# Patient Record
Sex: MALE | Race: WHITE | NOT HISPANIC OR LATINO | Employment: OTHER | URBAN - METROPOLITAN AREA
[De-identification: names, ages, dates, MRNs, and addresses within clinical notes are randomized per-mention and may not be internally consistent; named-entity substitution may affect disease eponyms.]

---

## 2024-07-08 ENCOUNTER — HOSPITAL ENCOUNTER (OUTPATIENT)
Dept: RADIOLOGY | Facility: MEDICAL CENTER | Age: 69
End: 2024-07-08

## 2024-07-08 ENCOUNTER — HOSPITAL ENCOUNTER (INPATIENT)
Facility: MEDICAL CENTER | Age: 69
LOS: 9 days | DRG: 377 | End: 2024-07-17
Attending: INTERNAL MEDICINE | Admitting: INTERNAL MEDICINE
Payer: MEDICARE

## 2024-07-08 DIAGNOSIS — I21.4 NSTEMI (NON-ST ELEVATED MYOCARDIAL INFARCTION) (HCC): ICD-10-CM

## 2024-07-08 DIAGNOSIS — I63.9 ACUTE CVA (CEREBROVASCULAR ACCIDENT) (HCC): ICD-10-CM

## 2024-07-08 DIAGNOSIS — Z89.432 STATUS POST AMPUTATION OF LEFT FOOT THROUGH METATARSAL BONE (HCC): ICD-10-CM

## 2024-07-08 DIAGNOSIS — E83.51 HYPOCALCEMIA: ICD-10-CM

## 2024-07-08 DIAGNOSIS — D62 ACUTE BLOOD LOSS ANEMIA: ICD-10-CM

## 2024-07-08 DIAGNOSIS — M86.172 ACUTE OSTEOMYELITIS OF LEFT FOOT (HCC): ICD-10-CM

## 2024-07-08 DIAGNOSIS — K92.1 GASTROINTESTINAL HEMORRHAGE WITH MELENA: ICD-10-CM

## 2024-07-08 DIAGNOSIS — I48.91 ATRIAL FIBRILLATION, UNSPECIFIED TYPE (HCC): ICD-10-CM

## 2024-07-08 DIAGNOSIS — L97.509 TYPE 2 DIABETES MELLITUS WITH FOOT ULCER, WITH LONG-TERM CURRENT USE OF INSULIN (HCC): ICD-10-CM

## 2024-07-08 DIAGNOSIS — E11.621 TYPE 2 DIABETES MELLITUS WITH FOOT ULCER, WITH LONG-TERM CURRENT USE OF INSULIN (HCC): ICD-10-CM

## 2024-07-08 DIAGNOSIS — R60.0 LOCALIZED EDEMA: ICD-10-CM

## 2024-07-08 DIAGNOSIS — K26.4 GASTROINTESTINAL HEMORRHAGE ASSOCIATED WITH DUODENAL ULCER: ICD-10-CM

## 2024-07-08 DIAGNOSIS — Z79.4 TYPE 2 DIABETES MELLITUS WITH FOOT ULCER, WITH LONG-TERM CURRENT USE OF INSULIN (HCC): ICD-10-CM

## 2024-07-08 DIAGNOSIS — I24.9 ACUTE CORONARY SYNDROME (HCC): ICD-10-CM

## 2024-07-08 PROBLEM — E11.9 TYPE 2 DIABETES MELLITUS (HCC): Status: ACTIVE | Noted: 2024-07-08

## 2024-07-08 PROBLEM — K92.2 GI BLEED: Status: ACTIVE | Noted: 2024-07-08

## 2024-07-08 PROBLEM — I05.0 MITRAL STENOSIS: Status: ACTIVE | Noted: 2024-07-08

## 2024-07-08 LAB
ABO GROUP BLD: NORMAL
ALBUMIN SERPL BCP-MCNC: 2.4 G/DL (ref 3.2–4.9)
ALBUMIN/GLOB SERPL: 1.4 G/DL
ALP SERPL-CCNC: 62 U/L (ref 30–99)
ALT SERPL-CCNC: 17 U/L (ref 2–50)
ANION GAP SERPL CALC-SCNC: 10 MMOL/L (ref 7–16)
APTT PPP: 21.5 SEC (ref 24.7–36)
AST SERPL-CCNC: 14 U/L (ref 12–45)
BARCODED ABORH UBTYP: 5100
BARCODED PRD CODE UBPRD: NORMAL
BARCODED UNIT NUM UBUNT: NORMAL
BASOPHILS # BLD AUTO: 0.2 % (ref 0–1.8)
BASOPHILS # BLD: 0.04 K/UL (ref 0–0.12)
BILIRUB SERPL-MCNC: 0.4 MG/DL (ref 0.1–1.5)
BLD GP AB SCN SERPL QL: NORMAL
BUN SERPL-MCNC: 30 MG/DL (ref 8–22)
CALCIUM ALBUM COR SERPL-MCNC: 8.6 MG/DL (ref 8.5–10.5)
CALCIUM SERPL-MCNC: 7.3 MG/DL (ref 8.5–10.5)
CHLORIDE SERPL-SCNC: 111 MMOL/L (ref 96–112)
CK SERPL-CCNC: 101 U/L (ref 0–154)
CO2 SERPL-SCNC: 17 MMOL/L (ref 20–33)
COMPONENT R 8504R: NORMAL
CREAT SERPL-MCNC: 0.69 MG/DL (ref 0.5–1.4)
CRP SERPL HS-MCNC: 1.3 MG/DL (ref 0–0.75)
EKG IMPRESSION: NORMAL
EOSINOPHIL # BLD AUTO: 0.06 K/UL (ref 0–0.51)
EOSINOPHIL NFR BLD: 0.3 % (ref 0–6.9)
ERYTHROCYTE [DISTWIDTH] IN BLOOD BY AUTOMATED COUNT: 46.5 FL (ref 35.9–50)
ERYTHROCYTE [SEDIMENTATION RATE] IN BLOOD BY WESTERGREN METHOD: 18 MM/HOUR (ref 0–20)
GFR SERPLBLD CREATININE-BSD FMLA CKD-EPI: 99 ML/MIN/1.73 M 2
GLOBULIN SER CALC-MCNC: 1.7 G/DL (ref 1.9–3.5)
GLUCOSE SERPL-MCNC: 155 MG/DL (ref 65–99)
HCT VFR BLD AUTO: 21.4 % (ref 42–52)
HGB BLD-MCNC: 7.3 G/DL (ref 14–18)
IMM GRANULOCYTES # BLD AUTO: 0.36 K/UL (ref 0–0.11)
IMM GRANULOCYTES NFR BLD AUTO: 2 % (ref 0–0.9)
INR PPP: 1.24 (ref 0.87–1.13)
LYMPHOCYTES # BLD AUTO: 2.07 K/UL (ref 1–4.8)
LYMPHOCYTES NFR BLD: 11.6 % (ref 22–41)
MCH RBC QN AUTO: 29.9 PG (ref 27–33)
MCHC RBC AUTO-ENTMCNC: 34.1 G/DL (ref 32.3–36.5)
MCV RBC AUTO: 87.7 FL (ref 81.4–97.8)
MONOCYTES # BLD AUTO: 1.02 K/UL (ref 0–0.85)
MONOCYTES NFR BLD AUTO: 5.7 % (ref 0–13.4)
NEUTROPHILS # BLD AUTO: 14.29 K/UL (ref 1.82–7.42)
NEUTROPHILS NFR BLD: 80.2 % (ref 44–72)
NRBC # BLD AUTO: 0.22 K/UL
NRBC BLD-RTO: 1.2 /100 WBC (ref 0–0.2)
PLATELET # BLD AUTO: 258 K/UL (ref 164–446)
PMV BLD AUTO: 9.9 FL (ref 9–12.9)
POTASSIUM SERPL-SCNC: 3.6 MMOL/L (ref 3.6–5.5)
PRODUCT TYPE UPROD: NORMAL
PROT SERPL-MCNC: 4.1 G/DL (ref 6–8.2)
PROTHROMBIN TIME: 15.7 SEC (ref 12–14.6)
RBC # BLD AUTO: 2.44 M/UL (ref 4.7–6.1)
RH BLD: NORMAL
SODIUM SERPL-SCNC: 138 MMOL/L (ref 135–145)
TROPONIN T SERPL-MCNC: 933 NG/L (ref 6–19)
UNIT STATUS USTAT: NORMAL
WBC # BLD AUTO: 17.8 K/UL (ref 4.8–10.8)

## 2024-07-08 PROCEDURE — C9113 INJ PANTOPRAZOLE SODIUM, VIA: HCPCS | Performed by: INTERNAL MEDICINE

## 2024-07-08 PROCEDURE — 93010 ELECTROCARDIOGRAM REPORT: CPT | Performed by: INTERNAL MEDICINE

## 2024-07-08 PROCEDURE — 85652 RBC SED RATE AUTOMATED: CPT

## 2024-07-08 PROCEDURE — 80053 COMPREHEN METABOLIC PANEL: CPT

## 2024-07-08 PROCEDURE — 99223 1ST HOSP IP/OBS HIGH 75: CPT | Mod: AI | Performed by: INTERNAL MEDICINE

## 2024-07-08 PROCEDURE — 84484 ASSAY OF TROPONIN QUANT: CPT

## 2024-07-08 PROCEDURE — 770000 HCHG ROOM/CARE - INTERMEDIATE ICU *

## 2024-07-08 PROCEDURE — 85730 THROMBOPLASTIN TIME PARTIAL: CPT

## 2024-07-08 PROCEDURE — 700111 HCHG RX REV CODE 636 W/ 250 OVERRIDE (IP): Performed by: INTERNAL MEDICINE

## 2024-07-08 PROCEDURE — 700105 HCHG RX REV CODE 258: Performed by: INTERNAL MEDICINE

## 2024-07-08 PROCEDURE — 93005 ELECTROCARDIOGRAM TRACING: CPT | Performed by: INTERNAL MEDICINE

## 2024-07-08 PROCEDURE — 85610 PROTHROMBIN TIME: CPT

## 2024-07-08 PROCEDURE — 86850 RBC ANTIBODY SCREEN: CPT

## 2024-07-08 PROCEDURE — 85025 COMPLETE CBC W/AUTO DIFF WBC: CPT

## 2024-07-08 PROCEDURE — 86900 BLOOD TYPING SEROLOGIC ABO: CPT

## 2024-07-08 PROCEDURE — 86140 C-REACTIVE PROTEIN: CPT

## 2024-07-08 PROCEDURE — 86901 BLOOD TYPING SEROLOGIC RH(D): CPT

## 2024-07-08 PROCEDURE — 82550 ASSAY OF CK (CPK): CPT

## 2024-07-08 RX ORDER — OXYCODONE HYDROCHLORIDE 5 MG/1
5 TABLET ORAL
Status: DISCONTINUED | OUTPATIENT
Start: 2024-07-08 | End: 2024-07-15

## 2024-07-08 RX ORDER — POLYETHYLENE GLYCOL 3350 17 G/17G
1 POWDER, FOR SOLUTION ORAL
Status: DISCONTINUED | OUTPATIENT
Start: 2024-07-08 | End: 2024-07-17 | Stop reason: HOSPADM

## 2024-07-08 RX ORDER — ONDANSETRON 4 MG/1
4 TABLET, ORALLY DISINTEGRATING ORAL EVERY 4 HOURS PRN
Status: DISCONTINUED | OUTPATIENT
Start: 2024-07-08 | End: 2024-07-17 | Stop reason: HOSPADM

## 2024-07-08 RX ORDER — SODIUM CHLORIDE 9 MG/ML
INJECTION, SOLUTION INTRAVENOUS CONTINUOUS
Status: DISCONTINUED | OUTPATIENT
Start: 2024-07-08 | End: 2024-07-09

## 2024-07-08 RX ORDER — ACETAMINOPHEN 325 MG/1
650 TABLET ORAL EVERY 6 HOURS PRN
Status: DISCONTINUED | OUTPATIENT
Start: 2024-07-08 | End: 2024-07-17 | Stop reason: HOSPADM

## 2024-07-08 RX ORDER — HYDROMORPHONE HYDROCHLORIDE 1 MG/ML
0.25 INJECTION, SOLUTION INTRAMUSCULAR; INTRAVENOUS; SUBCUTANEOUS
Status: DISCONTINUED | OUTPATIENT
Start: 2024-07-08 | End: 2024-07-14

## 2024-07-08 RX ORDER — ONDANSETRON 2 MG/ML
4 INJECTION INTRAMUSCULAR; INTRAVENOUS EVERY 4 HOURS PRN
Status: DISCONTINUED | OUTPATIENT
Start: 2024-07-08 | End: 2024-07-17 | Stop reason: HOSPADM

## 2024-07-08 RX ORDER — AMOXICILLIN 250 MG
2 CAPSULE ORAL EVERY EVENING
Status: DISCONTINUED | OUTPATIENT
Start: 2024-07-08 | End: 2024-07-17 | Stop reason: HOSPADM

## 2024-07-08 RX ORDER — PANTOPRAZOLE SODIUM 40 MG/10ML
40 INJECTION, POWDER, LYOPHILIZED, FOR SOLUTION INTRAVENOUS 2 TIMES DAILY
Status: DISCONTINUED | OUTPATIENT
Start: 2024-07-08 | End: 2024-07-13

## 2024-07-08 RX ORDER — DEXTROSE MONOHYDRATE 25 G/50ML
25 INJECTION, SOLUTION INTRAVENOUS
Status: DISCONTINUED | OUTPATIENT
Start: 2024-07-08 | End: 2024-07-09

## 2024-07-08 RX ORDER — LABETALOL HYDROCHLORIDE 5 MG/ML
10 INJECTION, SOLUTION INTRAVENOUS EVERY 4 HOURS PRN
Status: DISCONTINUED | OUTPATIENT
Start: 2024-07-08 | End: 2024-07-17 | Stop reason: HOSPADM

## 2024-07-08 RX ORDER — OXYCODONE HYDROCHLORIDE 5 MG/1
2.5 TABLET ORAL
Status: DISCONTINUED | OUTPATIENT
Start: 2024-07-08 | End: 2024-07-15

## 2024-07-08 RX ADMIN — PANTOPRAZOLE SODIUM 40 MG: 40 INJECTION, POWDER, FOR SOLUTION INTRAVENOUS at 21:45

## 2024-07-08 RX ADMIN — SODIUM CHLORIDE: 9 INJECTION, SOLUTION INTRAVENOUS at 22:16

## 2024-07-08 RX ADMIN — PIPERACILLIN AND TAZOBACTAM 3.38 G: 3; .375 INJECTION, POWDER, FOR SOLUTION INTRAVENOUS at 23:19

## 2024-07-08 SDOH — ECONOMIC STABILITY: TRANSPORTATION INSECURITY
IN THE PAST 12 MONTHS, HAS THE LACK OF TRANSPORTATION KEPT YOU FROM MEDICAL APPOINTMENTS OR FROM GETTING MEDICATIONS?: NO

## 2024-07-08 SDOH — ECONOMIC STABILITY: TRANSPORTATION INSECURITY
IN THE PAST 12 MONTHS, HAS LACK OF RELIABLE TRANSPORTATION KEPT YOU FROM MEDICAL APPOINTMENTS, MEETINGS, WORK OR FROM GETTING THINGS NEEDED FOR DAILY LIVING?: NO

## 2024-07-08 ASSESSMENT — COGNITIVE AND FUNCTIONAL STATUS - GENERAL
MOBILITY SCORE: 16
STANDING UP FROM CHAIR USING ARMS: A LITTLE
DRESSING REGULAR LOWER BODY CLOTHING: A LITTLE
SUGGESTED CMS G CODE MODIFIER MOBILITY: CK
MOVING TO AND FROM BED TO CHAIR: A LITTLE
DAILY ACTIVITIY SCORE: 20
SUGGESTED CMS G CODE MODIFIER DAILY ACTIVITY: CJ
MOVING FROM LYING ON BACK TO SITTING ON SIDE OF FLAT BED: A LITTLE
CLIMB 3 TO 5 STEPS WITH RAILING: A LOT
DRESSING REGULAR UPPER BODY CLOTHING: A LITTLE
HELP NEEDED FOR BATHING: A LITTLE
TURNING FROM BACK TO SIDE WHILE IN FLAT BAD: A LITTLE
WALKING IN HOSPITAL ROOM: A LOT
TOILETING: A LITTLE

## 2024-07-08 ASSESSMENT — ENCOUNTER SYMPTOMS
PALPITATIONS: 0
CHILLS: 0
TREMORS: 0
NERVOUS/ANXIOUS: 0
SPUTUM PRODUCTION: 0
LOSS OF CONSCIOUSNESS: 1
BACK PAIN: 0
HEARTBURN: 0
SPEECH CHANGE: 0
NECK PAIN: 0
VOMITING: 0
POLYDIPSIA: 0
DOUBLE VISION: 0
WEAKNESS: 1
ORTHOPNEA: 0
NAUSEA: 0
DIZZINESS: 1
FOCAL WEAKNESS: 0
HEMOPTYSIS: 0
FLANK PAIN: 0
PHOTOPHOBIA: 0
BRUISES/BLEEDS EASILY: 0
FALLS: 1
WEIGHT LOSS: 0
HALLUCINATIONS: 0
COUGH: 0
HEADACHES: 0
FEVER: 0
BLURRED VISION: 0

## 2024-07-08 ASSESSMENT — PATIENT HEALTH QUESTIONNAIRE - PHQ9
SUM OF ALL RESPONSES TO PHQ9 QUESTIONS 1 AND 2: 0
1. LITTLE INTEREST OR PLEASURE IN DOING THINGS: NOT AT ALL
2. FEELING DOWN, DEPRESSED, IRRITABLE, OR HOPELESS: NOT AT ALL

## 2024-07-08 ASSESSMENT — PAIN DESCRIPTION - PAIN TYPE: TYPE: ACUTE PAIN

## 2024-07-08 ASSESSMENT — SOCIAL DETERMINANTS OF HEALTH (SDOH)
IN THE PAST 12 MONTHS, HAS THE ELECTRIC, GAS, OIL, OR WATER COMPANY THREATENED TO SHUT OFF SERVICE IN YOUR HOME?: NO
WITHIN THE PAST 12 MONTHS, THE FOOD YOU BOUGHT JUST DIDN'T LAST AND YOU DIDN'T HAVE MONEY TO GET MORE: NEVER TRUE
WITHIN THE PAST 12 MONTHS, YOU WORRIED THAT YOUR FOOD WOULD RUN OUT BEFORE YOU GOT THE MONEY TO BUY MORE: NEVER TRUE
WITHIN THE LAST YEAR, HAVE YOU BEEN KICKED, HIT, SLAPPED, OR OTHERWISE PHYSICALLY HURT BY YOUR PARTNER OR EX-PARTNER?: NO
WITHIN THE LAST YEAR, HAVE YOU BEEN AFRAID OF YOUR PARTNER OR EX-PARTNER?: NO
WITHIN THE LAST YEAR, HAVE TO BEEN RAPED OR FORCED TO HAVE ANY KIND OF SEXUAL ACTIVITY BY YOUR PARTNER OR EX-PARTNER?: NO
WITHIN THE LAST YEAR, HAVE YOU BEEN HUMILIATED OR EMOTIONALLY ABUSED IN OTHER WAYS BY YOUR PARTNER OR EX-PARTNER?: NO

## 2024-07-08 ASSESSMENT — LIFESTYLE VARIABLES: SUBSTANCE_ABUSE: 0

## 2024-07-09 ENCOUNTER — ANESTHESIA (OUTPATIENT)
Dept: SURGERY | Facility: MEDICAL CENTER | Age: 69
DRG: 377 | End: 2024-07-09
Payer: MEDICARE

## 2024-07-09 ENCOUNTER — ANESTHESIA EVENT (OUTPATIENT)
Dept: SURGERY | Facility: MEDICAL CENTER | Age: 69
DRG: 377 | End: 2024-07-09
Payer: MEDICARE

## 2024-07-09 LAB
ABO + RH BLD: NORMAL
ALBUMIN SERPL BCP-MCNC: 2.3 G/DL (ref 3.2–4.9)
ALBUMIN/GLOB SERPL: 1.4 G/DL
ALP SERPL-CCNC: 62 U/L (ref 30–99)
ALT SERPL-CCNC: 16 U/L (ref 2–50)
ANION GAP SERPL CALC-SCNC: 8 MMOL/L (ref 7–16)
AST SERPL-CCNC: 13 U/L (ref 12–45)
BASOPHILS # BLD AUTO: 0.2 % (ref 0–1.8)
BASOPHILS # BLD: 0.04 K/UL (ref 0–0.12)
BILIRUB SERPL-MCNC: 0.5 MG/DL (ref 0.1–1.5)
BUN SERPL-MCNC: 28 MG/DL (ref 8–22)
CALCIUM ALBUM COR SERPL-MCNC: 8.7 MG/DL (ref 8.5–10.5)
CALCIUM SERPL-MCNC: 7.3 MG/DL (ref 8.5–10.5)
CHLORIDE SERPL-SCNC: 113 MMOL/L (ref 96–112)
CO2 SERPL-SCNC: 17 MMOL/L (ref 20–33)
CREAT SERPL-MCNC: 0.63 MG/DL (ref 0.5–1.4)
EOSINOPHIL # BLD AUTO: 0.07 K/UL (ref 0–0.51)
EOSINOPHIL NFR BLD: 0.4 % (ref 0–6.9)
ERYTHROCYTE [DISTWIDTH] IN BLOOD BY AUTOMATED COUNT: 45.9 FL (ref 35.9–50)
GFR SERPLBLD CREATININE-BSD FMLA CKD-EPI: 101 ML/MIN/1.73 M 2
GLOBULIN SER CALC-MCNC: 1.7 G/DL (ref 1.9–3.5)
GLUCOSE BLD STRIP.AUTO-MCNC: 144 MG/DL (ref 65–99)
GLUCOSE BLD STRIP.AUTO-MCNC: 150 MG/DL (ref 65–99)
GLUCOSE BLD STRIP.AUTO-MCNC: 155 MG/DL (ref 65–99)
GLUCOSE BLD STRIP.AUTO-MCNC: 229 MG/DL (ref 65–99)
GLUCOSE BLD STRIP.AUTO-MCNC: 245 MG/DL (ref 65–99)
GLUCOSE SERPL-MCNC: 157 MG/DL (ref 65–99)
HCT VFR BLD AUTO: 23.3 % (ref 42–52)
HGB BLD-MCNC: 7.6 G/DL (ref 14–18)
HGB BLD-MCNC: 8.2 G/DL (ref 14–18)
IMM GRANULOCYTES # BLD AUTO: 0.37 K/UL (ref 0–0.11)
IMM GRANULOCYTES NFR BLD AUTO: 1.9 % (ref 0–0.9)
LYMPHOCYTES # BLD AUTO: 2.03 K/UL (ref 1–4.8)
LYMPHOCYTES NFR BLD: 10.4 % (ref 22–41)
MCH RBC QN AUTO: 30.9 PG (ref 27–33)
MCHC RBC AUTO-ENTMCNC: 35.2 G/DL (ref 32.3–36.5)
MCV RBC AUTO: 87.9 FL (ref 81.4–97.8)
MONOCYTES # BLD AUTO: 1.07 K/UL (ref 0–0.85)
MONOCYTES NFR BLD AUTO: 5.5 % (ref 0–13.4)
NEUTROPHILS # BLD AUTO: 15.91 K/UL (ref 1.82–7.42)
NEUTROPHILS NFR BLD: 81.6 % (ref 44–72)
NRBC # BLD AUTO: 0.16 K/UL
NRBC BLD-RTO: 0.8 /100 WBC (ref 0–0.2)
PATHOLOGY CONSULT NOTE: NORMAL
PLATELET # BLD AUTO: 272 K/UL (ref 164–446)
PMV BLD AUTO: 9.5 FL (ref 9–12.9)
POTASSIUM SERPL-SCNC: 3.6 MMOL/L (ref 3.6–5.5)
PROT SERPL-MCNC: 4 G/DL (ref 6–8.2)
RBC # BLD AUTO: 2.65 M/UL (ref 4.7–6.1)
SCCMEC + MECA PNL NOSE NAA+PROBE: NEGATIVE
SODIUM SERPL-SCNC: 138 MMOL/L (ref 135–145)
TROPONIN T SERPL-MCNC: 861 NG/L (ref 6–19)
WBC # BLD AUTO: 19.5 K/UL (ref 4.8–10.8)

## 2024-07-09 PROCEDURE — 84484 ASSAY OF TROPONIN QUANT: CPT

## 2024-07-09 PROCEDURE — 36430 TRANSFUSION BLD/BLD COMPNT: CPT

## 2024-07-09 PROCEDURE — 99223 1ST HOSP IP/OBS HIGH 75: CPT | Mod: 25 | Performed by: INTERNAL MEDICINE

## 2024-07-09 PROCEDURE — 700111 HCHG RX REV CODE 636 W/ 250 OVERRIDE (IP): Performed by: STUDENT IN AN ORGANIZED HEALTH CARE EDUCATION/TRAINING PROGRAM

## 2024-07-09 PROCEDURE — 43255 EGD CONTROL BLEEDING ANY: CPT | Performed by: INTERNAL MEDICINE

## 2024-07-09 PROCEDURE — 700102 HCHG RX REV CODE 250 W/ 637 OVERRIDE(OP): Performed by: INTERNAL MEDICINE

## 2024-07-09 PROCEDURE — 99233 SBSQ HOSP IP/OBS HIGH 50: CPT | Performed by: HOSPITALIST

## 2024-07-09 PROCEDURE — 86923 COMPATIBILITY TEST ELECTRIC: CPT

## 2024-07-09 PROCEDURE — 700105 HCHG RX REV CODE 258: Performed by: STUDENT IN AN ORGANIZED HEALTH CARE EDUCATION/TRAINING PROGRAM

## 2024-07-09 PROCEDURE — 85025 COMPLETE CBC W/AUTO DIFF WBC: CPT

## 2024-07-09 PROCEDURE — 770000 HCHG ROOM/CARE - INTERMEDIATE ICU *

## 2024-07-09 PROCEDURE — 0DB78ZX EXCISION OF STOMACH, PYLORUS, VIA NATURAL OR ARTIFICIAL OPENING ENDOSCOPIC, DIAGNOSTIC: ICD-10-PCS | Performed by: INTERNAL MEDICINE

## 2024-07-09 PROCEDURE — 700101 HCHG RX REV CODE 250: Performed by: STUDENT IN AN ORGANIZED HEALTH CARE EDUCATION/TRAINING PROGRAM

## 2024-07-09 PROCEDURE — C9113 INJ PANTOPRAZOLE SODIUM, VIA: HCPCS | Performed by: INTERNAL MEDICINE

## 2024-07-09 PROCEDURE — 700105 HCHG RX REV CODE 258: Performed by: INTERNAL MEDICINE

## 2024-07-09 PROCEDURE — 700111 HCHG RX REV CODE 636 W/ 250 OVERRIDE (IP): Mod: JZ | Performed by: INTERNAL MEDICINE

## 2024-07-09 PROCEDURE — 160009 HCHG ANES TIME/MIN: Performed by: INTERNAL MEDICINE

## 2024-07-09 PROCEDURE — P9016 RBC LEUKOCYTES REDUCED: HCPCS

## 2024-07-09 PROCEDURE — 160048 HCHG OR STATISTICAL LEVEL 1-5: Performed by: INTERNAL MEDICINE

## 2024-07-09 PROCEDURE — 87641 MR-STAPH DNA AMP PROBE: CPT

## 2024-07-09 PROCEDURE — 88312 SPECIAL STAINS GROUP 1: CPT

## 2024-07-09 PROCEDURE — 85018 HEMOGLOBIN: CPT

## 2024-07-09 PROCEDURE — 88305 TISSUE EXAM BY PATHOLOGIST: CPT

## 2024-07-09 PROCEDURE — A9270 NON-COVERED ITEM OR SERVICE: HCPCS | Performed by: INTERNAL MEDICINE

## 2024-07-09 PROCEDURE — 80053 COMPREHEN METABOLIC PANEL: CPT

## 2024-07-09 PROCEDURE — 99223 1ST HOSP IP/OBS HIGH 75: CPT | Mod: GC | Performed by: INTERNAL MEDICINE

## 2024-07-09 PROCEDURE — 0W3P8ZZ CONTROL BLEEDING IN GASTROINTESTINAL TRACT, VIA NATURAL OR ARTIFICIAL OPENING ENDOSCOPIC: ICD-10-PCS | Performed by: INTERNAL MEDICINE

## 2024-07-09 PROCEDURE — 700102 HCHG RX REV CODE 250 W/ 637 OVERRIDE(OP)

## 2024-07-09 PROCEDURE — 82962 GLUCOSE BLOOD TEST: CPT | Mod: 91

## 2024-07-09 PROCEDURE — 160035 HCHG PACU - 1ST 60 MINS PHASE I: Performed by: INTERNAL MEDICINE

## 2024-07-09 PROCEDURE — 160002 HCHG RECOVERY MINUTES (STAT): Performed by: INTERNAL MEDICINE

## 2024-07-09 PROCEDURE — 160203 HCHG ENDO MINUTES - 1ST 30 MINS LEVEL 4: Performed by: INTERNAL MEDICINE

## 2024-07-09 PROCEDURE — 30233N1 TRANSFUSION OF NONAUTOLOGOUS RED BLOOD CELLS INTO PERIPHERAL VEIN, PERCUTANEOUS APPROACH: ICD-10-PCS | Performed by: STUDENT IN AN ORGANIZED HEALTH CARE EDUCATION/TRAINING PROGRAM

## 2024-07-09 PROCEDURE — A9270 NON-COVERED ITEM OR SERVICE: HCPCS

## 2024-07-09 RX ORDER — EMPAGLIFLOZIN 25 MG/1
25 TABLET, FILM COATED ORAL DAILY
Status: ON HOLD | COMMUNITY
End: 2024-07-31

## 2024-07-09 RX ORDER — ONDANSETRON 2 MG/ML
4 INJECTION INTRAMUSCULAR; INTRAVENOUS
Status: DISCONTINUED | OUTPATIENT
Start: 2024-07-09 | End: 2024-07-09 | Stop reason: HOSPADM

## 2024-07-09 RX ORDER — AMIODARONE HYDROCHLORIDE 200 MG/1
400 TABLET ORAL TWICE DAILY
Status: DISCONTINUED | OUTPATIENT
Start: 2024-07-09 | End: 2024-07-17 | Stop reason: HOSPADM

## 2024-07-09 RX ORDER — ROSUVASTATIN CALCIUM 5 MG/1
5 TABLET, COATED ORAL EVERY EVENING
Status: ON HOLD | COMMUNITY
End: 2024-07-17

## 2024-07-09 RX ORDER — DEXTROSE MONOHYDRATE 25 G/50ML
25 INJECTION, SOLUTION INTRAVENOUS
Status: DISCONTINUED | OUTPATIENT
Start: 2024-07-09 | End: 2024-07-11

## 2024-07-09 RX ORDER — SODIUM CHLORIDE, SODIUM LACTATE, POTASSIUM CHLORIDE, CALCIUM CHLORIDE 600; 310; 30; 20 MG/100ML; MG/100ML; MG/100ML; MG/100ML
INJECTION, SOLUTION INTRAVENOUS
Status: DISCONTINUED | OUTPATIENT
Start: 2024-07-09 | End: 2024-07-09 | Stop reason: SURG

## 2024-07-09 RX ORDER — ATORVASTATIN CALCIUM 40 MG/1
40 TABLET, FILM COATED ORAL EVERY EVENING
Status: DISCONTINUED | OUTPATIENT
Start: 2024-07-09 | End: 2024-07-17 | Stop reason: HOSPADM

## 2024-07-09 RX ORDER — LIDOCAINE HYDROCHLORIDE 20 MG/ML
INJECTION, SOLUTION EPIDURAL; INFILTRATION; INTRACAUDAL; PERINEURAL PRN
Status: DISCONTINUED | OUTPATIENT
Start: 2024-07-09 | End: 2024-07-09 | Stop reason: SURG

## 2024-07-09 RX ADMIN — PROPOFOL 30 MG: 10 INJECTION, EMULSION INTRAVENOUS at 10:00

## 2024-07-09 RX ADMIN — VANCOMYCIN HYDROCHLORIDE 1750 MG: 5 INJECTION, POWDER, LYOPHILIZED, FOR SOLUTION INTRAVENOUS at 00:01

## 2024-07-09 RX ADMIN — OXYCODONE 2.5 MG: 5 TABLET ORAL at 03:16

## 2024-07-09 RX ADMIN — OXYCODONE 2.5 MG: 5 TABLET ORAL at 20:37

## 2024-07-09 RX ADMIN — SODIUM CHLORIDE, POTASSIUM CHLORIDE, SODIUM LACTATE AND CALCIUM CHLORIDE: 600; 310; 30; 20 INJECTION, SOLUTION INTRAVENOUS at 09:57

## 2024-07-09 RX ADMIN — PIPERACILLIN AND TAZOBACTAM 3.38 G: 3; .375 INJECTION, POWDER, FOR SOLUTION INTRAVENOUS at 20:34

## 2024-07-09 RX ADMIN — LIDOCAINE HYDROCHLORIDE 100 MG: 20 INJECTION, SOLUTION EPIDURAL; INFILTRATION; INTRACAUDAL at 09:59

## 2024-07-09 RX ADMIN — AMIODARONE HYDROCHLORIDE 400 MG: 200 TABLET ORAL at 17:27

## 2024-07-09 RX ADMIN — PANTOPRAZOLE SODIUM 40 MG: 40 INJECTION, POWDER, FOR SOLUTION INTRAVENOUS at 17:27

## 2024-07-09 RX ADMIN — PROPOFOL 125 MCG/KG/MIN: 10 INJECTION, EMULSION INTRAVENOUS at 09:59

## 2024-07-09 RX ADMIN — VANCOMYCIN HYDROCHLORIDE 1250 MG: 5 INJECTION, POWDER, LYOPHILIZED, FOR SOLUTION INTRAVENOUS at 12:28

## 2024-07-09 RX ADMIN — PANTOPRAZOLE SODIUM 40 MG: 40 INJECTION, POWDER, FOR SOLUTION INTRAVENOUS at 06:04

## 2024-07-09 RX ADMIN — INSULIN HUMAN 2 UNITS: 100 INJECTION, SOLUTION PARENTERAL at 17:24

## 2024-07-09 RX ADMIN — ATORVASTATIN CALCIUM 40 MG: 40 TABLET, FILM COATED ORAL at 17:27

## 2024-07-09 RX ADMIN — PIPERACILLIN AND TAZOBACTAM 3.38 G: 3; .375 INJECTION, POWDER, FOR SOLUTION INTRAVENOUS at 03:42

## 2024-07-09 RX ADMIN — PIPERACILLIN AND TAZOBACTAM 3.38 G: 3; .375 INJECTION, POWDER, FOR SOLUTION INTRAVENOUS at 12:23

## 2024-07-09 RX ADMIN — INSULIN HUMAN 2 UNITS: 100 INJECTION, SOLUTION PARENTERAL at 12:16

## 2024-07-09 ASSESSMENT — ENCOUNTER SYMPTOMS
SORE THROAT: 0
BACK PAIN: 0
ABDOMINAL PAIN: 0
NAUSEA: 0
FEVER: 0
NECK PAIN: 0
BLURRED VISION: 0
SHORTNESS OF BREATH: 0
NERVOUS/ANXIOUS: 0
PALPITATIONS: 0
HEADACHES: 0

## 2024-07-09 ASSESSMENT — PAIN DESCRIPTION - PAIN TYPE
TYPE: SURGICAL PAIN
TYPE: SURGICAL PAIN
TYPE: ACUTE PAIN
TYPE: ACUTE PAIN
TYPE: ACUTE PAIN;CHRONIC PAIN
TYPE: ACUTE PAIN
TYPE: ACUTE PAIN;CHRONIC PAIN
TYPE: ACUTE PAIN

## 2024-07-09 NOTE — ASSESSMENT & PLAN NOTE
Uncontrolled with hyperglycemia Ha1c 9.4 with diabetic foot wound   BG uncontrolled   I have started lantus 10 Units today, continue ISS   Resume metformin   Hypoglycemic protocol  Diabetic diet

## 2024-07-09 NOTE — PROCEDURES
OPERATIVE REPORT    PATIENT:   Pj Freeman   1955       PREOPERATIVE DIAGNOSES/INDICATIONS: Upper GI bleeding, melena.     POSTOPERATIVE DIAGNOSIS:   Six duodenal ulcers, all clean based, but one was active oozing, s/p clips x 2.     PROCEDURE:  ESOPHAGOGASTRODUODENOSCOPY    PHYSICIAN:  Olu Diop MD. MPH.    ANESTHESIA:  Per anesthesiologist or ICU/ED team.     LOCATION: [facility]    CONSENT:  OBTAINED.  The risks, benefits and alternatives of the procedure were discussed in details. The risks include and are not limited to bleeding, infection, perforation, missed lesions, and sedations risks (cardiopulmonary compromise and allergic reaction to medications).    DESCRIPTION: Scope team at bedside  Patient was placed on his left lateral decubitus position. A bite block was placed in patient's mouth. Patient was sedated by anesthesia.  Vital signs were monitored throughout the procedure.  Oxygenation support was provided throughout the procedure. Upper endoscope was inserted into patient's mouth and advanced to the second portion of the duodenum under direct visualization.      Once the site was reached and examined, the upper endoscope was withdrawn.  Retroflexion was made within the stomach.  The stomach was decompressed, scope was withdrawn and the procedure was terminated.    The patient tolerated the procedure well.  There were no immediate complications.    OPERATIVE FINDINGS:    1. Esophagus: Hiatal hernia 1cm, GERD grade B with erosions.   2. Stomach: Gastritis and some erosions in body. S/p biopsy at antrum for HP.   3. Duodenum: Six duodenal ulcers, all clean based, but one was active oozing, s/p clips x 2.     RECOMMENDATIONS:  Complete iv ppi bid for total 3 days; then PPI 40mg bid oral dose for 8 weeks then taper.    Consider outpatient GI referral to have follow up and repeat EGD.   Okay to resume diet slowly. Water and clear liquid today.   GI team will follow up on the pathology with the  patient.   If Hgb stable and no further evidence of brisk/acute bleeding, we can consider resuming full dose of his medications for his heart/brain on 7/10  GI sign off.       This note has been transcribed with digital voice recognition software and although it has been reviewed may contain grammatical or word errors

## 2024-07-09 NOTE — H&P
Hospital Medicine History & Physical Note    Date of Service  7/8/2024    Primary Care Physician  No primary care provider on file.        Code Status  Full Code    Chief Complaint  Syncope, melena    History of Presenting Illness  69-year-old male with history of type 2 diabetes, hypertension, peripheral arterial disease, recent left toe amputation on 7/3 due to osteomyelitis, who   presented as a direct admit from [facility] with GI bleed, for GI consult and endoscopy.  After left foot transmetatarsal amputation  for osteomyelitis on 7/3  he was discharged home and then was readmitted on 7/6 after  syncopal episode at home,, black stool, orthostatic dizziness.   hemoglobin on admission 8.6, down from 15 prior to admission.  He continued having dark tarry stool on 7/7 and his hemoglobin trended down to 6.2, status post 4 units of RBC transfusion total.  Repeat hemoglobin today 8.1.  On admission he was evaluated with CT head and neck and MRI of the brain that showed acute CVA without large vessel occlusion.    Patient had mild chest discomfort in the last 24 hours and cardiac enzymes noted to rise to 6000.  He felt similar chest discomfort 1 to 2 weeks ago at home.  He initially started on IV heparin for both ischemic stroke presumed embolic etiology per neurology at the [facility] recommendation and for non-STEMI, that was discontinued due to continuous concern for GI bleed.    He was evaluated with transthoracic echo showed 50% EF, grade 2 diastolic dysfunction, elevated LV filling pressure, bicuspid aortic valve,  severe mitral stenosis, pulmonary hypertension.  MRI of the brain: Tiny focus of acute ischemia in the left parietal lobe with chronic microvascular disease and small infarcts.  No acute hemorrhage or mass.      patient found to have  new paroxysmal A-fib in the setting of acute CVA at outside hospital, was started on IV amiodarone and IV heparin, additionally to Plavix.   Heparin and Plavix was stopped due to continuous diarrhea black BMs and syncope on 7/7    MRI of the left foot: Status post transmetatarsal amputation without evidence of recurrent osteomyelitis of up or abscess.  Subcutaneous edema along the stump which could be postsurgical or posttraumatic without loculated fluid collection hematoma or abscess.  There is marrow edema along the first metatarsal which could represent a bone contusion    Patient was treated with IV vancomycin and Zosyn due to leukocytosis in the setting of recent surgery of transmetatarsal amputation of the left foot for osteomyelitis.    Chemistry panel today did show hyperglycemia 196, bicarb 19 with anion gap 6.9, potassium 3.5, sodium 134, albumin 1.6, phosphorus 3.3, magnesium 2.1, ALT is not elevated.  CBC showed WBC elevation 17.2, hemoglobin 7.6, platelets 243.  .    Therefore, active Hospital problem at outside facility included GI bleed, anemia of blood loss, syncope, non-ST elevation MI, valvular heart disease with severe mitral stenosis, CVA, diabetes type 2 with hyperglycemia, PAD with recent status post left metatarsal amputation  for osteomyelitis, leukocytosis, suspected secondary to recent osteomyelitis.      Active medications: Amiodarone 200 mg, vancomycin IV, Zosyn IV, Protonix IV twice daily 40 mg, vitamin B12 p.o., Januvia, farxiga, insulin sliding scale, Percocet, Zofran as needed, rosuvastatin          I discussed the plan of care with patient and bedside RN .    Review of Systems  Review of Systems   Constitutional:  Positive for malaise/fatigue. Negative for chills, fever and weight loss.   HENT:  Negative for ear pain, hearing loss and tinnitus.    Eyes:  Negative for blurred vision, double vision and photophobia.   Respiratory:  Negative for cough, hemoptysis and sputum production.    Cardiovascular:  Negative for chest pain, palpitations and orthopnea.   Gastrointestinal:  Positive for melena. Negative for heartburn,  nausea and vomiting.   Genitourinary:  Negative for dysuria, flank pain, frequency and hematuria.   Musculoskeletal:  Positive for falls and joint pain. Negative for back pain and neck pain.   Skin:  Negative for itching and rash.   Neurological:  Positive for dizziness, loss of consciousness and weakness. Negative for tremors, speech change, focal weakness and headaches.   Endo/Heme/Allergies:  Negative for environmental allergies and polydipsia. Does not bruise/bleed easily.   Psychiatric/Behavioral:  Negative for hallucinations and substance abuse. The patient is not nervous/anxious.        Past Medical History   has no past medical history on file.    Surgical History   has no past surgical history on file.     Family History  family history is not on file.   Family history reviewed with patient. There is no family history that is pertinent to the chief complaint.     Social History       Allergies  Not on File    Medications  None       Physical Exam                             Physical Exam  Vitals and nursing note reviewed.   Constitutional:       General: He is not in acute distress.     Appearance: Normal appearance.   HENT:      Head: Normocephalic and atraumatic.      Nose: Nose normal.      Mouth/Throat:      Mouth: Mucous membranes are moist.   Eyes:      Extraocular Movements: Extraocular movements intact.      Pupils: Pupils are equal, round, and reactive to light.   Cardiovascular:      Rate and Rhythm: Normal rate and regular rhythm.   Pulmonary:      Effort: Pulmonary effort is normal.      Breath sounds: Normal breath sounds.   Abdominal:      General: Abdomen is flat. There is no distension.      Tenderness: There is no abdominal tenderness.   Musculoskeletal:         General: No swelling or deformity. Normal range of motion.      Cervical back: Normal range of motion and neck supple.      Comments: Left foot: Status post TMA wound, surgical incision CDI   Skin:     General: Skin is warm and dry.  "     Coloration: Skin is pale.   Neurological:      General: No focal deficit present.      Mental Status: He is alert and oriented to person, place, and time.   Psychiatric:         Mood and Affect: Mood normal.         Behavior: Behavior normal.         Laboratory:          No results for input(s): \"ALTSGPT\", \"ASTSGOT\", \"ALKPHOSPHAT\", \"TBILIRUBIN\", \"DBILIRUBIN\", \"GAMMAGT\", \"AMYLASE\", \"LIPASE\", \"ALB\", \"PREALBUMIN\", \"GLUCOSE\" in the last 72 hours.      No results for input(s): \"NTPROBNP\" in the last 72 hours.      No results for input(s): \"TROPONINT\" in the last 72 hours.    Imaging:  No orders to display         Assessment/Plan:  Justification for Admission Status  I anticipate this patient will require at least two midnights for appropriate medical management, necessitating inpatient admission because GI bleed    Patient will need a Intermediate Care (Adult and Pediatrics) bed on MEDICAL service .  The need is secondary to GI bleed.    * Upper GI bleed- (present on admission)  Assessment & Plan  Patient is direct admit with melena and acute blood loss anemia, status post 4 unit of RBC at [facility]  Etiology is unclear, he denies taking NSAID.  However he was given IV heparin for A-fib/CVA/non-STEMI, as well as Plavix, that was discontinued  Plan: Repeat H&H, transfuse as needed for hemoglobin 8.0 and below given orthostatic dizziness, NSTEMI  Continue IV Protonix, IV fluids  Clear liquid diet and n.p.o. at midnight for expected endoscopy tomorrow      NSTEMI (non-ST elevated myocardial infarction) (HCC)  Assessment & Plan  Elevated troponin and chest discomfort documented at outside facility  Echo showed no focal wall motion abnormalities, but there is borderline low EF 50%, grade 2 diastolic dysfunction, severe mitral stenosis and pulmonary hypertension  Probably type II NSTEMI secondary to severe anemia  Plan: Repeat troponin and EKG  RBC transfusion for goal hemoglobin 8.0 and " above  Ischemic evaluation when appropriate, probably as outpatient    Acute CVA (cerebrovascular accident) ([facility])  Assessment & Plan  Presented on 7/6 after syncopal episode without reported gross neurodeficits  MRI of the brain: Tiny focus of acute ischemia in the left parietal lobe with chronic microvascular disease and small infarcts.  No acute hemorrhage or mass.  On exam today there is no definite motor deficit.  New onset A-fib, as well as severe mitral stenosis on echo documented at outside hospital, could not continue anticoagulation due to acute GI bleed  Plan to restart anticoagulation when cleared by GI  Monitor with neurochecks every 4 hours  Consider neuroconsult    Acute blood loss anemia  Assessment & Plan  S/p 4 units of RBC transfusion at outside hospital  Repeat H&H pending.  Goal hemoglobin 8.0 and above  Consider iron studies and IV iron supplementation    Type 2 diabetes mellitus ([facility])  Assessment & Plan  Uncontrolled with hyperglycemia  Insulin sliding scale ordered    Acute osteomyelitis of left foot ([facility])  Assessment & Plan  Status post left TMA on 7/3  Repeat MRI of the left foot:status post transmetatarsal amputation without evidence of recurrent osteomyelitis of up or abscess.  Subcutaneous edema along the stump which could be postsurgical or posttraumatic without loculated fluid collection hematoma or abscess.  There is marrow edema along the first metatarsal which could represent a bone contusion  And consider obtaining surgical pathology and available culture report to determine needs to continue antibiotics, consider ID consult R  repeat CBC, inflammatory markers  Left foot stump looks noninfected.  Continue vancomycin and Zosyn for now      Mitral stenosis  Assessment & Plan  transthoracic echo showed 50% EF, grade 2 diastolic dysfunction, elevated LV filling pressure, bicuspid aortic valve,  severe mitral stenosis, pulmonary hypertension.  Patient probably would benefit from GERALDINE and  possibly cardiac surgery consult when stable from bleeding standpoint        VTE prophylaxis: SCDs/TEDs

## 2024-07-09 NOTE — CONSULTS
Date of Consultation:  7/9/2024    Patient: : Pj Freeman  MRN: [id number]    Referring Physician:  Dr. Farhat Casillas DO    GI:Olu Diop M.D.     Reason for Consultation: GI bleeding    History of Present Illness:   The patient is 69 years old gentleman with medical history of recent myocardial infarction, CVA, osteomyelitis, amputation due to peripheral arterial disease, tarry stool 4 days, with hemoglobin drop in the 4 unit transfusion, transferred from outside hospital for urgent GI consultation and possible endoscopy.     GI team saw the patient at bedside,Denies active respiratory or GI distress at this time.  Last bowel movement last night.  Still tarry.  No urge to have vomiting or bowel movement for now.  N.p.o. for more than 1 day.    Had a EGD and colonoscopy about 10 to 15 years ago in [place] due to active GI bleeding.  However the patient could not remember the details of the report.  No GI follow-up was made, no follow-up endoscopy as well.    The patient living in [place], no GI provider around.      No past medical history on file.      No past surgical history on file.    No family history on file.    Social History     Socioeconomic History    Marital status:      Social Determinants of Health     Food Insecurity: No Food Insecurity (7/8/2024)    Hunger Vital Sign     Worried About Running Out of Food in the Last Year: Never true     Ran Out of Food in the Last Year: Never true   Transportation Needs: No Transportation Needs (7/8/2024)    PRAPARE - Transportation     Lack of Transportation (Medical): No     Lack of Transportation (Non-Medical): No   Intimate Partner Violence: Not At Risk (7/8/2024)    Humiliation, Afraid, Rape, and Kick questionnaire     Fear of Current or Ex-Partner: No     Emotionally Abused: No     Physically Abused: No     Sexually Abused: No   Housing Stability: Low Risk  (7/8/2024)    Housing Stability Vital Sign     Unable to Pay for Housing in the Last Year: No      Number of Places Lived in the Last Year: 1     Unstable Housing in the Last Year: No           Physical Exam:  Vitals:    07/09/24 0400 07/09/24 0500 07/09/24 0600 07/09/24 0800   BP: (!) 150/68 104/58 114/58 118/78   Pulse: 65 62 68 (!) 55   Resp: 16 13 20 12   Temp: 36.6 °C (97.9 °F)   36.9 °C (98.4 °F)   TempSrc: Temporal   Temporal   SpO2: 95% 95% 96% 96%   Weight:       Height:         Constitutional: No fevers.  Eyes: No symptoms reported.   Ears/Nose/Throat/Mouth: No choking reported.  Cardiovascular: No chest pain reported.   Respiratory: Denies shortness of breath.  Gastrointestinal/Hepatic: Per H/P.   Skin/Breast: No new rash reported.   Psychiatric: No complaints    HEENT: grossly normal.  Cardiovascular: Please refer to primary team's daily assessment.   Lungs: Please refer to primary team's daily assessment.   Abdomen: No evidence of acute abdomen.  Skin: No erythema, No rash.  Neurologic: Alert & oriented x 3, Normal motor function, No focal deficits noted.  PSY: stable mood.           Labs:  Recent Labs     07/08/24 2205 07/09/24  0605   WBC 17.8* 19.5*   RBC 2.44* 2.65*   HEMOGLOBIN 7.3* 8.2*   HEMATOCRIT 21.4* 23.3*   MCV 87.7 87.9   MCH 29.9 30.9   MCHC 34.1 35.2   RDW 46.5 45.9   PLATELETCT 258 272   MPV 9.9 9.5     Recent Labs     07/08/24 2205 07/09/24  0605   SODIUM 138 138   POTASSIUM 3.6 3.6   CHLORIDE 111 113*   CO2 17* 17*   GLUCOSE 155* 157*   BUN 30* 28*   CPKTOTAL 101  --      Recent Labs     07/08/24 2205   APTT 21.5*   INR 1.24*       Recent Labs     07/08/24 2205 07/09/24  0605   ASTSGOT 14 13   ALTSGPT 17 16   TBILIRUBIN 0.4 0.5   ALKPHOSPHAT 62 62   GLOBULIN 1.7* 1.7*   INR 1.24*  --          Imaging:  No image results found.            Impressions:  The patient is 69 years old gentleman with medical history of recent myocardial infarction, CVA, osteomyelitis, amputation due to peripheral arterial disease, tarry stool 4 days, with hemoglobin drop in the 4 unit transfusion,  transferred from outside hospital for urgent GI consultation and possible endoscopy.     GI team saw the patient at bedside,Denies active respiratory or GI distress at this time.  Last bowel movement last night.  Still tarry.  No urge to have vomiting or bowel movement for now.  N.p.o. for more than 1 day.      Could be erosion, ulcer, GERD, hiatal hernia bleeding while receiving full dose antiplatelet or anticoagulation.  No strong evidence of alcohol use or NSAID overuse.    GI team will offer upper GI scope today for bleeding control.  If totally negative, the patient will have colonoscopy tomorrow.    Continue n.p.o., PPI current supportive care by her primary team.    Diagnosis: upper gastrointestinal bleeding.   Procedure: Esophagogastroduodenoscopy with bleeding control including banding.             This note was generated using voice recognition software which has a small chance of producing errors of grammar and possibly content. I have made every reasonable attempt to find and correct any obvious errors, but expect that some may not be found prior to finalization of this note.

## 2024-07-09 NOTE — ASSESSMENT & PLAN NOTE
POSTERIOR VITREOUS DETACHMENT, OU:  NO HOLES. NO TEARS. RETINAL  DETACHMENT WARNINGS DISCUSSED. FLOATERS AND FLASHES BROCHURE GIVEN. RETURN FOR FOLLOW-UP AS SCHEDULED. transthoracic echo showed 50% EF, grade 2 diastolic dysfunction, elevated LV filling pressure, bicuspid aortic valve,  severe mitral stenosis, pulmonary hypertension.  Patient probably would benefit from GERALDINE and possibly cardiac surgery consult when stable from bleeding standpoint.  Cardiology was consulting Future outpatient valvular repair once stable from GI and cardiac standpoint, unable to tolerate AC at this time, valve workup deferred o OP for the time being

## 2024-07-09 NOTE — PROGRESS NOTES
Medications sent to pharmacy #[id number]. 2 RN sign off.    Ibuprofen  Acetaminophen  Magnesium  Vitamin c  Metoprolol ER  Bumetanide  Amiodarone  Glipizide  Gabapentin  Atorvastatin

## 2024-07-09 NOTE — ASSESSMENT & PLAN NOTE
Patient is direct admit with melena and acute blood loss anemia, status post 4 unit of RBC at [facility]   However he was given IV heparin for A-fib/CVA/non-STEMI, as well as Plavix, that was discontinued  7/9 EGD showed 6 duodenal ulcers with one that was bleeding, gastritis and some erosive esophagitis.  7/10 heparin drip initiated and stopped due to recurrent melena and need of transfusion of pRBCs.  .  Path negative for Hpylori  H/H stable over the last several days. Consider retrial of AC in the next 24-48 hours if remains stable   Cont. Oral PPI therapy   Will need OP GI follow up with repeat endoscopy

## 2024-07-09 NOTE — ASSESSMENT & PLAN NOTE
S/p 4 units of RBC transfusion at outside hospital  Repeat H&H pending.  Goal hemoglobin 8.0 and above  7/13 Hgb:9.1 <9<8.4<6.7 <7.7<9.2<6.5 <8.5  EGD with duodenal ulcers. Did not tolerate being on heparin.  7/11 discussed with GI and they do not feel an additional EGD is warranted other than holding anticoagulation.  7/12 Check TEG, if continued bleeding will readdress with GI.  Monitor CBC  H/H has stablized off AC, consider retrial of AC in the next 24/48 hours

## 2024-07-09 NOTE — PROGRESS NOTES
4 Eyes Skin Assessment Completed by ANIRUDH zhu and ANIRUDH chang.    Head WDL  Ears Redness and Blanching (R ear)  Nose WDL  Mouth WDL  Neck WDL  Breast/Chest WDL  Shoulder Blades WDL  Spine WDL  (R) Arm/Elbow/Hand Redness and Blanching  (L) Arm/Elbow/Hand Redness, Blanching, Bruising, and Scab  Abdomen Bruising  Groin Redness and Excoriation moisture fissure / nonblanching redness associated with brief worn prior to transfer (see photo_  Scrotum/Coccyx/Buttocks Redness, Blanching, and Non-Blanching (possible DTI to sacrum - see photo)  (R) Leg WDL  (L) Leg WDL  (R) Heel/Foot/Toe Redness and Swelling  (L) Heel/Foot/Toe Redness and Blanching (recent toe amputation x5 - stitches)          Devices In Places ECG, Blood Pressure Cuff, and Pulse Ox      Interventions In Place Heel Mepilex, TAP System, Pillows, Elbow Mepilex, Low Air Loss Mattress, Barrier Cream, Heels Loaded W/Pillows, and Pressure Redistribution Mattress    Possible Skin Injury Yes    Pictures Uploaded Into Epic Yes  Wound Consult Placed Yes  RN Wound Prevention Protocol Ordered Yes

## 2024-07-09 NOTE — ASSESSMENT & PLAN NOTE
Status post left TMA on 7/3  Repeat MRI of the left foot:status post transmetatarsal amputation without evidence of recurrent osteomyelitis of up or abscess.  Subcutaneous edema along the stump which could be postsurgical or posttraumatic without loculated fluid collection hematoma or abscess.  There is marrow edema along the first metatarsal which could represent a bone contusion  And consider obtaining surgical pathology and available culture report to determine needs to continue antibiotics, consider ID consult R  repeat CBC, inflammatory markers  Left foot stump looks noninfected.  Surgery was in [place] per patient.  7/8/24 ESR:18  WBC remains elevated and there is some drainage of the  medial aspect of the wound   7/10 stopped Zosyn and vancomycin initiated Unasyn.  I have consulted orthopedics to evaluate area of dehiscence of transmetatarsal wound site and a new suture was placed.  7/13 wbc:13.1  monitor cbc and vitals.  Off abx, monitor wound

## 2024-07-09 NOTE — ASSESSMENT & PLAN NOTE
Elevated troponin and chest discomfort documented at outside facility  Echo showed no focal wall motion abnormalities, but there is borderline low EF 50%, grade 2 diastolic dysfunction, severe mitral stenosis and pulmonary hypertension  Probably type II NSTEMI secondary to severe anemia  RBC transfusion for goal hemoglobin 8.0 and above  Per cardiology, For his cardiac disease, for a radial cath we do administer a heparin bolus. We recommend outpatient cardiac catheterization and then CT surgery consultation once his hemoglobin is stable and he can tolerate anticoagulation.   continue amiodarone for rhythm control to decrease risk of recurrent a fib. with possible cardiac cath during admission if bleeding resolves and able to tolerate anticoagulation

## 2024-07-09 NOTE — ASSESSMENT & PLAN NOTE
Presented on 7/6 after syncopal episode without reported gross neurodeficits  MRI of the brain: Tiny focus of acute ischemia in the left parietal lobe with chronic microvascular disease and small infarcts.  No acute hemorrhage or mass.  On exam today there is no definite motor deficit.  New onset A-fib, as well as severe mitral stenosis on echo documented at outside hospital, could not continue anticoagulation due to acute GI bleed  Frequent neurochecks no longer indicated   Neurology consulted at outside facility.  Continue current management.    Continue on high-dose statin

## 2024-07-09 NOTE — CARE PLAN
The patient is Stable - Low risk of patient condition declining or worsening    Shift Goals  Clinical Goals: hemodynamic stability  Patient Goals: get better  Family Goals: JUDITH    Progress made toward(s) clinical / shift goals:    Problem: Knowledge Deficit - Standard  Goal: Patient and family/care givers will demonstrate understanding of plan of care, disease process/condition, diagnostic tests and medications  Outcome: Progressing     Problem: Fall Risk  Goal: Patient will remain free from falls  Outcome: Progressing     Problem: Skin Integrity  Goal: Skin integrity is maintained or improved  Outcome: Progressing     Problem: Pain - Standard  Goal: Alleviation of pain or a reduction in pain to the patient’s comfort goal  Outcome: Progressing     Problem: Psychosocial  Goal: Patient's level of anxiety will decrease  Outcome: Progressing     Problem: Hemodynamics  Goal: Patient's hemodynamics, fluid balance and neurologic status will be stable or improve  Outcome: Progressing     Problem: Bowel Elimination  Goal: Establish and maintain regular bowel function  Outcome: Progressing     Problem: Infection - Standard  Goal: Patient will remain free from infection  Outcome: Progressing     Problem: Wound/ / Incision Healing  Goal: Patient's wound/surgical incision will decrease in size and heals properly  Outcome: Progressing       Patient is not progressing towards the following goals:

## 2024-07-09 NOTE — PROGRESS NOTES
Hospital Medicine Daily Progress Note    Date of Service  7/9/2024    Chief Complaint  Syncope and melena    Hospital Course  Pj Freeman is a 69 y.o. male w/ hx of DMII, HTN, PAD, recent left toe amputation due to osteomyeliits (7/3/24). He was seen at [facility] and  and a MRI brain showing acute CVA left parietal lobe.  During admit found to be having paroxysmal atrial fibrillation and was placed on amiodarone and heparin and he remained on plavix.  An echo showed EF:50% and bicuspid aortic valve, severe mitral stenosis and pulmonary hypertension.  Due to melena and syncope on 7/7 he was stopped of heparin and plavix and transferred to [facility].  He was admitted 7/8/2024 to [facility] GI bleed with Hgb:6.2    Interval Problem Update  7/9: Had EGD showing duodenal ulcers.  Starting on liquids today.  He is alert and oriented.  Hgb:8.2    I have discussed this patient's plan of care and discharge plan at IDT rounds today with Case Management, Nursing, Nursing leadership, and other members of the IDT team.    Consultants/Specialty  GI    Code Status  Full Code    Disposition  The patient is not medically cleared for discharge to home or a post-acute facility.      I have placed the appropriate orders for post-discharge needs.    Review of Systems  Review of Systems   Constitutional:  Positive for malaise/fatigue. Negative for fever.   HENT:  Negative for sore throat.    Eyes:  Negative for blurred vision.   Respiratory:  Negative for shortness of breath.    Cardiovascular:  Positive for leg swelling. Negative for chest pain and palpitations.   Gastrointestinal:  Negative for abdominal pain and nausea.   Genitourinary:  Negative for dysuria.   Musculoskeletal:  Negative for back pain and neck pain.   Neurological:  Negative for headaches.   Psychiatric/Behavioral:  The patient is not nervous/anxious.         Physical Exam  Temp:  [36.4 °C (97.6 °F)-37.1 °C (98.7 °F)] 37.1 °C (98.7 °F)  Pulse:  [55-82]  68  Resp:  [12-34] 31  BP: (104-154)/() 122/69  SpO2:  [86 %-100 %] 98 %    Physical Exam  Vitals reviewed.   Constitutional:       Appearance: Normal appearance.   HENT:      Head: Normocephalic and atraumatic.      Nose: Nose normal.      Mouth/Throat:      Mouth: Mucous membranes are moist.   Eyes:      General:         Right eye: No discharge.         Left eye: No discharge.      Extraocular Movements: Extraocular movements intact.      Conjunctiva/sclera: Conjunctivae normal.   Cardiovascular:      Rate and Rhythm: Normal rate and regular rhythm.      Pulses: Normal pulses.      Heart sounds: No murmur heard.  Pulmonary:      Effort: Pulmonary effort is normal. No respiratory distress.      Breath sounds: Normal breath sounds. No wheezing.   Abdominal:      General: Bowel sounds are normal. There is no distension.      Palpations: Abdomen is soft.   Musculoskeletal:      Right lower leg: No edema.      Left lower leg: No edema.      Comments: Left foot with transmetatarsal amputation with sutures in place.   Lymphadenopathy:      Cervical: No cervical adenopathy.   Skin:     General: Skin is warm.      Comments: Medial side of right transmetatarsal amputation with some serous (slight pink tinged) drainage and swelling    Neurological:      General: No focal deficit present.      Mental Status: He is alert.      Cranial Nerves: No cranial nerve deficit.   Psychiatric:         Mood and Affect: Mood normal.         Thought Content: Thought content normal.         Fluids    Intake/Output Summary (Last 24 hours) at 7/9/2024 1417  Last data filed at 7/9/2024 1400  Gross per 24 hour   Intake 638.93 ml   Output --   Net 638.93 ml       Laboratory  Recent Labs     07/08/24 2205 07/09/24  0605   WBC 17.8* 19.5*   RBC 2.44* 2.65*   HEMOGLOBIN 7.3* 8.2*   HEMATOCRIT 21.4* 23.3*   MCV 87.7 87.9   MCH 29.9 30.9   MCHC 34.1 35.2   RDW 46.5 45.9   PLATELETCT 258 272   MPV 9.9 9.5     Recent Labs     07/08/24 2205  07/09/24  0605   SODIUM 138 138   POTASSIUM 3.6 3.6   CHLORIDE 111 113*   CO2 17* 17*   GLUCOSE 155* 157*   BUN 30* 28*   CREATININE 0.69 0.63   CALCIUM 7.3* 7.3*     Recent Labs     07/08/24  2205   APTT 21.5*   INR 1.24*               Imaging  No orders to display        Assessment/Plan  * Upper GI bleed- (present on admission)  Assessment & Plan  Patient is direct admit with melena and acute blood loss anemia, status post 4 unit of RBC at [facility]  Etiology is unclear, he denies taking NSAID.  However he was given IV heparin for A-fib/CVA/non-STEMI, as well as Plavix, that was discontinued  Plan: Repeat H&H, transfuse as needed for hemoglobin 8.0 and below given orthostatic dizziness, NSTEMI  Continue IV Protonix, IV fluids  7/9 EGD showed duodenal ulcers with one that was bleeding, gastritis and some erosive esophagitis.  Restart anticoagulation 7/10 per GI if no further sign of bleeding.    Type 2 diabetes mellitus (HCC)  Assessment & Plan  Uncontrolled with hyperglycemia  Monitor accuchecks and cover with SSI  Hypoglycemic protocol  Diabetic diet    Acute osteomyelitis of left foot (HCC)  Assessment & Plan  Status post left TMA on 7/3  Repeat MRI of the left foot:status post transmetatarsal amputation without evidence of recurrent osteomyelitis of up or abscess.  Subcutaneous edema along the stump which could be postsurgical or posttraumatic without loculated fluid collection hematoma or abscess.  There is marrow edema along the first metatarsal which could represent a bone contusion  And consider obtaining surgical pathology and available culture report to determine needs to continue antibiotics, consider ID consult R  repeat CBC, inflammatory markers  Left foot stump looks noninfected.  Surgery was in [place] per patient.  WBC remains elevated and there is some drainage of the  medial aspect of the wound   Continue vancomycin and Zosyn for now      Mitral stenosis  Assessment &  Plan  transthoracic echo showed 50% EF, grade 2 diastolic dysfunction, elevated LV filling pressure, bicuspid aortic valve,  severe mitral stenosis, pulmonary hypertension.  Patient probably would benefit from GERALDINE and possibly cardiac surgery consult when stable from bleeding standpoint.  Needs cardiology evaluation after stability from GI and neuro    NSTEMI (non-ST elevated myocardial infarction) ([facility])  Assessment & Plan  Elevated troponin and chest discomfort documented at outside facility  Echo showed no focal wall motion abnormalities, but there is borderline low EF 50%, grade 2 diastolic dysfunction, severe mitral stenosis and pulmonary hypertension  Probably type II NSTEMI secondary to severe anemia  Plan: Repeat troponin and EKG  RBC transfusion for goal hemoglobin 8.0 and above  Ischemic evaluation when appropriate, probably as outpatient    Acute CVA (cerebrovascular accident) ([facility])  Assessment & Plan  Presented on 7/6 after syncopal episode without reported gross neurodeficits  MRI of the brain: Tiny focus of acute ischemia in the left parietal lobe with chronic microvascular disease and small infarcts.  No acute hemorrhage or mass.  On exam today there is no definite motor deficit.  New onset A-fib, as well as severe mitral stenosis on echo documented at outside hospital, could not continue anticoagulation due to acute GI bleed  Plan to restart anticoagulation when cleared by GI  Monitor with neurochecks every 4 hours  Consider neuroconsult    Acute blood loss anemia  Assessment & Plan  S/p 4 units of RBC transfusion at outside hospital  Repeat H&H pending.  Goal hemoglobin 8.0 and above  Consider iron studies and IV iron supplementation         VTE prophylaxis:   SCDs/TEDs      I have performed a physical exam and reviewed and updated ROS and Plan today (7/9/2024). In review of yesterday's note (7/8/2024), there are no changes except as documented above.

## 2024-07-09 NOTE — PROGRESS NOTES
Brief Cardiology Note:    I was called to discuss this patients care with Dr. Mcnamara. We discussed Mr. Freeman who is at [facility] ICU with severe GI bleed, POONAM, new a fib, and elevated troponin.     Agree with transfer for high risk endoscopy/colonoscopy to evaluate source of bleeding. Would not put on heparin for CVA prevention in the setting of life-threatening bleeding Given he has already required 4 units, I would also proceed with endoscopy/colonoscopy without further cardiac procedures.    If in person consultation would be helpful in the morning, please re-consult cardiology.     Electronically Signed by:  Riki Andrade MD  7/8/2024  6:09 PM

## 2024-07-09 NOTE — PROGRESS NOTES
"Pharmacy Vancomycin Kinetics Note for 7/9/2024     69 y.o. male on Vancomycin day # 1     Vancomycin Indication (AUC Dosing): Skin/skin structure infection    Provider specified end date: 07/15/24    Active Antibiotics (From admission, onward)      Ordered     Ordering Provider       Tue Jul 9, 2024  2:29 AM    07/09/24 0229  vancomycin (Vancocin) 1,250 mg in  mL IVPB  (vancomycin (VANCOCIN) IV (LD + Maintenance))  EVERY 12 HOURS         Girma Hill M.D.       Mon Jul 8, 2024 11:06 PM    07/08/24 2306  vancomycin (Vancocin) 1,750 mg in  mL IVPB  (vancomycin (VANCOCIN) IV (LD + Maintenance))  ONCE         Girma Hill M.D.       Mon Jul 8, 2024 10:43 PM    07/08/24 2243  MD Alert...Vancomycin per Pharmacy  PHARMACY TO DOSE        Question:  Indication(s) for vancomycin?  Answer:  Skin and soft tissue infection    Girma Hill M.D.    07/08/24 2243  piperacillin-tazobactam (Zosyn) 3.375 g in  mL IVPB  (piperacillin-tazobactam (ZOSYN) IV (Extended-infusion) PANEL )  EVERY 8 HOURS        Placed in \"And\" Linked Group    Girma Hill M.D.            Dosing Weight: 66.5 kg (146 lb 9.7 oz)      Admission History: Admitted on 7/8/2024 for GI bleed [K92.2]  Pertinent history: Empiric abx for SSTI of foot concerning for osteo.    Allergies:     Patient has no allergy information on record.     Pertinent cultures to date:     Results       Procedure Component Value Units Date/Time    MRSA By PCR (Amp) [[id number]] Collected: 07/09/24 0206    Order Status: Sent Specimen: Respirate from Nares             Labs:     Estimated Creatinine Clearance: 92.4 mL/min (by C-G formula based on SCr of 0.69 mg/dL).  Recent Labs     07/08/24  2205   WBC 17.8*   NEUTSPOLYS 80.20*     Recent Labs     07/08/24 2205   BUN 30*   CREATININE 0.69   ALBUMIN 2.4*     No intake or output data in the 24 hours ending 07/09/24 0230   BP (!) 151/71   Pulse 72   Temp 36.7 °C (98.1 °F)   Resp 20   Ht 1.778 m (5' 10\")  "  Wt 66.5 kg (146 lb 9.7 oz)   SpO2 95%  Temp (24hrs), Av.9 °C (98.4 °F), Min:36.7 °C (98.1 °F), Max:37 °C (98.6 °F)      List concerns for Vancomycin clearance:     Age;BUN/Scr ratio greater than 20:1;Malnutrition/Low albumin    Pharmacokinetics:     AUC kinetics:   Ke (hr ^-1): 0.0811 hr^-1  Half life: 8.55 hr  Clearance: 3.506  Estimated TDD: 1753  Estimated Dose: 774  Estimated interval: 10.6    A/P:     -  Vancomycin dose: 750 mg IV q12h    -  Next vancomycin level(s):    -TBD     -  Predicted vancomycin AUC from initial AUC test calculator: 428 mg·hr/L    -  Comments: empiric abx for SSTI. Anticipate patient to adequately clear vanco despite listed concerns for accumulation. Dosing conservatively due to age and patient not appearing overtly septic. Peak and trough to be determined once patient closer to steady state if abx are to continue. MRSA nares ordered for de-escalation purposes. Pharmacy to adjust dosing based on clearance concerns, levels and cultures.       Brayan Emmanuel, PharmD

## 2024-07-09 NOTE — CONSULTS
Cardiology Initial Consultation    Date of Service  7/9/2024    Referring Physician  Farhat Casillas D.O.    Reason for Consultation  Syncopal episode, CVA with concern for cardioembolic source, NSTEMI    History of Presenting Illness  Pj Freeman is a 69 y.o. male with a past medical history of T2DM, HTN, PAD c/b osteomyelitis status post left TMA (7/3) who presented 7/8/2024 as a direct admit from [facility] due to severe GI bleed, POONAM, elevated troponin, and new onset atrial fibrillation.  Shortly after his left TMA on 7/3, he experienced syncope, melanotic stool, and dizziness at home and return to and an MCV: 7 6.  He was found to have hemoglobin 8.6 which was a marked reduction from 15 on discharge.  He received 4 units of RBCs.  He was also found to have acute CVA on MRI and CT head neck without large vessel occlusion; tiny focus of acute ischemia in the left parietal lobe with evidence of chronic microvascular disease.  Monitoring demonstrated new paroxysmal A-fib for which she was started on IV amiodarone. Unfortunately, he also started to experience chest discomfort with markedly elevated troponins.  He was initially started on IV heparin for NSTEMI, however, due to concern for GI bleed this was discontinued.  TTE showed 50% EF, grade 2 diastolic dysfunction, bicuspid aortic valve, severe mitral stenosis, and pulmonary hypertension.  No evidence of wall motion abnormality.    Review of Systems  As above, otherwise negative    Past Medical History   has no past medical history on file.    Surgical History   has no past surgical history on file.    Family History  family history is not on file.    Social History       Medications  None       Allergies  Not on File    Vital signs in last 24 hours  Temp:  [36.6 °C (97.9 °F)-37 °C (98.6 °F)] 36.6 °C (97.9 °F)  Pulse:  [62-75] 68  Resp:  [13-20] 20  BP: (104-154)/(58-76) 114/58  SpO2:  [92 %-97 %] 96 %    Physical Exam  Physical  "Exam  HENT:      Head: Normocephalic and atraumatic.   Cardiovascular:      Rate and Rhythm: Normal rate and regular rhythm.      Pulses: Normal pulses.      Heart sounds: Normal heart sounds. No murmur heard.  Pulmonary:      Effort: Pulmonary effort is normal.      Breath sounds: Normal breath sounds.   Abdominal:      General: Abdomen is flat.      Palpations: Abdomen is soft.   Musculoskeletal:         General: No swelling.      Right lower leg: No edema.      Left lower leg: No edema.      Left foot: Deformity (TMA with intact sutures and dried blood) present.   Skin:     General: Skin is warm and dry.   Neurological:      General: No focal deficit present.      Mental Status: He is alert and oriented to person, place, and time.   Psychiatric:         Mood and Affect: Mood normal.         Behavior: Behavior normal.         Thought Content: Thought content normal.         Judgment: Judgment normal.         Lab Review  Lab Results   Component Value Date/Time    WBC 19.5 (H) 07/09/2024 06:05 AM    RBC 2.65 (L) 07/09/2024 06:05 AM    HEMOGLOBIN 8.2 (L) 07/09/2024 06:05 AM    HEMATOCRIT 23.3 (L) 07/09/2024 06:05 AM    MCV 87.9 07/09/2024 06:05 AM    MCH 30.9 07/09/2024 06:05 AM    MCHC 35.2 07/09/2024 06:05 AM    MPV 9.5 07/09/2024 06:05 AM      Lab Results   Component Value Date/Time    SODIUM 138 07/09/2024 06:05 AM    POTASSIUM 3.6 07/09/2024 06:05 AM    CHLORIDE 113 (H) 07/09/2024 06:05 AM    CO2 17 (L) 07/09/2024 06:05 AM    GLUCOSE 157 (H) 07/09/2024 06:05 AM    BUN 28 (H) 07/09/2024 06:05 AM    CREATININE 0.63 07/09/2024 06:05 AM      Lab Results   Component Value Date/Time    ASTSGOT 13 07/09/2024 06:05 AM    ALTSGPT 16 07/09/2024 06:05 AM     Lab Results   Component Value Date/Time    TROPONINT 861 (H) 07/09/2024 06:05 AM       No results for input(s): \"NTPROBNP\" in the last 72 hours.    Cardiac Imaging and Procedures Review  EKG:  My personal interpretation of the EKG dated 7/8/24 is sinus rhythm of 72 bpm " with low voltage    Echocardiogram: Repeat pending    Imaging  As discussed in HPI    Assessment  69-year-old male with past medical history as above who was a direct transfer from outside hospital on 7/8/2024 with concern for cardioembolic stroke in the setting of new onset atrial fibrillation complicated by severe upper GI bleed requiring 4 units of blood.  Suspect syncopal episode is vasovagal secondary to acute blood loss.  Lower concern for seizure given patient's sequence of events and CVA likely too small to be trigger.  Will also consider valvular disease as a contributor to syncope.  Suspect elevated troponins are secondary to demand ischemia in setting of no wall motion abnormalities identified on echo at outside hospital.  Pending EGD in hopes of identifying and treating source of bleed.  Syncopal episode, suspect vasovagal  Acute CVA, tiny left parietal lobe  Grade 2 diastolic dysfunction  Severe mitral valve stenosis  Bicuspid aortic valve  Pulmonary hypertension  Cardiac demand ischemia  Upper GI bleed with acute blood loss anemia  Paroxysmal atrial fibrillation  Leukocytosis in setting of recent left TMA    Plan  Continue to trend H&H with transfusion threshold hemoglobin> 8  Start p.o. amiodarone 400 mg twice daily with goal for loading dose ~10-15 g.  Can taper to 400 daily, then 200 daily  Pending GI evaluation for bleed, will discuss risks and benefits of restarting anticoagulation and antiplatelet therapy in setting of atrial fibrillation with [id number] and recent CVA  Start atorvastatin 40 mg  Repeat TTE for reevaluation of mitral valve, will consider repair of mitral valve depending on findings and symptoms.  Continue telemetry    Thank you for allowing me to participate in the care of this patient.    I will continue to follow this patient    Please contact me with any questions.    Celena Ruiz M.D.   [employer] PGY 2 internal medicine resident

## 2024-07-09 NOTE — DISCHARGE PLANNING
Care Transition Team Assessment    Admission Date: 7/8/2024  GMLOS: 4.6  ALOS: 1    6-Clicks ADL Score: 20  6-Clicks Mobility Score: 16    DME Needed: No    Action(s) Taken:     Chart review and pt discussed in IDT rounds. Pt admitted on 7/8 for upper GI bleed. Pt is currently alert and oriented, on room air, on IV abx, and has a left toe amputation. GI consulted and pt received a EGD today.    RN FREDDY spoke to pt at bedside. Confirmed information on facesheet including emergency contacts and address. Pt currently lives in a single-story house with spouse Kathy [phone] in [facility]. DC support: Spouse. Pt's PCP is RONY Coker. Pt has no AD. Pt is currently retired and is insured with Medicare. Prior to hospitalization pt was independent with ADLs. Pt does not own any walkers or canes but says he has a scooter. Pt does not use home oxygen. Pt stated he was going to a wound clinic x3 for his foot at [facility] in [place]. Pt denies SI/HI.     Escalations Completed: None    Medically Clear: No    Next Steps: RN FREDDY to assist with DC needs    Barriers to Discharge: Medical clearance    Information Source  Orientation Level: Oriented X4  Information Given By: Patient  Informant's Name: Pj Freeman  Who is responsible for making decisions for patient? : Patient    Readmission Evaluation  Is this a readmission?: No    Elopement Risk  Legal Hold: No  Ambulatory or Self Mobile in Wheelchair: No-Not an Elopement Risk  Elopement Risk: At Risk for Elopement    Interdisciplinary Discharge Planning  Lives with - Patient's Self Care Capacity: Spouse  Patient or legal guardian wants to designate a caregiver: No  Support Systems: Family Member(s)  Housing / Facility: 1 Story House    Discharge Preparedness  What is your plan after discharge?: Uncertain - pending medical team collaboration  What are your discharge supports?: Spouse  Prior Functional Level: Independent with Activities  of Daily Living  Difficulity with ADLs: Walking    Functional Assesment  Prior Functional Level: Independent with Activities of Daily Living    Finances  Financial Barriers to Discharge: No  Prescription Coverage: Yes    Vision / Hearing Impairment  Vision Impairment : Yes  Right Eye Vision: Wears Glasses  Left Eye Vision: Wears Glasses  Hearing Impairment : No    Advance Directive  Advance Directive?: None    Domestic Abuse  Possible Abuse/Neglect Reported to:: Not Applicable    Psychological Assessment  History of Substance Abuse: None  History of Psychiatric Problems: No    Discharge Risks or Barriers  Discharge risks or barriers?: Complex medical needs  Patient risk factors: Complex medical needs  Domestic Abuse  Possible Abuse/Neglect Reported to:: Not Applicable    Psychological Assessment  History of Substance Abuse: None  History of Psychiatric Problems: No    Discharge Risks or Barriers  Discharge risks or barriers?: Complex medical needs  Patient risk factors: Complex medical needs

## 2024-07-10 ENCOUNTER — APPOINTMENT (OUTPATIENT)
Dept: CARDIOLOGY | Facility: MEDICAL CENTER | Age: 69
DRG: 377 | End: 2024-07-10
Payer: MEDICARE

## 2024-07-10 LAB
ANION GAP SERPL CALC-SCNC: 7 MMOL/L (ref 7–16)
APTT PPP: 23.6 SEC (ref 24.7–36)
BUN SERPL-MCNC: 19 MG/DL (ref 8–22)
CALCIUM SERPL-MCNC: 7.2 MG/DL (ref 8.5–10.5)
CHLORIDE SERPL-SCNC: 112 MMOL/L (ref 96–112)
CO2 SERPL-SCNC: 19 MMOL/L (ref 20–33)
CREAT SERPL-MCNC: 0.55 MG/DL (ref 0.5–1.4)
ERYTHROCYTE [DISTWIDTH] IN BLOOD BY AUTOMATED COUNT: 46.5 FL (ref 35.9–50)
GFR SERPLBLD CREATININE-BSD FMLA CKD-EPI: 106 ML/MIN/1.73 M 2
GLUCOSE BLD STRIP.AUTO-MCNC: 164 MG/DL (ref 65–99)
GLUCOSE BLD STRIP.AUTO-MCNC: 190 MG/DL (ref 65–99)
GLUCOSE BLD STRIP.AUTO-MCNC: 268 MG/DL (ref 65–99)
GLUCOSE BLD STRIP.AUTO-MCNC: 286 MG/DL (ref 65–99)
GLUCOSE BLD STRIP.AUTO-MCNC: 299 MG/DL (ref 65–99)
GLUCOSE SERPL-MCNC: 201 MG/DL (ref 65–99)
HCT VFR BLD AUTO: 25.4 % (ref 42–52)
HGB BLD-MCNC: 8.5 G/DL (ref 14–18)
INR PPP: 1.13 (ref 0.87–1.13)
LV EJECT FRACT  99904: 60
LV EJECT FRACT MOD 2C 99903: 68.99
LV EJECT FRACT MOD 4C 99902: 53.94
LV EJECT FRACT MOD BP 99901: 62.45
MCH RBC QN AUTO: 29.8 PG (ref 27–33)
MCHC RBC AUTO-ENTMCNC: 33.5 G/DL (ref 32.3–36.5)
MCV RBC AUTO: 89.1 FL (ref 81.4–97.8)
PLATELET # BLD AUTO: 256 K/UL (ref 164–446)
PMV BLD AUTO: 9.8 FL (ref 9–12.9)
POTASSIUM SERPL-SCNC: 3.9 MMOL/L (ref 3.6–5.5)
PROTHROMBIN TIME: 14.6 SEC (ref 12–14.6)
RBC # BLD AUTO: 2.85 M/UL (ref 4.7–6.1)
SODIUM SERPL-SCNC: 138 MMOL/L (ref 135–145)
UFH PPP CHRO-ACNC: 0.35 IU/ML
UFH PPP CHRO-ACNC: <0.1 IU/ML
WBC # BLD AUTO: 15.3 K/UL (ref 4.8–10.8)

## 2024-07-10 PROCEDURE — 0HQNXZZ REPAIR LEFT FOOT SKIN, EXTERNAL APPROACH: ICD-10-PCS | Performed by: ORTHOPAEDIC SURGERY

## 2024-07-10 PROCEDURE — 82962 GLUCOSE BLOOD TEST: CPT

## 2024-07-10 PROCEDURE — 93306 TTE W/DOPPLER COMPLETE: CPT

## 2024-07-10 PROCEDURE — 36430 TRANSFUSION BLD/BLD COMPNT: CPT

## 2024-07-10 PROCEDURE — 700102 HCHG RX REV CODE 250 W/ 637 OVERRIDE(OP): Performed by: INTERNAL MEDICINE

## 2024-07-10 PROCEDURE — P9016 RBC LEUKOCYTES REDUCED: HCPCS

## 2024-07-10 PROCEDURE — 700105 HCHG RX REV CODE 258: Performed by: HOSPITALIST

## 2024-07-10 PROCEDURE — 700105 HCHG RX REV CODE 258: Performed by: INTERNAL MEDICINE

## 2024-07-10 PROCEDURE — 86923 COMPATIBILITY TEST ELECTRIC: CPT

## 2024-07-10 PROCEDURE — 700102 HCHG RX REV CODE 250 W/ 637 OVERRIDE(OP)

## 2024-07-10 PROCEDURE — 85610 PROTHROMBIN TIME: CPT

## 2024-07-10 PROCEDURE — 700101 HCHG RX REV CODE 250

## 2024-07-10 PROCEDURE — 93306 TTE W/DOPPLER COMPLETE: CPT | Mod: 26 | Performed by: INTERNAL MEDICINE

## 2024-07-10 PROCEDURE — C9113 INJ PANTOPRAZOLE SODIUM, VIA: HCPCS | Performed by: INTERNAL MEDICINE

## 2024-07-10 PROCEDURE — 99233 SBSQ HOSP IP/OBS HIGH 50: CPT | Performed by: HOSPITALIST

## 2024-07-10 PROCEDURE — 700102 HCHG RX REV CODE 250 W/ 637 OVERRIDE(OP): Performed by: HOSPITALIST

## 2024-07-10 PROCEDURE — A9270 NON-COVERED ITEM OR SERVICE: HCPCS | Performed by: HOSPITALIST

## 2024-07-10 PROCEDURE — 80048 BASIC METABOLIC PNL TOTAL CA: CPT

## 2024-07-10 PROCEDURE — 700111 HCHG RX REV CODE 636 W/ 250 OVERRIDE (IP): Mod: JZ | Performed by: HOSPITALIST

## 2024-07-10 PROCEDURE — 85520 HEPARIN ASSAY: CPT

## 2024-07-10 PROCEDURE — 700111 HCHG RX REV CODE 636 W/ 250 OVERRIDE (IP)

## 2024-07-10 PROCEDURE — 85730 THROMBOPLASTIN TIME PARTIAL: CPT

## 2024-07-10 PROCEDURE — A9270 NON-COVERED ITEM OR SERVICE: HCPCS | Performed by: INTERNAL MEDICINE

## 2024-07-10 PROCEDURE — A9270 NON-COVERED ITEM OR SERVICE: HCPCS

## 2024-07-10 PROCEDURE — 97602 WOUND(S) CARE NON-SELECTIVE: CPT

## 2024-07-10 PROCEDURE — 700111 HCHG RX REV CODE 636 W/ 250 OVERRIDE (IP): Mod: JZ | Performed by: INTERNAL MEDICINE

## 2024-07-10 PROCEDURE — 85027 COMPLETE CBC AUTOMATED: CPT

## 2024-07-10 PROCEDURE — 99233 SBSQ HOSP IP/OBS HIGH 50: CPT | Mod: GC | Performed by: INTERNAL MEDICINE

## 2024-07-10 PROCEDURE — 770000 HCHG ROOM/CARE - INTERMEDIATE ICU *

## 2024-07-10 RX ORDER — LIDOCAINE HYDROCHLORIDE 10 MG/ML
INJECTION, SOLUTION INFILTRATION; PERINEURAL
Status: COMPLETED
Start: 2024-07-10 | End: 2024-07-10

## 2024-07-10 RX ORDER — HEPARIN SODIUM 1000 [USP'U]/ML
4000 INJECTION, SOLUTION INTRAVENOUS; SUBCUTANEOUS ONCE
Status: COMPLETED | OUTPATIENT
Start: 2024-07-10 | End: 2024-07-10

## 2024-07-10 RX ORDER — ZINC SULFATE 50(220)MG
220 CAPSULE ORAL DAILY
Status: DISCONTINUED | OUTPATIENT
Start: 2024-07-10 | End: 2024-07-14

## 2024-07-10 RX ORDER — HEPARIN SODIUM 5000 [USP'U]/100ML
0-30 INJECTION, SOLUTION INTRAVENOUS CONTINUOUS
Status: DISCONTINUED | OUTPATIENT
Start: 2024-07-10 | End: 2024-07-11

## 2024-07-10 RX ORDER — ASCORBIC ACID 500 MG
500 TABLET ORAL DAILY
Status: DISCONTINUED | OUTPATIENT
Start: 2024-07-10 | End: 2024-07-12

## 2024-07-10 RX ORDER — HEPARIN SODIUM 1000 [USP'U]/ML
2000 INJECTION, SOLUTION INTRAVENOUS; SUBCUTANEOUS PRN
Status: DISCONTINUED | OUTPATIENT
Start: 2024-07-10 | End: 2024-07-11

## 2024-07-10 RX ADMIN — OXYCODONE HYDROCHLORIDE AND ACETAMINOPHEN 500 MG: 500 TABLET ORAL at 16:00

## 2024-07-10 RX ADMIN — ZINC SULFATE 220 MG (50 MG) CAPSULE 220 MG: CAPSULE at 16:00

## 2024-07-10 RX ADMIN — HEPARIN SODIUM 12 UNITS/KG/HR: 5000 INJECTION, SOLUTION INTRAVENOUS at 15:07

## 2024-07-10 RX ADMIN — AMIODARONE HYDROCHLORIDE 400 MG: 200 TABLET ORAL at 04:46

## 2024-07-10 RX ADMIN — INSULIN HUMAN 1 UNITS: 100 INJECTION, SOLUTION PARENTERAL at 08:52

## 2024-07-10 RX ADMIN — PANTOPRAZOLE SODIUM 40 MG: 40 INJECTION, POWDER, FOR SOLUTION INTRAVENOUS at 19:36

## 2024-07-10 RX ADMIN — AMIODARONE HYDROCHLORIDE 400 MG: 200 TABLET ORAL at 18:46

## 2024-07-10 RX ADMIN — INSULIN HUMAN 3 UNITS: 100 INJECTION, SOLUTION PARENTERAL at 21:55

## 2024-07-10 RX ADMIN — INSULIN HUMAN 3 UNITS: 100 INJECTION, SOLUTION PARENTERAL at 18:56

## 2024-07-10 RX ADMIN — OXYCODONE 2.5 MG: 5 TABLET ORAL at 21:52

## 2024-07-10 RX ADMIN — AMPICILLIN AND SULBACTAM 3 G: 1; 2 INJECTION, POWDER, FOR SOLUTION INTRAMUSCULAR; INTRAVENOUS at 12:51

## 2024-07-10 RX ADMIN — HEPARIN SODIUM 4000 UNITS: 1000 INJECTION, SOLUTION INTRAVENOUS; SUBCUTANEOUS at 15:07

## 2024-07-10 RX ADMIN — LIDOCAINE HYDROCHLORIDE: 10 INJECTION, SOLUTION INFILTRATION; PERINEURAL at 16:54

## 2024-07-10 RX ADMIN — INSULIN HUMAN 3 UNITS: 100 INJECTION, SOLUTION PARENTERAL at 12:37

## 2024-07-10 RX ADMIN — PIPERACILLIN AND TAZOBACTAM 3.38 G: 3; .375 INJECTION, POWDER, FOR SOLUTION INTRAVENOUS at 04:43

## 2024-07-10 RX ADMIN — ATORVASTATIN CALCIUM 40 MG: 40 TABLET, FILM COATED ORAL at 18:46

## 2024-07-10 RX ADMIN — PANTOPRAZOLE SODIUM 40 MG: 40 INJECTION, POWDER, FOR SOLUTION INTRAVENOUS at 04:43

## 2024-07-10 RX ADMIN — OXYCODONE 2.5 MG: 5 TABLET ORAL at 01:02

## 2024-07-10 RX ADMIN — AMPICILLIN AND SULBACTAM 3 G: 1; 2 INJECTION, POWDER, FOR SOLUTION INTRAMUSCULAR; INTRAVENOUS at 19:36

## 2024-07-10 ASSESSMENT — ENCOUNTER SYMPTOMS
SORE THROAT: 0
NERVOUS/ANXIOUS: 0
BLURRED VISION: 0
HEADACHES: 0
ABDOMINAL PAIN: 0
FEVER: 0
CHILLS: 1
PALPITATIONS: 0
SHORTNESS OF BREATH: 0
NAUSEA: 0
NECK PAIN: 0
DOUBLE VISION: 0
BACK PAIN: 0

## 2024-07-10 ASSESSMENT — PAIN DESCRIPTION - PAIN TYPE
TYPE: ACUTE PAIN;CHRONIC PAIN
TYPE: SURGICAL PAIN
TYPE: ACUTE PAIN;CHRONIC PAIN

## 2024-07-10 ASSESSMENT — FIBROSIS 4 INDEX: FIB4 SCORE: 0.85

## 2024-07-10 NOTE — PROGRESS NOTES
Hospital Medicine Daily Progress Note    Date of Service  7/10/2024    Chief Complaint  Syncope and melena    Hospital Course  Pj Freeman is a 69 y.o. male w/ hx of DMII, HTN, PAD, recent left toe amputation due to osteomyeliits (7/3/24). He was seen at [facility] and  and a MRI brain showing acute CVA left parietal lobe.  During admit found to be having paroxysmal atrial fibrillation and was placed on amiodarone and heparin and he remained on plavix.  An echo showed EF:50% and bicuspid aortic valve, severe mitral stenosis and pulmonary hypertension.  Due to melena and syncope on 7/7 he was stopped of heparin and plavix and transferred to [facility].  He was admitted 7/8/2024 to [facility] GI bleed with Hgb:6.2.  Neurology did consult at outside hospital.    Interval Problem Update  7/10: Hgb:8.5.  Left foot with some drainage and an open area near the medial surgical site with serous sanguinous fluid when squeezed.  He is alert and oriented. Restarted on heparin drip.    7/9: Had EGD showing duodenal ulcers.  Starting on liquids today.  He is alert and oriented.  Hgb:8.2    I have discussed this patient's plan of care and discharge plan at IDT rounds today with Case Management, Nursing, Nursing leadership, and other members of the IDT team.    Consultants/Specialty  cardiology, GI, neurology, and orthopedics    Code Status  Full Code    Disposition  The patient is not medically cleared for discharge to home or a post-acute facility.      I have placed the appropriate orders for post-discharge needs.    Review of Systems  Review of Systems   Constitutional:  Positive for chills and malaise/fatigue. Negative for fever.   HENT:  Negative for sore throat.    Eyes:  Negative for blurred vision and double vision.   Respiratory:  Negative for shortness of breath.    Cardiovascular:  Positive for leg swelling. Negative for chest pain and palpitations.   Gastrointestinal:  Negative for abdominal pain and nausea.    Genitourinary:  Negative for dysuria.   Musculoskeletal:  Negative for back pain and neck pain.   Neurological:  Negative for headaches.   Psychiatric/Behavioral:  The patient is not nervous/anxious.         Physical Exam  Temp:  [36.4 °C (97.6 °F)-37 °C (98.6 °F)] 36.6 °C (97.8 °F)  Pulse:  [62-83] 82  Resp:  [10-35] 23  BP: (110-160)/(58-83) 148/63  SpO2:  [91 %-99 %] 95 %    Physical Exam  Vitals reviewed.   Constitutional:       Appearance: Normal appearance.   HENT:      Head: Normocephalic and atraumatic.      Nose: Nose normal.      Mouth/Throat:      Mouth: Mucous membranes are moist.   Eyes:      General:         Right eye: No discharge.         Left eye: No discharge.      Extraocular Movements: Extraocular movements intact.      Conjunctiva/sclera: Conjunctivae normal.   Cardiovascular:      Rate and Rhythm: Normal rate and regular rhythm.      Pulses: Normal pulses.      Heart sounds: No murmur heard.  Pulmonary:      Effort: Pulmonary effort is normal. No respiratory distress.      Breath sounds: Normal breath sounds. No wheezing.   Abdominal:      General: Bowel sounds are normal. There is no distension.      Palpations: Abdomen is soft.   Musculoskeletal:         General: No tenderness.      Right lower leg: No edema.      Left lower leg: No edema.      Comments: Left foot with transmetatarsal amputation with right medial surgical site with an area of small dehiscence and possible missing suture.  Serous sanguinous fluid elicited on gentle squeezing of the surrounding tissue   Lymphadenopathy:      Cervical: No cervical adenopathy.   Skin:     General: Skin is warm.      Findings: No erythema.      Comments: Medial side of right transmetatarsal amputation with some serous (slight pink tinged) drainage and swelling    Neurological:      General: No focal deficit present.      Mental Status: He is alert and oriented to person, place, and time.      Cranial Nerves: No cranial nerve deficit.    Psychiatric:         Mood and Affect: Mood normal.         Behavior: Behavior normal.         Thought Content: Thought content normal.         Fluids    Intake/Output Summary (Last 24 hours) at 7/10/2024 1445  Last data filed at 7/10/2024 0800  Gross per 24 hour   Intake 648.04 ml   Output 600 ml   Net 48.04 ml       Laboratory  Recent Labs     07/08/24  2205 07/09/24  0605 07/09/24  2057 07/10/24  0440   WBC 17.8* 19.5*  --  15.3*   RBC 2.44* 2.65*  --  2.85*   HEMOGLOBIN 7.3* 8.2* 7.6* 8.5*   HEMATOCRIT 21.4* 23.3*  --  25.4*   MCV 87.7 87.9  --  89.1   MCH 29.9 30.9  --  29.8   MCHC 34.1 35.2  --  33.5   RDW 46.5 45.9  --  46.5   PLATELETCT 258 272  --  256   MPV 9.9 9.5  --  9.8     Recent Labs     07/08/24  2205 07/09/24  0605 07/10/24  0440   SODIUM 138 138 138   POTASSIUM 3.6 3.6 3.9   CHLORIDE 111 113* 112   CO2 17* 17* 19*   GLUCOSE 155* 157* 201*   BUN 30* 28* 19   CREATININE 0.69 0.63 0.55   CALCIUM 7.3* 7.3* 7.2*     Recent Labs     07/08/24  2205 07/10/24  1300   APTT 21.5* 23.6*   INR 1.24* 1.13               Imaging  EC-ECHOCARDIOGRAM COMPLETE W/O CONT    (Results Pending)        Assessment/Plan  * Upper GI bleed- (present on admission)  Assessment & Plan  Patient is direct admit with melena and acute blood loss anemia, status post 4 unit of RBC at [facility]  Etiology is unclear, he denies taking NSAID.  However he was given IV heparin for A-fib/CVA/non-STEMI, as well as Plavix, that was discontinued  Plan: Repeat H&H, transfuse as needed for hemoglobin 8.0 and below given orthostatic dizziness, NSTEMI  Continue IV Protonix, IV fluids  7/9 EGD showed duodenal ulcers with one that was bleeding, gastritis and some erosive esophagitis.  7/10 heparin drip initiated    Type 2 diabetes mellitus (HCC)  Assessment & Plan  Uncontrolled with hyperglycemia  Monitor accuchecks and cover with SSI  Hypoglycemic protocol  Diabetic diet    Acute osteomyelitis of left foot (HCC)  Assessment &  Plan  Status post left TMA on 7/3  Repeat MRI of the left foot:status post transmetatarsal amputation without evidence of recurrent osteomyelitis of up or abscess.  Subcutaneous edema along the stump which could be postsurgical or posttraumatic without loculated fluid collection hematoma or abscess.  There is marrow edema along the first metatarsal which could represent a bone contusion  And consider obtaining surgical pathology and available culture report to determine needs to continue antibiotics, consider ID consult R  repeat CBC, inflammatory markers  Left foot stump looks noninfected.  Surgery was in [place] per patient.  WBC remains elevated and there is some drainage of the  medial aspect of the wound   7/10 stopped Zosyn and vancomycin initiated Unasyn.  I have consulted orthopedics to evaluate area of dehiscence of transmetatarsal wound site possible suture missing      Mitral stenosis  Assessment & Plan  transthoracic echo showed 50% EF, grade 2 diastolic dysfunction, elevated LV filling pressure, bicuspid aortic valve,  severe mitral stenosis, pulmonary hypertension.  Patient probably would benefit from GERALDINE and possibly cardiac surgery consult when stable from bleeding standpoint.  Cardiology is consulting.  Consideration of repeat echocardiogram per cardiology  Future outpatient valvular repair once stable from GI and cardiac standpoint    NSTEMI (non-ST elevated myocardial infarction) ([facility])  Assessment & Plan  Elevated troponin and chest discomfort documented at outside facility  Echo showed no focal wall motion abnormalities, but there is borderline low EF 50%, grade 2 diastolic dysfunction, severe mitral stenosis and pulmonary hypertension  Probably type II NSTEMI secondary to severe anemia  Plan: Repeat troponin and EKG  RBC transfusion for goal hemoglobin 8.0 and above  Ischemic workup per cardiology with possible cardiac cath during admission    Acute CVA (cerebrovascular accident) ([facility])  Assessment  & Plan  Presented on 7/6 after syncopal episode without reported gross neurodeficits  MRI of the brain: Tiny focus of acute ischemia in the left parietal lobe with chronic microvascular disease and small infarcts.  No acute hemorrhage or mass.  On exam today there is no definite motor deficit.  New onset A-fib, as well as severe mitral stenosis on echo documented at outside hospital, could not continue anticoagulation due to acute GI bleed  Plan to restart anticoagulation when cleared by GI  Monitor with neurochecks every 4 hours  Neurology consulted at outside facility.  Continue current management.  On heparin drip monitoring of hemoglobin and see if he tolerates it.  Continue on high-dose statin    Acute blood loss anemia  Assessment & Plan  S/p 4 units of RBC transfusion at outside hospital  Repeat H&H pending.  Goal hemoglobin 8.0 and above  7/10 Hgb:8.5  Consider iron studies and IV iron supplementation         VTE prophylaxis:    therapeutic anticoagulation with weight-based heparin gtt, pharmacy to adjust PRN      I have performed a physical exam and reviewed and updated ROS and Plan today (7/10/2024). In review of yesterday's note (7/9/2024), there are no changes except as documented above.

## 2024-07-10 NOTE — CARE PLAN
The patient is Stable - Low risk of patient condition declining or worsening    Shift Goals  Clinical Goals: Hgb monitoring / NPO for possible EGD  Patient Goals: Pain Control / Rest  Family Goals: JUDITH    Progress made toward(s) clinical / shift goals:    Problem: Knowledge Deficit - Standard  Goal: Patient and family/care givers will demonstrate understanding of plan of care, disease process/condition, diagnostic tests and medications  Outcome: Progressing     Problem: Fall Risk  Goal: Patient will remain free from falls  Outcome: Progressing     Problem: Skin Integrity  Goal: Skin integrity is maintained or improved  Outcome: Progressing     Problem: Pain - Standard  Goal: Alleviation of pain or a reduction in pain to the patient’s comfort goal  Outcome: Progressing     Problem: Psychosocial  Goal: Patient's level of anxiety will decrease  Outcome: Progressing     Problem: Hemodynamics  Goal: Patient's hemodynamics, fluid balance and neurologic status will be stable or improve  Outcome: Progressing     Problem: Infection - Standard  Goal: Patient will remain free from infection  Outcome: Progressing     Problem: Wound/ / Incision Healing  Goal: Patient's wound/surgical incision will decrease in size and heals properly  Outcome: Progressing       Patient is not progressing towards the following goals:

## 2024-07-10 NOTE — PROCEDURES
Seen per request of Dr Field and Dr Casillas for closure of 1 cm surgical wound dehiscence.    Patient is now POD 6 from L transmetatarsal amputation performed in [facility].  He has developed a one cm dehiscence.  The incision was irrigated by RN earlier in the day.  R/A/B/I of suture closure given to the patient. With full understanding he wished to proceed. Area was first prepped with 3 chlorhexidine prep pads. Analgesia was achieved with 2cc of 1% lidocaine. Then the wound was closed with 2 vertical mattress sutures using 3-0 ethilon monofilament. Procedure was well tolerated with no blood loss.

## 2024-07-10 NOTE — PROGRESS NOTES
Stroke neurology brief note    Discussed with Dr. Casillas.    Pj was a transferred on 7/8.  Stroke Neurology asked to consult due to MRI findings.  Of note- patient had a Neurology consult completed for his MRI results from [facility].  Unfortunately I do not have that consult to review.    Pj has multiple vascular risk factors and presented on 7/6 with syncopal event and black stools.  He was noted to be in new onset atrial fibrillation and had elevated troponin.  As part of the syncopal evaluation, he had a brain MRI and CT angiogram of head and neck which I reviewed.  The MRI brain revealed small punctate embolic strokes in left parietal lobe.  CTA without high grade stenoses or occlusions.   He was started on heparin but this had to be continued due to his UGIB ultimately requiring multiple units of blood transfusion.    From Neurology perspective- strokes are small, incidental, and have no concerns for hemorrhagic conversion.  He is a candidate for any antithrombotic therapy when primary service deems appropriate to start.  It appears that he will start on heparin infusion.  Recommending transitioning to oral anticoagulation with apixaban when able.   Cardiology is adding statin therapy, and I recommend targeting LDL < 70.  There is no need for acute hypertensive response, and long-term BP goal is 110-130/60-80, he may start enteral anti-HTN if needed.  DM management for goal HgbA1c < 7.  There is no additional diagnostics recommended by Stroke Neurology.    Please reach out to the stroke service with any further questions or concerns.    Markus Riggs MD  Stroke Neurology

## 2024-07-10 NOTE — PROGRESS NOTES
Cardiology Progress Note    Date of Service  7/10/2024    Referring Physician  Farhat Casillas D.O.    Reason for Consultation  Syncopal episode, CVA with concern for cardioembolic source, NSTEMI    History of Presenting Illness  Pj Freeman is a 69 y.o. male with a past medical history of T2DM, HTN, PAD c/b osteomyelitis status post left TMA (7/3) who presented 7/8/2024 as a direct admit from [facility] due to severe GI bleed, POONAM, elevated troponin, and new onset atrial fibrillation.  Shortly after his left TMA on 7/3, he experienced syncope, melanotic stool, and dizziness at home and return to and an MCV: 7 6.  He was found to have hemoglobin 8.6 which was a marked reduction from 15 on discharge.  He received 4 units of RBCs.  He was also found to have acute CVA on MRI and CT head neck without large vessel occlusion; tiny focus of acute ischemia in the left parietal lobe with evidence of chronic microvascular disease.  Monitoring demonstrated new paroxysmal A-fib for which she was started on IV amiodarone. Unfortunately, he also started to experience chest discomfort with markedly elevated troponins.  He was initially started on IV heparin for NSTEMI, however, due to concern for GI bleed this was discontinued.  TTE showed 50% EF, grade 2 diastolic dysfunction, bicuspid aortic valve, severe mitral stenosis, and pulmonary hypertension.  No evidence of wall motion abnormality.    Interval:  Patient is stable this morning without acute complaints. He denies chest pain, abdominal pain, shortness of breath, or any other concerns. He is feeling very tired today. His hemoglobin remained stable (8.5 this morning, 8.2 yesterday). Will initiate heparin gtt and aspirin 81mg. Likely cath tomorrow if he remains stable and depending on TTE results.      Review of Systems  As above, otherwise negative`    Past Medical History   has no past medical history on file.    Surgical History   has a past  surgical history that includes pr upper gi endoscopy,diagnosis (N/A, 7/9/2024); pr upper gi endoscopy,ctrl bleed (7/9/2024); and pr upper gi endoscopy,biopsy (N/A, 7/9/2024).    Family History  family history is not on file.    Social History   reports that he has never smoked. He has never used smokeless tobacco. He reports that he does not currently use drugs.    Medications  None       Allergies  Not on File    Vital signs in last 24 hours  Temp:  [36.4 °C (97.6 °F)-37 °C (98.6 °F)] 36.6 °C (97.8 °F)  Pulse:  [62-83] 82  Resp:  [10-35] 23  BP: (110-160)/(58-83) 148/63  SpO2:  [91 %-99 %] 95 %    Physical Exam  Physical Exam  Constitutional:       General: He is awake.   HENT:      Head: Normocephalic and atraumatic.   Cardiovascular:      Rate and Rhythm: Normal rate and regular rhythm.      Pulses: Normal pulses.      Heart sounds: Normal heart sounds. No murmur heard.  Pulmonary:      Effort: Pulmonary effort is normal.      Breath sounds: Normal breath sounds.   Abdominal:      General: Abdomen is flat.      Palpations: Abdomen is soft.   Musculoskeletal:         General: No swelling.      Right lower leg: No edema.      Left lower leg: No edema.      Left foot: Deformity (TMA with intact sutures and dried blood) present.   Skin:     General: Skin is warm and dry.   Neurological:      General: No focal deficit present.      Mental Status: He is oriented to person, place, and time and easily aroused. He is lethargic.   Psychiatric:         Mood and Affect: Mood normal.         Behavior: Behavior normal.         Thought Content: Thought content normal.         Judgment: Judgment normal.         Lab Review  Lab Results   Component Value Date/Time    WBC 15.3 (H) 07/10/2024 04:40 AM    RBC 2.85 (L) 07/10/2024 04:40 AM    HEMOGLOBIN 8.5 (L) 07/10/2024 04:40 AM    HEMATOCRIT 25.4 (L) 07/10/2024 04:40 AM    MCV 89.1 07/10/2024 04:40 AM    MCH 29.8 07/10/2024 04:40 AM    MCHC 33.5 07/10/2024 04:40 AM    MPV 9.8  "07/10/2024 04:40 AM      Lab Results   Component Value Date/Time    SODIUM 138 07/10/2024 04:40 AM    POTASSIUM 3.9 07/10/2024 04:40 AM    CHLORIDE 112 07/10/2024 04:40 AM    CO2 19 (L) 07/10/2024 04:40 AM    GLUCOSE 201 (H) 07/10/2024 04:40 AM    BUN 19 07/10/2024 04:40 AM    CREATININE 0.55 07/10/2024 04:40 AM      Lab Results   Component Value Date/Time    ASTSGOT 13 07/09/2024 06:05 AM    ALTSGPT 16 07/09/2024 06:05 AM     Lab Results   Component Value Date/Time    TROPONINT 861 (H) 07/09/2024 06:05 AM       No results for input(s): \"NTPROBNP\" in the last 72 hours.    Cardiac Imaging and Procedures Review  EKG:  My personal interpretation of the EKG dated 7/8/24 is sinus rhythm of 72 bpm with low voltage    Echocardiogram: Repeat pending    Imaging  As discussed in HPI    Assessment  69-year-old male with past medical history as above who was a direct transfer from outside hospital on 7/8/2024 with concern for cardioembolic stroke in the setting of new onset atrial fibrillation complicated by severe upper GI bleed requiring 4 units of blood.  Suspect syncopal episode is vasovagal secondary to acute blood loss.  Lower concern for seizure given patient's sequence of events and CVA likely too small to be trigger.  Will also consider valvular disease as a contributor to syncope.  Suspect elevated troponins are secondary to demand ischemia in setting of no wall motion abnormalities identified on echo at outside hospital.  EGD complete, several gastric ulcerations one of which with oozing s/p clips.  Syncopal episode, suspect vasovagal  Acute CVA, tiny left parietal lobe  Grade 2 diastolic dysfunction  Severe mitral valve stenosis  Bicuspid aortic valve  Pulmonary hypertension  Cardiac demand ischemia  Upper GI bleed with acute blood loss anemia secondary to multiple gastric ulcerations  Paroxysmal atrial fibrillation  Leukocytosis in setting of recent left TMA    Plan  Continue to trend H&H with transfusion threshold " hemoglobin> 8  Continue p.o. amiodarone 400 mg twice daily with goal for loading dose ~10-15 g.  Can taper to 400 daily, then 200 daily  Pending GI evaluation for bleed, will discuss risks and benefits of restarting anticoagulation and antiplatelet therapy in setting of atrial fibrillation with [id number] and recent CVA  Start atorvastatin 40 mg  Repeat TTE for reevaluation of mitral valve, will consider repair of mitral valve depending on findings and symptoms.  Likely LHC tomorrow if patient remains stable. NPO at midnight.  Continue telemetry  Recommend IV iron when concern for infection reduced to assist with recovery from blood loss    Thank you for allowing me to participate in the care of this patient.    I will continue to follow this patient    Please contact me with any questions.    Celena Ruiz M.D.   [employer] PGY 2 internal medicine resident

## 2024-07-10 NOTE — WOUND TEAM
Assisted Wound RN Nolan with skin assessment and wound consult evaluation for pressure injury.  Additional staff necessary for turning/standing from chair, repositioning patient, assisting with dressing application and supplies and determining progress, assessment and staging of pressure injuries and/or complicated wound care.

## 2024-07-10 NOTE — CONSULTS
Cardiology Progress Note    Date of Service  7/10/2024    Referring Physician  Farhat Casillas D.O.    Reason for Consultation  Syncopal episode, CVA with concern for cardioembolic source, NSTEMI    History of Presenting Illness  Pj Freeman is a 69 y.o. male with a past medical history of T2DM, HTN, PAD c/b osteomyelitis status post left TMA (7/3) who presented 7/8/2024 as a direct admit from [facility] due to severe GI bleed, POONAM, elevated troponin, and new onset atrial fibrillation.  Shortly after his left TMA on 7/3, he experienced syncope, melanotic stool, and dizziness at home and return to and an MCV: 7 6.  He was found to have hemoglobin 8.6 which was a marked reduction from 15 on discharge.  He received 4 units of RBCs.  He was also found to have acute CVA on MRI and CT head neck without large vessel occlusion; tiny focus of acute ischemia in the left parietal lobe with evidence of chronic microvascular disease.  Monitoring demonstrated new paroxysmal A-fib for which she was started on IV amiodarone. Unfortunately, he also started to experience chest discomfort with markedly elevated troponins.  He was initially started on IV heparin for NSTEMI, however, due to concern for GI bleed this was discontinued.  TTE showed 50% EF, grade 2 diastolic dysfunction, bicuspid aortic valve, severe mitral stenosis, and pulmonary hypertension.  No evidence of wall motion abnormality.    Interval:  Patient is stable this morning without acute complaints. He denies chest pain, abdominal pain, shortness of breath, or any other concerns. He is feeling very tired today. His hemoglobin remained stable (8.5 this morning, 8.2 yesterday). Will initiate heparin gtt and aspirin 81mg. Likely cath tomorrow if he remains stable and depending on TTE results.      Review of Systems  As above, otherwise negative`    Past Medical History   has no past medical history on file.    Surgical History   has a past  surgical history that includes pr upper gi endoscopy,diagnosis (N/A, 7/9/2024); pr upper gi endoscopy,ctrl bleed (7/9/2024); and pr upper gi endoscopy,biopsy (N/A, 7/9/2024).    Family History  family history is not on file.    Social History   reports that he has never smoked. He has never used smokeless tobacco. He reports that he does not currently use drugs.    Medications  None       Allergies  Not on File    Vital signs in last 24 hours  Temp:  [36.4 °C (97.6 °F)-37.1 °C (98.7 °F)] 36.6 °C (97.8 °F)  Pulse:  [62-83] 82  Resp:  [10-35] 23  BP: (110-160)/() 148/63  SpO2:  [86 %-99 %] 95 %    Physical Exam  Physical Exam  Constitutional:       General: He is awake.   HENT:      Head: Normocephalic and atraumatic.   Cardiovascular:      Rate and Rhythm: Normal rate and regular rhythm.      Pulses: Normal pulses.      Heart sounds: Normal heart sounds. No murmur heard.  Pulmonary:      Effort: Pulmonary effort is normal.      Breath sounds: Normal breath sounds.   Abdominal:      General: Abdomen is flat.      Palpations: Abdomen is soft.   Musculoskeletal:         General: No swelling.      Right lower leg: No edema.      Left lower leg: No edema.      Left foot: Deformity (TMA with intact sutures and dried blood) present.   Skin:     General: Skin is warm and dry.   Neurological:      General: No focal deficit present.      Mental Status: He is oriented to person, place, and time and easily aroused. He is lethargic.   Psychiatric:         Mood and Affect: Mood normal.         Behavior: Behavior normal.         Thought Content: Thought content normal.         Judgment: Judgment normal.         Lab Review  Lab Results   Component Value Date/Time    WBC 15.3 (H) 07/10/2024 04:40 AM    RBC 2.85 (L) 07/10/2024 04:40 AM    HEMOGLOBIN 8.5 (L) 07/10/2024 04:40 AM    HEMATOCRIT 25.4 (L) 07/10/2024 04:40 AM    MCV 89.1 07/10/2024 04:40 AM    MCH 29.8 07/10/2024 04:40 AM    MCHC 33.5 07/10/2024 04:40 AM    MPV 9.8  "07/10/2024 04:40 AM      Lab Results   Component Value Date/Time    SODIUM 138 07/10/2024 04:40 AM    POTASSIUM 3.9 07/10/2024 04:40 AM    CHLORIDE 112 07/10/2024 04:40 AM    CO2 19 (L) 07/10/2024 04:40 AM    GLUCOSE 201 (H) 07/10/2024 04:40 AM    BUN 19 07/10/2024 04:40 AM    CREATININE 0.55 07/10/2024 04:40 AM      Lab Results   Component Value Date/Time    ASTSGOT 13 07/09/2024 06:05 AM    ALTSGPT 16 07/09/2024 06:05 AM     Lab Results   Component Value Date/Time    TROPONINT 861 (H) 07/09/2024 06:05 AM       No results for input(s): \"NTPROBNP\" in the last 72 hours.    Cardiac Imaging and Procedures Review  EKG:  My personal interpretation of the EKG dated 7/8/24 is sinus rhythm of 72 bpm with low voltage    Echocardiogram: Repeat pending    Imaging  As discussed in HPI    Assessment  69-year-old male with past medical history as above who was a direct transfer from outside hospital on 7/8/2024 with concern for cardioembolic stroke in the setting of new onset atrial fibrillation complicated by severe upper GI bleed requiring 4 units of blood.  Suspect syncopal episode is vasovagal secondary to acute blood loss.  Lower concern for seizure given patient's sequence of events and CVA likely too small to be trigger.  Will also consider valvular disease as a contributor to syncope.  Suspect elevated troponins are secondary to demand ischemia in setting of no wall motion abnormalities identified on echo at outside hospital.  EGD complete, several gastric ulcerations one of which with oozing s/p clips.  Syncopal episode, suspect vasovagal  Acute CVA, tiny left parietal lobe  Grade 2 diastolic dysfunction  Severe mitral valve stenosis  Bicuspid aortic valve  Pulmonary hypertension  Cardiac demand ischemia  Upper GI bleed with acute blood loss anemia secondary to multiple gastric ulcerations  Paroxysmal atrial fibrillation  Leukocytosis in setting of recent left TMA    Plan  Continue to trend H&H with transfusion threshold " hemoglobin> 8  Continue p.o. amiodarone 400 mg twice daily with goal for loading dose ~10-15 g.  Can taper to 400 daily, then 200 daily  Pending GI evaluation for bleed, will discuss risks and benefits of restarting anticoagulation and antiplatelet therapy in setting of atrial fibrillation with [id number] and recent CVA  Start atorvastatin 40 mg  Repeat TTE for reevaluation of mitral valve, will consider repair of mitral valve depending on findings and symptoms.  Likely LHC tomorrow if patient remains stable. NPO at midnight.  Continue telemetry  Recommend IV iron when concern for infection reduced to assist with recovery from blood loss    Thank you for allowing me to participate in the care of this patient.    I will continue to follow this patient    Please contact me with any questions.    Celena Ruiz M.D.   [employer] PGY 2 internal medicine resident

## 2024-07-10 NOTE — WOUND TEAM
[facility]  Inpatient Services  Initial Wound and Skin Care Evaluation    Admission Date: 7/8/2024     Last order of IP CONSULT TO WOUND CARE was found on 7/8/2024 from Hospital Encounter on 7/8/2024     HPI, PMH, SH: Reviewed    Past Surgical History:   Procedure Laterality Date    VT UPPER GI ENDOSCOPY,DIAGNOSIS N/A 7/9/2024    Procedure: GASTROSCOPY;  Surgeon: Olu Diop M.D.;  Location: SURGERY SAME DAY [facility];  Service: Gastroenterology    VT UPPER GI ENDOSCOPY,CTRL BLEED  7/9/2024    Procedure: EGD, WITH CLIP PLACEMENT;  Surgeon: Olu Diop M.D.;  Location: SURGERY SAME DAY [facility];  Service: Gastroenterology    VT UPPER GI ENDOSCOPY,BIOPSY N/A 7/9/2024    Procedure: GASTROSCOPY, WITH BIOPSY;  Surgeon: Olu Diop M.D.;  Location: SURGERY SAME DAY [facility];  Service: Gastroenterology     Social History     Tobacco Use    Smoking status: Never    Smokeless tobacco: Never   Substance Use Topics    Alcohol use: Not on file     No chief complaint on file.    Diagnosis: GI bleed [K92.2]    Unit where seen by Wound Team: [id number]     WOUND CONSULT RELATED TO:  L TMA    WOUND TEAM PLAN OF CARE - Frequency of Follow-up:   Nursing to follow dressing orders written for wound care. Contact wound team if area fails to progress, deteriorates or with any questions/concerns if something comes up before next scheduled follow up (See below as to whether wound is following and frequency of wound follow up)   Not following, consult as needed  - TMA    WOUND HISTORY:   Pt is a 69yr old male admitted with history of DM type 2, HTN, PAD, osteomyelitis s/p left TMA who presented as a direct admit from [facility] due to severe GI Bleed, POONAM and elevated troponin and new onset A. Fib. Wound team was consulted regarding Left TMA incision with drainage.        WOUND ASSESSMENT/LDA  Wound 07/10/24 Incision Foot Left TMA from [facility] (Active)   Date First Assessed/Time First Assessed: 07/10/24  1546   Present on Original Admission: Yes  Primary Wound Type: Incision  Location: Foot  Laterality: Left  Wound Description (Comments): TMA from [facility]      Assessments 7/10/2024  3:00 PM   Wound Image      Site Assessment Red;Drainage   Periwound Assessment Clean;Dry;Intact   Margins Attached edges;Defined edges   Closure Open to air   Drainage Amount Small   Drainage Description Serosanguineous   Treatments Cleansed;Site care;Nonselective debridement   Wound Cleansing Foam Cleanser/Washcloth   Periwound Protectant No-sting Skin Prep   Dressing Status Clean;Dry;Intact   Dressing Changed New   Dressing Cleansing/Solutions Not Applicable   Dressing Options Hydrofiber Silver;Offloading Dressing - Heel   Dressing Change/Treatment Frequency Every 48 hrs, and As Needed   NEXT Dressing Change/Treatment Date 07/12/24   NEXT Weekly Photo (Inpatient Only) 07/17/24   Wound Team Following Not following   Non-staged Wound Description Partial thickness                                  Vascular:    FILIBERTO:   No results found.    Lab Values:    Lab Results   Component Value Date/Time    WBC 15.3 (H) 07/10/2024 04:40 AM    RBC 2.85 (L) 07/10/2024 04:40 AM    HEMOGLOBIN 8.5 (L) 07/10/2024 04:40 AM    HEMATOCRIT 25.4 (L) 07/10/2024 04:40 AM    CREACTPROT 1.30 (H) 07/08/2024 10:05 PM    SEDRATEWES 18 07/08/2024 10:05 PM         Culture Results show:  No results found for this or any previous visit (from the past 720 hour(s)).    Pain Level/Medicated:  None, Tolerated without pain medication       INTERVENTIONS BY WOUND TEAM:  Chart and images reviewed. Discussed with bedside RN. All areas of concern (based on picture review, LDA review and discussion with bedside RN) have been thoroughly assessed. Documentation of areas based on significant findings. This RN in to assess patient. Performed standard wound care which includes appropriate positioning, dressing removal and non-selective debridement. Pictures and measurements  obtained weekly if/when required.    Wound:  Left TMA  Preparation for Dressing removal: Open to air  Cleansed/Non-selectively Debrided with:  Moist warm washcloth  Sally wound: Cleansed with Moist warm washcloth, Prepped with No Sting  Primary Dressing:  Aquacel ag  Secondary (Outer) Dressing: adhesive foam     Wound:  Sacrococcygeal area partial thickness abrasion  Preparation for Dressing removal: Open to air  Cleansed/Non-selectively Debrided with:  Moist warm washcloth  Sally wound: Cleansed with Moist warm washcloth, Prepped with N/A  Primary Dressing:  Sacral offloading dressing  Secondary (Outer) Dressing: Pillows for turns    Area Assessed:  Posterior Head  Area manually palpated, no wounds appreciated, no pain noted     Area Assessed:  Bilateral Ears  Area intact, pt on room air     Area Assessed:  Bilateral Elbows & Arms  Area intact, scattered bruising noted    Area Assessed: Abdomen/Pannus  Area intact,     Area Assessed: Back  Area intact,     Area Assessed:  Bilateral I.T.s  Area intact,     Area Assessed:  BLE & Knees  Area intact,     Area Assessed:  Bilateral ankles  Area intact,     Area Assessed:  Bilateral heels  Area intact, heel offloading dressings applied      Advanced Wound Care Discharge Planning  Number of Clinicians necessary to complete wound care: 1  Is patient requiring IV pain medications for dressing changes:  No   Length of time for dressing change 30 min. (This does not include chart review, pre-medication time, set up, clean up or time spent charting.)    Interdisciplinary consultation: Patient, Bedside RN, Ingrid JONES (Wound RN).  Pressure injury and staging reviewed with N/A.    EVALUATION / RATIONALE FOR TREATMENT:     Date:  07/10/24  Wound Status:  Initial evaluation    Pt with incision to left TMA. Aquacel Ag Hydrofiber applied to manage bioburden, absorb exudate, and maintain a moist wound environment without laterally wicking exudate therefore reducing sally-wound maceration.  Will follow up on incision early next week if pt still inpatient. May benefit from tubigrip to assist in decreasing edema. Pt encouraged to ice and elevate extremity. Pillow placed to float heels and elevate. Partial thickness abrasion to sacrococcygeal area pt has had since POA. Son was aware. Sacral offloading dressing applied.          Goals: Steady decrease in wound area and depth weekly.    NURSING PLAN OF CARE ORDERS:  Dressing changes: See Dressing Care orders  RN Prevention Protocol    NUTRITION RECOMMENDATIONS   Wound Team Recommendations:  N/A    DIET ORDERS (From admission to next 24h)       Start     Ordered    07/10/24 6328  Diet NPO Restrict to: Sips with Medications  AT MIDNIGHT      Question:  Diet NPO Restrict to:  Answer:  Sips with Medications    07/10/24 1121    07/10/24 0735  Diet Order Diet: Low Fiber(GI Soft) (No acidic foods/beverages. No citric no sodas); Miscellaneous modifications: (optional): No Decaf, No Caffeine(for test)  ALL MEALS        Question Answer Comment   Diet: Low Fiber(GI Soft) No acidic foods/beverages. No citric no sodas   Miscellaneous modifications: (optional) No Decaf, No Caffeine(for test)        07/10/24 0734                    PREVENTATIVE INTERVENTIONS:    Q shift Rocky - performed per nursing policy  Q shift pressure point assessments - performed per nursing policy    Surface/Positioning  ICU Low Airloss - Currently in Place  Reposition q 2 hours - Currently in Place  Z Dru Pillow - Currently in Place    Offloading/Redistribution  Sacral offloading dressing (Silicone dressing) - Applied this Visit  Heel offloading dressing (Silicone dressing) - Applied this Visit  Float Heels off Bed with Pillows - Applied this Visit           Respiratory  N/A    Containment/Moisture Prevention    Dri-dru pad - Currently in Place    Anticipated discharge plans:  TBD        Vac Discharge Needs:  Vac Discharge plan is purely a recommendation from wound team and not a requirement for  discharge unless otherwise stated by physician.  Not Applicable Pt not on a wound vac

## 2024-07-11 ENCOUNTER — APPOINTMENT (OUTPATIENT)
Dept: CARDIOLOGY | Facility: MEDICAL CENTER | Age: 69
DRG: 377 | End: 2024-07-11
Attending: INTERNAL MEDICINE
Payer: MEDICARE

## 2024-07-11 LAB
ANION GAP SERPL CALC-SCNC: 7 MMOL/L (ref 7–16)
BUN SERPL-MCNC: 18 MG/DL (ref 8–22)
CALCIUM SERPL-MCNC: 7 MG/DL (ref 8.5–10.5)
CHLORIDE SERPL-SCNC: 113 MMOL/L (ref 96–112)
CO2 SERPL-SCNC: 18 MMOL/L (ref 20–33)
CREAT SERPL-MCNC: 0.71 MG/DL (ref 0.5–1.4)
ERYTHROCYTE [DISTWIDTH] IN BLOOD BY AUTOMATED COUNT: 46 FL (ref 35.9–50)
GFR SERPLBLD CREATININE-BSD FMLA CKD-EPI: 98 ML/MIN/1.73 M 2
GLUCOSE BLD STRIP.AUTO-MCNC: 210 MG/DL (ref 65–99)
GLUCOSE BLD STRIP.AUTO-MCNC: 252 MG/DL (ref 65–99)
GLUCOSE BLD STRIP.AUTO-MCNC: 289 MG/DL (ref 65–99)
GLUCOSE SERPL-MCNC: 256 MG/DL (ref 65–99)
HCT VFR BLD AUTO: 19.3 % (ref 42–52)
HGB BLD-MCNC: 6.5 G/DL (ref 14–18)
HGB BLD-MCNC: 7.7 G/DL (ref 14–18)
HGB BLD-MCNC: 9.2 G/DL (ref 14–18)
MCH RBC QN AUTO: 30.1 PG (ref 27–33)
MCHC RBC AUTO-ENTMCNC: 33.7 G/DL (ref 32.3–36.5)
MCV RBC AUTO: 89.4 FL (ref 81.4–97.8)
PLATELET # BLD AUTO: 242 K/UL (ref 164–446)
PMV BLD AUTO: 10.3 FL (ref 9–12.9)
POTASSIUM SERPL-SCNC: 4 MMOL/L (ref 3.6–5.5)
RBC # BLD AUTO: 2.16 M/UL (ref 4.7–6.1)
SODIUM SERPL-SCNC: 138 MMOL/L (ref 135–145)
UFH PPP CHRO-ACNC: 0.48 IU/ML
WBC # BLD AUTO: 14.9 K/UL (ref 4.8–10.8)

## 2024-07-11 PROCEDURE — 770000 HCHG ROOM/CARE - INTERMEDIATE ICU *

## 2024-07-11 PROCEDURE — A9270 NON-COVERED ITEM OR SERVICE: HCPCS | Performed by: HOSPITALIST

## 2024-07-11 PROCEDURE — 700111 HCHG RX REV CODE 636 W/ 250 OVERRIDE (IP): Performed by: INTERNAL MEDICINE

## 2024-07-11 PROCEDURE — 700102 HCHG RX REV CODE 250 W/ 637 OVERRIDE(OP): Performed by: HOSPITALIST

## 2024-07-11 PROCEDURE — A9270 NON-COVERED ITEM OR SERVICE: HCPCS

## 2024-07-11 PROCEDURE — 99232 SBSQ HOSP IP/OBS MODERATE 35: CPT | Mod: GC | Performed by: INTERNAL MEDICINE

## 2024-07-11 PROCEDURE — 82962 GLUCOSE BLOOD TEST: CPT | Mod: 91

## 2024-07-11 PROCEDURE — 99233 SBSQ HOSP IP/OBS HIGH 50: CPT | Performed by: HOSPITALIST

## 2024-07-11 PROCEDURE — 86923 COMPATIBILITY TEST ELECTRIC: CPT

## 2024-07-11 PROCEDURE — 85018 HEMOGLOBIN: CPT | Mod: 91

## 2024-07-11 PROCEDURE — 700102 HCHG RX REV CODE 250 W/ 637 OVERRIDE(OP)

## 2024-07-11 PROCEDURE — 36430 TRANSFUSION BLD/BLD COMPNT: CPT

## 2024-07-11 PROCEDURE — 85027 COMPLETE CBC AUTOMATED: CPT

## 2024-07-11 PROCEDURE — P9016 RBC LEUKOCYTES REDUCED: HCPCS

## 2024-07-11 PROCEDURE — 80048 BASIC METABOLIC PNL TOTAL CA: CPT

## 2024-07-11 PROCEDURE — C9113 INJ PANTOPRAZOLE SODIUM, VIA: HCPCS | Performed by: INTERNAL MEDICINE

## 2024-07-11 PROCEDURE — 700105 HCHG RX REV CODE 258: Performed by: HOSPITALIST

## 2024-07-11 PROCEDURE — 700111 HCHG RX REV CODE 636 W/ 250 OVERRIDE (IP): Mod: JZ | Performed by: HOSPITALIST

## 2024-07-11 PROCEDURE — 85520 HEPARIN ASSAY: CPT

## 2024-07-11 RX ORDER — DEXTROSE MONOHYDRATE 25 G/50ML
25 INJECTION, SOLUTION INTRAVENOUS
Status: DISCONTINUED | OUTPATIENT
Start: 2024-07-11 | End: 2024-07-17 | Stop reason: HOSPADM

## 2024-07-11 RX ADMIN — PANTOPRAZOLE SODIUM 40 MG: 40 INJECTION, POWDER, FOR SOLUTION INTRAVENOUS at 17:14

## 2024-07-11 RX ADMIN — INSULIN HUMAN 7 UNITS: 100 INJECTION, SOLUTION PARENTERAL at 22:49

## 2024-07-11 RX ADMIN — INSULIN HUMAN 15 UNITS: 100 INJECTION, SUSPENSION SUBCUTANEOUS at 16:02

## 2024-07-11 RX ADMIN — AMIODARONE HYDROCHLORIDE 400 MG: 200 TABLET ORAL at 17:12

## 2024-07-11 RX ADMIN — OXYCODONE HYDROCHLORIDE AND ACETAMINOPHEN 500 MG: 500 TABLET ORAL at 06:13

## 2024-07-11 RX ADMIN — AMIODARONE HYDROCHLORIDE 400 MG: 200 TABLET ORAL at 06:13

## 2024-07-11 RX ADMIN — AMPICILLIN AND SULBACTAM 3 G: 1; 2 INJECTION, POWDER, FOR SOLUTION INTRAMUSCULAR; INTRAVENOUS at 13:35

## 2024-07-11 RX ADMIN — ZINC SULFATE 220 MG (50 MG) CAPSULE 220 MG: CAPSULE at 06:13

## 2024-07-11 RX ADMIN — AMPICILLIN AND SULBACTAM 3 G: 1; 2 INJECTION, POWDER, FOR SOLUTION INTRAMUSCULAR; INTRAVENOUS at 22:47

## 2024-07-11 RX ADMIN — AMPICILLIN AND SULBACTAM 3 G: 1; 2 INJECTION, POWDER, FOR SOLUTION INTRAMUSCULAR; INTRAVENOUS at 17:15

## 2024-07-11 RX ADMIN — ATORVASTATIN CALCIUM 40 MG: 40 TABLET, FILM COATED ORAL at 17:12

## 2024-07-11 RX ADMIN — PANTOPRAZOLE SODIUM 40 MG: 40 INJECTION, POWDER, FOR SOLUTION INTRAVENOUS at 06:13

## 2024-07-11 RX ADMIN — AMPICILLIN AND SULBACTAM 3 G: 1; 2 INJECTION, POWDER, FOR SOLUTION INTRAMUSCULAR; INTRAVENOUS at 00:16

## 2024-07-11 RX ADMIN — AMPICILLIN AND SULBACTAM 3 G: 1; 2 INJECTION, POWDER, FOR SOLUTION INTRAMUSCULAR; INTRAVENOUS at 06:15

## 2024-07-11 ASSESSMENT — ENCOUNTER SYMPTOMS
NAUSEA: 0
HEADACHES: 0
FEVER: 0
SHORTNESS OF BREATH: 0
CHILLS: 0
BACK PAIN: 0
DIARRHEA: 1
NERVOUS/ANXIOUS: 0
COUGH: 0
NECK PAIN: 0
PALPITATIONS: 0
ABDOMINAL PAIN: 0

## 2024-07-11 ASSESSMENT — PAIN DESCRIPTION - PAIN TYPE
TYPE: ACUTE PAIN;CHRONIC PAIN
TYPE: ACUTE PAIN
TYPE: ACUTE PAIN;CHRONIC PAIN
TYPE: ACUTE PAIN;CHRONIC PAIN

## 2024-07-11 ASSESSMENT — FIBROSIS 4 INDEX: FIB4 SCORE: 0.9

## 2024-07-11 NOTE — PROGRESS NOTES
Cardiology Progress Note    Date of Service  7/11/2024    Referring Physician  Farhat Casillas D.O.    Reason for Consultation  Syncopal episode, CVA with concern for cardioembolic source, NSTEMI    History of Presenting Illness  Pj Freeman is a 69 y.o. male with a past medical history of T2DM, HTN, PAD c/b osteomyelitis status post left TMA (7/3) who presented 7/8/2024 as a direct admit from [facility] due to severe GI bleed, POONAM, elevated troponin, and new onset atrial fibrillation.  Shortly after his left TMA on 7/3, he experienced syncope, melanotic stool, and dizziness at home and return to and an MCV: 7 6.  He was found to have hemoglobin 8.6 which was a marked reduction from 15 on discharge.  He received 4 units of RBCs.  He was also found to have acute CVA on MRI and CT head neck without large vessel occlusion; tiny focus of acute ischemia in the left parietal lobe with evidence of chronic microvascular disease.  Monitoring demonstrated new paroxysmal A-fib for which she was started on IV amiodarone. Unfortunately, he also started to experience chest discomfort with markedly elevated troponins.  He was initially started on IV heparin for NSTEMI, however, due to concern for GI bleed this was discontinued.  TTE showed 50% EF, grade 2 diastolic dysfunction, bicuspid aortic valve, severe mitral stenosis, and pulmonary hypertension.  No evidence of wall motion abnormality.    Interval:  Hemoglobin dropped to 6.5 from 8.5 and given 1 unit PRBC. Vital signs stable although patient notes that he is feeling very fatigued. Dark tarry stool noted by nursing staff. LHC cancelled.    Review of Systems  As above, otherwise negative`    Past Medical History   has no past medical history on file.    Surgical History   has a past surgical history that includes pr upper gi endoscopy,diagnosis (N/A, 7/9/2024); pr upper gi endoscopy,ctrl bleed (7/9/2024); and pr upper gi endoscopy,biopsy (N/A,  7/9/2024).    Family History  family history is not on file.    Social History   reports that he has never smoked. He has never used smokeless tobacco. He reports that he does not currently use drugs.    Medications  None       Allergies  Not on File    Vital signs in last 24 hours  Temp:  [36.6 °C (97.8 °F)-36.8 °C (98.3 °F)] 36.7 °C (98.1 °F)  Pulse:  [77-95] 86  Resp:  [13-32] 19  BP: (129-160)/(63-83) 152/82  SpO2:  [90 %-95 %] 94 %    Physical Exam  Physical Exam  Constitutional:       General: He is awake.   HENT:      Head: Normocephalic and atraumatic.   Cardiovascular:      Rate and Rhythm: Normal rate and regular rhythm.      Pulses: Normal pulses.      Heart sounds: Normal heart sounds. No murmur heard.  Pulmonary:      Effort: Pulmonary effort is normal.      Breath sounds: Normal breath sounds.   Abdominal:      General: Abdomen is flat.      Palpations: Abdomen is soft.   Musculoskeletal:         General: No swelling.      Right lower leg: No edema.      Left lower leg: No edema.      Left foot: Deformity (TMA with intact sutures and dried blood) present.   Skin:     General: Skin is warm and dry.   Neurological:      General: No focal deficit present.      Mental Status: He is oriented to person, place, and time and easily aroused. He is lethargic.   Psychiatric:         Mood and Affect: Mood normal.         Behavior: Behavior normal.         Thought Content: Thought content normal.         Judgment: Judgment normal.       Lab Review  Lab Results   Component Value Date/Time    WBC 14.9 (H) 07/11/2024 03:30 AM    RBC 2.16 (L) 07/11/2024 03:30 AM    HEMOGLOBIN 6.5 (L) 07/11/2024 03:30 AM    HEMATOCRIT 19.3 (L) 07/11/2024 03:30 AM    MCV 89.4 07/11/2024 03:30 AM    MCH 30.1 07/11/2024 03:30 AM    MCHC 33.7 07/11/2024 03:30 AM    MPV 10.3 07/11/2024 03:30 AM      Lab Results   Component Value Date/Time    SODIUM 138 07/11/2024 03:30 AM    POTASSIUM 4.0 07/11/2024 03:30 AM    CHLORIDE 113 (H) 07/11/2024  "03:30 AM    CO2 18 (L) 07/11/2024 03:30 AM    GLUCOSE 256 (H) 07/11/2024 03:30 AM    BUN 18 07/11/2024 03:30 AM    CREATININE 0.71 07/11/2024 03:30 AM      Lab Results   Component Value Date/Time    ASTSGOT 13 07/09/2024 06:05 AM    ALTSGPT 16 07/09/2024 06:05 AM     Lab Results   Component Value Date/Time    TROPONINT 861 (H) 07/09/2024 06:05 AM       No results for input(s): \"NTPROBNP\" in the last 72 hours.    Cardiac Imaging and Procedures Review  EKG:  My personal interpretation of the EKG dated 7/8/24 is sinus rhythm of 72 bpm with low voltage    Echocardiogram: Repeat re demonstrated severe mitral valve stenosis.    Imaging  As discussed in HPI    Assessment  69-year-old male with past medical history as above who was a direct transfer from outside hospital on 7/8/2024 with concern for cardioembolic stroke in the setting of new onset atrial fibrillation complicated by severe upper GI bleed requiring 4 units of blood.  Suspect syncopal episode is vasovagal secondary to acute blood loss.  Lower concern for seizure given patient's sequence of events and CVA likely too small to be trigger.  Will also consider valvular disease as a contributor to syncope.  Suspect elevated troponins are secondary to demand ischemia in setting of no wall motion abnormalities identified on echo at outside hospital.  EGD complete, several gastric ulcerations one of which with oozing s/p clips. Repeat TTE similar to report from outside hospital  Syncopal episode, suspect vasovagal  Acute CVA, tiny left parietal lobe  Grade 2 diastolic dysfunction  Severe mitral valve stenosis  Bicuspid aortic valve  Pulmonary hypertension (RVSP 75-80 mmHg)  Cardiac demand ischemia  Upper GI bleed with acute blood loss anemia secondary to multiple gastric ulcerations  Paroxysmal atrial fibrillation  Leukocytosis in setting of recent left TMA    Plan  Continue to trend H&H with transfusion threshold hemoglobin>7  Continue p.o. amiodarone 400 mg twice " daily with goal for loading dose ~10-15 g.  Can taper to 400 daily, then 200 daily  Discontinued heparin after 2 point drop in Hgb in 24 hours. Start Q8H H&H checks.  Start atorvastatin 40 mg  Continue telemetry  Recommend IV iron when concern for infection reduced to assist with recovery from blood loss  Once bleeding stabilized and can tolerate anticoagulation and antiplatelet therapy, patient to follow up with cardiology for LHC and referral for MV repair.    Thank you for allowing me to participate in the care of this patient.    Please contact me with any questions.    Celena Ruiz MD  [employer] Internal Medicine Resident PGY-2

## 2024-07-11 NOTE — CARE PLAN
The patient is Stable - Low risk of patient condition declining or worsening    Shift Goals  Clinical Goals: monitor HH  Patient Goals: heart catheterization tomorrow  Family Goals: JUDITH    Progress made toward(s) clinical / shift goals:    Problem: Knowledge Deficit - Standard  Goal: Patient and family/care givers will demonstrate understanding of plan of care, disease process/condition, diagnostic tests and medications  Outcome: Progressing     Problem: Fall Risk  Goal: Patient will remain free from falls  Outcome: Progressing     Problem: Skin Integrity  Goal: Skin integrity is maintained or improved  Outcome: Progressing     Problem: Pain - Standard  Goal: Alleviation of pain or a reduction in pain to the patient’s comfort goal  Outcome: Progressing     Problem: Hemodynamics  Goal: Patient's hemodynamics, fluid balance and neurologic status will be stable or improve  Outcome: Progressing     Problem: Infection - Standard  Goal: Patient will remain free from infection  Outcome: Progressing     Problem: Wound/ / Incision Healing  Goal: Patient's wound/surgical incision will decrease in size and heals properly  Outcome: Progressing       Patient is not progressing towards the following goals:

## 2024-07-11 NOTE — CARE PLAN
The patient is Stable - Low risk of patient condition declining or worsening    Shift Goals  Clinical Goals: Monitor H&H, Advance diet as tolerated, Hemodynamic stability,skin integrity   Patient Goals: Rest and feel better  Family Goals: None present    Progress made toward(s) clinical / shift goals:     Problem: Knowledge Deficit - Standard  Goal: Patient and family/care givers will demonstrate understanding of plan of care, disease process/condition, diagnostic tests and medications  Outcome: Progressing  Note: Patient educated on plan and goals of care and disease process. Education provided on medications, procedures, and equipment. Will continue to re-inforce education when required. All questions and concerns answered at this time.     Problem: Fall Risk  Goal: Patient will remain free from falls  Outcome: Progressing  Note: Bed in lowest and locked position, call light is within reach, bed alarm is on, treaded socks in place. Patient oriented to room, plan of care and educated to use call light for assistance. Patient educated to not get out of bed without staff present.     Problem: Skin Integrity  Goal: Skin integrity is maintained or improved  Outcome: Progressing  Note: 4 eyes assessment complete  Preventative measures in place:  Q 2 hour turns, pt demonstrates  they can turn themselves, pillows in place for support and cushioning, 2 RN skin assessments, ensuring medical devices/tubing are not under patient, repositioning medical equipment q 2 hours, mepilex in place, pressure redistribution mattress, mobility as tolerated, skin assessed under bony prominences and high pressure point areas, z-dru pillow, TAPS system,moisturizer.

## 2024-07-11 NOTE — PROGRESS NOTES
Heparin gtt started 7/10. Hgb now 6.5, down from 8.5 24hrs ago. Dr Powell notified. 1 unit PRBC, hold heparin gtt.     No bowel movements overnight.

## 2024-07-11 NOTE — CARE PLAN
Problem: Knowledge Deficit - Standard  Goal: Patient and family/care givers will demonstrate understanding of plan of care, disease process/condition, diagnostic tests and medications  Outcome: Progressing     Problem: Fall Risk  Goal: Patient will remain free from falls  Outcome: Progressing     Problem: Skin Integrity  Goal: Skin integrity is maintained or improved  Outcome: Progressing     Problem: Pain - Standard  Goal: Alleviation of pain or a reduction in pain to the patient’s comfort goal  Outcome: Progressing     Problem: Psychosocial  Goal: Patient's level of anxiety will decrease  Outcome: Progressing     Problem: Hemodynamics  Goal: Patient's hemodynamics, fluid balance and neurologic status will be stable or improve  Outcome: Progressing     Problem: Bowel Elimination  Goal: Establish and maintain regular bowel function  Outcome: Progressing     Problem: Infection - Standard  Goal: Patient will remain free from infection  Outcome: Progressing     Problem: Wound/ / Incision Healing  Goal: Patient's wound/surgical incision will decrease in size and heals properly  Outcome: Progressing   The patient is Stable - Low risk of patient condition declining or worsening    Shift Goals  Clinical Goals: hemodynamic stability, monitor H&H  Patient Goals: rest  Family Goals: updates    Progress made toward(s) clinical / shift goals:  yes

## 2024-07-11 NOTE — PROGRESS NOTES
Hospital Medicine Daily Progress Note    Date of Service  7/11/2024    Chief Complaint  Syncope and melena    Hospital Course  Pj Freeman is a 69 y.o. male w/ hx of DMII, HTN, PAD, recent left toe amputation due to osteomyeliits (7/3/24). He was seen at [facility] and  and a MRI brain showing acute CVA left parietal lobe.  During admit found to be having paroxysmal atrial fibrillation and was placed on amiodarone and heparin and he remained on plavix.  An echo showed EF:50% and bicuspid aortic valve, severe mitral stenosis and pulmonary hypertension.  Due to melena and syncope on 7/7 he was stopped of heparin and plavix and transferred to [facility].  He was admitted 7/8/2024 to [facility] GI bleed with Hgb:6.2.  Neurology did consult at outside hospital.    Interval Problem Update  7/11: wbc:14.9 <15.3. Hgb:6.5 <8.5 w/ melanotic bleeding.  Transfused 1 unit pRBCs. He is alert and oriented.  Aware we have to hold on further cardiac work up until ulcers are healed and he can tolerate anticoagulation.    7/10: Hgb:8.5.  Left foot with some drainage and an open area near the medial surgical site with serous sanguinous fluid when squeezed.  He is alert and oriented. Restarted on heparin drip.    7/9: Had EGD showing duodenal ulcers.  Starting on liquids today.  He is alert and oriented.  Hgb:8.2    I have discussed this patient's plan of care and discharge plan at IDT rounds today with Case Management, Nursing, Nursing leadership, and other members of the IDT team.    Consultants/Specialty  cardiology, GI, neurology, and orthopedics    Code Status  Full Code    Disposition  The patient is not medically cleared for discharge to home or a post-acute facility.      I have placed the appropriate orders for post-discharge needs.    Review of Systems  Review of Systems   Constitutional:  Positive for malaise/fatigue. Negative for chills and fever.   Respiratory:  Negative for cough and shortness of breath.     Cardiovascular:  Positive for leg swelling. Negative for chest pain and palpitations.   Gastrointestinal:  Positive for diarrhea and melena. Negative for abdominal pain and nausea.   Genitourinary:  Negative for dysuria.   Musculoskeletal:  Negative for back pain and neck pain.   Neurological:  Negative for headaches.   Psychiatric/Behavioral:  The patient is not nervous/anxious.         Physical Exam  Temp:  [36.7 °C (98 °F)-36.8 °C (98.3 °F)] 36.8 °C (98.2 °F)  Pulse:  [80-95] 81  Resp:  [13-25] 14  BP: (129-155)/(66-83) 155/75  SpO2:  [91 %-95 %] 94 %    Physical Exam  Vitals reviewed.   Constitutional:       Appearance: Normal appearance. He is not ill-appearing.   HENT:      Head: Normocephalic and atraumatic.      Nose: Nose normal.      Mouth/Throat:      Mouth: Mucous membranes are moist.   Eyes:      General:         Right eye: No discharge.         Left eye: No discharge.      Extraocular Movements: Extraocular movements intact.      Conjunctiva/sclera: Conjunctivae normal.   Cardiovascular:      Rate and Rhythm: Normal rate and regular rhythm.      Pulses: Normal pulses.      Heart sounds: No murmur heard.  Pulmonary:      Effort: Pulmonary effort is normal. No respiratory distress.      Breath sounds: Normal breath sounds. No wheezing.   Abdominal:      General: Bowel sounds are normal. There is no distension.      Palpations: Abdomen is soft.   Musculoskeletal:         General: No tenderness.      Cervical back: Normal range of motion.      Right lower leg: No edema.      Left lower leg: No edema.      Comments: Left foot with transmetatarsal amputation with right medial surgical site with an area of small dehiscence and possible missing suture.  Serous sanguinous fluid elicited on gentle squeezing of the surrounding tissue   Skin:     General: Skin is warm.      Findings: No erythema.      Comments: Medial side of right transmetatarsal amputation with some serous (slight pink tinged) drainage and  swelling    Neurological:      General: No focal deficit present.      Mental Status: He is alert and oriented to person, place, and time.      Cranial Nerves: No cranial nerve deficit.   Psychiatric:         Mood and Affect: Mood normal.         Behavior: Behavior normal.         Thought Content: Thought content normal.         Fluids    Intake/Output Summary (Last 24 hours) at 7/11/2024 1934  Last data filed at 7/11/2024 1430  Gross per 24 hour   Intake 614.67 ml   Output 750 ml   Net -135.33 ml       Laboratory  Recent Labs     07/09/24  0605 07/09/24  2057 07/10/24  0440 07/11/24  0330 07/11/24  0909 07/11/24  1740   WBC 19.5*  --  15.3* 14.9*  --   --    RBC 2.65*  --  2.85* 2.16*  --   --    HEMOGLOBIN 8.2*   < > 8.5* 6.5* 9.2* 7.7*   HEMATOCRIT 23.3*  --  25.4* 19.3*  --   --    MCV 87.9  --  89.1 89.4  --   --    MCH 30.9  --  29.8 30.1  --   --    MCHC 35.2  --  33.5 33.7  --   --    RDW 45.9  --  46.5 46.0  --   --    PLATELETCT 272  --  256 242  --   --    MPV 9.5  --  9.8 10.3  --   --     < > = values in this interval not displayed.     Recent Labs     07/09/24  0605 07/10/24  0440 07/11/24  0330   SODIUM 138 138 138   POTASSIUM 3.6 3.9 4.0   CHLORIDE 113* 112 113*   CO2 17* 19* 18*   GLUCOSE 157* 201* 256*   BUN 28* 19 18   CREATININE 0.63 0.55 0.71   CALCIUM 7.3* 7.2* 7.0*     Recent Labs     07/08/24  2205 07/10/24  1300   APTT 21.5* 23.6*   INR 1.24* 1.13               Imaging  EC-ECHOCARDIOGRAM COMPLETE W/O CONT   Final Result           Assessment/Plan  * Upper GI bleed- (present on admission)  Assessment & Plan  Patient is direct admit with melena and acute blood loss anemia, status post 4 unit of RBC at [facility]  Etiology is unclear, he denies taking NSAID.  However he was given IV heparin for A-fib/CVA/non-STEMI, as well as Plavix, that was discontinued  Plan: Repeat H&H, transfuse as needed for hemoglobin 8.0 and below given orthostatic dizziness, NSTEMI  Continue IV  Protonix, IV fluids  7/9 EGD showed duodenal ulcers with one that was bleeding, gastritis and some erosive esophagitis.  7/10 heparin drip initiated  7/11 Heparin dc'd due to recurrent melana and need of transfusion of pRBCs.  Monitor q 8 hgb.  Path negative for Hpylori    Type 2 diabetes mellitus (HCC)  Assessment & Plan  Uncontrolled with hyperglycemia  Monitor accuchecks and cover with SSI  Hypoglycemic protocol  Diabetic diet    Acute osteomyelitis of left foot (HCC)  Assessment & Plan  Status post left TMA on 7/3  Repeat MRI of the left foot:status post transmetatarsal amputation without evidence of recurrent osteomyelitis of up or abscess.  Subcutaneous edema along the stump which could be postsurgical or posttraumatic without loculated fluid collection hematoma or abscess.  There is marrow edema along the first metatarsal which could represent a bone contusion  And consider obtaining surgical pathology and available culture report to determine needs to continue antibiotics, consider ID consult R  repeat CBC, inflammatory markers  Left foot stump looks noninfected.  Surgery was in [place] per patient.  WBC remains elevated and there is some drainage of the  medial aspect of the wound   7/10 stopped Zosyn and vancomycin initiated Unasyn.  I have consulted orthopedics to evaluate area of dehiscence of transmetatarsal wound site and a new suture was placed.      Mitral stenosis  Assessment & Plan  transthoracic echo showed 50% EF, grade 2 diastolic dysfunction, elevated LV filling pressure, bicuspid aortic valve,  severe mitral stenosis, pulmonary hypertension.  Patient probably would benefit from GERALDINE and possibly cardiac surgery consult when stable from bleeding standpoint.  Cardiology is consulting.  Consideration of repeat echocardiogram per cardiology  Future outpatient valvular repair once stable from GI and cardiac standpoint    NSTEMI (non-ST elevated myocardial infarction) ([facility])  Assessment & Plan  Elevated  troponin and chest discomfort documented at outside facility  Echo showed no focal wall motion abnormalities, but there is borderline low EF 50%, grade 2 diastolic dysfunction, severe mitral stenosis and pulmonary hypertension  Probably type II NSTEMI secondary to severe anemia  Plan: Repeat troponin and EKG  RBC transfusion for goal hemoglobin 8.0 and above  Ischemic workup per cardiology with possible cardiac cath during admission    Acute CVA (cerebrovascular accident) (HCC)  Assessment & Plan  Presented on 7/6 after syncopal episode without reported gross neurodeficits  MRI of the brain: Tiny focus of acute ischemia in the left parietal lobe with chronic microvascular disease and small infarcts.  No acute hemorrhage or mass.  On exam today there is no definite motor deficit.  New onset A-fib, as well as severe mitral stenosis on echo documented at outside hospital, could not continue anticoagulation due to acute GI bleed  Plan to restart anticoagulation when cleared by GI  Monitor with neurochecks every 4 hours  Neurology consulted at outside facility.  Continue current management.  On heparin drip monitoring of hemoglobin and see if he tolerates it.  Continue on high-dose statin    Acute blood loss anemia  Assessment & Plan  S/p 4 units of RBC transfusion at outside hospital  Repeat H&H pending.  Goal hemoglobin 8.0 and above  7/11 Hgb:6.5 <8.5  EGD with duodenal ulcers. Did not tolerate being on heparin.  7/11 discussed with GI and they do not feel an additional EGD is warranted other than holding anticoagulation.  Monitor CBC         VTE prophylaxis:   SCDs/TEDs      I have performed a physical exam and reviewed and updated ROS and Plan today (7/11/2024). In review of yesterday's note (7/10/2024), there are no changes except as documented above.

## 2024-07-12 PROBLEM — E87.6 HYPOKALEMIA: Status: ACTIVE | Noted: 2024-07-12

## 2024-07-12 PROBLEM — E83.42 HYPOMAGNESEMIA: Status: ACTIVE | Noted: 2024-07-12

## 2024-07-12 LAB
ABO GROUP BLD: NORMAL
ANION GAP SERPL CALC-SCNC: 10 MMOL/L (ref 7–16)
BARCODED ABORH UBTYP: 5100
BARCODED PRD CODE UBPRD: NORMAL
BARCODED UNIT NUM UBUNT: NORMAL
BLD GP AB SCN SERPL QL: NORMAL
BUN SERPL-MCNC: 14 MG/DL (ref 8–22)
CALCIUM SERPL-MCNC: 6.8 MG/DL (ref 8.5–10.5)
CFT BLD TEG: 2.6 MIN (ref 4.6–9.1)
CFT P HPASE BLD TEG: 2.7 MIN (ref 4.3–8.3)
CHLORIDE SERPL-SCNC: 114 MMOL/L (ref 96–112)
CLOT ANGLE BLD TEG: 71.9 DEGREES (ref 63–78)
CLOT LYSIS 30M P MA LENFR BLD TEG: 0 % (ref 0–2.6)
CO2 SERPL-SCNC: 17 MMOL/L (ref 20–33)
COMPONENT R 8504R: NORMAL
CREAT SERPL-MCNC: 0.52 MG/DL (ref 0.5–1.4)
CT.EXTRINSIC BLD ROTEM: 1.5 MIN (ref 0.8–2.1)
ERYTHROCYTE [DISTWIDTH] IN BLOOD BY AUTOMATED COUNT: 47.8 FL (ref 35.9–50)
GFR SERPLBLD CREATININE-BSD FMLA CKD-EPI: 108 ML/MIN/1.73 M 2
GLUCOSE BLD STRIP.AUTO-MCNC: 112 MG/DL (ref 65–99)
GLUCOSE BLD STRIP.AUTO-MCNC: 136 MG/DL (ref 65–99)
GLUCOSE BLD STRIP.AUTO-MCNC: 192 MG/DL (ref 65–99)
GLUCOSE BLD STRIP.AUTO-MCNC: 215 MG/DL (ref 65–99)
GLUCOSE BLD STRIP.AUTO-MCNC: 236 MG/DL (ref 65–99)
GLUCOSE SERPL-MCNC: 166 MG/DL (ref 65–99)
HCT VFR BLD AUTO: 20.5 % (ref 42–52)
HGB BLD-MCNC: 6.7 G/DL (ref 14–18)
HGB BLD-MCNC: 8.4 G/DL (ref 14–18)
HGB BLD-MCNC: 9 G/DL (ref 14–18)
MAGNESIUM SERPL-MCNC: 1.7 MG/DL (ref 1.5–2.5)
MCF BLD TEG: 59.1 MM (ref 52–69)
MCF.PLATELET INHIB BLD ROTEM: 17.6 MM (ref 15–32)
MCH RBC QN AUTO: 29.4 PG (ref 27–33)
MCHC RBC AUTO-ENTMCNC: 32.7 G/DL (ref 32.3–36.5)
MCV RBC AUTO: 89.9 FL (ref 81.4–97.8)
PA AA BLD-ACNC: 24.9 % (ref 0–11)
PA ADP BLD-ACNC: 74.8 % (ref 0–17)
PLATELET # BLD AUTO: 207 K/UL (ref 164–446)
PMV BLD AUTO: 10.2 FL (ref 9–12.9)
POTASSIUM SERPL-SCNC: 3.4 MMOL/L (ref 3.6–5.5)
PRODUCT TYPE UPROD: NORMAL
RBC # BLD AUTO: 2.28 M/UL (ref 4.7–6.1)
RH BLD: NORMAL
SODIUM SERPL-SCNC: 141 MMOL/L (ref 135–145)
TEG ALGORITHM TGALG: ABNORMAL
UNIT STATUS USTAT: NORMAL
WBC # BLD AUTO: 11.2 K/UL (ref 4.8–10.8)

## 2024-07-12 PROCEDURE — 86900 BLOOD TYPING SEROLOGIC ABO: CPT

## 2024-07-12 PROCEDURE — 99233 SBSQ HOSP IP/OBS HIGH 50: CPT | Performed by: HOSPITALIST

## 2024-07-12 PROCEDURE — 700111 HCHG RX REV CODE 636 W/ 250 OVERRIDE (IP): Performed by: HOSPITALIST

## 2024-07-12 PROCEDURE — 85018 HEMOGLOBIN: CPT

## 2024-07-12 PROCEDURE — 85027 COMPLETE CBC AUTOMATED: CPT

## 2024-07-12 PROCEDURE — 85576 BLOOD PLATELET AGGREGATION: CPT

## 2024-07-12 PROCEDURE — 86923 COMPATIBILITY TEST ELECTRIC: CPT

## 2024-07-12 PROCEDURE — 82962 GLUCOSE BLOOD TEST: CPT | Mod: 91

## 2024-07-12 PROCEDURE — 86850 RBC ANTIBODY SCREEN: CPT

## 2024-07-12 PROCEDURE — 700102 HCHG RX REV CODE 250 W/ 637 OVERRIDE(OP)

## 2024-07-12 PROCEDURE — 700111 HCHG RX REV CODE 636 W/ 250 OVERRIDE (IP): Mod: JG | Performed by: STUDENT IN AN ORGANIZED HEALTH CARE EDUCATION/TRAINING PROGRAM

## 2024-07-12 PROCEDURE — 36430 TRANSFUSION BLD/BLD COMPNT: CPT

## 2024-07-12 PROCEDURE — A9270 NON-COVERED ITEM OR SERVICE: HCPCS

## 2024-07-12 PROCEDURE — 85384 FIBRINOGEN ACTIVITY: CPT

## 2024-07-12 PROCEDURE — 80048 BASIC METABOLIC PNL TOTAL CA: CPT

## 2024-07-12 PROCEDURE — 83735 ASSAY OF MAGNESIUM: CPT

## 2024-07-12 PROCEDURE — 85347 COAGULATION TIME ACTIVATED: CPT

## 2024-07-12 PROCEDURE — 770000 HCHG ROOM/CARE - INTERMEDIATE ICU *

## 2024-07-12 PROCEDURE — 700102 HCHG RX REV CODE 250 W/ 637 OVERRIDE(OP): Performed by: HOSPITALIST

## 2024-07-12 PROCEDURE — 700105 HCHG RX REV CODE 258: Performed by: HOSPITALIST

## 2024-07-12 PROCEDURE — C9113 INJ PANTOPRAZOLE SODIUM, VIA: HCPCS | Performed by: INTERNAL MEDICINE

## 2024-07-12 PROCEDURE — 700111 HCHG RX REV CODE 636 W/ 250 OVERRIDE (IP): Performed by: INTERNAL MEDICINE

## 2024-07-12 PROCEDURE — P9016 RBC LEUKOCYTES REDUCED: HCPCS

## 2024-07-12 PROCEDURE — 86901 BLOOD TYPING SEROLOGIC RH(D): CPT

## 2024-07-12 PROCEDURE — A9270 NON-COVERED ITEM OR SERVICE: HCPCS | Performed by: HOSPITALIST

## 2024-07-12 RX ORDER — POTASSIUM CHLORIDE 7.45 MG/ML
10 INJECTION INTRAVENOUS
Status: COMPLETED | OUTPATIENT
Start: 2024-07-12 | End: 2024-07-12

## 2024-07-12 RX ORDER — MAGNESIUM SULFATE HEPTAHYDRATE 40 MG/ML
2 INJECTION, SOLUTION INTRAVENOUS ONCE
Status: COMPLETED | OUTPATIENT
Start: 2024-07-12 | End: 2024-07-12

## 2024-07-12 RX ORDER — CALCIUM GLUCONATE 20 MG/ML
1 INJECTION, SOLUTION INTRAVENOUS ONCE
Status: COMPLETED | OUTPATIENT
Start: 2024-07-12 | End: 2024-07-12

## 2024-07-12 RX ADMIN — ZINC SULFATE 220 MG (50 MG) CAPSULE 220 MG: CAPSULE at 05:50

## 2024-07-12 RX ADMIN — PANTOPRAZOLE SODIUM 40 MG: 40 INJECTION, POWDER, FOR SOLUTION INTRAVENOUS at 17:22

## 2024-07-12 RX ADMIN — OXYCODONE HYDROCHLORIDE AND ACETAMINOPHEN 500 MG: 500 TABLET ORAL at 05:50

## 2024-07-12 RX ADMIN — POTASSIUM CHLORIDE 10 MEQ: 7.46 INJECTION, SOLUTION INTRAVENOUS at 07:48

## 2024-07-12 RX ADMIN — AMPICILLIN AND SULBACTAM 3 G: 1; 2 INJECTION, POWDER, FOR SOLUTION INTRAMUSCULAR; INTRAVENOUS at 05:51

## 2024-07-12 RX ADMIN — AMIODARONE HYDROCHLORIDE 400 MG: 200 TABLET ORAL at 05:49

## 2024-07-12 RX ADMIN — POTASSIUM CHLORIDE 10 MEQ: 7.46 INJECTION, SOLUTION INTRAVENOUS at 10:10

## 2024-07-12 RX ADMIN — INSULIN HUMAN 4 UNITS: 100 INJECTION, SOLUTION PARENTERAL at 13:30

## 2024-07-12 RX ADMIN — CALCIUM GLUCONATE 1 G: 20 INJECTION, SOLUTION INTRAVENOUS at 02:15

## 2024-07-12 RX ADMIN — INSULIN HUMAN 3 UNITS: 100 INJECTION, SOLUTION PARENTERAL at 21:08

## 2024-07-12 RX ADMIN — INSULIN HUMAN 4 UNITS: 100 INJECTION, SOLUTION PARENTERAL at 18:32

## 2024-07-12 RX ADMIN — AMIODARONE HYDROCHLORIDE 400 MG: 200 TABLET ORAL at 17:21

## 2024-07-12 RX ADMIN — AMPICILLIN AND SULBACTAM 3 G: 1; 2 INJECTION, POWDER, FOR SOLUTION INTRAMUSCULAR; INTRAVENOUS at 12:17

## 2024-07-12 RX ADMIN — INSULIN HUMAN 15 UNITS: 100 INJECTION, SUSPENSION SUBCUTANEOUS at 18:32

## 2024-07-12 RX ADMIN — POTASSIUM CHLORIDE 10 MEQ: 7.46 INJECTION, SOLUTION INTRAVENOUS at 08:47

## 2024-07-12 RX ADMIN — MAGNESIUM SULFATE HEPTAHYDRATE 2 G: 2 INJECTION, SOLUTION INTRAVENOUS at 14:56

## 2024-07-12 RX ADMIN — POTASSIUM CHLORIDE 10 MEQ: 7.46 INJECTION, SOLUTION INTRAVENOUS at 12:03

## 2024-07-12 RX ADMIN — AMPICILLIN AND SULBACTAM 3 G: 1; 2 INJECTION, POWDER, FOR SOLUTION INTRAMUSCULAR; INTRAVENOUS at 17:21

## 2024-07-12 RX ADMIN — ATORVASTATIN CALCIUM 40 MG: 40 TABLET, FILM COATED ORAL at 17:21

## 2024-07-12 RX ADMIN — PANTOPRAZOLE SODIUM 40 MG: 40 INJECTION, POWDER, FOR SOLUTION INTRAVENOUS at 05:49

## 2024-07-12 ASSESSMENT — ENCOUNTER SYMPTOMS
CHILLS: 0
COUGH: 0
NERVOUS/ANXIOUS: 0
FEVER: 0
SHORTNESS OF BREATH: 0
HEADACHES: 0
DIARRHEA: 1
BACK PAIN: 0
NAUSEA: 0
NECK PAIN: 0
PALPITATIONS: 0
ABDOMINAL PAIN: 0

## 2024-07-12 ASSESSMENT — PAIN DESCRIPTION - PAIN TYPE
TYPE: ACUTE PAIN

## 2024-07-12 ASSESSMENT — FIBROSIS 4 INDEX: FIB4 SCORE: 1.11

## 2024-07-12 NOTE — CARE PLAN
The patient is Stable - Low risk of patient condition declining or worsening    Shift Goals  Clinical Goals: monitor HH  Patient Goals: stop bleeding  Family Goals: vinayak    Progress made toward(s) clinical / shift goals:    Problem: Knowledge Deficit - Standard  Goal: Patient and family/care givers will demonstrate understanding of plan of care, disease process/condition, diagnostic tests and medications  Outcome: Progressing     Problem: Fall Risk  Goal: Patient will remain free from falls  Outcome: Progressing     Problem: Skin Integrity  Goal: Skin integrity is maintained or improved  Outcome: Progressing     Problem: Pain - Standard  Goal: Alleviation of pain or a reduction in pain to the patient’s comfort goal  Outcome: Progressing     Problem: Psychosocial  Goal: Patient's level of anxiety will decrease  Outcome: Progressing     Problem: Hemodynamics  Goal: Patient's hemodynamics, fluid balance and neurologic status will be stable or improve  Outcome: Progressing     Problem: Infection - Standard  Goal: Patient will remain free from infection  Outcome: Progressing     Problem: Wound/ / Incision Healing  Goal: Patient's wound/surgical incision will decrease in size and heals properly  Outcome: Progressing       Patient is not progressing towards the following goals:

## 2024-07-12 NOTE — PROGRESS NOTES
4 Eyes Skin Assessment Completed by ANIRUDH zhu and ANIRUDH ibarra.    Head WDL  Ears Redness and Blanching  Nose WDL  Mouth WDL  Neck WDL  Breast/Chest WDL  Shoulder Blades WDL  Spine WDL  (R) Arm/Elbow/Hand Redness and Blanching  (L) Arm/Elbow/Hand Redness, Blanching, and Bruising Scab  Abdomen Bruising  Groin Excoriation  Scrotum/Coccyx/Buttocks Redness and Blanching small abrasion   (R) Leg WDL  (L) Leg WDL  (R) Heel/Foot/Toe Redness and Blanching  (L) Heel/Foot/Toe Redness and Blanching L TMA site          Devices In Places Tele Box, Blood Pressure Cuff, and Pulse Ox      Interventions In Place Heel Mepilex, Sacral Mepilex, TAP System, Pillows, Low Air Loss Mattress, Barrier Cream, Heels Loaded W/Pillows, and Pressure Redistribution Mattress    Possible Skin Injury Yes    Pictures Uploaded Into Epic Yes  Wound Consult Placed Yes  RN Wound Prevention Protocol Ordered Yes

## 2024-07-12 NOTE — CARE PLAN
Problem: Knowledge Deficit - Standard  Goal: Patient and family/care givers will demonstrate understanding of plan of care, disease process/condition, diagnostic tests and medications  Outcome: Progressing     Problem: Fall Risk  Goal: Patient will remain free from falls  Outcome: Progressing     Problem: Skin Integrity  Goal: Skin integrity is maintained or improved  Outcome: Progressing     Problem: Pain - Standard  Goal: Alleviation of pain or a reduction in pain to the patient’s comfort goal  Outcome: Progressing     Problem: Psychosocial  Goal: Patient's level of anxiety will decrease  Outcome: Progressing     Problem: Hemodynamics  Goal: Patient's hemodynamics, fluid balance and neurologic status will be stable or improve  Outcome: Progressing     Problem: Bowel Elimination  Goal: Establish and maintain regular bowel function  Outcome: Progressing     Problem: Infection - Standard  Goal: Patient will remain free from infection  Outcome: Progressing     Problem: Wound/ / Incision Healing  Goal: Patient's wound/surgical incision will decrease in size and heals properly  Outcome: Progressing   The patient is Stable - Low risk of patient condition declining or worsening    Shift Goals  Clinical Goals: monitor H&H, bowel rest  Patient Goals: rest/eat solid food  Family Goals: vinayak    Progress made toward(s) clinical / shift goals:  yes

## 2024-07-12 NOTE — PROGRESS NOTES
Hospital Medicine Daily Progress Note    Date of Service  7/12/2024    Chief Complaint  Syncope and melena    Hospital Course  Pj Freeman is a 69 y.o. male w/ hx of DMII, HTN, PAD, recent left toe amputation due to osteomyeliits (7/3/24). He was seen at [facility] and  and a MRI brain showing acute CVA left parietal lobe.  During admit found to be having paroxysmal atrial fibrillation and was placed on amiodarone and heparin and he remained on plavix.  An echo showed EF:50% and bicuspid aortic valve, severe mitral stenosis and pulmonary hypertension.  Due to melena and syncope on 7/7 he was stopped of heparin and plavix and transferred to [facility].  He was admitted 7/8/2024 to [facility] GI bleed with Hgb:6.2.  Neurology did consult at outside hospital.    Interval Problem Update  7/12: Having ongoing melena.  Hgb:8.4<6.7 (transfused after 6.7). Ca:6.8, Mg 1.7.  He is alert and oriented with clear speech.  No abdominal pain, no dizziness.    7/11: wbc:14.9 <15.3. Hgb:6.5 <8.5 w/ melanotic bleeding.  Transfused 1 unit pRBCs. He is alert and oriented.  Aware we have to hold on further cardiac work up until ulcers are healed and he can tolerate anticoagulation.    7/10: Hgb:8.5.  Left foot with some drainage and an open area near the medial surgical site with serous sanguinous fluid when squeezed.  He is alert and oriented. Restarted on heparin drip.    7/9: Had EGD showing duodenal ulcers.  Starting on liquids today.  He is alert and oriented.  Hgb:8.2    I have discussed this patient's plan of care and discharge plan at IDT rounds today with Case Management, Nursing, Nursing leadership, and other members of the IDT team.    Consultants/Specialty  cardiology, GI, neurology, and orthopedics    Code Status  Full Code    Disposition  Medically Cleared  I have placed the appropriate orders for post-discharge needs.    Review of Systems  Review of Systems   Constitutional:  Positive for malaise/fatigue. Negative  for chills and fever.   Respiratory:  Negative for cough and shortness of breath.    Cardiovascular:  Positive for leg swelling. Negative for chest pain and palpitations.   Gastrointestinal:  Positive for diarrhea and melena. Negative for abdominal pain and nausea.   Genitourinary:  Negative for dysuria.   Musculoskeletal:  Negative for back pain and neck pain.   Neurological:  Negative for headaches.   Psychiatric/Behavioral:  The patient is not nervous/anxious.         Physical Exam  Temp:  [36.5 °C (97.7 °F)-36.8 °C (98.3 °F)] 36.5 °C (97.7 °F)  Pulse:  [58-94] 79  Resp:  [13-20] 20  BP: (118-166)/(63-96) 160/80  SpO2:  [91 %-99 %] 92 %    Physical Exam  Vitals reviewed.   Constitutional:       Appearance: Normal appearance. He is not ill-appearing.   HENT:      Head: Normocephalic and atraumatic.      Nose: Nose normal.      Mouth/Throat:      Mouth: Mucous membranes are moist.   Eyes:      General:         Right eye: No discharge.         Left eye: No discharge.      Extraocular Movements: Extraocular movements intact.      Conjunctiva/sclera: Conjunctivae normal.   Cardiovascular:      Rate and Rhythm: Normal rate and regular rhythm.      Pulses: Normal pulses.      Heart sounds: No murmur heard.  Pulmonary:      Effort: Pulmonary effort is normal. No respiratory distress.      Breath sounds: Normal breath sounds. No wheezing.   Abdominal:      General: Bowel sounds are normal. There is no distension.      Palpations: Abdomen is soft.   Musculoskeletal:         General: No tenderness.      Cervical back: Normal range of motion.      Right lower leg: No edema.      Left lower leg: No edema.      Comments: Left foot with transmetatarsal amputation with right medial surgical site with an area of small dehiscence and possible missing suture.  Serous sanguinous fluid elicited on gentle squeezing of the surrounding tissue   Skin:     General: Skin is warm.      Findings: No erythema.      Comments: Good  approximation of surgical edges   Neurological:      General: No focal deficit present.      Mental Status: He is alert and oriented to person, place, and time.      Cranial Nerves: No cranial nerve deficit.   Psychiatric:         Mood and Affect: Mood normal.         Behavior: Behavior normal.         Thought Content: Thought content normal.         Fluids    Intake/Output Summary (Last 24 hours) at 7/12/2024 1548  Last data filed at 7/12/2024 1303  Gross per 24 hour   Intake 1400 ml   Output --   Net 1400 ml       Laboratory  Recent Labs     07/10/24  0440 07/11/24  0330 07/11/24  0909 07/11/24  1740 07/12/24  0116 07/12/24  0858   WBC 15.3* 14.9*  --   --  11.2*  --    RBC 2.85* 2.16*  --   --  2.28*  --    HEMOGLOBIN 8.5* 6.5*   < > 7.7* 6.7* 8.4*   HEMATOCRIT 25.4* 19.3*  --   --  20.5*  --    MCV 89.1 89.4  --   --  89.9  --    MCH 29.8 30.1  --   --  29.4  --    MCHC 33.5 33.7  --   --  32.7  --    RDW 46.5 46.0  --   --  47.8  --    PLATELETCT 256 242  --   --  207  --    MPV 9.8 10.3  --   --  10.2  --     < > = values in this interval not displayed.     Recent Labs     07/10/24  0440 07/11/24  0330 07/12/24  0116   SODIUM 138 138 141   POTASSIUM 3.9 4.0 3.4*   CHLORIDE 112 113* 114*   CO2 19* 18* 17*   GLUCOSE 201* 256* 166*   BUN 19 18 14   CREATININE 0.55 0.71 0.52   CALCIUM 7.2* 7.0* 6.8*     Recent Labs     07/10/24  1300   APTT 23.6*   INR 1.13               Imaging  EC-ECHOCARDIOGRAM COMPLETE W/O CONT   Final Result           Assessment/Plan  * Upper GI bleed- (present on admission)  Assessment & Plan  Patient is direct admit with melena and acute blood loss anemia, status post 4 unit of RBC at [facility]  Etiology is unclear, he denies taking NSAID.  However he was given IV heparin for A-fib/CVA/non-STEMI, as well as Plavix, that was discontinued  Plan: Repeat H&H, transfuse as needed for hemoglobin 8.0 and below given orthostatic dizziness, NSTEMI  Continue IV Protonix, IV  fluids  7/9 EGD showed duodenal ulcers with one that was bleeding, gastritis and some erosive esophagitis.  7/10 heparin drip initiated  7/11 Heparin dc'd due to recurrent melena and need of transfusion of pRBCs.  Monitor q 8 hgb.  Path negative for Hpylori  7/12 PPI, transfuse for Hgb <7    Hypokalemia  Assessment & Plan  Goal of K ~4  7/12 Supplementing with IV    Hypomagnesemia  Assessment & Plan  Goal ~2  7/12 giving 2 g IV magnesium sulfate    Type 2 diabetes mellitus (HCC)  Assessment & Plan  Uncontrolled with hyperglycemia  Monitor accuchecks and cover with SSI  Hypoglycemic protocol  Diabetic diet    Acute osteomyelitis of left foot (HCC)  Assessment & Plan  Status post left TMA on 7/3  Repeat MRI of the left foot:status post transmetatarsal amputation without evidence of recurrent osteomyelitis of up or abscess.  Subcutaneous edema along the stump which could be postsurgical or posttraumatic without loculated fluid collection hematoma or abscess.  There is marrow edema along the first metatarsal which could represent a bone contusion  And consider obtaining surgical pathology and available culture report to determine needs to continue antibiotics, consider ID consult R  repeat CBC, inflammatory markers  Left foot stump looks noninfected.  Surgery was in [place] per patient.  WBC remains elevated and there is some drainage of the  medial aspect of the wound   7/10 stopped Zosyn and vancomycin initiated Unasyn.  I have consulted orthopedics to evaluate area of dehiscence of transmetatarsal wound site and a new suture was placed.      Mitral stenosis  Assessment & Plan  transthoracic echo showed 50% EF, grade 2 diastolic dysfunction, elevated LV filling pressure, bicuspid aortic valve,  severe mitral stenosis, pulmonary hypertension.  Patient probably would benefit from GERALDINE and possibly cardiac surgery consult when stable from bleeding standpoint.  Cardiology is consulting.  Consideration of repeat echocardiogram  per cardiology  Future outpatient valvular repair once stable from GI and cardiac standpoint    NSTEMI (non-ST elevated myocardial infarction) ([facility])  Assessment & Plan  Elevated troponin and chest discomfort documented at outside facility  Echo showed no focal wall motion abnormalities, but there is borderline low EF 50%, grade 2 diastolic dysfunction, severe mitral stenosis and pulmonary hypertension  Probably type II NSTEMI secondary to severe anemia  Plan: Repeat troponin and EKG  RBC transfusion for goal hemoglobin 8.0 and above  Ischemic workup per cardiology with possible cardiac cath during admission if bleeding resolves and able to tolerate anticoagulation    Acute CVA (cerebrovascular accident) ([facility])  Assessment & Plan  Presented on 7/6 after syncopal episode without reported gross neurodeficits  MRI of the brain: Tiny focus of acute ischemia in the left parietal lobe with chronic microvascular disease and small infarcts.  No acute hemorrhage or mass.  On exam today there is no definite motor deficit.  New onset A-fib, as well as severe mitral stenosis on echo documented at outside hospital, could not continue anticoagulation due to acute GI bleed  Plan to restart anticoagulation when cleared by GI  Monitor with neurochecks every 4 hours  Neurology consulted at outside facility.  Continue current management.  On heparin drip monitoring of hemoglobin and see if he tolerates it.  Continue on high-dose statin    Acute blood loss anemia  Assessment & Plan  S/p 4 units of RBC transfusion at outside hospital  Repeat H&H pending.  Goal hemoglobin 8.0 and above  7/12 Hgb:6.7 <7.7<9.2<6.5 <8.5  EGD with duodenal ulcers. Did not tolerate being on heparin.  7/11 discussed with GI and they do not feel an additional EGD is warranted other than holding anticoagulation.  7/12 Check TEG, if continued bleeding will readdress with GI.  Monitor CBC         VTE prophylaxis: VTE Selection    I have performed a physical exam and  reviewed and updated ROS and Plan today (7/12/2024). In review of yesterday's note (7/11/2024), there are no changes except as documented above.

## 2024-07-12 NOTE — PROGRESS NOTES
1 bloody bowel movement since 1900, now more dark red tinged as opposed to black which was noted prior. Hgb trending down 6.7. New orders for 1 unit PRBC.     Critical calcium 6.8. New orders for calcium gluconate.

## 2024-07-13 PROBLEM — I24.9 ACUTE CORONARY SYNDROME (HCC): Status: ACTIVE | Noted: 2024-07-13

## 2024-07-13 LAB
ANION GAP SERPL CALC-SCNC: 8 MMOL/L (ref 7–16)
BUN SERPL-MCNC: 11 MG/DL (ref 8–22)
CALCIUM SERPL-MCNC: 7.1 MG/DL (ref 8.5–10.5)
CHLORIDE SERPL-SCNC: 111 MMOL/L (ref 96–112)
CO2 SERPL-SCNC: 19 MMOL/L (ref 20–33)
CREAT SERPL-MCNC: 0.46 MG/DL (ref 0.5–1.4)
ERYTHROCYTE [DISTWIDTH] IN BLOOD BY AUTOMATED COUNT: 48.6 FL (ref 35.9–50)
GFR SERPLBLD CREATININE-BSD FMLA CKD-EPI: 112 ML/MIN/1.73 M 2
GLUCOSE BLD STRIP.AUTO-MCNC: 190 MG/DL (ref 65–99)
GLUCOSE BLD STRIP.AUTO-MCNC: 197 MG/DL (ref 65–99)
GLUCOSE BLD STRIP.AUTO-MCNC: 266 MG/DL (ref 65–99)
GLUCOSE SERPL-MCNC: 67 MG/DL (ref 65–99)
HCT VFR BLD AUTO: 26 % (ref 42–52)
HGB BLD-MCNC: 9.1 G/DL (ref 14–18)
MCH RBC QN AUTO: 30.2 PG (ref 27–33)
MCHC RBC AUTO-ENTMCNC: 35 G/DL (ref 32.3–36.5)
MCV RBC AUTO: 86.4 FL (ref 81.4–97.8)
PLATELET # BLD AUTO: 259 K/UL (ref 164–446)
PMV BLD AUTO: 9.8 FL (ref 9–12.9)
POTASSIUM SERPL-SCNC: 3.7 MMOL/L (ref 3.6–5.5)
RBC # BLD AUTO: 3.01 M/UL (ref 4.7–6.1)
SODIUM SERPL-SCNC: 138 MMOL/L (ref 135–145)
WBC # BLD AUTO: 13.1 K/UL (ref 4.8–10.8)

## 2024-07-13 PROCEDURE — 80048 BASIC METABOLIC PNL TOTAL CA: CPT

## 2024-07-13 PROCEDURE — 99233 SBSQ HOSP IP/OBS HIGH 50: CPT | Performed by: HOSPITALIST

## 2024-07-13 PROCEDURE — A9270 NON-COVERED ITEM OR SERVICE: HCPCS

## 2024-07-13 PROCEDURE — 700102 HCHG RX REV CODE 250 W/ 637 OVERRIDE(OP): Performed by: HOSPITALIST

## 2024-07-13 PROCEDURE — 700111 HCHG RX REV CODE 636 W/ 250 OVERRIDE (IP): Mod: JZ | Performed by: HOSPITALIST

## 2024-07-13 PROCEDURE — 700105 HCHG RX REV CODE 258: Performed by: HOSPITALIST

## 2024-07-13 PROCEDURE — 700111 HCHG RX REV CODE 636 W/ 250 OVERRIDE (IP): Performed by: INTERNAL MEDICINE

## 2024-07-13 PROCEDURE — 85027 COMPLETE CBC AUTOMATED: CPT

## 2024-07-13 PROCEDURE — 700102 HCHG RX REV CODE 250 W/ 637 OVERRIDE(OP)

## 2024-07-13 PROCEDURE — A9270 NON-COVERED ITEM OR SERVICE: HCPCS | Performed by: HOSPITALIST

## 2024-07-13 PROCEDURE — 770020 HCHG ROOM/CARE - TELE (206)

## 2024-07-13 PROCEDURE — C9113 INJ PANTOPRAZOLE SODIUM, VIA: HCPCS | Performed by: INTERNAL MEDICINE

## 2024-07-13 PROCEDURE — 82962 GLUCOSE BLOOD TEST: CPT | Mod: 91

## 2024-07-13 RX ADMIN — AMPICILLIN AND SULBACTAM 3 G: 1; 2 INJECTION, POWDER, FOR SOLUTION INTRAMUSCULAR; INTRAVENOUS at 05:46

## 2024-07-13 RX ADMIN — ZINC SULFATE 220 MG (50 MG) CAPSULE 220 MG: CAPSULE at 05:45

## 2024-07-13 RX ADMIN — INSULIN HUMAN 10 UNITS: 100 INJECTION, SUSPENSION SUBCUTANEOUS at 11:22

## 2024-07-13 RX ADMIN — INSULIN HUMAN 3 UNITS: 100 INJECTION, SOLUTION PARENTERAL at 13:14

## 2024-07-13 RX ADMIN — AMPICILLIN AND SULBACTAM 3 G: 1; 2 INJECTION, POWDER, FOR SOLUTION INTRAMUSCULAR; INTRAVENOUS at 00:37

## 2024-07-13 RX ADMIN — AMIODARONE HYDROCHLORIDE 400 MG: 200 TABLET ORAL at 05:45

## 2024-07-13 RX ADMIN — INSULIN HUMAN 3 UNITS: 100 INJECTION, SOLUTION PARENTERAL at 11:23

## 2024-07-13 RX ADMIN — AMPICILLIN AND SULBACTAM 3 G: 1; 2 INJECTION, POWDER, FOR SOLUTION INTRAMUSCULAR; INTRAVENOUS at 18:21

## 2024-07-13 RX ADMIN — OMEPRAZOLE 20 MG: 20 CAPSULE, DELAYED RELEASE ORAL at 18:08

## 2024-07-13 RX ADMIN — AMPICILLIN AND SULBACTAM 3 G: 1; 2 INJECTION, POWDER, FOR SOLUTION INTRAMUSCULAR; INTRAVENOUS at 13:10

## 2024-07-13 RX ADMIN — ATORVASTATIN CALCIUM 40 MG: 40 TABLET, FILM COATED ORAL at 18:08

## 2024-07-13 RX ADMIN — INSULIN HUMAN 10 UNITS: 100 INJECTION, SUSPENSION SUBCUTANEOUS at 20:52

## 2024-07-13 RX ADMIN — AMIODARONE HYDROCHLORIDE 400 MG: 200 TABLET ORAL at 18:08

## 2024-07-13 RX ADMIN — PANTOPRAZOLE SODIUM 40 MG: 40 INJECTION, POWDER, FOR SOLUTION INTRAVENOUS at 05:45

## 2024-07-13 RX ADMIN — INSULIN HUMAN 7 UNITS: 100 INJECTION, SOLUTION PARENTERAL at 20:48

## 2024-07-13 ASSESSMENT — PAIN DESCRIPTION - PAIN TYPE
TYPE: ACUTE PAIN

## 2024-07-13 ASSESSMENT — ENCOUNTER SYMPTOMS
SPEECH CHANGE: 0
SHORTNESS OF BREATH: 0
COUGH: 0
DIARRHEA: 0
PALPITATIONS: 0
ABDOMINAL PAIN: 0
BACK PAIN: 0
FEVER: 0
NECK PAIN: 0
NERVOUS/ANXIOUS: 0
NAUSEA: 0

## 2024-07-13 ASSESSMENT — COGNITIVE AND FUNCTIONAL STATUS - GENERAL
MOVING TO AND FROM BED TO CHAIR: A LITTLE
SUGGESTED CMS G CODE MODIFIER MOBILITY: CK
STANDING UP FROM CHAIR USING ARMS: A LITTLE
DRESSING REGULAR LOWER BODY CLOTHING: A LITTLE
DRESSING REGULAR UPPER BODY CLOTHING: A LITTLE
EATING MEALS: A LITTLE
MOBILITY SCORE: 18
DAILY ACTIVITIY SCORE: 18
TOILETING: A LITTLE
WALKING IN HOSPITAL ROOM: A LITTLE
HELP NEEDED FOR BATHING: A LITTLE
CLIMB 3 TO 5 STEPS WITH RAILING: A LITTLE
MOVING FROM LYING ON BACK TO SITTING ON SIDE OF FLAT BED: A LITTLE
SUGGESTED CMS G CODE MODIFIER DAILY ACTIVITY: CK
TURNING FROM BACK TO SIDE WHILE IN FLAT BAD: A LITTLE
PERSONAL GROOMING: A LITTLE

## 2024-07-13 ASSESSMENT — LIFESTYLE VARIABLES
TOTAL SCORE: 0
HOW MANY TIMES IN THE PAST YEAR HAVE YOU HAD 5 OR MORE DRINKS IN A DAY: 0
CONSUMPTION TOTAL: NEGATIVE
DOES PATIENT WANT TO STOP DRINKING: NO
TOTAL SCORE: 0
ALCOHOL_USE: NO
HAVE PEOPLE ANNOYED YOU BY CRITICIZING YOUR DRINKING: NO
TOTAL SCORE: 0
AVERAGE NUMBER OF DAYS PER WEEK YOU HAVE A DRINK CONTAINING ALCOHOL: 0
EVER FELT BAD OR GUILTY ABOUT YOUR DRINKING: NO
EVER HAD A DRINK FIRST THING IN THE MORNING TO STEADY YOUR NERVES TO GET RID OF A HANGOVER: NO
ON A TYPICAL DAY WHEN YOU DRINK ALCOHOL HOW MANY DRINKS DO YOU HAVE: 0
HAVE YOU EVER FELT YOU SHOULD CUT DOWN ON YOUR DRINKING: NO

## 2024-07-13 NOTE — CARE PLAN
The patient is Watcher - Medium risk of patient condition declining or worsening    Shift Goals  Clinical Goals: HGB>8, Q8 Hgb,  Patient Goals: Advance and tolerate diet  Family Goals: JUDITH    Progress made toward(s) clinical / shift goals:    Problem: Knowledge Deficit - Standard  Goal: Patient and family/care givers will demonstrate understanding of plan of care, disease process/condition, diagnostic tests and medications  Outcome: Progressing     Problem: Pain - Standard  Goal: Alleviation of pain or a reduction in pain to the patient’s comfort goal  Outcome: Progressing     Problem: Psychosocial  Goal: Patient's level of anxiety will decrease  Outcome: Progressing     Problem: Hemodynamics  Goal: Patient's hemodynamics, fluid balance and neurologic status will be stable or improve  Outcome: Progressing     Problem: Bowel Elimination  Goal: Establish and maintain regular bowel function  Outcome: Progressing       Patient is not progressing towards the following goals:

## 2024-07-13 NOTE — PROGRESS NOTES
Hospital Medicine Daily Progress Note    Date of Service  7/13/2024    Chief Complaint  Syncope and melena    Hospital Course  Pj Freeman is a 69 y.o. male w/ hx of DMII, HTN, PAD, recent left toe amputation due to osteomyeliits (7/3/24). He was seen at [facility] and  and a MRI brain showing acute CVA left parietal lobe.  During admit found to be having paroxysmal atrial fibrillation and was placed on amiodarone and heparin and he remained on plavix.  An echo showed EF:50% and bicuspid aortic valve, severe mitral stenosis and pulmonary hypertension.  Due to melena and syncope on 7/7 he was stopped of heparin and plavix and transferred to [facility].  He was admitted 7/8/2024 to [facility] GI bleed with Hgb:6.2.  Neurology did consult at outside hospital.    Interval Problem Update  7/13: Hgb:9.1    7/12: Having ongoing melena.  Hgb:8.4<6.7 (transfused after 6.7). Ca:6.8, Mg 1.7.  He is alert and oriented with clear speech.  No abdominal pain, no dizziness.    7/11: wbc:14.9 <15.3. Hgb:6.5 <8.5 w/ melanotic bleeding.  Transfused 1 unit pRBCs. He is alert and oriented.  Aware we have to hold on further cardiac work up until ulcers are healed and he can tolerate anticoagulation.    7/10: Hgb:8.5.  Left foot with some drainage and an open area near the medial surgical site with serous sanguinous fluid when squeezed.  He is alert and oriented. Restarted on heparin drip.    7/9: Had EGD showing duodenal ulcers.  Starting on liquids today.  He is alert and oriented.  Hgb:8.2    I have discussed this patient's plan of care and discharge plan at IDT rounds today with Case Management, Nursing, Nursing leadership, and other members of the IDT team.    Consultants/Specialty  cardiology, GI, neurology, and orthopedics    Code Status  Full Code    Disposition  The patient is not medically cleared for discharge to home or a post-acute facility.      I have placed the appropriate orders for post-discharge needs.    Review  of Systems  Review of Systems   Constitutional:  Negative for fever.   Respiratory:  Negative for cough and shortness of breath.    Cardiovascular:  Negative for palpitations and leg swelling.   Gastrointestinal:  Negative for abdominal pain, diarrhea, melena and nausea.   Genitourinary:  Negative for dysuria.   Musculoskeletal:  Negative for back pain and neck pain.   Neurological:  Negative for speech change.   Psychiatric/Behavioral:  The patient is not nervous/anxious.         Physical Exam  Temp:  [36.5 °C (97.7 °F)-36.8 °C (98.2 °F)] 36.8 °C (98.2 °F)  Pulse:  [62-81] 75  Resp:  [13-26] 19  BP: (138-166)/(68-86) 146/83  SpO2:  [89 %-99 %] 93 %    Physical Exam  Vitals reviewed.   Constitutional:       Appearance: Normal appearance. He is not ill-appearing.   HENT:      Head: Normocephalic and atraumatic.      Mouth/Throat:      Mouth: Mucous membranes are moist.   Eyes:      General:         Right eye: No discharge.         Left eye: No discharge.      Extraocular Movements: Extraocular movements intact.   Cardiovascular:      Rate and Rhythm: Normal rate and regular rhythm.      Pulses: Normal pulses.      Heart sounds: No murmur heard.  Pulmonary:      Effort: Pulmonary effort is normal. No respiratory distress.      Breath sounds: Normal breath sounds. No wheezing.   Abdominal:      General: Bowel sounds are normal. There is no distension.      Palpations: Abdomen is soft.   Musculoskeletal:      Right lower leg: No edema.      Left lower leg: No edema.      Comments: Clean dressing over left transmetatarsal surgical site.   Skin:     General: Skin is warm.      Findings: No erythema.      Comments: Good approximation of surgical edges   Neurological:      General: No focal deficit present.      Mental Status: He is alert and oriented to person, place, and time.      Cranial Nerves: No cranial nerve deficit.   Psychiatric:         Mood and Affect: Mood normal.         Behavior: Behavior normal.          Thought Content: Thought content normal.         Fluids    Intake/Output Summary (Last 24 hours) at 7/13/2024 0847  Last data filed at 7/13/2024 0600  Gross per 24 hour   Intake 1100 ml   Output 550 ml   Net 550 ml       Laboratory  Recent Labs     07/11/24  0330 07/11/24  0909 07/12/24  0116 07/12/24  0858 07/12/24  1736 07/13/24  0425   WBC 14.9*  --  11.2*  --   --  13.1*   RBC 2.16*  --  2.28*  --   --  3.01*   HEMOGLOBIN 6.5*   < > 6.7* 8.4* 9.0* 9.1*   HEMATOCRIT 19.3*  --  20.5*  --   --  26.0*   MCV 89.4  --  89.9  --   --  86.4   MCH 30.1  --  29.4  --   --  30.2   MCHC 33.7  --  32.7  --   --  35.0   RDW 46.0  --  47.8  --   --  48.6   PLATELETCT 242  --  207  --   --  259   MPV 10.3  --  10.2  --   --  9.8    < > = values in this interval not displayed.     Recent Labs     07/11/24 0330 07/12/24  0116 07/13/24  0425   SODIUM 138 141 138   POTASSIUM 4.0 3.4* 3.7   CHLORIDE 113* 114* 111   CO2 18* 17* 19*   GLUCOSE 256* 166* 67   BUN 18 14 11   CREATININE 0.71 0.52 0.46*   CALCIUM 7.0* 6.8* 7.1*     Recent Labs     07/10/24  1300   APTT 23.6*   INR 1.13               Imaging  EC-ECHOCARDIOGRAM COMPLETE W/O CONT   Final Result           Assessment/Plan  * Upper GI bleed- (present on admission)  Assessment & Plan  Patient is direct admit with melena and acute blood loss anemia, [facility]  Etiology is unclear, he denies taking NSAID.  However he was given IV heparin for A-fib/CVA/non-STEMI, as well as Plavix, that was discontinued  Plan: Repeat H&H, transfuse as needed for hemoglobin 8.0 and below given orthostatic dizziness, NSTEMI  Continue IV Protonix, IV fluids  7/9 EGD showed duodenal ulcers with one that was bleeding, gastritis and some erosive esophagitis.  7/10 heparin drip initiated  7/11 Heparin dc'd due to recurrent melena and need of transfusion of pRBCs.  Monitor q 8 hgb.  Path negative for Hpylori  7/12 PPI, transfuse for Hgb <7  7/13 Hgb staying  level.  Monitor for one more day then possibly consider restarting heparin.    Hypokalemia  Assessment & Plan  Goal of K ~4  7/12 Supplementing with IV    Hypomagnesemia  Assessment & Plan  Goal ~2  7/12 giving 2 g IV magnesium sulfate    Type 2 diabetes mellitus (HCC)  Assessment & Plan  Uncontrolled with hyperglycemia  Monitor accuchecks and cover with SSI  Hypoglycemic protocol  Diabetic diet    Acute osteomyelitis of left foot (HCC)  Assessment & Plan  Status post left TMA on 7/3  Repeat MRI of the left foot:status post transmetatarsal amputation without evidence of recurrent osteomyelitis of up or abscess.  Subcutaneous edema along the stump which could be postsurgical or posttraumatic without loculated fluid collection hematoma or abscess.  There is marrow edema along the first metatarsal which could represent a bone contusion  And consider obtaining surgical pathology and available culture report to determine needs to continue antibiotics, consider ID consult R  repeat CBC, inflammatory markers  Left foot stump looks noninfected.  Surgery was in [place] per patient.  7/8/24 ESR:18  WBC remains elevated and there is some drainage of the  medial aspect of the wound   7/10 stopped Zosyn and vancomycin initiated Unasyn.  I have consulted orthopedics to evaluate area of dehiscence of transmetatarsal wound site and a new suture was placed.  7/13 wbc:13.1  monitor cbc and vitals.      Mitral stenosis  Assessment & Plan  transthoracic echo showed 50% EF, grade 2 diastolic dysfunction, elevated LV filling pressure, bicuspid aortic valve,  severe mitral stenosis, pulmonary hypertension.  Patient probably would benefit from GERALDINE and possibly cardiac surgery consult when stable from bleeding standpoint.  Cardiology is consulting.  Consideration of repeat echocardiogram per cardiology  Future outpatient valvular repair once stable from GI and cardiac standpoint    NSTEMI (non-ST elevated myocardial infarction)  ([facility])  Assessment & Plan  Elevated troponin and chest discomfort documented at outside facility  Echo showed no focal wall motion abnormalities, but there is borderline low EF 50%, grade 2 diastolic dysfunction, severe mitral stenosis and pulmonary hypertension  Probably type II NSTEMI secondary to severe anemia  Plan: Repeat troponin and EKG  RBC transfusion for goal hemoglobin 8.0 and above  Ischemic workup per cardiology with possible cardiac cath during admission if bleeding resolves and able to tolerate anticoagulation    Acute CVA (cerebrovascular accident) ([facility])  Assessment & Plan  Presented on 7/6 after syncopal episode without reported gross neurodeficits  MRI of the brain: Tiny focus of acute ischemia in the left parietal lobe with chronic microvascular disease and small infarcts.  No acute hemorrhage or mass.  On exam today there is no definite motor deficit.  New onset A-fib, as well as severe mitral stenosis on echo documented at outside hospital, could not continue anticoagulation due to acute GI bleed  Monitor with neurochecks every 4 hours  Neurology consulted at outside facility.  Continue current management.    Continue on high-dose statin    Acute blood loss anemia  Assessment & Plan  S/p 4 units of RBC transfusion at outside hospital  Repeat H&H pending.  Goal hemoglobin 8.0 and above  7/13 Hgb:9.1 <9<8.4<6.7 <7.7<9.2<6.5 <8.5  EGD with duodenal ulcers. Did not tolerate being on heparin.  7/11 discussed with GI and they do not feel an additional EGD is warranted other than holding anticoagulation.  7/12 Check TEG, if continued bleeding will readdress with GI.  Monitor CBC         VTE prophylaxis:   SCDs/TEDs      I have performed a physical exam and reviewed and updated ROS and Plan today (7/13/2024). In review of yesterday's note (7/12/2024), there are no changes except as documented above.

## 2024-07-13 NOTE — CARE PLAN
The patient is Watcher - Medium risk of patient condition declining or worsening    Shift Goals  Clinical Goals: HGB >8, Q6 hemoglobin, Hemodynamic stability  Patient Goals: Advance diet  Family Goals: JUDITH    Progress made toward(s) clinical / shift goals:      Problem: Knowledge Deficit - Standard  Goal: Patient and family/care givers will demonstrate understanding of plan of care, disease process/condition, diagnostic tests and medications  Outcome: Progressing     Problem: Fall Risk  Goal: Patient will remain free from falls  Outcome: Progressing     Problem: Pain - Standard  Goal: Alleviation of pain or a reduction in pain to the patient’s comfort goal  Outcome: Progressing     Problem: Hemodynamics  Goal: Patient's hemodynamics, fluid balance and neurologic status will be stable or improve  Outcome: Progressing     Problem: Bowel Elimination  Goal: Establish and maintain regular bowel function  Outcome: Progressing     Problem: Wound/ / Incision Healing  Goal: Patient's wound/surgical incision will decrease in size and heals properly  Outcome: Progressing     Problem: Risk for Fluid Volume Deficit Related to Bleeding  Goal: Patient will show no signs and symptoms of excessive bleeding  Outcome: Progressing

## 2024-07-14 LAB
ERYTHROCYTE [DISTWIDTH] IN BLOOD BY AUTOMATED COUNT: 50.7 FL (ref 35.9–50)
GLUCOSE BLD STRIP.AUTO-MCNC: 131 MG/DL (ref 65–99)
GLUCOSE BLD STRIP.AUTO-MCNC: 171 MG/DL (ref 65–99)
GLUCOSE BLD STRIP.AUTO-MCNC: 190 MG/DL (ref 65–99)
GLUCOSE BLD STRIP.AUTO-MCNC: 217 MG/DL (ref 65–99)
GLUCOSE BLD STRIP.AUTO-MCNC: 67 MG/DL (ref 65–99)
GLUCOSE BLD STRIP.AUTO-MCNC: 79 MG/DL (ref 65–99)
GLUCOSE BLD STRIP.AUTO-MCNC: 96 MG/DL (ref 65–99)
HCT VFR BLD AUTO: 29.9 % (ref 42–52)
HGB BLD-MCNC: 8.8 G/DL (ref 14–18)
HGB BLD-MCNC: 9.9 G/DL (ref 14–18)
MCH RBC QN AUTO: 29.1 PG (ref 27–33)
MCHC RBC AUTO-ENTMCNC: 33.1 G/DL (ref 32.3–36.5)
MCV RBC AUTO: 87.9 FL (ref 81.4–97.8)
PLATELET # BLD AUTO: 319 K/UL (ref 164–446)
PMV BLD AUTO: 9.8 FL (ref 9–12.9)
RBC # BLD AUTO: 3.4 M/UL (ref 4.7–6.1)
WBC # BLD AUTO: 10.6 K/UL (ref 4.8–10.8)

## 2024-07-14 PROCEDURE — 700102 HCHG RX REV CODE 250 W/ 637 OVERRIDE(OP)

## 2024-07-14 PROCEDURE — 85027 COMPLETE CBC AUTOMATED: CPT

## 2024-07-14 PROCEDURE — A9270 NON-COVERED ITEM OR SERVICE: HCPCS | Performed by: HOSPITALIST

## 2024-07-14 PROCEDURE — 700102 HCHG RX REV CODE 250 W/ 637 OVERRIDE(OP): Performed by: STUDENT IN AN ORGANIZED HEALTH CARE EDUCATION/TRAINING PROGRAM

## 2024-07-14 PROCEDURE — 99233 SBSQ HOSP IP/OBS HIGH 50: CPT | Performed by: STUDENT IN AN ORGANIZED HEALTH CARE EDUCATION/TRAINING PROGRAM

## 2024-07-14 PROCEDURE — A9270 NON-COVERED ITEM OR SERVICE: HCPCS

## 2024-07-14 PROCEDURE — 82962 GLUCOSE BLOOD TEST: CPT | Mod: 91

## 2024-07-14 PROCEDURE — 700102 HCHG RX REV CODE 250 W/ 637 OVERRIDE(OP): Performed by: HOSPITALIST

## 2024-07-14 PROCEDURE — 700105 HCHG RX REV CODE 258: Performed by: HOSPITALIST

## 2024-07-14 PROCEDURE — 700102 HCHG RX REV CODE 250 W/ 637 OVERRIDE(OP): Performed by: INTERNAL MEDICINE

## 2024-07-14 PROCEDURE — A9270 NON-COVERED ITEM OR SERVICE: HCPCS | Performed by: INTERNAL MEDICINE

## 2024-07-14 PROCEDURE — 700111 HCHG RX REV CODE 636 W/ 250 OVERRIDE (IP): Mod: JZ | Performed by: HOSPITALIST

## 2024-07-14 PROCEDURE — A9270 NON-COVERED ITEM OR SERVICE: HCPCS | Performed by: STUDENT IN AN ORGANIZED HEALTH CARE EDUCATION/TRAINING PROGRAM

## 2024-07-14 PROCEDURE — 36415 COLL VENOUS BLD VENIPUNCTURE: CPT

## 2024-07-14 PROCEDURE — 85018 HEMOGLOBIN: CPT

## 2024-07-14 PROCEDURE — 770020 HCHG ROOM/CARE - TELE (206)

## 2024-07-14 RX ADMIN — INSULIN HUMAN 4 UNITS: 100 INJECTION, SOLUTION PARENTERAL at 16:59

## 2024-07-14 RX ADMIN — AMPICILLIN AND SULBACTAM 3 G: 1; 2 INJECTION, POWDER, FOR SOLUTION INTRAMUSCULAR; INTRAVENOUS at 01:08

## 2024-07-14 RX ADMIN — OMEPRAZOLE 20 MG: 20 CAPSULE, DELAYED RELEASE ORAL at 09:09

## 2024-07-14 RX ADMIN — OMEPRAZOLE 40 MG: 20 CAPSULE, DELAYED RELEASE ORAL at 17:09

## 2024-07-14 RX ADMIN — SENNOSIDES AND DOCUSATE SODIUM 2 TABLET: 50; 8.6 TABLET ORAL at 17:09

## 2024-07-14 RX ADMIN — ATORVASTATIN CALCIUM 40 MG: 40 TABLET, FILM COATED ORAL at 17:08

## 2024-07-14 RX ADMIN — AMPICILLIN AND SULBACTAM 3 G: 1; 2 INJECTION, POWDER, FOR SOLUTION INTRAMUSCULAR; INTRAVENOUS at 11:43

## 2024-07-14 RX ADMIN — ZINC SULFATE 220 MG (50 MG) CAPSULE 220 MG: CAPSULE at 05:20

## 2024-07-14 RX ADMIN — OMEPRAZOLE 20 MG: 20 CAPSULE, DELAYED RELEASE ORAL at 05:20

## 2024-07-14 RX ADMIN — INSULIN HUMAN 10 UNITS: 100 INJECTION, SUSPENSION SUBCUTANEOUS at 16:59

## 2024-07-14 RX ADMIN — AMPICILLIN AND SULBACTAM 3 G: 1; 2 INJECTION, POWDER, FOR SOLUTION INTRAMUSCULAR; INTRAVENOUS at 17:14

## 2024-07-14 RX ADMIN — INSULIN HUMAN 3 UNITS: 100 INJECTION, SOLUTION PARENTERAL at 21:47

## 2024-07-14 RX ADMIN — AMPICILLIN AND SULBACTAM 3 G: 1; 2 INJECTION, POWDER, FOR SOLUTION INTRAMUSCULAR; INTRAVENOUS at 05:20

## 2024-07-14 RX ADMIN — AMIODARONE HYDROCHLORIDE 400 MG: 200 TABLET ORAL at 17:08

## 2024-07-14 RX ADMIN — INSULIN HUMAN 3 UNITS: 100 INJECTION, SOLUTION PARENTERAL at 11:33

## 2024-07-14 RX ADMIN — AMIODARONE HYDROCHLORIDE 400 MG: 200 TABLET ORAL at 05:20

## 2024-07-14 ASSESSMENT — ENCOUNTER SYMPTOMS
SHORTNESS OF BREATH: 0
NECK PAIN: 0
COUGH: 0
BACK PAIN: 0
PALPITATIONS: 0
ABDOMINAL PAIN: 0
FEVER: 0
DIARRHEA: 0
SPEECH CHANGE: 0
NAUSEA: 0
NERVOUS/ANXIOUS: 0

## 2024-07-14 ASSESSMENT — FIBROSIS 4 INDEX: FIB4 SCORE: 0.89

## 2024-07-14 ASSESSMENT — PAIN DESCRIPTION - PAIN TYPE: TYPE: ACUTE PAIN

## 2024-07-14 NOTE — PROGRESS NOTES
Patient has just been transferred off of the unit in stable condition via ICU bed to T732 bed 2. This RN gave IPASS report to ANIRUDH Rodriguez. Patient transferred with all personal belongings and updated on plan of care.

## 2024-07-14 NOTE — PROGRESS NOTES
4 Eyes Skin Assessment Completed by ANIRUDH Rodriguez and TAYLOR Joseph.    Head Blanching and Redness  Ears WDL  Nose WDL  Mouth WDL  Neck Redness and Blanching  Breast/Chest WDL  Shoulder Blades WDL  Spine WDL  (R) Arm/Elbow/Hand Redness and Blanching  (L) Arm/Elbow/Hand Redness and Blanching  Abdomen Bruising  Groin WDL  Scrotum/Coccyx/Buttocks Redness, Blanching, and Scar  (R) Leg WDL  (L) Leg Redness and Blanching  (R) Heel/Foot/Toe Scab on heel  (L) Heel/Foot/Toe surgical incision from amputation, sutures with redness          Devices In Places Tele Box, Blood Pressure Cuff, and Pulse Ox      Interventions In Place Heel Mepilex, Sacral Mepilex, TAP System, and Pillows    Possible Skin Injury Yes    Pictures Uploaded Into Epic Yes  Wound Consult Placed Yes  RN Wound Prevention Protocol Ordered Yes RN

## 2024-07-14 NOTE — CARE PLAN
The patient is Stable - Low risk of patient condition declining or worsening    Shift Goals  Clinical Goals: monitor hgb, hemodynamic stability, wound care  Patient Goals: feel better, rest  Family Goals: vinayak    Progress made toward(s) clinical / shift goals:      Problem: Knowledge Deficit - Standard  Goal: Patient and family/care givers will demonstrate understanding of plan of care, disease process/condition, diagnostic tests and medications  Outcome: Progressing     Problem: Skin Integrity  Goal: Skin integrity is maintained or improved  Outcome: Progressing     Problem: Hemodynamics  Goal: Patient's hemodynamics, fluid balance and neurologic status will be stable or improve  Outcome: Progressing       Patient is not progressing towards the following goals:

## 2024-07-14 NOTE — PROGRESS NOTES
Hypoglycemia Intervention    Hypoglycemia protocol intervention:  Blood glucose 48 at 0530 and asymptomatic  Intervention: 8 oz of fruit juice   Repeat blood glucose 67 at 0600.  Intervention: 4 oz of fruit juice  Blood glucose 79 at 0621.  Intervention: 4 oz of fruit juice  Blood glucose 96 at 0647.  Intervention: 4 oz of fruit juice  Blood glucose 48 at 0719.  Intervention: high carb snack  Additional interventions needed: recheck in one hour  Ramy Lopez notified of the above at 0623.

## 2024-07-14 NOTE — PROGRESS NOTES
Hospital Medicine Daily Progress Note    Date of Service  7/14/2024    Chief Complaint  Syncope and melena    Hospital Course  69 y.o. male w/ hx of DMII, HTN, PAD, recent left toe amputation due to osteomyeliits (7/3/24). He was seen at [facility] and  and a MRI brain showing acute CVA left parietal lobe.  During admit found to be having paroxysmal atrial fibrillation and was placed on amiodarone and heparin and he remained on plavix.  An echo showed EF:50% and bicuspid aortic valve, severe mitral stenosis and pulmonary hypertension.  Due to melena and syncope on 7/7 he was stopped of heparin and plavix and transferred to [facility].      He was admitted 7/8/2024 to [facility] GI bleed with Hgb:6.2.  Neurology did consult at outside hospital.  Anticoagulation antiplatelets were held.  Patient received up to multiple PRBC units.  Gastroenterology following for which patient underwent EGD on 7/9/2024 and was noted for 6 duodenal ulcers 1 of which was oozing and received 2 clips.  His diet were escalated slowly and he was continued on proton pump inhibitor.  Cardiology following for which repeat echocardiogram confirms severe mitral stenosis which likely contribute to atrial fibrillation and embolic like stroke.  Heparin drip was restarted on 7/10/2024 but patient did not tolerate and had melanotic stools for which heparin drip was discontinued and he required PRBC transfusion.    Interval Problem Update  Evaluated at bedside  Tolerating meals, no belly pain  Stable vitals  Room air  Last hemoglobin yesterday trending up and at 9.1, pending repeat  Escalate to GI soft diet  Switch oral PPI to 40 mg twice daily  Continue holding anticoagulation for now  If hemoglobin remained stable, will likely reattempts anticoagulation on a.m.  Frequent H&H  Labs on AM    I have discussed this patient's plan of care and discharge plan at IDT rounds today with Case Management, Nursing, Nursing leadership, and other members of the  IDT team.    Consultants/Specialty  cardiology, GI, neurology, and orthopedics    Code Status  Full Code    Disposition  The patient is not medically cleared for discharge to home or a post-acute facility.     I have placed the appropriate orders for post-discharge needs.    Review of Systems  Review of Systems   Constitutional:  Negative for fever.   Respiratory:  Negative for cough and shortness of breath.    Cardiovascular:  Negative for palpitations and leg swelling.   Gastrointestinal:  Negative for abdominal pain, diarrhea, melena and nausea.   Genitourinary:  Negative for dysuria.   Musculoskeletal:  Negative for back pain and neck pain.   Neurological:  Negative for speech change.   Psychiatric/Behavioral:  The patient is not nervous/anxious.         Physical Exam  Temp:  [36.2 °C (97.1 °F)-36.7 °C (98.1 °F)] 36.5 °C (97.7 °F)  Pulse:  [57-81] 68  Resp:  [18-27] 18  BP: (105-156)/(49-81) 156/81  SpO2:  [93 %-98 %] 98 %    Physical Exam  Vitals reviewed.   Constitutional:       Appearance: Normal appearance. He is not ill-appearing.   HENT:      Head: Normocephalic and atraumatic.      Mouth/Throat:      Mouth: Mucous membranes are moist.   Eyes:      General:         Right eye: No discharge.         Left eye: No discharge.      Extraocular Movements: Extraocular movements intact.   Cardiovascular:      Rate and Rhythm: Normal rate and regular rhythm.      Pulses: Normal pulses.      Heart sounds: No murmur heard.  Pulmonary:      Effort: Pulmonary effort is normal. No respiratory distress.      Breath sounds: Normal breath sounds. No wheezing.   Abdominal:      General: Bowel sounds are normal. There is no distension.      Palpations: Abdomen is soft.   Musculoskeletal:      Right lower leg: No edema.      Left lower leg: No edema.      Comments: Clean dressing over left transmetatarsal surgical site.   Skin:     General: Skin is warm.      Findings: No erythema.      Comments: Good approximation of surgical  edges   Neurological:      General: No focal deficit present.      Mental Status: He is alert and oriented to person, place, and time.      Cranial Nerves: No cranial nerve deficit.   Psychiatric:         Mood and Affect: Mood normal.         Behavior: Behavior normal.         Thought Content: Thought content normal.         Fluids    Intake/Output Summary (Last 24 hours) at 7/14/2024 1317  Last data filed at 7/14/2024 1000  Gross per 24 hour   Intake 890 ml   Output 1210 ml   Net -320 ml       Laboratory  Recent Labs     07/12/24  0116 07/12/24  0858 07/12/24  1736 07/13/24  0425   WBC 11.2*  --   --  13.1*   RBC 2.28*  --   --  3.01*   HEMOGLOBIN 6.7* 8.4* 9.0* 9.1*   HEMATOCRIT 20.5*  --   --  26.0*   MCV 89.9  --   --  86.4   MCH 29.4  --   --  30.2   MCHC 32.7  --   --  35.0   RDW 47.8  --   --  48.6   PLATELETCT 207  --   --  259   MPV 10.2  --   --  9.8     Recent Labs     07/12/24  0116 07/13/24  0425   SODIUM 141 138   POTASSIUM 3.4* 3.7   CHLORIDE 114* 111   CO2 17* 19*   GLUCOSE 166* 67   BUN 14 11   CREATININE 0.52 0.46*   CALCIUM 6.8* 7.1*                     Imaging  EC-ECHOCARDIOGRAM COMPLETE W/O CONT   Final Result           Assessment/Plan  * Upper GI bleed- (present on admission)  Assessment & Plan  Patient is direct admit with melena and acute blood loss anemia, status post 4 unit of RBC at [facility]  Etiology is unclear, he denies taking NSAID.  However he was given IV heparin for A-fib/CVA/non-STEMI, as well as Plavix, that was discontinued  Plan: Repeat H&H, transfuse as needed for hemoglobin 8.0 and below given orthostatic dizziness, NSTEMI  Continue IV Protonix, IV fluids  7/9 EGD showed duodenal ulcers with one that was bleeding, gastritis and some erosive esophagitis.  7/10 heparin drip initiated  7/11 Heparin dc'd due to recurrent melena and need of transfusion of pRBCs.  Monitor q 8 hgb.  Path negative for Hpylori  7/12 PPI, transfuse for Hgb <7  7/13 Hgb staying  level.  Monitor for one more day then possibly consider restarting heparin.    Hypokalemia  Assessment & Plan  Goal of K ~4  7/12 Supplementing with IV    Hypomagnesemia  Assessment & Plan  Goal ~2  7/12 giving 2 g IV magnesium sulfate    Type 2 diabetes mellitus (HCC)  Assessment & Plan  Uncontrolled with hyperglycemia  Monitor accuchecks and cover with SSI  Hypoglycemic protocol  Diabetic diet    Acute osteomyelitis of left foot (HCC)  Assessment & Plan  Status post left TMA on 7/3  Repeat MRI of the left foot:status post transmetatarsal amputation without evidence of recurrent osteomyelitis of up or abscess.  Subcutaneous edema along the stump which could be postsurgical or posttraumatic without loculated fluid collection hematoma or abscess.  There is marrow edema along the first metatarsal which could represent a bone contusion  And consider obtaining surgical pathology and available culture report to determine needs to continue antibiotics, consider ID consult R  repeat CBC, inflammatory markers  Left foot stump looks noninfected.  Surgery was in [place] per patient.  7/8/24 ESR:18  WBC remains elevated and there is some drainage of the  medial aspect of the wound   7/10 stopped Zosyn and vancomycin initiated Unasyn.  I have consulted orthopedics to evaluate area of dehiscence of transmetatarsal wound site and a new suture was placed.  7/13 wbc:13.1  monitor cbc and vitals.      Mitral stenosis  Assessment & Plan  transthoracic echo showed 50% EF, grade 2 diastolic dysfunction, elevated LV filling pressure, bicuspid aortic valve,  severe mitral stenosis, pulmonary hypertension.  Patient probably would benefit from GERALDINE and possibly cardiac surgery consult when stable from bleeding standpoint.  Cardiology is consulting.  Consideration of repeat echocardiogram per cardiology  Future outpatient valvular repair once stable from GI and cardiac standpoint    NSTEMI (non-ST elevated myocardial infarction)  ([facility])  Assessment & Plan  Elevated troponin and chest discomfort documented at outside facility  Echo showed no focal wall motion abnormalities, but there is borderline low EF 50%, grade 2 diastolic dysfunction, severe mitral stenosis and pulmonary hypertension  Probably type II NSTEMI secondary to severe anemia  Plan: Repeat troponin and EKG  RBC transfusion for goal hemoglobin 8.0 and above  Ischemic workup per cardiology with possible cardiac cath during admission if bleeding resolves and able to tolerate anticoagulation    Acute CVA (cerebrovascular accident) ([facility])  Assessment & Plan  Presented on 7/6 after syncopal episode without reported gross neurodeficits  MRI of the brain: Tiny focus of acute ischemia in the left parietal lobe with chronic microvascular disease and small infarcts.  No acute hemorrhage or mass.  On exam today there is no definite motor deficit.  New onset A-fib, as well as severe mitral stenosis on echo documented at outside hospital, could not continue anticoagulation due to acute GI bleed  Monitor with neurochecks every 4 hours  Neurology consulted at outside facility.  Continue current management.    Continue on high-dose statin    Acute blood loss anemia  Assessment & Plan  S/p 4 units of RBC transfusion at outside hospital  Repeat H&H pending.  Goal hemoglobin 8.0 and above  7/13 Hgb:9.1 <9<8.4<6.7 <7.7<9.2<6.5 <8.5  EGD with duodenal ulcers. Did not tolerate being on heparin.  7/11 discussed with GI and they do not feel an additional EGD is warranted other than holding anticoagulation.  7/12 Check TEG, if continued bleeding will readdress with GI.  Monitor CBC       VTE prophylaxis: None, UGIB    I have performed a physical exam and reviewed and updated ROS and Plan today (7/14/2024). In review of yesterday's note (7/13/2024), there are no changes except as documented above.    Greater than 51 minutes spent prepping to see patient (e.g. review of tests) obtaining and/or reviewing  separately obtained history. Performing a medically appropriate examination and/ evaluation.  Counseling and educating the patient/family/caregiver.  Ordering medications, tests, or procedures.  Referring and communicating with other health care professionals.  Documenting clinical information in EPIC.  Independently interpreting results and communicating results to patient/family/caregiver.  Care coordination

## 2024-07-14 NOTE — PROGRESS NOTES
Received bedside handoff report from RN. Patient is A&Ox4 and denies having pain at this time. Physical assessment completed. Fall prevention precautions are in place, teaching is reinforced and patient verbalizes an understanding. Personal belongings and call light are within reach. POC is ongoing.

## 2024-07-15 LAB
ALBUMIN SERPL BCP-MCNC: 2.1 G/DL (ref 3.2–4.9)
ALBUMIN/GLOB SERPL: 1.1 G/DL
ALP SERPL-CCNC: 83 U/L (ref 30–99)
ALT SERPL-CCNC: 27 U/L (ref 2–50)
ANION GAP SERPL CALC-SCNC: 9 MMOL/L (ref 7–16)
AST SERPL-CCNC: 20 U/L (ref 12–45)
BASOPHILS # BLD AUTO: 0.2 % (ref 0–1.8)
BASOPHILS # BLD: 0.02 K/UL (ref 0–0.12)
BILIRUB SERPL-MCNC: 0.7 MG/DL (ref 0.1–1.5)
BUN SERPL-MCNC: 10 MG/DL (ref 8–22)
CALCIUM ALBUM COR SERPL-MCNC: 8.4 MG/DL (ref 8.5–10.5)
CALCIUM SERPL-MCNC: 6.9 MG/DL (ref 8.5–10.5)
CHLORIDE SERPL-SCNC: 107 MMOL/L (ref 96–112)
CO2 SERPL-SCNC: 20 MMOL/L (ref 20–33)
CREAT SERPL-MCNC: 0.54 MG/DL (ref 0.5–1.4)
EOSINOPHIL # BLD AUTO: 0.09 K/UL (ref 0–0.51)
EOSINOPHIL NFR BLD: 0.9 % (ref 0–6.9)
ERYTHROCYTE [DISTWIDTH] IN BLOOD BY AUTOMATED COUNT: 52.2 FL (ref 35.9–50)
GFR SERPLBLD CREATININE-BSD FMLA CKD-EPI: 106 ML/MIN/1.73 M 2
GLOBULIN SER CALC-MCNC: 2 G/DL (ref 1.9–3.5)
GLUCOSE BLD STRIP.AUTO-MCNC: 137 MG/DL (ref 65–99)
GLUCOSE BLD STRIP.AUTO-MCNC: 248 MG/DL (ref 65–99)
GLUCOSE BLD STRIP.AUTO-MCNC: 294 MG/DL (ref 65–99)
GLUCOSE BLD STRIP.AUTO-MCNC: 85 MG/DL (ref 65–99)
GLUCOSE SERPL-MCNC: 80 MG/DL (ref 65–99)
HCT VFR BLD AUTO: 27.7 % (ref 42–52)
HGB BLD-MCNC: 8.5 G/DL (ref 14–18)
HGB BLD-MCNC: 8.8 G/DL (ref 14–18)
HGB BLD-MCNC: 9.1 G/DL (ref 14–18)
IMM GRANULOCYTES # BLD AUTO: 0.06 K/UL (ref 0–0.11)
IMM GRANULOCYTES NFR BLD AUTO: 0.6 % (ref 0–0.9)
LYMPHOCYTES # BLD AUTO: 1.73 K/UL (ref 1–4.8)
LYMPHOCYTES NFR BLD: 16.8 % (ref 22–41)
MAGNESIUM SERPL-MCNC: 1.8 MG/DL (ref 1.5–2.5)
MCH RBC QN AUTO: 29.4 PG (ref 27–33)
MCHC RBC AUTO-ENTMCNC: 32.9 G/DL (ref 32.3–36.5)
MCV RBC AUTO: 89.4 FL (ref 81.4–97.8)
MONOCYTES # BLD AUTO: 0.87 K/UL (ref 0–0.85)
MONOCYTES NFR BLD AUTO: 8.4 % (ref 0–13.4)
NEUTROPHILS # BLD AUTO: 7.53 K/UL (ref 1.82–7.42)
NEUTROPHILS NFR BLD: 73.1 % (ref 44–72)
NRBC # BLD AUTO: 0 K/UL
NRBC BLD-RTO: 0 /100 WBC (ref 0–0.2)
PHOSPHATE SERPL-MCNC: 2.5 MG/DL (ref 2.5–4.5)
PLATELET # BLD AUTO: 325 K/UL (ref 164–446)
PMV BLD AUTO: 10 FL (ref 9–12.9)
POTASSIUM SERPL-SCNC: 3.7 MMOL/L (ref 3.6–5.5)
PROT SERPL-MCNC: 4.1 G/DL (ref 6–8.2)
RBC # BLD AUTO: 3.1 M/UL (ref 4.7–6.1)
SODIUM SERPL-SCNC: 136 MMOL/L (ref 135–145)
WBC # BLD AUTO: 10.3 K/UL (ref 4.8–10.8)

## 2024-07-15 PROCEDURE — 85018 HEMOGLOBIN: CPT

## 2024-07-15 PROCEDURE — 700111 HCHG RX REV CODE 636 W/ 250 OVERRIDE (IP): Performed by: STUDENT IN AN ORGANIZED HEALTH CARE EDUCATION/TRAINING PROGRAM

## 2024-07-15 PROCEDURE — 700102 HCHG RX REV CODE 250 W/ 637 OVERRIDE(OP)

## 2024-07-15 PROCEDURE — 700105 HCHG RX REV CODE 258: Performed by: HOSPITALIST

## 2024-07-15 PROCEDURE — 36415 COLL VENOUS BLD VENIPUNCTURE: CPT

## 2024-07-15 PROCEDURE — 99233 SBSQ HOSP IP/OBS HIGH 50: CPT | Performed by: STUDENT IN AN ORGANIZED HEALTH CARE EDUCATION/TRAINING PROGRAM

## 2024-07-15 PROCEDURE — 82962 GLUCOSE BLOOD TEST: CPT | Mod: 91

## 2024-07-15 PROCEDURE — 83735 ASSAY OF MAGNESIUM: CPT

## 2024-07-15 PROCEDURE — A9270 NON-COVERED ITEM OR SERVICE: HCPCS | Performed by: INTERNAL MEDICINE

## 2024-07-15 PROCEDURE — 700102 HCHG RX REV CODE 250 W/ 637 OVERRIDE(OP): Performed by: STUDENT IN AN ORGANIZED HEALTH CARE EDUCATION/TRAINING PROGRAM

## 2024-07-15 PROCEDURE — A9270 NON-COVERED ITEM OR SERVICE: HCPCS

## 2024-07-15 PROCEDURE — 85025 COMPLETE CBC W/AUTO DIFF WBC: CPT

## 2024-07-15 PROCEDURE — 700102 HCHG RX REV CODE 250 W/ 637 OVERRIDE(OP): Performed by: INTERNAL MEDICINE

## 2024-07-15 PROCEDURE — 770001 HCHG ROOM/CARE - MED/SURG/GYN PRIV*

## 2024-07-15 PROCEDURE — 80053 COMPREHEN METABOLIC PANEL: CPT

## 2024-07-15 PROCEDURE — 700111 HCHG RX REV CODE 636 W/ 250 OVERRIDE (IP): Mod: JZ | Performed by: HOSPITALIST

## 2024-07-15 PROCEDURE — 84100 ASSAY OF PHOSPHORUS: CPT

## 2024-07-15 PROCEDURE — A9270 NON-COVERED ITEM OR SERVICE: HCPCS | Performed by: STUDENT IN AN ORGANIZED HEALTH CARE EDUCATION/TRAINING PROGRAM

## 2024-07-15 RX ORDER — MAGNESIUM SULFATE HEPTAHYDRATE 40 MG/ML
2 INJECTION, SOLUTION INTRAVENOUS ONCE
Status: COMPLETED | OUTPATIENT
Start: 2024-07-15 | End: 2024-07-15

## 2024-07-15 RX ORDER — CALCIUM CARBONATE 500 MG/1
1000 TABLET, CHEWABLE ORAL
Status: DISCONTINUED | OUTPATIENT
Start: 2024-07-15 | End: 2024-07-17 | Stop reason: HOSPADM

## 2024-07-15 RX ADMIN — ATORVASTATIN CALCIUM 40 MG: 40 TABLET, FILM COATED ORAL at 17:53

## 2024-07-15 RX ADMIN — OMEPRAZOLE 40 MG: 20 CAPSULE, DELAYED RELEASE ORAL at 05:56

## 2024-07-15 RX ADMIN — MAGNESIUM SULFATE HEPTAHYDRATE 2 G: 2 INJECTION, SOLUTION INTRAVENOUS at 09:17

## 2024-07-15 RX ADMIN — OMEPRAZOLE 40 MG: 20 CAPSULE, DELAYED RELEASE ORAL at 17:55

## 2024-07-15 RX ADMIN — ANTACID TABLETS 1000 MG: 500 TABLET, CHEWABLE ORAL at 17:53

## 2024-07-15 RX ADMIN — INSULIN HUMAN 10 UNITS: 100 INJECTION, SUSPENSION SUBCUTANEOUS at 06:08

## 2024-07-15 RX ADMIN — AMIODARONE HYDROCHLORIDE 400 MG: 200 TABLET ORAL at 05:56

## 2024-07-15 RX ADMIN — ANTACID TABLETS 1000 MG: 500 TABLET, CHEWABLE ORAL at 11:42

## 2024-07-15 RX ADMIN — ANTACID TABLETS 1000 MG: 500 TABLET, CHEWABLE ORAL at 09:15

## 2024-07-15 RX ADMIN — AMIODARONE HYDROCHLORIDE 400 MG: 200 TABLET ORAL at 17:54

## 2024-07-15 RX ADMIN — SENNOSIDES AND DOCUSATE SODIUM 2 TABLET: 50; 8.6 TABLET ORAL at 17:53

## 2024-07-15 RX ADMIN — INSULIN HUMAN 4 UNITS: 100 INJECTION, SOLUTION PARENTERAL at 20:36

## 2024-07-15 RX ADMIN — AMPICILLIN AND SULBACTAM 3 G: 1; 2 INJECTION, POWDER, FOR SOLUTION INTRAMUSCULAR; INTRAVENOUS at 00:03

## 2024-07-15 RX ADMIN — INSULIN HUMAN 7 UNITS: 100 INJECTION, SOLUTION PARENTERAL at 18:03

## 2024-07-15 RX ADMIN — AMPICILLIN AND SULBACTAM 3 G: 1; 2 INJECTION, POWDER, FOR SOLUTION INTRAMUSCULAR; INTRAVENOUS at 05:59

## 2024-07-15 ASSESSMENT — ENCOUNTER SYMPTOMS
FEVER: 0
NAUSEA: 0
PALPITATIONS: 0
NERVOUS/ANXIOUS: 0
SPEECH CHANGE: 0
BACK PAIN: 0
SHORTNESS OF BREATH: 0
COUGH: 0
ABDOMINAL PAIN: 0
NECK PAIN: 0
DIARRHEA: 0

## 2024-07-15 ASSESSMENT — FIBROSIS 4 INDEX: FIB4 SCORE: 0.72

## 2024-07-15 NOTE — DISCHARGE PLANNING
Case Management Discharge Planning    Admission Date: 7/8/2024  GMLOS: 4.6  ALOS: 7    6-Clicks ADL Score: 18  6-Clicks Mobility Score: 18      Anticipated Discharge Dispo: Discharge Disposition: Discharged to home/self care (01)    DME Needed: No    Action(s) Taken: Updated Provider/Nurse on Discharge Plan    1100, Pending PT/OT evaluation and medical clearance.    Escalations Completed: None    Medically Clear: No    Next Steps: CM will continue to assist Pt with discharge needs.      Barriers to Discharge: Medical clearance    Is the patient up for discharge tomorrow: No

## 2024-07-15 NOTE — PROGRESS NOTES
Monitor Summary:     Rhythm: SR  Rate: 59-61  Ectopy: (R)PVC, (R)PAC  Measurements: .16/.10/.45                 12 Hour Chart Check

## 2024-07-15 NOTE — PROGRESS NOTES
Received bedside report from RN. Patient is A&Ox4 and denies having pain at this time. Fall prevention interventions are in place, teaching is reinforced and patient verbalizes an understanding. Physical assessment completed. Personal belongings and call light are within reach. POC is ongoing.

## 2024-07-15 NOTE — CARE PLAN
The patient is Stable - Low risk of patient condition declining or worsening    Shift Goals  Clinical Goals: hemodynamic stability, maintain hgb >8  Patient Goals: rest  Family Goals: vinayak    Progress made toward(s) clinical / shift goals:      Problem: Knowledge Deficit - Standard  Goal: Patient and family/care givers will demonstrate understanding of plan of care, disease process/condition, diagnostic tests and medications  Outcome: Progressing   Pt educated on POC, current condition, tests and medications, verbalizes understanding.    Problem: Fall Risk  Goal: Patient will remain free from falls  Outcome: Progressing   Pt fall risk assessed and appropriate interventions put in place.    Problem: Risk for Fluid Volume Deficit Related to Bleeding  Goal: Fluid volume balance will be maintained  Outcome: Progressing   Problem: Risk for Fluid Volume Deficit Related to Bleeding  Goal: Patient will show no signs and symptoms of excessive bleeding  Outcome: Progressing   Pt vital signs stable throughout shift, no signs of excessive bleeding. Hgb trending q8    Patient is not progressing towards the following goals:

## 2024-07-15 NOTE — CARE PLAN
The patient is Stable - Low risk of patient condition declining or worsening    Shift Goals  Clinical Goals: monitor labs, VSS, rest  Patient Goals: Rest  Family Goals: vinayak    Progress made toward(s) clinical / shift goals:    Problem: Knowledge Deficit - Standard  Goal: Patient and family/care givers will demonstrate understanding of plan of care, disease process/condition, diagnostic tests and medications  Outcome: Progressing     Problem: Risk for Bleeding  Goal: Patient will not experience bleeding as evidenced by normal blood pressure, stable hematocrit and hemoglobin levels and desired ranges for coagulation profiles  Outcome: Progressing     Patient's hemoglobin continues to trend upward and has not displayed any signs or symptoms of bleeding. Patient verbalized an understanding of the disease process and the POC.   Patient is not progressing towards the following goals:

## 2024-07-15 NOTE — PROGRESS NOTES
Hospital Medicine Daily Progress Note    Date of Service  7/15/2024    Chief Complaint  Syncope and melena    Hospital Course  69 y.o. male w/ hx of DMII, HTN, PAD, recent left toe amputation due to osteomyeliits (7/3/24). He was seen at [facility] and  and a MRI brain showing acute CVA left parietal lobe.  During admit found to be having paroxysmal atrial fibrillation and was placed on amiodarone and heparin and he remained on plavix.  An echo showed EF:50% and bicuspid aortic valve, severe mitral stenosis and pulmonary hypertension.  Due to melena and syncope on 7/7 he was stopped of heparin and plavix and transferred to [facility].      He was admitted 7/8/2024 to [facility] GI bleed with Hgb:6.2.  Neurology did consult at outside hospital.  Anticoagulation antiplatelets were held.  Patient received up to multiple PRBC units.  Gastroenterology following for which patient underwent EGD on 7/9/2024 and was noted for 6 duodenal ulcers 1 of which was oozing and received 2 clips.  His diet were escalated slowly and he was continued on proton pump inhibitor.  Cardiology following for which repeat echocardiogram confirms severe mitral stenosis which likely contribute to atrial fibrillation and embolic like stroke.  Heparin drip was restarted on 7/10/2024 but patient did not tolerate and had melanotic stools for which heparin drip was discontinued and he required PRBC transfusion.  Per cardiology, holding off on anticoagulation as patient cannot tolerate and aim to pursue severe mitral stenosis workup once patient able to tolerate anticoagulation    Interval Problem Update  Evaluated at bedside  Tolerating meals  Denies melena or hematochezia  Stable vitals  On room air  Hgb 8-9  Ca low, started Tums with meals  Continue omeprazole 40 twice daily  Continue holding anticoagulation for now, (failed anticoagulation reinitiation on 7/10)  PT/OT to eval  PMR referral  Labs on AM    I have discussed this patient's plan of  care and discharge plan at IDT rounds today with Case Management, Nursing, Nursing leadership, and other members of the IDT team.    Consultants/Specialty  cardiology, GI, neurology, and orthopedics    Code Status  Full Code    Disposition  The patient is not medically cleared for discharge to home or a post-acute facility.     I have placed the appropriate orders for post-discharge needs.    Review of Systems  Review of Systems   Constitutional:  Negative for fever.   Respiratory:  Negative for cough and shortness of breath.    Cardiovascular:  Negative for palpitations and leg swelling.   Gastrointestinal:  Negative for abdominal pain, diarrhea, melena and nausea.   Genitourinary:  Negative for dysuria.   Musculoskeletal:  Negative for back pain and neck pain.   Neurological:  Negative for speech change.   Psychiatric/Behavioral:  The patient is not nervous/anxious.         Physical Exam  Temp:  [36.5 °C (97.7 °F)-36.7 °C (98.1 °F)] 36.5 °C (97.7 °F)  Pulse:  [68-82] 82  Resp:  [18] 18  BP: (151-160)/() 159/128  SpO2:  [93 %-98 %] 93 %    Physical Exam  Vitals reviewed.   Constitutional:       Appearance: Normal appearance. He is not ill-appearing.   HENT:      Head: Normocephalic and atraumatic.      Mouth/Throat:      Mouth: Mucous membranes are moist.   Eyes:      General:         Right eye: No discharge.         Left eye: No discharge.      Extraocular Movements: Extraocular movements intact.   Cardiovascular:      Rate and Rhythm: Normal rate and regular rhythm.      Pulses: Normal pulses.      Heart sounds: No murmur heard.  Pulmonary:      Effort: Pulmonary effort is normal. No respiratory distress.      Breath sounds: Normal breath sounds. No wheezing.   Abdominal:      General: Bowel sounds are normal. There is no distension.      Palpations: Abdomen is soft.   Musculoskeletal:      Right lower leg: No edema.      Left lower leg: No edema.      Comments: Clean dressing over left transmetatarsal  surgical site.   Skin:     General: Skin is warm.      Findings: No erythema.      Comments: Good approximation of surgical edges   Neurological:      General: No focal deficit present.      Mental Status: He is alert and oriented to person, place, and time.      Cranial Nerves: No cranial nerve deficit.   Psychiatric:         Mood and Affect: Mood normal.         Behavior: Behavior normal.         Thought Content: Thought content normal.         Fluids    Intake/Output Summary (Last 24 hours) at 7/15/2024 1058  Last data filed at 7/15/2024 1003  Gross per 24 hour   Intake 1050 ml   Output 850 ml   Net 200 ml       Laboratory  Recent Labs     07/13/24  0425 07/14/24  1401 07/14/24  2136 07/15/24  0549   WBC 13.1* 10.6  --  10.3   RBC 3.01* 3.40*  --  3.10*   HEMOGLOBIN 9.1* 9.9* 8.8* 9.1*   HEMATOCRIT 26.0* 29.9*  --  27.7*   MCV 86.4 87.9  --  89.4   MCH 30.2 29.1  --  29.4   MCHC 35.0 33.1  --  32.9   RDW 48.6 50.7*  --  52.2*   PLATELETCT 259 319  --  325   MPV 9.8 9.8  --  10.0     Recent Labs     07/13/24  0425 07/15/24  0549   SODIUM 138 136   POTASSIUM 3.7 3.7   CHLORIDE 111 107   CO2 19* 20   GLUCOSE 67 80   BUN 11 10   CREATININE 0.46* 0.54   CALCIUM 7.1* 6.9*                     Imaging  EC-ECHOCARDIOGRAM COMPLETE W/O CONT   Final Result           Assessment/Plan  * Upper GI bleed- (present on admission)  Assessment & Plan  Patient is direct admit with melena and acute blood loss anemia, status post 4 unit of [facility]  Etiology is unclear, he denies taking NSAID.  However he was given IV heparin for A-fib/CVA/non-STEMI, as well as Plavix, that was discontinued  Plan: Repeat H&H, transfuse as needed for hemoglobin 8.0 and below given orthostatic dizziness, NSTEMI  Continue IV Protonix, IV fluids  7/9 EGD showed duodenal ulcers with one that was bleeding, gastritis and some erosive esophagitis.  7/10 heparin drip initiated  7/11 Heparin dc'd due to recurrent melena and need of  transfusion of pRBCs.  Monitor q 8 hgb.  Path negative for Hpylori  7/12 PPI, transfuse for Hgb <7  7/13 Hgb staying level.  Monitor for one more day then possibly consider restarting heparin.    Hypokalemia  Assessment & Plan  Goal of K ~4  7/12 Supplementing with IV    Hypomagnesemia  Assessment & Plan  Goal ~2  7/12 giving 2 g IV magnesium sulfate    Type 2 diabetes mellitus (HCC)  Assessment & Plan  Uncontrolled with hyperglycemia  Monitor accuchecks and cover with SSI  Hypoglycemic protocol  Diabetic diet    Acute osteomyelitis of left foot (HCC)  Assessment & Plan  Status post left TMA on 7/3  Repeat MRI of the left foot:status post transmetatarsal amputation without evidence of recurrent osteomyelitis of up or abscess.  Subcutaneous edema along the stump which could be postsurgical or posttraumatic without loculated fluid collection hematoma or abscess.  There is marrow edema along the first metatarsal which could represent a bone contusion  And consider obtaining surgical pathology and available culture report to determine needs to continue antibiotics, consider ID consult R  repeat CBC, inflammatory markers  Left foot stump looks noninfected.  Surgery was in [place] per patient.  7/8/24 ESR:18  WBC remains elevated and there is some drainage of the  medial aspect of the wound   7/10 stopped Zosyn and vancomycin initiated Unasyn.  I have consulted orthopedics to evaluate area of dehiscence of transmetatarsal wound site and a new suture was placed.  7/13 wbc:13.1  monitor cbc and vitals.      Mitral stenosis  Assessment & Plan  transthoracic echo showed 50% EF, grade 2 diastolic dysfunction, elevated LV filling pressure, bicuspid aortic valve,  severe mitral stenosis, pulmonary hypertension.  Patient probably would benefit from GERALDINE and possibly cardiac surgery consult when stable from bleeding standpoint.  Cardiology is consulting.  Consideration of repeat echocardiogram per cardiology  Future outpatient  valvular repair once stable from GI and cardiac standpoint    NSTEMI (non-ST elevated myocardial infarction) (HCC)  Assessment & Plan  Elevated troponin and chest discomfort documented at outside facility  Echo showed no focal wall motion abnormalities, but there is borderline low EF 50%, grade 2 diastolic dysfunction, severe mitral stenosis and pulmonary hypertension  Probably type II NSTEMI secondary to severe anemia  Plan: Repeat troponin and EKG  RBC transfusion for goal hemoglobin 8.0 and above  Ischemic workup per cardiology with possible cardiac cath during admission if bleeding resolves and able to tolerate anticoagulation    Acute CVA (cerebrovascular accident) ([facility])  Assessment & Plan  Presented on 7/6 after syncopal episode without reported gross neurodeficits  MRI of the brain: Tiny focus of acute ischemia in the left parietal lobe with chronic microvascular disease and small infarcts.  No acute hemorrhage or mass.  On exam today there is no definite motor deficit.  New onset A-fib, as well as severe mitral stenosis on echo documented at outside hospital, could not continue anticoagulation due to acute GI bleed  Monitor with neurochecks every 4 hours  Neurology consulted at outside facility.  Continue current management.    Continue on high-dose statin    Acute blood loss anemia  Assessment & Plan  S/p 4 units of RBC transfusion at outside hospital  Repeat H&H pending.  Goal hemoglobin 8.0 and above  7/13 Hgb:9.1 <9<8.4<6.7 <7.7<9.2<6.5 <8.5  EGD with duodenal ulcers. Did not tolerate being on heparin.  7/11 discussed with GI and they do not feel an additional EGD is warranted other than holding anticoagulation.  7/12 Check TEG, if continued bleeding will readdress with GI.  Monitor CBC       VTE prophylaxis: None, UGIB    I have performed a physical exam and reviewed and updated ROS and Plan today (7/15/2024). In review of yesterday's note (7/14/2024), there are no changes except as documented  above.    Greater than 51 minutes spent prepping to see patient (e.g. review of tests) obtaining and/or reviewing separately obtained history. Performing a medically appropriate examination and/ evaluation.  Counseling and educating the patient/family/caregiver.  Ordering medications, tests, or procedures.  Referring and communicating with other health care professionals.  Documenting clinical information in EPIC.  Independently interpreting results and communicating results to patient/family/caregiver.  Care coordination

## 2024-07-16 LAB
ALBUMIN SERPL BCP-MCNC: 2.3 G/DL (ref 3.2–4.9)
ALBUMIN/GLOB SERPL: 1.3 G/DL
ALP SERPL-CCNC: 81 U/L (ref 30–99)
ALT SERPL-CCNC: 24 U/L (ref 2–50)
ANION GAP SERPL CALC-SCNC: 8 MMOL/L (ref 7–16)
AST SERPL-CCNC: 16 U/L (ref 12–45)
BASOPHILS # BLD AUTO: 0.6 % (ref 0–1.8)
BASOPHILS # BLD: 0.05 K/UL (ref 0–0.12)
BILIRUB SERPL-MCNC: 0.6 MG/DL (ref 0.1–1.5)
BUN SERPL-MCNC: 11 MG/DL (ref 8–22)
CA-I SERPL-SCNC: 1.1 MMOL/L (ref 1.1–1.3)
CALCIUM ALBUM COR SERPL-MCNC: 8.4 MG/DL (ref 8.5–10.5)
CALCIUM SERPL-MCNC: 7 MG/DL (ref 8.5–10.5)
CHLORIDE SERPL-SCNC: 105 MMOL/L (ref 96–112)
CO2 SERPL-SCNC: 21 MMOL/L (ref 20–33)
CREAT SERPL-MCNC: 0.5 MG/DL (ref 0.5–1.4)
EOSINOPHIL # BLD AUTO: 0.07 K/UL (ref 0–0.51)
EOSINOPHIL NFR BLD: 0.8 % (ref 0–6.9)
ERYTHROCYTE [DISTWIDTH] IN BLOOD BY AUTOMATED COUNT: 49.4 FL (ref 35.9–50)
GFR SERPLBLD CREATININE-BSD FMLA CKD-EPI: 109 ML/MIN/1.73 M 2
GLOBULIN SER CALC-MCNC: 1.8 G/DL (ref 1.9–3.5)
GLUCOSE BLD STRIP.AUTO-MCNC: 171 MG/DL (ref 65–99)
GLUCOSE BLD STRIP.AUTO-MCNC: 199 MG/DL (ref 65–99)
GLUCOSE BLD STRIP.AUTO-MCNC: 261 MG/DL (ref 65–99)
GLUCOSE BLD STRIP.AUTO-MCNC: 364 MG/DL (ref 65–99)
GLUCOSE SERPL-MCNC: 158 MG/DL (ref 65–99)
HCT VFR BLD AUTO: 24.4 % (ref 42–52)
HGB BLD-MCNC: 8.3 G/DL (ref 14–18)
HGB BLD-MCNC: 9.1 G/DL (ref 14–18)
IMM GRANULOCYTES # BLD AUTO: 0.06 K/UL (ref 0–0.11)
IMM GRANULOCYTES NFR BLD AUTO: 0.7 % (ref 0–0.9)
LYMPHOCYTES # BLD AUTO: 1.44 K/UL (ref 1–4.8)
LYMPHOCYTES NFR BLD: 17.4 % (ref 22–41)
MCH RBC QN AUTO: 29.5 PG (ref 27–33)
MCHC RBC AUTO-ENTMCNC: 34 G/DL (ref 32.3–36.5)
MCV RBC AUTO: 86.8 FL (ref 81.4–97.8)
MONOCYTES # BLD AUTO: 0.76 K/UL (ref 0–0.85)
MONOCYTES NFR BLD AUTO: 9.2 % (ref 0–13.4)
NEUTROPHILS # BLD AUTO: 5.88 K/UL (ref 1.82–7.42)
NEUTROPHILS NFR BLD: 71.3 % (ref 44–72)
NRBC # BLD AUTO: 0 K/UL
NRBC BLD-RTO: 0 /100 WBC (ref 0–0.2)
PLATELET # BLD AUTO: 328 K/UL (ref 164–446)
PMV BLD AUTO: 9.9 FL (ref 9–12.9)
POTASSIUM SERPL-SCNC: 3.6 MMOL/L (ref 3.6–5.5)
PROT SERPL-MCNC: 4.1 G/DL (ref 6–8.2)
RBC # BLD AUTO: 2.81 M/UL (ref 4.7–6.1)
SODIUM SERPL-SCNC: 134 MMOL/L (ref 135–145)
WBC # BLD AUTO: 8.3 K/UL (ref 4.8–10.8)

## 2024-07-16 PROCEDURE — 85018 HEMOGLOBIN: CPT

## 2024-07-16 PROCEDURE — 97163 PT EVAL HIGH COMPLEX 45 MIN: CPT

## 2024-07-16 PROCEDURE — 700102 HCHG RX REV CODE 250 W/ 637 OVERRIDE(OP): Performed by: INTERNAL MEDICINE

## 2024-07-16 PROCEDURE — 80053 COMPREHEN METABOLIC PANEL: CPT

## 2024-07-16 PROCEDURE — 99232 SBSQ HOSP IP/OBS MODERATE 35: CPT | Performed by: STUDENT IN AN ORGANIZED HEALTH CARE EDUCATION/TRAINING PROGRAM

## 2024-07-16 PROCEDURE — 700111 HCHG RX REV CODE 636 W/ 250 OVERRIDE (IP): Mod: JZ | Performed by: STUDENT IN AN ORGANIZED HEALTH CARE EDUCATION/TRAINING PROGRAM

## 2024-07-16 PROCEDURE — 82330 ASSAY OF CALCIUM: CPT

## 2024-07-16 PROCEDURE — A9270 NON-COVERED ITEM OR SERVICE: HCPCS

## 2024-07-16 PROCEDURE — 82962 GLUCOSE BLOOD TEST: CPT | Mod: 91

## 2024-07-16 PROCEDURE — 36415 COLL VENOUS BLD VENIPUNCTURE: CPT

## 2024-07-16 PROCEDURE — 97535 SELF CARE MNGMENT TRAINING: CPT

## 2024-07-16 PROCEDURE — 700102 HCHG RX REV CODE 250 W/ 637 OVERRIDE(OP): Performed by: STUDENT IN AN ORGANIZED HEALTH CARE EDUCATION/TRAINING PROGRAM

## 2024-07-16 PROCEDURE — A9270 NON-COVERED ITEM OR SERVICE: HCPCS | Performed by: STUDENT IN AN ORGANIZED HEALTH CARE EDUCATION/TRAINING PROGRAM

## 2024-07-16 PROCEDURE — A9270 NON-COVERED ITEM OR SERVICE: HCPCS | Performed by: INTERNAL MEDICINE

## 2024-07-16 PROCEDURE — 700102 HCHG RX REV CODE 250 W/ 637 OVERRIDE(OP)

## 2024-07-16 PROCEDURE — 85025 COMPLETE CBC W/AUTO DIFF WBC: CPT

## 2024-07-16 PROCEDURE — 770001 HCHG ROOM/CARE - MED/SURG/GYN PRIV*

## 2024-07-16 PROCEDURE — 97166 OT EVAL MOD COMPLEX 45 MIN: CPT

## 2024-07-16 RX ORDER — FUROSEMIDE 10 MG/ML
20 INJECTION INTRAMUSCULAR; INTRAVENOUS
Status: DISCONTINUED | OUTPATIENT
Start: 2024-07-16 | End: 2024-07-17 | Stop reason: HOSPADM

## 2024-07-16 RX ADMIN — AMIODARONE HYDROCHLORIDE 400 MG: 200 TABLET ORAL at 05:34

## 2024-07-16 RX ADMIN — INSULIN HUMAN 3 UNITS: 100 INJECTION, SOLUTION PARENTERAL at 17:48

## 2024-07-16 RX ADMIN — FUROSEMIDE 20 MG: 10 INJECTION, SOLUTION INTRAVENOUS at 17:48

## 2024-07-16 RX ADMIN — ATORVASTATIN CALCIUM 40 MG: 40 TABLET, FILM COATED ORAL at 17:47

## 2024-07-16 RX ADMIN — OMEPRAZOLE 40 MG: 20 CAPSULE, DELAYED RELEASE ORAL at 17:46

## 2024-07-16 RX ADMIN — INSULIN HUMAN 12 UNITS: 100 INJECTION, SOLUTION PARENTERAL at 11:58

## 2024-07-16 RX ADMIN — INSULIN GLARGINE-YFGN 10 UNITS: 100 INJECTION, SOLUTION SUBCUTANEOUS at 17:50

## 2024-07-16 RX ADMIN — INSULIN HUMAN 7 UNITS: 100 INJECTION, SOLUTION PARENTERAL at 21:13

## 2024-07-16 RX ADMIN — AMIODARONE HYDROCHLORIDE 400 MG: 200 TABLET ORAL at 17:46

## 2024-07-16 RX ADMIN — INSULIN HUMAN 3 UNITS: 100 INJECTION, SOLUTION PARENTERAL at 06:51

## 2024-07-16 RX ADMIN — ANTACID TABLETS 1000 MG: 500 TABLET, CHEWABLE ORAL at 07:55

## 2024-07-16 RX ADMIN — ANTACID TABLETS 1000 MG: 500 TABLET, CHEWABLE ORAL at 17:47

## 2024-07-16 RX ADMIN — ANTACID TABLETS 1000 MG: 500 TABLET, CHEWABLE ORAL at 12:00

## 2024-07-16 RX ADMIN — METFORMIN HYDROCHLORIDE 1000 MG: 500 TABLET ORAL at 17:47

## 2024-07-16 RX ADMIN — SENNOSIDES AND DOCUSATE SODIUM 2 TABLET: 50; 8.6 TABLET ORAL at 17:47

## 2024-07-16 RX ADMIN — OMEPRAZOLE 40 MG: 20 CAPSULE, DELAYED RELEASE ORAL at 05:35

## 2024-07-16 ASSESSMENT — PAIN DESCRIPTION - PAIN TYPE: TYPE: ACUTE PAIN

## 2024-07-16 ASSESSMENT — COGNITIVE AND FUNCTIONAL STATUS - GENERAL
DRESSING REGULAR UPPER BODY CLOTHING: A LITTLE
HELP NEEDED FOR BATHING: A LITTLE
STANDING UP FROM CHAIR USING ARMS: A LOT
TURNING FROM BACK TO SIDE WHILE IN FLAT BAD: A LITTLE
MOVING TO AND FROM BED TO CHAIR: A LITTLE
WALKING IN HOSPITAL ROOM: TOTAL
SUGGESTED CMS G CODE MODIFIER DAILY ACTIVITY: CJ
CLIMB 3 TO 5 STEPS WITH RAILING: TOTAL
DRESSING REGULAR LOWER BODY CLOTHING: A LITTLE
DAILY ACTIVITIY SCORE: 20
MOVING FROM LYING ON BACK TO SITTING ON SIDE OF FLAT BED: A LITTLE
MOBILITY SCORE: 13
SUGGESTED CMS G CODE MODIFIER MOBILITY: CL
TOILETING: A LITTLE

## 2024-07-16 ASSESSMENT — ENCOUNTER SYMPTOMS
WEAKNESS: 1
FEVER: 0
BACK PAIN: 0
SPEECH CHANGE: 0
PALPITATIONS: 0
COUGH: 0
DIARRHEA: 0
SHORTNESS OF BREATH: 0
NERVOUS/ANXIOUS: 0
ABDOMINAL PAIN: 0
NAUSEA: 0
NECK PAIN: 0

## 2024-07-16 ASSESSMENT — GAIT ASSESSMENTS: GAIT LEVEL OF ASSIST: UNABLE TO PARTICIPATE

## 2024-07-16 ASSESSMENT — ACTIVITIES OF DAILY LIVING (ADL): TOILETING: INDEPENDENT

## 2024-07-16 NOTE — CARE PLAN
The patient is Stable - Low risk of patient condition declining or worsening    Shift Goals  Clinical Goals: monitor labs, hgb >8, rest  Patient Goals: rest  Family Goals: vinayak    Progress made toward(s) clinical / shift goals:    Problem: Knowledge Deficit - Standard  Goal: Patient and family/care givers will demonstrate understanding of plan of care, disease process/condition, diagnostic tests and medications  Outcome: Progressing   Patient verbalized an understanding of the disease process and POC.    Patient is not progressing towards the following goals:

## 2024-07-16 NOTE — CARE PLAN
The patient is Stable - Low risk of patient condition declining or worsening    Shift Goals  Clinical Goals: discharge planning for anticoag, up for meals  Patient Goals: rest, up to chair  Family Goals: updates    Progress made toward(s) clinical / shift goals:     Problem: Knowledge Deficit - Standard  Goal: Patient and family/care givers will demonstrate understanding of plan of care, disease process/condition, diagnostic tests and medications  Outcome: Progressing     Problem: Fall Risk  Goal: Patient will remain free from falls  Outcome: Progressing       Patient is not progressing towards the following goals:

## 2024-07-16 NOTE — CARE PLAN
The patient is Stable - Low risk of patient condition declining or worsening    Shift Goals  Clinical Goals: monitor hgb, vss  Patient Goals: rest, updates  Family Goals: updates    Progress made toward(s) clinical / shift goals:    Problem: Knowledge Deficit - Standard  Goal: Patient and family/care givers will demonstrate understanding of plan of care, disease process/condition, diagnostic tests and medications  Outcome: Progressing  Note: Discussed and clarified POC. Daughter also called at hs for update. Same provided. No questions or concerns.     Problem: Fall Risk  Goal: Patient will remain free from falls  Outcome: Progressing  Note: Fall precautions in place     Problem: Skin Integrity  Goal: Skin integrity is maintained or improved  Outcome: Progressing  Note: Patient able to turn and reposition self in bed     Problem: Pain - Standard  Goal: Alleviation of pain or a reduction in pain to the patient’s comfort goal  Outcome: Progressing  Note: No complaints of pain thus far       Patient is not progressing towards the following goals:

## 2024-07-16 NOTE — THERAPY
Occupational Therapy   Initial Evaluation     Patient Name: Pj Freeman  Age:  69 y.o., Sex:  male  Medical Record #: [id number]  Today's Date: 7/16/2024     Precautions  Precautions: Fall Risk, Non Weight Bearing Left Lower Extremity, Cardiac Precautions (See Comments) (max HR nbb 158, bb 114; target HR nbb 135, bb 97, L TMA- per pt NWB x4 weeks)  Comments: noncomplaint with NWB status    Assessment  Patient is a 69 y.o. male with a diagnosis of upper GI bleed after presenting 7/8 as a transfer from [facility]. Pt had recent L TMA on 7/3 due to osteomyelitis and reportedly is NWB. Hospital course complicated by acute left parietal CVA, syncope, NSTEMI related to anemia from GI bleed, new onset afib. Additional factors influencing patient status / progress: PMHx includes type 2 diabetes, hypertension, peripheral arterial disease.      During OT eval, pt presented with impaired strength, activity tolerance, balance and functional mobility. Pt was able to thread BLEs through underwear opening, but was unable to come to a full stand x3 attempts to pull over hips. Pt had difficulty maintaining NWB and could not bring his second hand up to the walker without falling backwards into the chair. Pt was unable to tolerate further standing attempts due to decreased endurance. Pt is very pleasant and motivated, has good support from his spouse who is currently out of the country but reports she is returning in a few days. Recommend post-acute placement and PM&R consult for IPR appropriateness.     Plan    Occupational Therapy Initial Treatment Plan   Treatment Interventions: (P) Self Care / Activities of Daily Living, Adaptive Equipment, Neuro Re-Education / Balance, Therapeutic Exercises, Therapeutic Activity  Treatment Frequency: (P) 4 Times per Week  Duration: (P) Until Therapy Goals Met    DC Equipment Recommendations: (P) Unable to determine at this time  Discharge Recommendations: (P) Recommend post-acute  placement for additional occupational therapy services prior to discharge home (PM&R consult)     Subjective    Pt agreeable to OT yasmani      Objective     07/16/24 1315   Initial Contact Note    Initial Contact Note Order Received and Verified, Occupational Therapy Evaluation in Progress with Full Report to Follow.   Prior Living Situation   Prior Services Home-Independent   Housing / Facility 1 Story House   Steps Into Home 3   Steps In Home 0  (stairs to basement but reports he does not need to access)   Rail Both Rail (Steps into Home)  (rails are too far apart to hold at same time)   Bathroom Set up Bathtub / Shower Combination;Grab Bars   Equipment Owned Crutches;Scooter  (knee scooter)   Lives with - Patient's Self Care Capacity Spouse   Comments Pt reports his spouse is curently out of the country but is returning in a few days   Prior Level of ADL Function   Self Feeding Independent   Grooming / Hygiene Independent   Bathing Independent   Dressing Independent   Toileting Independent   Prior Level of IADL Function   Medication Management Independent   Laundry Independent   Kitchen Mobility Independent   Finances Independent   Home Management Independent   Shopping Independent   Prior Level Of Mobility Independent With Device in Community   Driving / Transportation Driving Independent   Occupation (Pre-Hospital Vocational) Retired Due To Age   History of Falls   History of Falls Yes   Date of Last Fall 07/04/24  (in relation to admit, syncope)   Pain   Intervention Declines   Non Verbal Descriptors   Non Verbal Scale  Calm   Cognition    Cognition / Consciousness WDL   Level of Consciousness Alert   Comments Pleasant and cooperative, motivated   Active ROM Upper Body   Active ROM Upper Body  WDL   Strength Upper Body   Upper Body Strength  X   Shoulder Retraction Symmetric   Rt Shoulder Flexion Strength 3+ (F+)   Rt Elbow Flexion Strength 4 (G)   Right  Impaired   Lt Shoulder Flexion Strength 3+ (F+)   Lt  Elbow Flexion Strength 4 (G)   Left  Impaired   Sensation Upper Body   Upper Extremity Sensation  Not Tested   Upper Body Muscle Tone   Upper Body Muscle Tone  WDL   Neurological Concerns   Neurological Concerns No   Coordination Upper Body   Coordination WDL   Balance Assessment   Sitting Balance (Static) Fair +   Sitting Balance (Dynamic) Fair   Standing Balance (Static) Poor -   Standing Balance (Dynamic) Trace +   Weight Shift Sitting Fair   Weight Shift Standing Poor   Comments FWW for attempted standing x3, unable to come to full stand from recliner with one person assist and loses balance backwards when attempting to bring hand up to FWW   Bed Mobility    Scooting Supervised   Comments in chair pre/post   ADL Assessment   Eating Independent   Grooming Supervision;Seated  (oral care seated in recliner)   Lower Body Dressing Minimal Assist  (assist to manage underwear over backside with pt depression lifting up from chair due to inability to come to full stand)   How much help from another person does the patient currently need...   Putting on and taking off regular lower body clothing? 3   Bathing (including washing, rinsing, and drying)? 3   Toileting, which includes using a toilet, bedpan, or urinal? 3   Putting on and taking off regular upper body clothing? 3   Taking care of personal grooming such as brushing teeth? 4   Eating meals? 4   6 Clicks Daily Activity Score 20   Functional Mobility   Sit to Stand Maximal Assist  (unable to come to full stand from recliner x3 attempts)   Activity Tolerance   Sitting in Chair pre/post   Patient / Family Goals   Patient / Family Goal #1 To get stronger and go home   Short Term Goals   Short Term Goal # 1 Pt will perform LBD with SPV   Short Term Goal # 2 Pt will perform ADL transfers with SBA   Short Term Goal # 3 Pt will demo good adherence to weight bearing precautions during ADLs and related functional mobility   Short Term Goal # 4 Pt will stand at sink >2  min for g/h and CGA   Education Group   Role of Occupational Therapist Patient Response Patient;Acceptance;Explanation;Verbal Demonstration   Occupational Therapy Initial Treatment Plan    Treatment Interventions Self Care / Activities of Daily Living;Adaptive Equipment;Neuro Re-Education / Balance;Therapeutic Exercises;Therapeutic Activity   Treatment Frequency 4 Times per Week   Duration Until Therapy Goals Met   Problem List   Problem List Decreased Active Daily Living Skills;Decreased Homemaking Skills;Decreased Upper Extremity Strength Right;Decreased Upper Extremity Strength Left;Decreased Functional Mobility;Decreased Activity Tolerance;Impaired Postural Control / Balance   Anticipated Discharge Equipment and Recommendations   DC Equipment Recommendations Unable to determine at this time   Discharge Recommendations Recommend post-acute placement for additional occupational therapy services prior to discharge home  (PM&R consult)   Interdisciplinary Plan of Care Collaboration   IDT Collaboration with  Nursing;Physical Therapist   Patient Position at End of Therapy Seated;Call Light within Reach;Tray Table within Reach;Phone within Reach   Collaboration Comments RN updated, discussed with PT   Session Information   Date / Session Number  7/16 #1 (1/4, 7/22)

## 2024-07-16 NOTE — PROGRESS NOTES
Received bedside report from RN, pt care assumed, VSS, pt assessment complete. Pt AAOx4, with no complaint of pain at this time. No signs of acute distress. Plan of care discussed with pt and verbalizes no questions. Pt denies any additional needs at this time. Bed locked/in lowest position, bed alarm on, pt educated on fall risk and verbalized understanding, call light within reach, hourly rounding initiated.

## 2024-07-16 NOTE — PROGRESS NOTES
Handoff report received from night shift nurse. Pt care assumed. Pt is currently resting in bed. POC discussed with Pt and Pt verbalizes no questions at this time. Pt is AAOx4, on Ra, patient is medical, and VSS. Call light and belongings within reach, bed in lowest and locked position, and Pt educated on use of call light.

## 2024-07-16 NOTE — PROGRESS NOTES
Hospital Medicine Daily Progress Note    Date of Service  7/16/2024    Chief Complaint  Syncope and melena    Hospital Course  69 y.o. male w/ hx of DMII, HTN, PAD, recent left toe amputation due to osteomyeliits (7/3/24). He was seen at [facility] [place] he presented with GI bleed evidence of melena, orthostatic symptoms and syncope. While hospitalized there he was also diagnosed with CVA after   MRI brain showing acute CVA left parietal lobe as well as NSTEMI.  During admit found to be having paroxysmal atrial fibrillation and was placed on amiodarone and heparin and he remained on plavix.  An echo showed EF:50% and bicuspid aortic valve, severe mitral stenosis and pulmonary hypertension.  Due to melena and syncope on 7/7 he was stopped of heparin and plavix and transferred to [facility] for higher level of care.     He was admitted 7/8/2024 to [facility] GI bleed with Hgb:6.2.  Neurology did consult at outside hospital.  Anticoagulation antiplatelets were held.  Patient received  multiple PRBC units (4 units).  Gastroenterology following for which patient underwent EGD on 7/9/2024 and was noted for 6 duodenal ulcers 1 of which was oozing and received 2 hemoclips.  His diet were escalated slowly and he was continued on proton pump inhibitor.  Cardiology following for which repeat echocardiogram confirms severe mitral stenosis which likely contribute to atrial fibrillation and embolic stroke.  Heparin drip was restarted on 7/10/2024 but patient did not tolerate and had melanotic stools for which heparin drip was discontinued and he required additional PRBC transfusion.  Per cardiology, holding off on anticoagulation as patient cannot tolerate and aim to pursue severe mitral stenosis workup once patient able to tolerate anticoagulation.     Interval Problem Update  Pt seen and examined at bedside, he is overall feeling improved. He had minimal pain in his foot s/p transmetatarsal amputation. He remains weak. He has  not had any further episodes of melena or hematochezia. He denies dizziness or abdominal discomfort.   His vitals were reviewed and stable, on RA. BP is improved   - H/H is stable 8-9. Continue to hold AC, (failed anticoagulation reinitiation on 7/10), consider re attempting AC in the next 48 hours if H/H stable.   - Ca low but improving, continue tums.   - Continue omeprazole 40 twice daily  - PT/OT , PMR referral ordered   - sign. Edema to BLE, will start low dose IV lasix 20 mg, monitor OP and BP    I have discussed this patient's plan of care and discharge plan at IDT rounds today with Case Management, Nursing, Nursing leadership, and other members of the IDT team.    Consultants/Specialty  cardiology, GI, neurology, and orthopedics    Code Status  Full Code    Disposition  The patient is not medically cleared for discharge to home or a post-acute facility.     I have placed the appropriate orders for post-discharge needs.    Review of Systems  Review of Systems   Constitutional:  Negative for fever.   Respiratory:  Negative for cough and shortness of breath.    Cardiovascular:  Positive for leg swelling. Negative for palpitations.   Gastrointestinal:  Negative for abdominal pain, diarrhea, melena and nausea.   Genitourinary:  Negative for dysuria.   Musculoskeletal:  Negative for back pain and neck pain.   Neurological:  Positive for weakness. Negative for speech change.   Psychiatric/Behavioral:  The patient is not nervous/anxious.         Physical Exam  Temp:  [36.5 °C (97.7 °F)-36.8 °C (98.2 °F)] 36.5 °C (97.7 °F)  Pulse:  [71-83] 81  Resp:  [18] 18  BP: (134-160)/(68-91) 139/68  SpO2:  [92 %-95 %] 94 %    Physical Exam  Vitals and nursing note reviewed.   Constitutional:       General: He is not in acute distress.     Appearance: Normal appearance. He is not ill-appearing.   HENT:      Head: Normocephalic and atraumatic.      Mouth/Throat:      Mouth: Mucous membranes are moist.      Pharynx: Oropharynx is  clear.   Eyes:      General:         Right eye: No discharge.         Left eye: No discharge.      Extraocular Movements: Extraocular movements intact.   Cardiovascular:      Rate and Rhythm: Normal rate and regular rhythm.      Pulses: Normal pulses.      Heart sounds: Murmur heard.   Pulmonary:      Effort: Pulmonary effort is normal. No respiratory distress.      Breath sounds: Normal breath sounds. No wheezing.   Abdominal:      General: Bowel sounds are normal. There is no distension.      Palpations: Abdomen is soft.   Musculoskeletal:      Right lower leg: Edema present.      Left lower leg: Edema present.      Comments: Clean dressing over left transmetatarsal surgical site. 3+ pitting edema on Left 2+ on right    Skin:     General: Skin is warm.      Findings: No erythema.      Comments: Good approximation of surgical edges   Neurological:      General: No focal deficit present.      Mental Status: He is alert and oriented to person, place, and time.      Cranial Nerves: No cranial nerve deficit.   Psychiatric:         Mood and Affect: Mood normal.         Behavior: Behavior normal.         Thought Content: Thought content normal.         Fluids    Intake/Output Summary (Last 24 hours) at 7/16/2024 1710  Last data filed at 7/16/2024 1524  Gross per 24 hour   Intake 770 ml   Output 1300 ml   Net -530 ml       Laboratory  Recent Labs     07/14/24  1401 07/14/24  2136 07/15/24  0549 07/15/24  1458 07/15/24  2136 07/16/24  0557 07/16/24  1349   WBC 10.6  --  10.3  --   --  8.3  --    RBC 3.40*  --  3.10*  --   --  2.81*  --    HEMOGLOBIN 9.9*   < > 9.1*   < > 8.5* 8.3* 9.1*   HEMATOCRIT 29.9*  --  27.7*  --   --  24.4*  --    MCV 87.9  --  89.4  --   --  86.8  --    MCH 29.1  --  29.4  --   --  29.5  --    MCHC 33.1  --  32.9  --   --  34.0  --    RDW 50.7*  --  52.2*  --   --  49.4  --    PLATELETCT 319  --  325  --   --  328  --    MPV 9.8  --  10.0  --   --  9.9  --     < > = values in this interval not  displayed.     Recent Labs     07/15/24  0549 07/16/24  0557   SODIUM 136 134*   POTASSIUM 3.7 3.6   CHLORIDE 107 105   CO2 20 21   GLUCOSE 80 158*   BUN 10 11   CREATININE 0.54 0.50   CALCIUM 6.9* 7.0*                     Imaging  EC-ECHOCARDIOGRAM COMPLETE W/O CONT   Final Result           Assessment/Plan  * Upper GI bleed- (present on admission)  Assessment & Plan  Patient is direct admit with melena and acute blood loss anemia, status post 4 unit of RBC at [facility]   However he was given IV heparin for A-fib/CVA/non-STEMI, as well as Plavix, that was discontinued  7/9 EGD showed 6 duodenal ulcers with one that was bleeding, gastritis and some erosive esophagitis.  7/10 heparin drip initiated and stopped due to recurrent melena and need of transfusion of pRBCs.  .  Path negative for Hpylori  H/H stable over the last several days. Consider retrial of AC in the next 24-48 hours if remains stable   Cont. Oral PPI therapy   Will need OP GI follow up with repeat endoscopy    Hypokalemia  Assessment & Plan  Goal of K ~4  7/12 Supplementing with IV    Hypomagnesemia  Assessment & Plan  Goal ~2  7/12 giving 2 g IV magnesium sulfate    Type 2 diabetes mellitus (HCC)  Assessment & Plan  Uncontrolled with hyperglycemia Ha1c 9.4 with diabetic foot wound   BG uncontrolled   I have started lantus 10 Units today, continue ISS   Resume metformin   Hypoglycemic protocol  Diabetic diet    Acute osteomyelitis of left foot (HCC)  Assessment & Plan  Status post left TMA on 7/3  Repeat MRI of the left foot:status post transmetatarsal amputation without evidence of recurrent osteomyelitis of up or abscess.  Subcutaneous edema along the stump which could be postsurgical or posttraumatic without loculated fluid collection hematoma or abscess.  There is marrow edema along the first metatarsal which could represent a bone contusion  And consider obtaining surgical pathology and available culture report to determine  needs to continue antibiotics, consider ID consult R  repeat CBC, inflammatory markers  Left foot stump looks noninfected.  Surgery was in [place] per patient.  7/8/24 ESR:18  WBC remains elevated and there is some drainage of the  medial aspect of the wound   7/10 stopped Zosyn and vancomycin initiated Unasyn.  I have consulted orthopedics to evaluate area of dehiscence of transmetatarsal wound site and a new suture was placed.  7/13 wbc:13.1  monitor cbc and vitals.  Off abx, monitor wound       Mitral stenosis  Assessment & Plan  transthoracic echo showed 50% EF, grade 2 diastolic dysfunction, elevated LV filling pressure, bicuspid aortic valve,  severe mitral stenosis, pulmonary hypertension.  Patient probably would benefit from GERALDINE and possibly cardiac surgery consult when stable from bleeding standpoint.  Cardiology was consulting Future outpatient valvular repair once stable from GI and cardiac standpoint, unable to tolerate AC at this time, valve workup deferred o OP for the time being     NSTEMI (non-ST elevated myocardial infarction) (HCC)  Assessment & Plan  Elevated troponin and chest discomfort documented at outside facility  Echo showed no focal wall motion abnormalities, but there is borderline low EF 50%, grade 2 diastolic dysfunction, severe mitral stenosis and pulmonary hypertension  Probably type II NSTEMI secondary to severe anemia  RBC transfusion for goal hemoglobin 8.0 and above  Per cardiology, For his cardiac disease, for a radial cath we do administer a heparin bolus. We recommend outpatient cardiac catheterization and then CT surgery consultation once his hemoglobin is stable and he can tolerate anticoagulation.   continue amiodarone for rhythm control to decrease risk of recurrent a fib. with possible cardiac cath during admission if bleeding resolves and able to tolerate anticoagulation    Acute CVA (cerebrovascular accident) (HCC)  Assessment & Plan  Presented on 7/6 after syncopal episode  without reported gross neurodeficits  MRI of the brain: Tiny focus of acute ischemia in the left parietal lobe with chronic microvascular disease and small infarcts.  No acute hemorrhage or mass.  On exam today there is no definite motor deficit.  New onset A-fib, as well as severe mitral stenosis on echo documented at outside hospital, could not continue anticoagulation due to acute GI bleed  Frequent neurochecks no longer indicated   Neurology consulted at outside facility.  Continue current management.    Continue on high-dose statin    Acute blood loss anemia  Assessment & Plan  S/p 4 units of RBC transfusion at outside hospital  Repeat H&H pending.  Goal hemoglobin 8.0 and above  7/13 Hgb:9.1 <9<8.4<6.7 <7.7<9.2<6.5 <8.5  EGD with duodenal ulcers. Did not tolerate being on heparin.  7/11 discussed with GI and they do not feel an additional EGD is warranted other than holding anticoagulation.  7/12 Check TEG, if continued bleeding will readdress with GI.  Monitor CBC  H/H has stablized off AC, consider retrial of AC in the next 24/48 hours        VTE prophylaxis: None, UGIB    I have performed a physical exam and reviewed and updated ROS and Plan today (7/16/2024). In review of yesterday's note (7/15/2024), there are no changes except as documented above.    Greater than 51 minutes spent prepping to see patient (e.g. review of tests) obtaining and/or reviewing separately obtained history. Performing a medically appropriate examination and/ evaluation.  Counseling and educating the patient/family/caregiver.  Ordering medications, tests, or procedures.  Referring and communicating with other health care professionals.  Documenting clinical information in EPIC.  Independently interpreting results and communicating results to patient/family/caregiver.  Care coordination

## 2024-07-16 NOTE — PROGRESS NOTES
Monitor Summary  Rhythm: SR  Rate: 65-76  Ectopy: PVC  Measurements: 0.15/0.19/0.45  ---12 hr Chart Review---

## 2024-07-16 NOTE — THERAPY
"Physical Therapy   Initial Evaluation     Patient Name: Pj Freeman  Age:  69 y.o., Sex:  male  Medical Record #: [id number]  Today's Date: 7/16/2024     Precautions  Precautions: Fall Risk;Non Weight Bearing Left Lower Extremity;Cardiac Precautions (See Comments) (max HR nbb 158, bb 114; target HR nbb 135, bb 97, L TMA- per pt NWB x4 weeks)  Comments: noncomplaint with NWB status    Assessment    69 y.o. male was admitted to the hospital for acute CVA, NSTEMI related to anemia from a GI bleed, and new onset a fib.  Pt has a L TMA on 7/3 with NWB status, reports he was home for a day then returned to the hospital.  PMHx includes  DM, HTN, PAD, severe mitral stenosis, He lives with his wife in a 1SH with 3 CHRISTINE and was independent for mobility without an AD prior to February, when he had a L toe amputated.  Since then he had been using a scooter.  His wife is currently out of the country, expected to return 7/21.  He presents, on eval, with decreased activity tolerance, decreased functional strength related to NWB status, and orthopedic restrictions limiting his mobility.  He will benefit from continued acute PT services to address the above deficits in order to return home safely.  Recommend post acute PT services.  Pt would benefit from PM&R consult.     Plan    Physical Therapy Initial Treatment Plan   Treatment Plan : (P) Bed Mobility, Equipment, Family / Caregiver Training, Gait Training, Manual Therapy, Neuro Re-Education / Balance, Self Care / Home Evaluation, Stair Training, Therapeutic Activities, Therapeutic Exercise  Treatment Frequency: (P) 5 Times per Week  Duration: (P) Until Therapy Goals Met    DC Equipment Recommendations: (P) Unable to determine at this time  Discharge Recommendations: (P) Recommend post-acute placement for additional physical therapy services prior to discharge home       Subjective    \"I haven't been out of bed for 12 days.\"     Objective       07/16/24 1004   Initial Contact " Note    Initial Contact Note Order Received and Verified, Physical Therapy Evaluation in Progress with Full Report to Follow.   Precautions   Precautions Fall Risk;Non Weight Bearing Left Lower Extremity;Cardiac Precautions (See Comments)  (max HR nbb 158, bb 114; target HR nbb 135, bb 97, L TMA- per pt NWB x4 weeks)   Comments noncomplaint with NWB status   Vitals   Pulse 76  (supine, 80 sitting, 88 standing, 82 /p standing activity)   Blood Pressure  137/79  (supine, 147/74 sitting with dizziness, 105/85 standing with dizziness)   Room Air Oximetry 96  (/p standing activity)   O2 Delivery Device None - Room Air   Pain 0 - 10 Group   Therapist Pain Assessment Post Activity Pain Same as Prior to Activity  (no complaints of pain)   Prior Living Situation   Housing / Facility 1 Story House   Steps Into Home 3   Rail Both Rail (Steps into Home)  (raills to wide to grab both at the same time)   Bathroom Set up Bathtub / Shower Combination;Grab Bars   Equipment Owned Crutches;Scooter;Bed Side Commode   Lives with - Patient's Self Care Capacity Spouse  (currently out of the country until 7/21)   Prior Level of Functional Mobility   Bed Mobility Independent   Transfer Status Independent   Ambulation Independent   Ambulation Distance community   Assistive Devices Used None   Comments drives; pt reports being in a scooter since his toe amputation in February   History of Falls   History of Falls   (Pt reports 3 falls on 7/4, otherwise none in the past year)   Date of Last Fall 07/04/24   Cognition    Cognition / Consciousness WDL   Level of Consciousness Alert   Active ROM Lower Body    Active ROM Lower Body  WDL   Strength Lower Body   Lower Body Strength  WDL  (grossly 4/5 BLEs)   Sensation Lower Body   Lower Extremity Sensation   WDL   Balance Assessment   Sitting Balance (Static) Fair   Sitting Balance (Dynamic) Fair   Standing Balance (Static) Fair -   Standing Balance (Dynamic) Poor   Weight Shift Sitting Fair   Weight  Shift Standing Poor   Bed Mobility    Supine to Sit Minimal Assist   Sit to Supine Contact Guard Assist   Scooting Standby Assist   Rolling Contact Guard Assist   Comments bed flat, rail   Gait Analysis   Gait Level Of Assist Unable to Participate   Comments unablet o clear R foot   Functional Mobility   Sit to Stand Maximal Assist   Bed, Chair, Wheelchair Transfer Minimal Assist   Transfer Method Stand Pivot   Mobility with FWW   Activity Tolerance   Sitting in Chair post session   Edema / Skin Assessment   Edema / Skin  Not Assessed   Comments L TMA incision site dressing intact   Short Term Goals    Short Term Goal # 1 Pt will perform supine < > sit SPV with bed flat, no rail in 6 visits in order to set up for upright mobility   Short Term Goal # 2 Pt wlll perform sit < > stand SPV in 6 visits in order to prepare for ambulation   Short Term Goal # 3 Pt will ambulate 25' with FWW CGA, maintain % of the time in 6 visits in order to get to the bathroom with nursing   Short Term Goal # 4 Pt will ascend/ descend 2 steps Coreen in 6 visits in order to progress home access   Education Group   Education Provided Cardiac Precautions;Role of Physical Therapist;Gait Training;Transfer Status;Weight Bearing Precautions   Cardiac Precautions Patient Response Patient;Acceptance;Explanation;Action Demonstration   Role of Physical Therapist Patient Response Patient;Acceptance;Explanation;Action Demonstration   Gait Training Patient Response Patient;Acceptance;Explanation;Demonstration;Action Demonstration;Reinforcement Needed   Transfer Status Patient Response Patient;Acceptance;Explanation;Demonstration;Action Demonstration;Reinforcement Needed   Weight Bearing Precautions Patient Response Patient;Acceptance;Explanation;Demonstration;Action Demonstration;Reinforcement Needed  (Pt is able to verbalize NWB status, but is noncompliant during mobility)   Physical Therapy Initial Treatment Plan    Treatment Plan  Bed  Mobility;Equipment;Family / Caregiver Training;Gait Training;Manual Therapy;Neuro Re-Education / Balance;Self Care / Home Evaluation;Stair Training;Therapeutic Activities;Therapeutic Exercise   Treatment Frequency 5 Times per Week   Duration Until Therapy Goals Met   Problem List    Problems Impaired Bed Mobility;Impaired Transfers;Impaired Ambulation;Functional Strength Deficit;Decreased Activity Tolerance;Limited Knowledge of Post-Op Precautions   Anticipated Discharge Equipment and Recommendations   DC Equipment Recommendations Unable to determine at this time   Discharge Recommendations Recommend post-acute placement for additional physical therapy services prior to discharge home   Interdisciplinary Plan of Care Collaboration   IDT Collaboration with  Nursing   Patient Position at End of Therapy Seated;Call Light within Reach;Tray Table within Reach   Collaboration Comments RN updated, PT recommendations   Session Information   Date / Session Number  7/16 -1 (1/5, 7/22)

## 2024-07-16 NOTE — DIETARY
Nutrition services: Brief Note  Day 8 of admit.  Pj Freeman is a 69 y.o. male with admitting DX of GI bleed [K92.2]  Acute coronary syndrome (HCC) [I24.9]     RD screening pt for length-of-stay per protocol. Pt w/ documented hx of T2DM. POC glu x24 hrs = . On SSI per MAR. Current diet order is Low Fiber (GI Soft). Pt could benefit from addition of Consistent CHO (diabetic) diet modifier to promote tighter glycemic control. RD updated diet order.    RD will screen weekly per department policy; available PRN.

## 2024-07-16 NOTE — DISCHARGE PLANNING
Case Management Discharge Planning    Admission Date: 7/8/2024  GMLOS: 4.6  ALOS: 8    6-Clicks ADL Score: 18  6-Clicks Mobility Score: 13  PT and/or OT Eval ordered: Yes  Post-acute Referrals Ordered: Yes  Post-acute Choice Obtained: No  Has referral(s) been sent to post-acute provider:  Yes      Anticipated Discharge Dispo: Discharge Disposition: Disch to  rehab facility or distinct part unit (62)     DME Needed: No    Action(s) Taken: Updated Provider/Nurse on Discharge Plan and Referral(s) sent    1445, Pt was discussed in IDT rounds. Pt will need post acute placement.     1500, Pt was visited at room and Pt wants to be referred to [facility] and if not accepted Pt is also okay with SNF. Pt okay to send SNF referral to [facility].    1511, Aneesh ROBLES from [facility] was informed through [employer] that a PMR consult was placed for possible acceptance to [facility].    1518, MICHI Segura informed to send SNF referral to [facility].    Escalations Completed: None    Medically Clear: No    Next Steps: CM will continue to assist Pt with discharge needs.      Barriers to Discharge: Medical clearance and Pending Placement    Is the patient up for discharge tomorrow: No

## 2024-07-17 ENCOUNTER — HOSPITAL ENCOUNTER (INPATIENT)
Facility: REHABILITATION | Age: 69
DRG: 056 | End: 2024-07-17
Attending: PHYSICAL MEDICINE & REHABILITATION | Admitting: PHYSICAL MEDICINE & REHABILITATION
Payer: MEDICARE

## 2024-07-17 VITALS
SYSTOLIC BLOOD PRESSURE: 145 MMHG | RESPIRATION RATE: 18 BRPM | WEIGHT: 166.89 LBS | DIASTOLIC BLOOD PRESSURE: 84 MMHG | HEART RATE: 101 BPM | HEIGHT: 70 IN | OXYGEN SATURATION: 95 % | TEMPERATURE: 98.1 F | BODY MASS INDEX: 23.89 KG/M2

## 2024-07-17 DIAGNOSIS — E11.65 TYPE 2 DIABETES MELLITUS WITH HYPERGLYCEMIA, WITH LONG-TERM CURRENT USE OF INSULIN (HCC): ICD-10-CM

## 2024-07-17 DIAGNOSIS — I48.91 ATRIAL FIBRILLATION, UNSPECIFIED TYPE (HCC): ICD-10-CM

## 2024-07-17 DIAGNOSIS — I27.20 PULMONARY HYPERTENSION (HCC): ICD-10-CM

## 2024-07-17 DIAGNOSIS — M86.172 ACUTE OSTEOMYELITIS OF LEFT FOOT (HCC): ICD-10-CM

## 2024-07-17 DIAGNOSIS — R60.0 LOCALIZED EDEMA: ICD-10-CM

## 2024-07-17 DIAGNOSIS — K26.4 GASTROINTESTINAL HEMORRHAGE ASSOCIATED WITH DUODENAL ULCER: ICD-10-CM

## 2024-07-17 DIAGNOSIS — Z79.4 TYPE 2 DIABETES MELLITUS WITH HYPERGLYCEMIA, WITH LONG-TERM CURRENT USE OF INSULIN (HCC): ICD-10-CM

## 2024-07-17 DIAGNOSIS — I63.9 LEFT SIDED CEREBRAL HEMISPHERE CEREBROVASCULAR ACCIDENT (CVA) (HCC): ICD-10-CM

## 2024-07-17 DIAGNOSIS — I63.9 ACUTE CVA (CEREBROVASCULAR ACCIDENT) (HCC): ICD-10-CM

## 2024-07-17 LAB
ALBUMIN SERPL BCP-MCNC: 2.6 G/DL (ref 3.2–4.9)
ALBUMIN/GLOB SERPL: 1.2 G/DL
ALP SERPL-CCNC: 93 U/L (ref 30–99)
ALT SERPL-CCNC: 21 U/L (ref 2–50)
ANION GAP SERPL CALC-SCNC: 10 MMOL/L (ref 7–16)
AST SERPL-CCNC: 13 U/L (ref 12–45)
BASOPHILS # BLD AUTO: 0.5 % (ref 0–1.8)
BASOPHILS # BLD: 0.04 K/UL (ref 0–0.12)
BILIRUB SERPL-MCNC: 0.5 MG/DL (ref 0.1–1.5)
BUN SERPL-MCNC: 11 MG/DL (ref 8–22)
CALCIUM ALBUM COR SERPL-MCNC: 8.7 MG/DL (ref 8.5–10.5)
CALCIUM SERPL-MCNC: 7.6 MG/DL (ref 8.5–10.5)
CHLORIDE SERPL-SCNC: 104 MMOL/L (ref 96–112)
CO2 SERPL-SCNC: 24 MMOL/L (ref 20–33)
CREAT SERPL-MCNC: 0.57 MG/DL (ref 0.5–1.4)
EOSINOPHIL # BLD AUTO: 0.08 K/UL (ref 0–0.51)
EOSINOPHIL NFR BLD: 0.9 % (ref 0–6.9)
ERYTHROCYTE [DISTWIDTH] IN BLOOD BY AUTOMATED COUNT: 50.2 FL (ref 35.9–50)
GFR SERPLBLD CREATININE-BSD FMLA CKD-EPI: 105 ML/MIN/1.73 M 2
GLOBULIN SER CALC-MCNC: 2.1 G/DL (ref 1.9–3.5)
GLUCOSE BLD STRIP.AUTO-MCNC: 128 MG/DL (ref 65–99)
GLUCOSE BLD STRIP.AUTO-MCNC: 195 MG/DL (ref 65–99)
GLUCOSE BLD STRIP.AUTO-MCNC: 250 MG/DL (ref 65–99)
GLUCOSE BLD STRIP.AUTO-MCNC: 91 MG/DL (ref 65–99)
GLUCOSE SERPL-MCNC: 99 MG/DL (ref 65–99)
HCT VFR BLD AUTO: 28.5 % (ref 42–52)
HGB BLD-MCNC: 9.3 G/DL (ref 14–18)
IMM GRANULOCYTES # BLD AUTO: 0.05 K/UL (ref 0–0.11)
IMM GRANULOCYTES NFR BLD AUTO: 0.6 % (ref 0–0.9)
LYMPHOCYTES # BLD AUTO: 1.82 K/UL (ref 1–4.8)
LYMPHOCYTES NFR BLD: 20.7 % (ref 22–41)
MCH RBC QN AUTO: 28.4 PG (ref 27–33)
MCHC RBC AUTO-ENTMCNC: 32.6 G/DL (ref 32.3–36.5)
MCV RBC AUTO: 86.9 FL (ref 81.4–97.8)
MONOCYTES # BLD AUTO: 0.88 K/UL (ref 0–0.85)
MONOCYTES NFR BLD AUTO: 10 % (ref 0–13.4)
NEUTROPHILS # BLD AUTO: 5.93 K/UL (ref 1.82–7.42)
NEUTROPHILS NFR BLD: 67.3 % (ref 44–72)
NRBC # BLD AUTO: 0 K/UL
NRBC BLD-RTO: 0 /100 WBC (ref 0–0.2)
PLATELET # BLD AUTO: 363 K/UL (ref 164–446)
PMV BLD AUTO: 9.7 FL (ref 9–12.9)
POTASSIUM SERPL-SCNC: 3.4 MMOL/L (ref 3.6–5.5)
PROT SERPL-MCNC: 4.7 G/DL (ref 6–8.2)
RBC # BLD AUTO: 3.28 M/UL (ref 4.7–6.1)
SODIUM SERPL-SCNC: 138 MMOL/L (ref 135–145)
WBC # BLD AUTO: 8.8 K/UL (ref 4.8–10.8)

## 2024-07-17 PROCEDURE — 80053 COMPREHEN METABOLIC PANEL: CPT

## 2024-07-17 PROCEDURE — A9270 NON-COVERED ITEM OR SERVICE: HCPCS | Performed by: STUDENT IN AN ORGANIZED HEALTH CARE EDUCATION/TRAINING PROGRAM

## 2024-07-17 PROCEDURE — A9270 NON-COVERED ITEM OR SERVICE: HCPCS

## 2024-07-17 PROCEDURE — 700102 HCHG RX REV CODE 250 W/ 637 OVERRIDE(OP)

## 2024-07-17 PROCEDURE — 82962 GLUCOSE BLOOD TEST: CPT | Mod: 91

## 2024-07-17 PROCEDURE — 700102 HCHG RX REV CODE 250 W/ 637 OVERRIDE(OP): Performed by: STUDENT IN AN ORGANIZED HEALTH CARE EDUCATION/TRAINING PROGRAM

## 2024-07-17 PROCEDURE — 82962 GLUCOSE BLOOD TEST: CPT

## 2024-07-17 PROCEDURE — 94760 N-INVAS EAR/PLS OXIMETRY 1: CPT

## 2024-07-17 PROCEDURE — 99223 1ST HOSP IP/OBS HIGH 75: CPT | Performed by: PHYSICAL MEDICINE & REHABILITATION

## 2024-07-17 PROCEDURE — 700102 HCHG RX REV CODE 250 W/ 637 OVERRIDE(OP): Performed by: PHYSICAL MEDICINE & REHABILITATION

## 2024-07-17 PROCEDURE — 99239 HOSP IP/OBS DSCHRG MGMT >30: CPT | Performed by: STUDENT IN AN ORGANIZED HEALTH CARE EDUCATION/TRAINING PROGRAM

## 2024-07-17 PROCEDURE — 700111 HCHG RX REV CODE 636 W/ 250 OVERRIDE (IP): Mod: JZ | Performed by: STUDENT IN AN ORGANIZED HEALTH CARE EDUCATION/TRAINING PROGRAM

## 2024-07-17 PROCEDURE — 36415 COLL VENOUS BLD VENIPUNCTURE: CPT

## 2024-07-17 PROCEDURE — 97116 GAIT TRAINING THERAPY: CPT | Mod: CQ

## 2024-07-17 PROCEDURE — A9270 NON-COVERED ITEM OR SERVICE: HCPCS | Performed by: PHYSICAL MEDICINE & REHABILITATION

## 2024-07-17 PROCEDURE — 85025 COMPLETE CBC W/AUTO DIFF WBC: CPT

## 2024-07-17 PROCEDURE — 97530 THERAPEUTIC ACTIVITIES: CPT | Mod: CQ

## 2024-07-17 PROCEDURE — 770010 HCHG ROOM/CARE - REHAB SEMI PRIVAT*

## 2024-07-17 RX ORDER — FUROSEMIDE 10 MG/ML
20 INJECTION INTRAMUSCULAR; INTRAVENOUS
Status: DISCONTINUED | OUTPATIENT
Start: 2024-07-18 | End: 2024-07-18

## 2024-07-17 RX ORDER — AMIODARONE HYDROCHLORIDE 200 MG/1
400 TABLET ORAL TWICE DAILY
Status: COMPLETED | OUTPATIENT
Start: 2024-07-17 | End: 2024-07-23

## 2024-07-17 RX ORDER — AMOXICILLIN 250 MG
2 CAPSULE ORAL EVERY EVENING
Qty: 30 TABLET | Refills: 0 | Status: ON HOLD | OUTPATIENT
Start: 2024-07-17 | End: 2024-07-31

## 2024-07-17 RX ORDER — POLYETHYLENE GLYCOL 3350 17 G/17G
1 POWDER, FOR SOLUTION ORAL
Status: CANCELLED | OUTPATIENT
Start: 2024-07-17

## 2024-07-17 RX ORDER — BISACODYL 10 MG
10 SUPPOSITORY, RECTAL RECTAL
Status: DISCONTINUED | OUTPATIENT
Start: 2024-07-17 | End: 2024-08-01 | Stop reason: HOSPADM

## 2024-07-17 RX ORDER — ECHINACEA PURPUREA EXTRACT 125 MG
2 TABLET ORAL PRN
Status: DISCONTINUED | OUTPATIENT
Start: 2024-07-17 | End: 2024-08-01 | Stop reason: HOSPADM

## 2024-07-17 RX ORDER — CARBOXYMETHYLCELLULOSE SODIUM 5 MG/ML
1 SOLUTION/ DROPS OPHTHALMIC PRN
Status: DISCONTINUED | OUTPATIENT
Start: 2024-07-17 | End: 2024-08-01 | Stop reason: HOSPADM

## 2024-07-17 RX ORDER — CALCIUM CARBONATE 500 MG/1
1000 TABLET, CHEWABLE ORAL
Status: CANCELLED | OUTPATIENT
Start: 2024-07-17

## 2024-07-17 RX ORDER — OMEPRAZOLE 40 MG/1
40 CAPSULE, DELAYED RELEASE ORAL 2 TIMES DAILY
Status: ON HOLD
Start: 2024-07-17 | End: 2024-07-31

## 2024-07-17 RX ORDER — POLYETHYLENE GLYCOL 3350 17 G/17G
1 POWDER, FOR SOLUTION ORAL
Status: DISCONTINUED | OUTPATIENT
Start: 2024-07-17 | End: 2024-08-01 | Stop reason: HOSPADM

## 2024-07-17 RX ORDER — AMIODARONE HYDROCHLORIDE 200 MG/1
TABLET ORAL
Status: ON HOLD
Start: 2024-07-17 | End: 2024-07-31

## 2024-07-17 RX ORDER — FUROSEMIDE 20 MG
20 TABLET ORAL 2 TIMES DAILY
Status: ON HOLD
Start: 2024-07-17 | End: 2024-07-31

## 2024-07-17 RX ORDER — OXYCODONE HYDROCHLORIDE 5 MG/1
5 TABLET ORAL
Status: DISCONTINUED | OUTPATIENT
Start: 2024-07-17 | End: 2024-08-01 | Stop reason: HOSPADM

## 2024-07-17 RX ORDER — ALUMINA, MAGNESIA, AND SIMETHICONE 2400; 2400; 240 MG/30ML; MG/30ML; MG/30ML
20 SUSPENSION ORAL
Status: DISCONTINUED | OUTPATIENT
Start: 2024-07-17 | End: 2024-08-01 | Stop reason: HOSPADM

## 2024-07-17 RX ORDER — OXYCODONE HYDROCHLORIDE 10 MG/1
10 TABLET ORAL
Status: DISCONTINUED | OUTPATIENT
Start: 2024-07-17 | End: 2024-08-01 | Stop reason: HOSPADM

## 2024-07-17 RX ORDER — AMIODARONE HYDROCHLORIDE 200 MG/1
400 TABLET ORAL TWICE DAILY
Status: CANCELLED | OUTPATIENT
Start: 2024-07-17 | End: 2024-07-23

## 2024-07-17 RX ORDER — ATORVASTATIN CALCIUM 40 MG/1
40 TABLET, FILM COATED ORAL EVERY EVENING
Status: ON HOLD
Start: 2024-07-17 | End: 2024-07-31

## 2024-07-17 RX ORDER — ATORVASTATIN CALCIUM 40 MG/1
40 TABLET, FILM COATED ORAL EVERY EVENING
Status: DISCONTINUED | OUTPATIENT
Start: 2024-07-17 | End: 2024-08-01 | Stop reason: HOSPADM

## 2024-07-17 RX ORDER — POLYETHYLENE GLYCOL 3350 17 G/17G
17 POWDER, FOR SOLUTION ORAL
Status: ON HOLD
Start: 2024-07-17 | End: 2024-07-31

## 2024-07-17 RX ORDER — ATORVASTATIN CALCIUM 40 MG/1
40 TABLET, FILM COATED ORAL EVERY EVENING
Status: CANCELLED | OUTPATIENT
Start: 2024-07-17

## 2024-07-17 RX ORDER — INSULIN LISPRO 100 [IU]/ML
2-9 INJECTION, SOLUTION INTRAVENOUS; SUBCUTANEOUS
Status: ON HOLD | DISCHARGE
Start: 2024-07-17 | End: 2024-07-31

## 2024-07-17 RX ORDER — ACETAMINOPHEN 325 MG/1
650 TABLET ORAL EVERY 6 HOURS PRN
Status: SHIPPED
Start: 2024-07-17

## 2024-07-17 RX ORDER — DEXTROSE MONOHYDRATE 25 G/50ML
25 INJECTION, SOLUTION INTRAVENOUS
Status: CANCELLED | OUTPATIENT
Start: 2024-07-17

## 2024-07-17 RX ORDER — AMOXICILLIN 250 MG
2 CAPSULE ORAL 2 TIMES DAILY
Status: DISCONTINUED | OUTPATIENT
Start: 2024-07-17 | End: 2024-08-01 | Stop reason: HOSPADM

## 2024-07-17 RX ORDER — AMOXICILLIN 250 MG
2 CAPSULE ORAL EVERY EVENING
Status: DISCONTINUED | OUTPATIENT
Start: 2024-07-17 | End: 2024-07-17

## 2024-07-17 RX ORDER — CALCIUM CARBONATE 500 MG/1
1000 TABLET, CHEWABLE ORAL
Status: DISCONTINUED | OUTPATIENT
Start: 2024-07-17 | End: 2024-08-01 | Stop reason: HOSPADM

## 2024-07-17 RX ORDER — ONDANSETRON 4 MG/1
4 TABLET, ORALLY DISINTEGRATING ORAL 4 TIMES DAILY PRN
Status: DISCONTINUED | OUTPATIENT
Start: 2024-07-17 | End: 2024-08-01 | Stop reason: HOSPADM

## 2024-07-17 RX ORDER — FUROSEMIDE 10 MG/ML
20 INJECTION INTRAMUSCULAR; INTRAVENOUS
Status: CANCELLED | OUTPATIENT
Start: 2024-07-18

## 2024-07-17 RX ORDER — TRAZODONE HYDROCHLORIDE 50 MG/1
50 TABLET, FILM COATED ORAL
Status: DISCONTINUED | OUTPATIENT
Start: 2024-07-17 | End: 2024-08-01 | Stop reason: HOSPADM

## 2024-07-17 RX ORDER — HYDRALAZINE HYDROCHLORIDE 25 MG/1
25 TABLET, FILM COATED ORAL EVERY 8 HOURS PRN
Status: DISCONTINUED | OUTPATIENT
Start: 2024-07-17 | End: 2024-08-01 | Stop reason: HOSPADM

## 2024-07-17 RX ORDER — ACETAMINOPHEN 325 MG/1
650 TABLET ORAL EVERY 4 HOURS PRN
Status: DISCONTINUED | OUTPATIENT
Start: 2024-07-17 | End: 2024-08-01 | Stop reason: HOSPADM

## 2024-07-17 RX ORDER — ONDANSETRON 2 MG/ML
4 INJECTION INTRAMUSCULAR; INTRAVENOUS 4 TIMES DAILY PRN
Status: DISCONTINUED | OUTPATIENT
Start: 2024-07-17 | End: 2024-08-01 | Stop reason: HOSPADM

## 2024-07-17 RX ORDER — AMOXICILLIN 250 MG
2 CAPSULE ORAL EVERY EVENING
Status: CANCELLED | OUTPATIENT
Start: 2024-07-17

## 2024-07-17 RX ORDER — POTASSIUM CHLORIDE 1500 MG/1
40 TABLET, EXTENDED RELEASE ORAL ONCE
Status: COMPLETED | OUTPATIENT
Start: 2024-07-17 | End: 2024-07-17

## 2024-07-17 RX ORDER — POLYETHYLENE GLYCOL 3350 17 G/17G
1 POWDER, FOR SOLUTION ORAL
Status: DISCONTINUED | OUTPATIENT
Start: 2024-07-17 | End: 2024-07-17

## 2024-07-17 RX ADMIN — ANTACID TABLETS 1000 MG: 500 TABLET, CHEWABLE ORAL at 08:52

## 2024-07-17 RX ADMIN — METFORMIN HYDROCHLORIDE 1000 MG: 500 TABLET ORAL at 06:14

## 2024-07-17 RX ADMIN — FUROSEMIDE 20 MG: 10 INJECTION, SOLUTION INTRAVENOUS at 06:13

## 2024-07-17 RX ADMIN — OMEPRAZOLE 40 MG: 20 CAPSULE, DELAYED RELEASE ORAL at 20:43

## 2024-07-17 RX ADMIN — ANTACID TABLETS 1000 MG: 500 TABLET, CHEWABLE ORAL at 12:28

## 2024-07-17 RX ADMIN — OMEPRAZOLE 40 MG: 20 CAPSULE, DELAYED RELEASE ORAL at 06:13

## 2024-07-17 RX ADMIN — POTASSIUM CHLORIDE 40 MEQ: 1500 TABLET, EXTENDED RELEASE ORAL at 08:52

## 2024-07-17 RX ADMIN — AMIODARONE HYDROCHLORIDE 400 MG: 200 TABLET ORAL at 17:26

## 2024-07-17 RX ADMIN — INSULIN HUMAN 3 UNITS: 100 INJECTION, SOLUTION PARENTERAL at 17:28

## 2024-07-17 RX ADMIN — METFORMIN HYDROCHLORIDE 1000 MG: 500 TABLET ORAL at 17:26

## 2024-07-17 RX ADMIN — CALCIUM CARBONATE (ANTACID) CHEW TAB 500 MG 1000 MG: 500 CHEW TAB at 17:26

## 2024-07-17 RX ADMIN — ATORVASTATIN CALCIUM 40 MG: 40 TABLET, FILM COATED ORAL at 20:43

## 2024-07-17 RX ADMIN — AMIODARONE HYDROCHLORIDE 400 MG: 200 TABLET ORAL at 06:14

## 2024-07-17 RX ADMIN — INSULIN GLARGINE-YFGN 10 UNITS: 100 INJECTION, SOLUTION SUBCUTANEOUS at 20:48

## 2024-07-17 RX ADMIN — INSULIN HUMAN 4 UNITS: 100 INJECTION, SOLUTION PARENTERAL at 20:47

## 2024-07-17 ASSESSMENT — COGNITIVE AND FUNCTIONAL STATUS - GENERAL
TURNING FROM BACK TO SIDE WHILE IN FLAT BAD: A LITTLE
WALKING IN HOSPITAL ROOM: A LOT
SUGGESTED CMS G CODE MODIFIER MOBILITY: CL
MOBILITY SCORE: 14
MOVING TO AND FROM BED TO CHAIR: A LITTLE
MOVING FROM LYING ON BACK TO SITTING ON SIDE OF FLAT BED: A LITTLE
CLIMB 3 TO 5 STEPS WITH RAILING: TOTAL
STANDING UP FROM CHAIR USING ARMS: A LOT

## 2024-07-17 ASSESSMENT — PATIENT HEALTH QUESTIONNAIRE - PHQ9
SUM OF ALL RESPONSES TO PHQ9 QUESTIONS 1 AND 2: 0
1. LITTLE INTEREST OR PLEASURE IN DOING THINGS: NOT AT ALL
1. LITTLE INTEREST OR PLEASURE IN DOING THINGS: NOT AT ALL
2. FEELING DOWN, DEPRESSED, IRRITABLE, OR HOPELESS: NOT AT ALL
2. FEELING DOWN, DEPRESSED, IRRITABLE, OR HOPELESS: NOT AT ALL
SUM OF ALL RESPONSES TO PHQ9 QUESTIONS 1 AND 2: 0

## 2024-07-17 ASSESSMENT — PAIN DESCRIPTION - PAIN TYPE
TYPE: ACUTE PAIN
TYPE: SURGICAL PAIN

## 2024-07-17 ASSESSMENT — LIFESTYLE VARIABLES
TOTAL SCORE: 0
CONSUMPTION TOTAL: NEGATIVE
TOTAL SCORE: 0
EVER FELT BAD OR GUILTY ABOUT YOUR DRINKING: NO
TOTAL SCORE: 0
ALCOHOL_USE: NO
HOW MANY TIMES IN THE PAST YEAR HAVE YOU HAD 5 OR MORE DRINKS IN A DAY: 0
HAVE YOU EVER FELT YOU SHOULD CUT DOWN ON YOUR DRINKING: NO
EVER_SMOKED: NEVER
HAVE PEOPLE ANNOYED YOU BY CRITICIZING YOUR DRINKING: NO
AVERAGE NUMBER OF DAYS PER WEEK YOU HAVE A DRINK CONTAINING ALCOHOL: 0
ON A TYPICAL DAY WHEN YOU DRINK ALCOHOL HOW MANY DRINKS DO YOU HAVE: 0
EVER HAD A DRINK FIRST THING IN THE MORNING TO STEADY YOUR NERVES TO GET RID OF A HANGOVER: NO
DOES PATIENT WANT TO STOP DRINKING: NO

## 2024-07-17 ASSESSMENT — GAIT ASSESSMENTS
GAIT LEVEL OF ASSIST: MINIMAL ASSIST
DISTANCE (FEET): 3
ASSISTIVE DEVICE: FRONT WHEEL WALKER

## 2024-07-17 ASSESSMENT — FIBROSIS 4 INDEX
FIB4 SCORE: 0.55
FIB4 SCORE: 0.71

## 2024-07-17 NOTE — CARE PLAN
The patient is Stable - Low risk of patient condition declining or worsening    Shift Goals  Clinical Goals: safety, vss  Patient Goals: rest, comfort  Family Goals: vinayak    Progress made toward(s) clinical / shift goals:  pt safety maintained. Vs per flowsheets. Pt resting comfortably in bed.       Problem: Knowledge Deficit - Standard  Goal: Patient and family/care givers will demonstrate understanding of plan of care, disease process/condition, diagnostic tests and medications  Outcome: Progressing  Note: Pt participating in plan of care with questions for care team. All questions addressed     Problem: Skin Integrity  Goal: Skin integrity is maintained or improved  Outcome: Progressing  Note: Pt skin integrity maintained, pt turns self from side to side. Wound care completed per orders, heel protectors in use.        Patient is not progressing towards the following goals:

## 2024-07-17 NOTE — CARE PLAN
Problem: Pain - Standard  Goal: Alleviation of pain or a reduction in pain to the patient’s comfort goal  Flowsheets (Taken 7/17/2024 9839)  Pain Rating Scale (NPRS): 0  Note: Pt able to participate in therapies and activities this shift.     Problem: Skin Integrity  Goal: Skin integrity is maintained or improved  Note: Patient's skin is maintained and free from new or accidental injury this shift.  Will continue to monitor.   The patient is Watcher - Medium risk of patient condition declining or worsening    Shift Goals  Clinical Goals: Safety, pain control  Patient Goals: pain control, gain strength  Family Goals: no family present

## 2024-07-17 NOTE — PREADMISSION SCREENING NOTE
Pre-Admission Screening Form    Patient Information:   Name: Pj Freeman     MRN: [id number]       : 1955      Age: 69 y.o.   Gender: male      Race: White [7]       Marital Status:  [2]  Family Contact: Kathy Terrazas        Relationship: Spouse [17]  Home Phone:            Cell Phone: [phone]  Advanced Directives: None  Code Status:  FULL  Current Attending Provider: Indiana Toscano M.D.  Referring Physician: Dr. Indiana Toscano      Physiatrist Consult: Dr. Salazar       Referral Date: 2024  Primary Payor Source:  MEDICARE  Secondary Payor Source:      Medical Information:   Date of Admission to [facility] Care [facility]  Room Number: T732/02  Rehabilitation Diagnosis: 0017.8 - Medically Complex: Medical/Surgical Complications  Immunization History   Administered Date(s) Administered    Comirnaty (Covid-19 Vaccine, Mrna, [id number] Formula) 2023    MODERNA SARS-COV-2 VACCINE (12+) 2021, 2021, 2021, 2022     Not on File  History reviewed. No pertinent past medical history.  Past Surgical History:   Procedure Laterality Date    MS UPPER GI ENDOSCOPY,DIAGNOSIS N/A 2024    Procedure: GASTROSCOPY;  Surgeon: Olu Diop M.D.;  Location: SURGERY SAME DAY [facility];  Service: Gastroenterology    MS UPPER GI ENDOSCOPY,CTRL BLEED  2024    Procedure: EGD, WITH CLIP PLACEMENT;  Surgeon: Olu Diop M.D.;  Location: SURGERY SAME DAY [facility];  Service: Gastroenterology    MS UPPER GI ENDOSCOPY,BIOPSY N/A 2024    Procedure: GASTROSCOPY, WITH BIOPSY;  Surgeon: Olu Diop M.D.;  Location: SURGERY SAME DAY [facility];  Service: Gastroenterology       History Leading to Admission, Conditions that Caused the Need for Rehab (CMS): Upper GI bleed, Hypokalemia, Hypomagnesemia, Type 2 diabetes, Acute osteomyelitis of left foot, Mitral stenosis, NSTEMI, Acute CVA, Acute blood loss  Co-morbidities:  as listed above and below   Potential Risk -  Complications: Aphasia, Cognitive Impairment, Contractures, Deep Vein Thrombosis, Dysphagia, Incontinence, Malnutrition, Pain, Paralysis, Perceptual Impairment, and Pneumonia  Level of Risk: High    Ongoing Medical Management Needed (Medical/Nursing Needs):   Patient Active Problem List    Diagnosis Date Noted    Acute coronary syndrome ([facility]) 07/13/2024    Hypomagnesemia 07/12/2024    Hypokalemia 07/12/2024    Upper GI bleed 07/08/2024    Acute blood loss anemia 07/08/2024    Acute CVA (cerebrovascular accident) ([facility]) 07/08/2024    NSTEMI (non-ST elevated myocardial infarction) ([facility]) 07/08/2024    Mitral stenosis 07/08/2024    Acute osteomyelitis of left foot ([facility]) 07/08/2024    Type 2 diabetes mellitus ([facility]) 07/08/2024             Expand All Collapse All    Date of Consultation:  7/9/2024     Patient: : Pj Freeman  MRN: [id number]     Referring Physician:  Dr. Farhat Casillas DO     GI:Olu Diop M.D.      Reason for Consultation: GI bleeding     History of Present Illness:   The patient is 69 years old gentleman with medical history of recent myocardial infarction, CVA, osteomyelitis, amputation due to peripheral arterial disease, tarry stool 4 days, with hemoglobin drop in the 4 unit transfusion, transferred from outside hospital for urgent GI consultation and possible endoscopy.      GI team saw the patient at bedside,Denies active respiratory or GI distress at this time.  Last bowel movement last night.  Still tarry.  No urge to have vomiting or bowel movement for now.  N.p.o. for more than 1 day.     Had a EGD and colonoscopy about 10 to 15 years ago in [place] due to active GI bleeding.  However the patient could not remember the details of the report.  No GI follow-up was made, no follow-up endoscopy as well.     The patient living in [place], no GI provider around.        No past medical history on file.          Past Surgical History  No past surgical history on file.         Family History  No family  history on file.         Social History         Socioeconomic History   Marital status:        Social Determinants of Health       Food Insecurity: No Food Insecurity (7/8/2024)    Hunger Vital Sign     Worried About Running Out of Food in the Last Year: Never true     Ran Out of Food in the Last Year: Never true  Transportation Needs: No Transportation Needs (7/8/2024)    PRAPARE - Transportation     Lack of Transportation (Medical): No     Lack of Transportation (Non-Medical): No  Intimate Partner Violence: Not At Risk (7/8/2024)    Humiliation, Afraid, Rape, and Kick questionnaire     Fear of Current or Ex-Partner: No     Emotionally Abused: No     Physically Abused: No     Sexually Abused: No  Housing Stability: Low Risk  (7/8/2024)    Housing Stability Vital Sign     Unable to Pay for Housing in the Last Year: No     Number of Places Lived in the Last Year: 1     Unstable Housing in the Last Year: No                Physical Exam:    Vitals  Vitals:    07/09/24 0400 07/09/24 0500 07/09/24 0600 07/09/24 0800  BP: (!) 150/68 104/58 114/58 118/78  Pulse: 65 62 68 (!) 55  Resp: 16 13 20 12  Temp: 36.6 °C (97.9 °F)     36.9 °C (98.4 °F)  TempSrc: Temporal     Temporal  SpO2: 95% 95% 96% 96%  Weight:          Height:               Constitutional: No fevers.  Eyes: No symptoms reported.   Ears/Nose/Throat/Mouth: No choking reported.  Cardiovascular: No chest pain reported.   Respiratory: Denies shortness of breath.  Gastrointestinal/Hepatic: Per H/P.   Skin/Breast: No new rash reported.   Psychiatric: No complaints     HEENT: grossly normal.  Cardiovascular: Please refer to primary team's daily assessment.   Lungs: Please refer to primary team's daily assessment.   Abdomen: No evidence of acute abdomen.  Skin: No erythema, No rash.  Neurologic: Alert & oriented x 3, Normal motor function, No focal deficits noted.  PSY: stable mood.               Labs:    Recent Labs     07/08/24 2205 07/09/24 0605  WBC 17.8* 19.5*  RBC 2.44* 2.65*  HEMOGLOBIN 7.3* 8.2*  HEMATOCRIT 21.4* 23.3*  MCV 87.7 87.9  MCH 29.9 30.9  MCHC 34.1 35.2  RDW 46.5 45.9  PLATELETCT 258 272  MPV 9.9 9.5       Recent Labs    07/08/24 2205 07/09/24 0605  SODIUM 138 138  POTASSIUM 3.6 3.6  CHLORIDE 111 113*  CO2 17* 17*  GLUCOSE 155* 157*  BUN 30* 28*  CPKTOTAL 101  --        Recent Labs    07/08/24 2205  APTT 21.5*  INR 1.24*          Recent Labs    07/08/24 2205 07/09/24 0605  ASTSGOT 14 13  ALTSGPT 17 16  TBILIRUBIN 0.4 0.5  ALKPHOSPHAT 62 62  GLOBULIN 1.7* 1.7*  INR 1.24*  --            Imaging:  No image results found.                 Impressions:  The patient is 69 years old gentleman with medical history of recent myocardial infarction, CVA, osteomyelitis, amputation due to peripheral arterial disease, tarry stool 4 days, with hemoglobin drop in the 4 unit transfusion, transferred from outside hospital for urgent GI consultation and possible endoscopy.      GI team saw the patient at bedside,Denies active respiratory or GI distress at this time.  Last bowel movement last night.  Still tarry.  No urge to have vomiting or bowel movement for now.  N.p.o. for more than 1 day.        Could be erosion, ulcer, GERD, hiatal hernia bleeding while receiving full dose antiplatelet or anticoagulation.  No strong evidence of alcohol use or NSAID overuse.     GI team will offer upper GI scope today for bleeding control.  If totally negative, the patient will have colonoscopy tomorrow.     Continue n.p.o., PPI current supportive care by her primary team.     Diagnosis: upper gastrointestinal bleeding.   Procedure: Esophagogastroduodenoscopy with bleeding control including banding.            Attestation signed by Riki Andrade M.D. at 7/9/2024  6:35 PM     Cardiology Attending Addendum:     I have seen and examined the patient with resident physician Dr. Ruiz  I personally reviewed all elements of the history and  repeated the physical exam as appropriate.  I have also reviewed the labs, imaging, and EKG.  I am actively involved in the care of this patient.     Pertinent physical exam findings: NAD  Pertinent studies: EKG personally reviewed and showed sinus rhythm, non-specific st changes.     For full plan, please see attached note.  In summary, we appreciate GI consultation, will re-trial on blood thinners tomorrow and if can tolerate consider inpatient vs outpatient catheterization given his severely elevated troponin and recent stroke.      Repeat echo given significant findings on OSH echo that are not consistent with exam.      Discussed with Dr. Casillas.      Extremely high risk condition given he has had 4 units of PRBCs already, monitor CBC at least daily. Will monitor for hepatotoxicity from amiodarone daily as well.      Thank you for allowing us to participate in the care of this patient, and please do not hesitate to call or page if any clarification of our recommendation would be useful.     After hours please contact the general [facility] cardiologist on call for cardiology related questions.     Riki Andrade MD  Cardiologist, [facility]                       Cardiology Initial Consultation     Date of Service  7/9/2024     Referring Physician  Farhat Casillas, YENI.     Reason for Consultation  Syncopal episode, CVA with concern for cardioembolic source, NSTEMI     History of Presenting Illness  Pj Freeman is a 69 y.o. male with a past medical history of T2DM, HTN, PAD c/b osteomyelitis status post left TMA (7/3) who presented 7/8/2024 as a direct admit from [facility] due to severe GI bleed, POONAM, elevated troponin, and new onset atrial fibrillation.  Shortly after his left TMA on 7/3, he experienced syncope, melanotic stool, and dizziness at home and return to and an MCV: 7 6.  He was found to have hemoglobin 8.6 which was a marked reduction from 15 on discharge.   He received 4 units of RBCs.  He was also found to have acute CVA on MRI and CT head neck without large vessel occlusion; tiny focus of acute ischemia in the left parietal lobe with evidence of chronic microvascular disease.  Monitoring demonstrated new paroxysmal A-fib for which she was started on IV amiodarone. Unfortunately, he also started to experience chest discomfort with markedly elevated troponins.  He was initially started on IV heparin for NSTEMI, however, due to concern for GI bleed this was discontinued.  TTE showed 50% EF, grade 2 diastolic dysfunction, bicuspid aortic valve, severe mitral stenosis, and pulmonary hypertension.  No evidence of wall motion abnormality.     Review of Systems  As above, otherwise negative     Past Medical History   has no past medical history on file.     Surgical History   has no past surgical history on file.     Family History  family history is not on file.     Social History     Medications    None        Allergies  Not on File     Vital signs in last 24 hours  Temp:  [36.6 °C (97.9 °F)-37 °C (98.6 °F)] 36.6 °C (97.9 °F)  Pulse:  [62-75] 68  Resp:  [13-20] 20  BP: (104-154)/(58-76) 114/58  SpO2:  [92 %-97 %] 96 %     Physical Exam  Physical Exam  HENT:      Head: Normocephalic and atraumatic.   Cardiovascular:      Rate and Rhythm: Normal rate and regular rhythm.      Pulses: Normal pulses.      Heart sounds: Normal heart sounds. No murmur heard.  Pulmonary:      Effort: Pulmonary effort is normal.      Breath sounds: Normal breath sounds.   Abdominal:      General: Abdomen is flat.      Palpations: Abdomen is soft.   Musculoskeletal:         General: No swelling.      Right lower leg: No edema.      Left lower leg: No edema.      Left foot: Deformity (TMA with intact sutures and dried blood) present.   Skin:     General: Skin is warm and dry.   Neurological:      General: No focal deficit present.      Mental Status: He is alert and oriented to person, place, and  "time.   Psychiatric:         Mood and Affect: Mood normal.         Behavior: Behavior normal.         Thought Content: Thought content normal.         Judgment: Judgment normal.            Lab Review    Lab Results  Component Value Date/Time    WBC 19.5 (H) 07/09/2024 06:05 AM    RBC 2.65 (L) 07/09/2024 06:05 AM    HEMOGLOBIN 8.2 (L) 07/09/2024 06:05 AM    HEMATOCRIT 23.3 (L) 07/09/2024 06:05 AM    MCV 87.9 07/09/2024 06:05 AM    MCH 30.9 07/09/2024 06:05 AM    MCHC 35.2 07/09/2024 06:05 AM    MPV 9.5 07/09/2024 06:05 AM       Lab Results  Component Value Date/Time    SODIUM 138 07/09/2024 06:05 AM    POTASSIUM 3.6 07/09/2024 06:05 AM    CHLORIDE 113 (H) 07/09/2024 06:05 AM    CO2 17 (L) 07/09/2024 06:05 AM    GLUCOSE 157 (H) 07/09/2024 06:05 AM    BUN 28 (H) 07/09/2024 06:05 AM    CREATININE 0.63 07/09/2024 06:05 AM       Lab Results  Component Value Date/Time    ASTSGOT 13 07/09/2024 06:05 AM    ALTSGPT 16 07/09/2024 06:05 AM       Lab Results  Component Value Date/Time    TROPONINT 861 (H) 07/09/2024 06:05 AM      No results for input(s): \"NTPROBNP\" in the last 72 hours.     Cardiac Imaging and Procedures Review  EKG:  My personal interpretation of the EKG dated 7/8/24 is sinus rhythm of 72 bpm with low voltage     Echocardiogram: Repeat pending     Imaging  As discussed in HPI     Assessment  69-year-old male with past medical history as above who was a direct transfer from outside hospital on 7/8/2024 with concern for cardioembolic stroke in the setting of new onset atrial fibrillation complicated by severe upper GI bleed requiring 4 units of blood.  Suspect syncopal episode is vasovagal secondary to acute blood loss.  Lower concern for seizure given patient's sequence of events and CVA likely too small to be trigger.  Will also consider valvular disease as a contributor to syncope.  Suspect elevated troponins are secondary to demand ischemia in setting of no wall motion abnormalities identified on echo at " "outside hospital.  Pending EGD in hopes of identifying and treating source of bleed.  1. Syncopal episode, suspect vasovagal  2. Acute CVA, tiny left parietal lobe  3. Grade 2 diastolic dysfunction  4. Severe mitral valve stenosis  5. Bicuspid aortic valve  6. Pulmonary hypertension  7. Cardiac demand ischemia  8. Upper GI bleed with acute blood loss anemia  9. Paroxysmal atrial fibrillation  10. Leukocytosis in setting of recent left TMA     Plan  1. Continue to trend H&H with transfusion threshold hemoglobin> 8  2. Start p.o. amiodarone 400 mg twice daily with goal for loading dose ~10-15 g.  Can taper to 400 daily, then 200 daily  3. Pending GI evaluation for bleed, will discuss risks and benefits of restarting anticoagulation and antiplatelet therapy in setting of atrial fibrillation with [id number] and recent CVA  4. Start atorvastatin 40 mg  5. Repeat TTE for reevaluation of mitral valve, will consider repair of mitral valve depending on findings and symptoms.  6. Continue telemetry     Thank you for allowing me to participate in the care of this patient.     I will continue to follow this patient     Please contact me with any questions.     Celena Ruiz M.D.   [employer] PGY 2 internal medicine resident                      Current Vital Signs:   Temperature: 36.7 °C (98.1 °F) Pulse: 69 Respiration: 18 Blood Pressure : (!) 155/80  Weight: 75.7 kg (166 lb 14.2 oz) Height: 177.8 cm (5' 10\")  Pulse Oximetry: 95 % O2 (LPM): 0      Completed Laboratory Reports:  Recent Labs     07/14/24  1401 07/14/24  2136 07/15/24  0549 07/15/24  1458 07/15/24  2136 07/16/24  0557 07/16/24  1349 07/17/24  0702   WBC 10.6  --  10.3  --   --  8.3  --  8.8   HEMOGLOBIN 9.9* 8.8* 9.1* 8.8* 8.5* 8.3* 9.1* 9.3*   HEMATOCRIT 29.9*  --  27.7*  --   --  24.4*  --  28.5*   PLATELETCT 319  --  325  --   --  328  --  363   SODIUM  --   --  136  --   --  134*  --  138   POTASSIUM  --   --  3.7  --   --  3.6  --  3.4*   BUN  --   --  10  --  "  --  11  --  11   CREATININE  --   --  0.54  --   --  0.50  --  0.57   ALBUMIN  --   --  2.1*  --   --  2.3*  --  2.6*   GLUCOSE  --   --  80  --   --  158*  --  99     Additional Labs: Not Applicable    Prior Living Situation:   Housing / Facility: 1 Story House  Steps Into Home: 3  Steps In Home: 0 (stairs to basement but reports he does not need to access)  Lives with - Patient's Self Care Capacity: Spouse  Equipment Owned: CrutchesHumphrey (knee scooter)    Prior Level of Function / Living Situation:   Physical Therapy: Prior Services: Home-Independent  Housing / Facility: 1 Story House  Steps Into Home: 3  Steps In Home: 0 (stairs to basement but reports he does not need to access)  Rail: Both Rail (Steps into Home) (rails are too far apart to hold at same time)  Bathroom Set up: Bathtub / Shower Combination, Grab Bars  Equipment Owned: Humphrey Gabriel (knee scooter)  Lives with - Patient's Self Care Capacity: Spouse  Bed Mobility: Independent  Transfer Status: Independent  Ambulation: Independent  Assistive Devices Used: None  Current Level of Function:   Gait Level Of Assist: Unable to Participate  Supine to Sit: Minimal Assist  Sit to Supine: Contact Guard Assist  Scooting: Supervised  Rolling: Contact Guard Assist  Comments: in chair pre/post  Sit to Stand: Maximal Assist (unable to come to full stand from recliner x3 attempts)  Bed, Chair, Wheelchair Transfer: Minimal Assist  Transfer Method: Stand Pivot  Sitting in Chair: pre/post  Occupational Therapy:   Self Feeding: Independent  Grooming / Hygiene: Independent  Bathing: Independent  Dressing: Independent  Toileting: Independent  Medication Management: Independent  Laundry: Independent  Kitchen Mobility: Independent  Finances: Independent  Home Management: Independent  Shopping: Independent  Prior Level Of Mobility: Independent With Device in Community  Driving / Transportation: Driving Independent  Prior Services: Home-Independent  Housing /  Facility: [address]  Occupation (Pre-Hospital Vocational): Retired Due To Age  Current Level of Function:   Eating: Independent  Lower Body Dressing: Minimal Assist (assist to manage underwear over backside with pt depression lifting up from chair due to inability to come to full stand)  Speech Language Pathology:      Rehabilitation Prognosis/Potential: Good  Estimated Length of Stay: 14-21 days    Nursing:      Continent    Scope/Intensity of Services Recommended:  Physical Therapy: 1 hr / day  5 days / week. Therapeutic Interventions Required: Maximize Endurance, Mobility, Strength, and Safety  Occupational Therapy: 1 hr / day 5 days / week. Therapeutic Interventions Required: Maximize Self Care, ADLs, IADLs, and Energy Conservation  Speech & Language Pathology: 1 hr / day 5 days / week. Therapeutic Interventions Required: Maximize Cognition, Swallowing, and Safety  Rehabilitation Nursin/7. Therapeutic Interventions Required: Monitor Pain, Skin, Wound(s), Vital Signs, Intake and Output, Labs, Safety, and Aspiration Risk  Rehabilitation Physician: 3 - 5 days / week. Therapeutic Interventions Required: Medical Management  Respiratory Care: consult. Therapeutic Interventions Required: Pulmonary Toileting  Dietician: consult. Therapeutic Interventions Required: Please advise on a diet that is both healthy and promotes healing    He requires 24-hour rehabilitation nursing to manage bowel and bladder function, skin care, surgical incision, wound, nutrition and fluid intake, pulmonary hygiene, pain control, safety, medication management, and patient/family goals. In addition, rehabilitation nursing will reiterate and reinforce therapy skills and equipment use, including ADLs, as well as provide education to the patient and family. Pj Freeman is willing to participate in and is able to tolerate the proposed plan of care.    Rehabilitation Goals and Plan (Expected frequency & duration of treatment in the  IRF):   Return to the Community and Family Able to Provide 24/7 Assistance  Anticipated Date of Rehabilitation Admission: 7/17/2024  Patient/Family oriented IRF level of care/facility/plan: Yes  Patient/Family willing to participate in IRF care/facility/plan: Yes  Patient able to tolerate IRF level of care proposed: Yes  Patient has potential to benefit IRF level of care proposed: Yes  Comments: Spoke with spouse Kathy.  Discussed admission and family training.  Kathy agreeable to both.  Pj will be returning home to a single story house with 3 CHRISTINE.  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  She will be returning to work end of August.  Discussed 3hours/therapy 5 days/week.   Clothing needs and possible LOS.  Their daughter also lives in the home with two children.    Answered all questions/concerns.  Left TCC contact information for any further questions,      Special Needs or Precautions - Medical Necessity:  Safety Concerns/Precautions:  Fall Risk / High Risk for Falls and Balance  Diet:   DIET ORDERS (From admission to next 24h)       Start     Ordered    07/16/24 0832  Diet Order Diet: Low Fiber(GI Soft); Second Modifier: (optional): Consistent CHO (Diabetic)  ALL MEALS        Question Answer Comment   Diet: Low Fiber(GI Soft)    Second Modifier: (optional) Consistent CHO (Diabetic)        07/16/24 0831                    Anticipated Discharge Destination / Patient/Family Goal:  Destination: Home with Assistance Support System: Spouse and Family   Anticipated home health services: OT, PT, SLP, and Nursing  Previously used HH service/ provider: Not Applicable  Anticipated DME Needs: [facility]  Outpatient Services: OT, PT, and SLP  Alternative resources to address additional identified needs:     Pre-Screen Completed: 7/17/2024 9:31 AM Janelle Pérez L.P.N.

## 2024-07-17 NOTE — DISCHARGE PLANNING
Voalte out to Alexander Avalos. RN Case manager for update on medical clearance    1035 Per RNCM Dr. Toscano has medically cleared

## 2024-07-17 NOTE — DISCHARGE PLANNING
[facility] Transitional Care Coordination     Referral from: Indiana Toscano MD  Insurance Provider on Facesheet: MCR  Potential Rehab Diagnosis: stroke, N STEMI, GI bleed     Chart review indicates patient may have on going medical management and may have therapy needs to possibly meet inpatient rehab facility criteria with the goal of returning to community.     D/C support: spouse     Physiatry consultation forwarded per protocol.     TCC following     Thank you for the referral.

## 2024-07-17 NOTE — PROGRESS NOTES
[facility] IV's taken out, patient taken down via gurney and [facility] transport. Monitor taken off; monitor room notified. Patient belongings discharged with patient. Discharge summary reviewed with patient. RN provided education regarding follow up care, appointments, and medications. RN also provided education regarding when to call doctor and when to call 911.

## 2024-07-17 NOTE — THERAPY
Physical Therapy   Daily Treatment     Patient Name: Pj Freeman  Age:  69 y.o., Sex:  male  Medical Record #: [id number]  Today's Date: 7/17/2024     Precautions  Precautions: Fall Risk;Non Weight Bearing Left Lower Extremity;Cardiac Precautions (See Comments) (max HR nbb 158, bb 114; target HR nbb 135, bb 97, L TMA- per pt NWB x4 weeks)  Comments: poor adherence with NWB status    Assessment    Pt greeted and seen for PT treatment. Pt did not need assist for bed mobility from a flat HOB, he reported min dizziness that improved while remaining. Pt stood from bed height w/ FWW and CGA, he was able to hop to bedside chair w/ John. Pt req'd two attempts for second stand, needing ModA to complete w/ FWW. He was able to hop forward a few times before reporting increasing dizziness, pt able to hop back to chair BP was 145/84. Pt currently limited by impaired balance 2/2 WB status and decreased strength which negatively impacts functional mobility. Pt will continue to benefit from skilled PT to address deficits.       Plan    Treatment Plan Status: Continue Current Treatment Plan  Type of Treatment: Bed Mobility, Equipment, Family / Caregiver Training, Gait Training, Manual Therapy, Neuro Re-Education / Balance, Self Care / Home Evaluation, Stair Training, Therapeutic Activities, Therapeutic Exercise  Treatment Frequency: 5 Times per Week  Treatment Duration: Until Therapy Goals Met    DC Equipment Recommendations: Unable to determine at this time  Discharge Recommendations: Recommend post-acute placement for additional physical therapy services prior to discharge home       07/17/24 1001   Vitals   Pulse (!) 101  (after standing and hopping to chair w/ FWW)   Blood Pressure  (!) 145/84   Pain 0 - 10 Group   Therapist Pain Assessment Post Activity Pain Same as Prior to Activity;Nurse Notified  (no c/o pain)   Cognition    Comments Pleasant and cooperative, motivated   Sitting Lower Body Exercises   Sitting Lower Body  Exercises Yes   Ankle Pumps 1 set of 10;Bilateral   Long Arc Quad 1 set of 10;Bilateral   Balance   Sitting Balance (Static) Fair +   Sitting Balance (Dynamic) Fair   Standing Balance (Static) Poor +   Standing Balance (Dynamic) Poor   Weight Shift Sitting Fair   Weight Shift Standing Poor   Skilled Intervention Verbal Cuing;Tactile Cuing;Sequencing;Facilitation;Compensatory Strategies   Comments w/ FWW   Bed Mobility    Supine to Sit Supervised   Scooting Supervised   Rolling Supervised   Skilled Intervention Verbal Cuing   Comments flat HOB   Gait Analysis   Gait Level Of Assist Minimal Assist   Assistive Device Front Wheel Walker   Distance (Feet) 3   # of Times Distance was Traveled 3   Weight Bearing Status NWB L LE   Skilled Intervention Verbal Cuing;Tactile Cuing;Sequencing;Facilitation;Compensatory Strategies   Comments Fair adherence to NWB L LE while up, poor adherence during STS. Pt was able to hop bed>chair then forward and backward, limited by dizziness. /84.   Functional Mobility   Sit to Stand   (CGA-ModA)   Bed, Chair, Wheelchair Transfer Minimal Assist   Transfer Method   (step hop)   Mobility bed>chair>STS w/ hopping> chair   Skilled Intervention Verbal Cuing;Tactile Cuing;Sequencing;Facilitation;Compensatory Strategies   Comments Pt able to stand from bed height w/ CGA, pt needed ModA to stand from chair. Poor adherence to WB prx, pt was heel WB during STS despite VC to hold leg up. Therapist from previous day inquired about possibility of ok for HWB, however NWB L LE still stands.   Short Term Goals    Short Term Goal # 1 Pt will perform supine < > sit SPV with bed flat, no rail in 6 visits in order to set up for upright mobility   Goal Outcome # 1 Goal met   Short Term Goal # 2 Pt wlll perform sit < > stand SPV in 6 visits in order to prepare for ambulation   Goal Outcome # 2 Goal not met   Short Term Goal # 3 Pt will ambulate 25' with FWW CGA, maintain % of the time in 6 visits in  order to get to the bathroom with nursing   Goal Outcome # 3 Goal not met   Short Term Goal # 4 Pt will ascend/ descend 2 steps Coreen in 6 visits in order to progress home access   Goal Outcome # 4 Goal not met   Supervising Physical Therapist (PTA Treatments Only)   Supervising Physical Therapist Tito Gunderson

## 2024-07-17 NOTE — CARE PLAN
The patient is Stable - Low risk of patient condition declining or worsening    Shift Goals  Clinical Goals: safety, PT/OT  Patient Goals: rest  Family Goals: NA    Progress made toward(s) clinical / shift goals:    Problem: Knowledge Deficit - Standard  Goal: Patient and family/care givers will demonstrate understanding of plan of care, disease process/condition, diagnostic tests and medications  Outcome: Progressing     Problem: Fall Risk  Goal: Patient will remain free from falls  Outcome: Progressing     Problem: Skin Integrity  Goal: Skin integrity is maintained or improved  Outcome: Progressing     Problem: Risk for Bleeding  Goal: Patient will take measures to prevent bleeding and recognizes signs of bleeding that need to be reported immediately to a health care professional  Outcome: Progressing       Patient is not progressing towards the following goals:

## 2024-07-17 NOTE — PROGRESS NOTES
4 Eyes Skin Assessment Completed by ANIRUDH Gastelum and ANIRUDH Horne.    Head WDL  Ears WDL  Nose WDL  Mouth WDL  Neck WDL  Breast/Chest WDL  Shoulder Blades WDL  Spine WDL  (R) Arm/Elbow/Hand Bruising and Scab  (L) Arm/Elbow/Hand Bruising and Scab  Abdomen WDL  Groin Redness  Scrotum/Coccyx/Buttocks Redness and Blanching  (R) Leg WDL  (L) Leg WDL  (R) Heel/Foot/Toe Blanching and Edema  (L) Heel/Foot/Toe Blanching and Edema          Devices In Places Blood Pressure Cuff      Interventions In Place Waffle Overlay    Possible Skin Injury No    Pictures Uploaded Into Epic Yes  Wound Consult Placed N/A  RN Wound Prevention Protocol Ordered Yes

## 2024-07-17 NOTE — DISCHARGE SUMMARY
Discharge Summary    CHIEF COMPLAINT ON ADMISSION  No chief complaint on file.      Reason for Admission  GIB, NSTEMI    Admission Date  7/8/2024     CODE STATUS  Full Code    HPI & HOSPITAL COURSE  Mr. Pj Freeman is a pleasant 69 y.o. male with a past medical history of type II diabetes, hypertension, peripheral arterial disease with a recent recent left toe amputation due to osteomyeliits (7/3/24). He was readmitted on 7/6 at [facility] where he presented with melena, orthostatic symptoms and syncope with associated GI bleed, Hgb drop from 15 to 8.6, he received 4 units of PRBC at [facility], [place]. . While hospitalized there he was also diagnosed with CVA after MRI brain was done and revealed acute CVA in left parietal lobe as well as NSTEMI. He had been put on plavix. During admit found to be having paroxysmal atrial fibrillation and was placed on amiodarone and heparin.  An echo showed EF:50% and bicuspid aortic valve, severe mitral stenosis and pulmonary hypertension.  Due to recurrent melena and syncope on 7/7 heparin and plavix were discontinued and he was transferred to [facility] for higher level of care.      He was admitted 7/8/2024 to [facility] GI bleed with Hgb:6.2.  Neurology did consult at outside hospital.  Anticoagulation and antiplatelets were held.  Patient received  multiple PRBC units (4 units).  Gastroenterology following for which patient underwent EGD on 7/9/2024 and was noted for 6 duodenal ulcers 1 of which was oozing and received 2 hemoclips.  His diet was escalated slowly and he was continued on proton pump inhibitor.  Cardiology following for which repeat echocardiogram confirms severe mitral stenosis which likely contribute to atrial fibrillation and embolic stroke.  Heparin drip was restarted on 7/10/2024 but patient did not tolerate and had recurrence of melanotic stools for which heparin drip was discontinued and he required additional PRBC transfusion.  Per cardiology, holding off on  anticoagulation as patient cannot tolerate and aim to pursue severe mitral stenosis workup once patient able to tolerate anticoagulation.   At this time patients H/H has remained stable ~8. He has not had any further evidence of bleeding. After discussion with the patient, will hold on reinstating anticoagulation for 1/2 weeks to allow for duodenal ulcer healing, risks of both were discussed. Could attempt staring lovenox DVT ppx followed by full dose with transition to full oral AC if tolerates that that point.   Referral to cardiology structural heart program and GI were placed on discharge.   He does have LE edema and was started on lasix, renal function and volume status will need to be monitored.   He is on an  amiodarone taper      Therefore, he is discharged in fair and stable condition to an inpatient rehabilitation hospital.    The patient met 2-midnight criteria for an inpatient stay at the time of discharge.      FOLLOW UP ITEMS POST DISCHARGE  Starting AC, potential trial of starting in the next 1 week, monitor H/H   GI follow up OP for repeat EGD in ~3 months   Healing of previous TMA by ortho (7/3)- ortho follow up   Stroke bridge clinic   Cardiology follow up for valve/A.fib follow up   Volume status, diuresis   DM management       DISCHARGE DIAGNOSES  Principal Problem:    Upper GI bleed (POA: Yes)  Active Problems:    Acute blood loss anemia (POA: Unknown)    Acute CVA (cerebrovascular accident) (HCC) (POA: Unknown)    NSTEMI (non-ST elevated myocardial infarction) (HCC) (POA: Unknown)    Mitral stenosis (POA: Unknown)    Acute osteomyelitis of left foot (HCC) (POA: Unknown)    Type 2 diabetes mellitus (HCC) (POA: Unknown)    Hypomagnesemia (POA: Unknown)    Hypokalemia (POA: Unknown)    Acute coronary syndrome (HCC) (POA: Yes)  Resolved Problems:    * No resolved hospital problems. *      FOLLOW UP  Future Appointments   Date Time Provider Department Center   7/18/2024  2:15 PM Anthony Napoles  A[facility] None     No follow-up provider specified.    MEDICATIONS ON DISCHARGE     Medication List        START taking these medications        Instructions   acetaminophen 325 MG Tabs  Commonly known as: Tylenol   Take 2 Tablets by mouth every 6 hours as needed for Mild Pain or Moderate Pain.  Dose: 650 mg     amiodarone 200 MG Tabs  Start taking on: July 17, 2024  Commonly known as: Cordarone   Take 2 Tablets by mouth 2 times a day for 7 days, THEN 2 Tablets 2 times a day for 7 days, THEN 1 Tablet 2 times a day for 30 days.     atorvastatin 40 MG Tabs  Commonly known as: Lipitor   Take 1 Tablet by mouth every evening.  Dose: 40 mg     Calcium Carbonate Antacid 1000 MG Chew   Chew 1 Tablet 3 times a day with meals.  Dose: 1,000 mg     furosemide 20 MG Tabs  Commonly known as: Lasix   Take 1 Tablet by mouth 2 times a day.  Dose: 20 mg     insulin GLARGINE 100 UNIT/ML Soln  Commonly known as: Lantus,Semglee   Inject 5 Units under the skin every evening.  Dose: 5 Units     insulin lispro 100 UNIT/ML Sopn injection PEN  Commonly known as: HumaLOG/AdmeLOG   Doctor's comments:  mg/dL=0 Units, 151-200 mg/dL=2 Units, 201-250 mg/dL=3 Units, 251-300 mg/dL= 5 Units, 301-350 mg/dL, =6 Units, 351- 400 mg/dL = 8 Units, Over 400 mg/dL   =   9 Units  Inject 2-9 Units under the skin 4 Times a Day,Before Meals and at Bedtime.  Dose: 2-9 Units     omeprazole 40 MG delayed-release capsule  Commonly known as: PriLOSEC   Take 1 Capsule by mouth 2 times a day.  Dose: 40 mg     polyethylene glycol/lytes Pack  Commonly known as: Miralax   Take 1 Packet by mouth 1 time a day as needed (if no bowel movement in last 2 days).  Dose: 17 g     senna-docusate 8.6-50 MG Tabs  Commonly known as: Pericolace Or Senokot S   Take 2 Tablets by mouth every evening.  Dose: 2 Tablet            CONTINUE taking these medications        Instructions   Jardiance 25 MG Tabs  Generic drug: Empagliflozin   Take 25 mg by mouth every day.  Dose: 25 mg      metFORMIN 500 MG Tabs  Commonly known as: Glucophage   Take 1,000 mg by mouth 2 times a day.  Dose: 1,000 mg     vitamin D3 400 UNIT Tabs  Commonly known as: Cholecalciferol   Take 1,000 Units by mouth every day.  Dose: 1,000 Units            STOP taking these medications      rosuvastatin 5 MG Tabs  Commonly known as: Crestor              Allergies  Not on File    DIET  Orders Placed This Encounter   Procedures    Diet Order Diet: Low Fiber(GI Soft); Second Modifier: (optional): Consistent CHO (Diabetic)     Standing Status:   Standing     Number of Occurrences:   1     Order Specific Question:   Diet:     Answer:   Low Fiber(GI Soft) [2]     Order Specific Question:   Second Modifier: (optional)     Answer:   Consistent CHO (Diabetic) [4]       ACTIVITY  As tolerated.  Heel weight bearing on LLE    LINES, DRAINS, AND WOUNDS  This is an automated list. Peripheral IVs will be removed prior to discharge.  Peripheral IV 07/10/24 20 G Anterior;Right Forearm (Active)   Site Assessment Clean;Dry;Intact 07/17/24 0800   Dressing Type Occlusive;Transparent 07/17/24 0800   Line Status Flushed;Scrubbed the hub prior to access;Saline locked 07/17/24 0800   Dressing Status Clean;Dry;Intact 07/17/24 0800   Dressing Intervention N/A 07/17/24 0800   Date Primary Tubing Changed 07/16/24 07/16/24 2000   Date Secondary Tubing Changed 07/16/24 07/16/24 2000   NEXT Primary Tubing Change  07/19/24 07/16/24 2000   NEXT Secondary Tubing Change  07/17/24 07/16/24 2000   Infiltration Grading ([facility], CV) 0 07/17/24 0800   Phlebitis Scale ([facility] Only) 0 07/17/24 0800       Peripheral IV 07/10/24 20 G Anterior;Distal;Left Forearm (Active)   Site Assessment Clean;Dry;Intact 07/17/24 0800   Dressing Type Occlusive;Transparent 07/17/24 0800   Line Status Flushed;Scrubbed the hub prior to access;Saline locked 07/17/24 0800   Dressing Status Clean;Dry;Intact 07/17/24 0800   Dressing Intervention N/A 07/17/24 0800   Date Primary Tubing Changed  07/16/24 07/16/24 2000   Date Secondary Tubing Changed 07/16/24 07/16/24 2000   NEXT Primary Tubing Change  07/19/24 07/16/24 2000   NEXT Secondary Tubing Change  07/17/24 07/16/24 2000   Infiltration Grading ([facility], CV) 0 07/17/24 0800   Phlebitis Scale ([facility] Only) 0 07/17/24 0800       Wound 07/10/24 Incision Foot Left TMA from [facility] (Active)   Wound Image    07/10/24 1500   Site Assessment JUDITH 07/17/24 0800   Periwound Assessment JUDITH 07/17/24 0800   Margins JUDITH 07/17/24 0800   Closure JUDITH 07/17/24 0800   Drainage Amount None 07/17/24 0800   Drainage Description Sanguineous 07/16/24 2000   Treatments Cleansed;Site care 07/16/24 2000   Wound Cleansing Approved Wound Cleanser 07/16/24 2000   Periwound Protectant No-sting Skin Prep 07/16/24 2000   Dressing Status Clean;Dry;Intact 07/16/24 2000   Dressing Changed Observed 07/17/24 0800   Dressing Cleansing/Solutions Not Applicable 07/14/24 0200   Dressing Options Hydrofiber Silver;Offloading Dressing - Heel 07/16/24 2000   Dressing Change/Treatment Frequency Every 48 hrs, and As Needed 07/17/24 0800   NEXT Dressing Change/Treatment Date 07/18/24 07/17/24 0800   NEXT Weekly Photo (Inpatient Only) 07/17/24 07/10/24 1500   Wound Team Following Not following 07/10/24 1500   Non-staged Wound Description Partial thickness 07/10/24 1500       Peripheral IV 07/10/24 20 G Anterior;Right Forearm (Active)   Site Assessment Clean;Dry;Intact 07/17/24 0800   Dressing Type Occlusive;Transparent 07/17/24 0800   Line Status Flushed;Scrubbed the hub prior to access;Saline locked 07/17/24 0800   Dressing Status Clean;Dry;Intact 07/17/24 0800   Dressing Intervention N/A 07/17/24 0800   Date Primary Tubing Changed 07/16/24 07/16/24 2000   Date Secondary Tubing Changed 07/16/24 07/16/24 2000   NEXT Primary Tubing Change  07/19/24 07/16/24 2000   NEXT Secondary Tubing Change  07/17/24 07/16/24 2000   Infiltration Grading ([facility], [facility]) 0 07/17/24 0800   Phlebitis Scale ([facility]  Only) 0 07/17/24 0800       Peripheral IV 07/10/24 20 G Anterior;Distal;Left Forearm (Active)   Site Assessment Clean;Dry;Intact 07/17/24 0800   Dressing Type Occlusive;Transparent 07/17/24 0800   Line Status Flushed;Scrubbed the hub prior to access;Saline locked 07/17/24 0800   Dressing Status Clean;Dry;Intact 07/17/24 0800   Dressing Intervention N/A 07/17/24 0800   Date Primary Tubing Changed 07/16/24 07/16/24 2000   Date Secondary Tubing Changed 07/16/24 07/16/24 2000   NEXT Primary Tubing Change  07/19/24 07/16/24 2000   NEXT Secondary Tubing Change  07/17/24 07/16/24 2000   Infiltration Grading ([facility], ROHAN) 0 07/17/24 0800   Phlebitis Scale ([facility] Only) 0 07/17/24 0800               MENTAL STATUS ON TRANSFER      Alert and oriented x 3       CONSULTATIONS  Cardiology   Gastroenterology   Orthopedic surgery     PROCEDURES  PB CLOSURE SUPERF WND DEHIS SIMPLE 7/10/24  ESOPHAGOGASTRODUODENOSCOPY 7/9/2024    LABORATORY  Lab Results   Component Value Date    SODIUM 138 07/17/2024    POTASSIUM 3.4 (L) 07/17/2024    CHLORIDE 104 07/17/2024    CO2 24 07/17/2024    GLUCOSE 99 07/17/2024    BUN 11 07/17/2024    CREATININE 0.57 07/17/2024        Lab Results   Component Value Date    WBC 8.8 07/17/2024    HEMOGLOBIN 9.3 (L) 07/17/2024    HEMATOCRIT 28.5 (L) 07/17/2024    PLATELETCT 363 07/17/2024        Total time of the discharge process exceeds 40 minutes.

## 2024-07-17 NOTE — DISCHARGE PLANNING
GMT to transfer Pj to [facility] today 1500-15:30 via w/c  Dr. Salazar accepting  Bedside RN to call [phone] for nurse to nurse report.   Care team notified via Voalte

## 2024-07-17 NOTE — DISCHARGE PLANNING
Spoke with spouse Kathy.  Discussed admission and family training.  Kathy agreeable to both.  Pj will be returning home to a single story house with 3 CHRISTINE.  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  She will be returning to work end of August.  Discussed 3hours/therapy 5 days/week.   Clothing needs and possible LOS.  Their daughter also lives in the home with two children.    Answered all questions/concerns.  Left [facility] contact information for any further questions,

## 2024-07-17 NOTE — PROGRESS NOTES
NURSING DAILY NOTE    Name: Pj Freeman   Date of Admission: 7/17/2024   Admitting Diagnosis: Acute CVA (cerebrovascular accident) ([facility])  Attending Physician: Dilia Salazar M.d.  Allergies: Patient has no known allergies.    Safety  Patient Assist     Patient Precautions     Precaution Comments     Bed Transfer Status     Toilet Transfer Status      Assistive Devices  Rails  Oxygen  None - Room Air  Diet/Therapeutic Dining  Current Diet Order   Procedures    Diet Order Diet: Low Fiber(GI Soft); Second Modifier: (optional): Consistent CHO (Diabetic)     Pill Administration  whole  Agitated Behavioral Scale     ABS Level of Severity       Fall Risk  Has the patient had a fall this admission?      Taylor Gallego Fall Risk Scoring  12, MODERATE RISK  Fall Risk Safety Measures  bed alarm, chair alarm, poor balance, and low vision/ hearing    Vitals  Temperature: 36.5 °C (97.7 °F)  Temp src: Oral  Pulse: 69  Respiration: 20  Blood Pressure : (!) 145/71  Blood Pressure MAP (Calculated): 96 MM HG  BP Location: Left, Upper Arm  Patient BP Position: Supine     Oxygen  Pulse Oximetry: 96 %  O2 (LPM): 0  O2 Delivery Device: None - Room Air    Bowel and Bladder  Last Bowel Movement     Stool Type     Bowel Device     Continent  Bladder: Continent void   Bowel: Continent movement  Bladder Function     Genitourinary Assessment        Skin  Rocky Score   18  Sensory Interventions   Bed Types: Standard/Trauma Mattress  Skin Preventative Measures: Waffle Overlay  Moisture Interventions  Moisturizers/Barriers: Barrier Cream      Pain  Pain Rating Scale  0 - No Pain  Pain Location  Foot  Pain Location Orientation  Left  Pain Interventions   Declines    ADLs    Bathing      Linen Change      Personal Hygiene     Chlorhexidine Bath      Oral Care     Teeth/Dentures     Shave     Nutrition Percentage Eaten     Environmental Precautions     Patient  Turns/Positioning  Patient Turns Self from Side to Side  Patient Turns Assistance/Tolerance     Bed Positions     Head of Bed Elevated         Psychosocial/Neurologic Assessment  Psychosocial Assessment  Psychosocial (WDL):  Within Defined Limits  Neurologic Assessment          Cardio/Pulmonary Assessment  Edema      Respiratory Breath Sounds     Cardiac Assessment

## 2024-07-17 NOTE — PROGRESS NOTES
Bedside report received from [facility] OLE[facility]. Pt assessment complete. Pt AO x 4. Reviewed plan of care with pt. Pt is medical. Chart and labs reviewed. Bed in lowest position, and 3 side rails up. Pt educated on call lights, call light within reach. Hourly rounding in place

## 2024-07-17 NOTE — DISCHARGE PLANNING
Case Management Discharge Planning    Admission Date: 7/8/2024  GMLOS: 4.6  ALOS: 9    6-Clicks ADL Score: 20  6-Clicks Mobility Score: 13  PT and/or OT Eval ordered: Yes  Post-acute Referrals Ordered: Yes  Post-acute Choice Obtained: Yes  Has referral(s) been sent to post-acute provider:  Yes      Anticipated Discharge Dispo: Discharge Disposition: Disch to  rehab facility or distinct part unit (62)    DME Needed: No    Action(s) Taken: Updated Provider/Nurse on Discharge Plan, Choice obtained, and Referral(s) sent    1015, Received message through Voalte from Janelle LARSEN from [facility] that Dr. Salazar accepted Pt and [facility] can admit Pt today.    1030, Pt was visited at room. Choice for Acute Rehab obtained and fax to [facility]. Pt in agreement to transfer to [facility] today. [facility] transfer packet signed.    1243, Pt's wife Kathy informed through telephone of transfer to [facility] at 1500 today.    1245, [facility] packet given to discharge ANIRUDH Hill Completed: None    Medically Clear: Yes    Next Steps: CM will continue to assist Pt with discharge needs.      Barriers to Discharge: None

## 2024-07-17 NOTE — H&P
Physical Medicine & Rehabilitation  History and Physical (H&P)  &     Post Admission Physician Evaluation (HARVINDER)       Date of Admission: 7/17/2024  Date of Service: 7/17/2024   Pj Freeman    [facility] Code to Support Admission: 0001.2 - Stroke: Right Body Involvement (Left Brain)  Etiologic Diagnosis: Acute CVA (cerebrovascular accident) (HCC)    _______________________________________________    Chief Complaint: Decreased mobility, weakness    History of Present Illness:  Patient is a 69 y.o. male with a PMH of DM2, HTN, PVD, and recent toe amputation (NWB LLE) who was admitted at [facility] on 7/6/24 after having syncope due to blood loss. He reportedly was transfused 4 U at [facility]. He was also found to have a L CVA on MRI as well as an NSTEMI.  His stay was complicated by new A fib, anemia and melena requiring higher level of care and was transferred to [facility] on 7/8/24 for ongoing GI bleed.  EGD was performed which showed multiple duodenal ulcers 1 of which was oozing requiring clipping.  He was evaluated by Cardiology for severe mitral stenosis. Per discharge can consider restarting AC in 1-2 weeks.     Patient tolerated transfer to [facility]. He reports he is doing OK. He reports no pain at this time to foot. He reports he has been using a scooter. He reports being weaker due to being in bed.     Review of Systems:     Comprehensive 14 point ROS was reviewed and all were negative except as noted elsewhere in this document.     Past Medical History:  No past medical history on file.    Past Surgical History:  Past Surgical History:   Procedure Laterality Date    IA UPPER GI ENDOSCOPY,DIAGNOSIS N/A 7/9/2024    Procedure: GASTROSCOPY;  Surgeon: Olu Diop M.D.;  Location: SURGERY SAME DAY [facility];  Service: Gastroenterology    IA UPPER GI ENDOSCOPY,CTRL BLEED  7/9/2024    Procedure: EGD, WITH CLIP PLACEMENT;  Surgeon: Olu Diop M.D.;  Location: SURGERY SAME DAY [facility];  Service: Gastroenterology    IA UPPER GI  ENDOSCOPY,BIOPSY N/A 7/9/2024    Procedure: GASTROSCOPY, WITH BIOPSY;  Surgeon: Olu Diop M.D.;  Location: SURGERY SAME DAY [facility];  Service: Gastroenterology       Family History:  No family history on file.    Medications:  No current facility-administered medications for this encounter.       Allergies:  Patient has no allergy information on record.    Psychosocial History:  ousing / Facility: 1 Story House  Steps Into Home: 3  Steps In Home: 0 (stairs to basement but reports he does not need to access)  Lives with - Patient's Self Care Capacity: Spouse  Equipment Owned: Crutches, Scooter (knee scooter)     Prior Level of Function / Living Situation:   Physical Therapy: Prior Services: Home-Independent  Housing / Facility: 1 Story House  Steps Into Home: 3  Steps In Home: 0 (stairs to basement but reports he does not need to access)  Rail: Both Rail (Steps into Home) (rails are too far apart to hold at same time)  Bathroom Set up: Bathtub / Shower Combination, Grab Bars  Equipment Owned: Crutches, Scooter (knee scooter)  Lives with - Patient's Self Care Capacity: Spouse  Bed Mobility: Independent  Transfer Status: Independent  Ambulation: Independent  Assistive Devices Used: None  Current Level of Function:   Gait Level Of Assist: Unable to Participate  Supine to Sit: Minimal Assist  Sit to Supine: Contact Guard Assist  Scooting: Supervised  Rolling: Contact Guard Assist  Comments: in chair pre/post  Sit to Stand: Maximal Assist (unable to come to full stand from recliner x3 attempts)  Bed, Chair, Wheelchair Transfer: Minimal Assist  Transfer Method: Stand Pivot  Sitting in Chair: pre/post  Occupational Therapy:   Self Feeding: Independent  Grooming / Hygiene: Independent  Bathing: Independent  Dressing: Independent  Toileting: Independent  Medication Management: Independent  Laundry: Independent  Kitchen Mobility: Independent  Finances: Independent  Home Management: Independent  Shopping:  Independent  Prior Level Of Mobility: Independent With Device in Community  Driving / Transportation: Driving Independent  Prior Services: Home-Independent  Housing / Facility: 1 Story House  Occupation (Pre-Hospital Vocational): Retired Due To Age  Current Level of Function:   Eating: Independent  Lower Body Dressing: Minimal Assist (assist to manage underwear over backside with pt depression lifting up from chair due to inability to come to full stand)        CURRENT LEVEL OF FUNCTION:   Same as level of function prior to admission to [facility]    Physical Examination:   GENERAL: No apparent distress  HEENT: Normocephalic/atraumatic, EOMI, and PERRL  CARDIAC: Regular rate and rhythm, normal S1, S2   LUNGS: Clear to auscultation   ABDOMINAL: bowel sounds present, soft, and nontender    EXTREMITIES: no contractures, spasticity, or edema.  NEURO:  Mental status: answers questions appropriately follows commands  Motor:    4/5 LUE, 4-/5 RUE  5/5 L HF, 4/5 R HF,   5/5 L KE  4/5 L KE    Sensory: intact to light touch through out  DTRs: 2+ in bilateral patellar tendons    Radiology:                                                     TTE 7/10/24  CONCLUSIONS  No prior study is available for comparison.   Severe mitral stenosis. Mean transvalvular gradient is 12  mmHg at a   heart rate of 85 BPM. Mitral valve area by VTI is 1.1 sq cm.  Trace MR  Normal RV size and systolic function  Normal LV systolic function, LVEF 60%  Dilated IVC  Small pericardial effusion  Pleural effusion noted  Severe pulmonary hypertension with estimated RVSP 75-80 mmHg           Laboratory Values:  Recent Labs     07/15/24  0549 07/16/24  0557 07/17/24  0702   SODIUM 136 134* 138   POTASSIUM 3.7 3.6 3.4*   CHLORIDE 107 105 104   CO2 20 21 24   GLUCOSE 80 158* 99   BUN 10 11 11   CREATININE 0.54 0.50 0.57   CALCIUM 6.9* 7.0* 7.6*     Recent Labs     07/15/24  0549 07/15/24  1458 07/16/24  0557 07/16/24  1349 07/17/24  0702    WBC 10.3  --  8.3  --  8.8   RBC 3.10*  --  2.81*  --  3.28*   HEMOGLOBIN 9.1*   < > 8.3* 9.1* 9.3*   HEMATOCRIT 27.7*  --  24.4*  --  28.5*   MCV 89.4  --  86.8  --  86.9   MCH 29.4  --  29.5  --  28.4   MCHC 32.9  --  34.0  --  32.6   RDW 52.2*  --  49.4  --  50.2*   PLATELETCT 325  --  328  --  363   MPV 10.0  --  9.9  --  9.7    < > = values in this interval not displayed.           Primary Rehabilitation Diagnosis:    This patient is a 69 y.o. male admitted for acute inpatient rehabilitation with Acute CVA (cerebrovascular accident) (HCC).    Impairments:   Cognitive  ADLs/IADLs  Mobility    Secondary Diagnosis/Medical Co-morbidities Affecting Function  HTN/CAD/Mitral stenosis/A fib  HLD  GI bleed  DM2 with hyperglycemia  Anemia  Hypokalemia  Hypocalcemia    Relevant Changes Since Preadmission Evaluation:    Status unchanged    The patient's rehabilitation potential is Good  The patient's medical prognosis is fair    Rehabilitation Plan:   Discussion and Recommendations:   1. The patient requires an acute inpatient rehabilitation program with a coordinated program of care at an intensity and frequency not available at a lower level of care. This recommendation is substantiated by the patient's medical physicians who recommend that the patient's intervention and assessment of medical issues needs to be done at an acute level of care for patient's safety and maximum outcome.   2. A coordinated program of care will be supplied by an interdisciplinary team of physical therapy, occupational therapy, rehab physician, rehab nursing, and, if needed, speech therapy and rehab psychology. Rehab team presents a patient-specific rehabilitation and education program concentrating on prevention of future problems related to accessibility, mobility, skin, bowel, bladder, sexuality, and psychosocial and medical/surgical problems.   3. Need for Rehabilitation Physician: The rehab physician will be evaluating the patient on a  multi-weekly basis to help coordinate the program of care. The rehab physician communicates between medical physicians, therapists, and nurses to maximize the patient's potential outcome. Specific areas in which the rehab physician will be providing daily assessment include the following:   A. Assessing the patient's heart rate and blood pressure response (vitals monitoring) to activity and making adjustments in medications or conservative measures as needed.   B. The rehab physician will be assessing the frequency at which the program can be increased to allow the patient to reach optimal functional outcome.   C. The rehab physician will also provide assessments in daily skin care, especially in light of patient's impairments in mobility.   D. The rehab physician will provide special expertise in understanding how to work with functional impairment and recommend appropriate interventions, compensatory techniques, and education that will facilitate the patient's outcome.   4. Rehab R.N.   The rehab RN will be working with patient to carry over in room mobility and activities of daily living when the patient is not in 3 hours of skilled therapy. Rehab nursing will be working in conjunction with rehab physician to address all the medical issues above and continue to assess laboratory work and discuss abnormalities with the treating physicians, assess vitals, and response to activity, and discuss and report abnormalities with the rehab physician. Rehab RN will also continue daily skin care, supervise bladder/bowel program, instruct in medication administration, and ensure patient safety.   5. Rehab Therapy: Therapies to treat at intensity and frequency of (may change after completion of evaluation by all therapeutic disciplines):       PT:  Physical therapy to address mobility, transfer, gait training and evaluation for adaptive equipment needs 1 hour/day at least 5 days/week for the duration of the ELOS (see below)        OT:  Occupational therapy to address ADLs, self-care, home management training, functional mobility/transfers and assistive device evaluation, and community re-integration 1  hour/day at least 5 days/week for the duration of the ELOS (see below).        ST/Dysphagia:  Speech therapy to address speech, language, and cognitive deficits as well as swallowing difficulties with retraining/dysphagia management and community re-integration with comprehension, expression, cognitive training 1  hour/day at least 5 days/week for the duration of the ELOS (see below).     Medical management / Rehabilitation Issues/ Adverse Potential as part of rehabilitation plan     Rehabilitation Issues/Adverse Potential  1.  CVA (Cerebrovascular Accident): Cont hold AC due to GI bleed for secondary prophylaxis as well as lipid and blood pressure management. Patient demonstrates functional deficits in strength, balance, coordination, and ADL's. Patient is admitted to [facility] for comprehensive rehabilitation therapy as described below.   Rehabilitation nursing monitors bowel and bladder control, educates on medication administration, co-morbidities and monitors patient safety.      2.  Neurostimulants: None at this time but continue to assess daily for need to initiate should status change.    3.  DVT prophylaxis:  Patient is not cleared for anticoagulation upon transfer. Encourage OOB. Monitor daily for signs and symptoms of DVT including but not limited to swelling and pain to prevent the development of DVT that may interfere with therapies.    4.  GI prophylaxis:  On prilosec to prevent gastritis/dyspepsia which may interfere with therapies.    5.  Pain: No issues with pain currently / Controlled with APAP/Oxycodone    6.  Nutrition/Dysphagia: Dietician monitors nutrient intake, recommend supplements prn and provide nutrition education to pt/family to promote optimal nutrition for wound healing/recovery.     7.   Bladder/bowel:  Start bowel and bladder program as described below, to prevent constipation, urinary retention (which may lead to UTI), and urinary incontinence (which will impact upon pt's functional independence).   - Post void bladder scans, I&O cath for PVRs >400  - up to commode after meal     8.  Skin/dermal ulcer prophylaxis: Monitor for new skin conditions with q.2 h. turns as required to prevent the development of skin breakdown.     9.  Cognition/Behavior: As needed psychologist provides adjustment counseling to illness and psychosocial barriers that may be potential barriers to rehabilitation.     10. Respiratory therapy: RT performs O2 management prn, breathing retraining, pulmonary hygiene and bronchospasm management prn to optimize participation in therapies.     Medical Co-Morbidities/Adverse Potential Affecting Function:  L CVA -Patient with L parietal CVA in 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 Stroke Ppx has been held due to GI bleed  -PT and OT for mobility and ADLs. Per guidelines, 15 hours per week between PT, OT and/or SLP.  -Follow-up Neurology    HTN/CAD/Mitral stenosis/A fib - Patient on Amiodarone 400 mg BID, Lasix 20 mg daily    MT amputation - Recent at OSH. [facility] reportedly on LLE    HLD - Patient on Atorvastatin 40 mg daily    GI bleed - Check AM CBC. Continue PPI BID    DM2 with hyperglycemia - Patient on Lantus 5 U and SSI. Previously on metformin and Jardiance    Anemia - Check AM CBC    Hypokalemia - Check AM CMP    Hypocalcemia - Check AM CMP    Pain - Patient on PRN Tylenol/Oxycodone    Skin - Patient at risk for skin breakdown due to debility in areas including sacrum, achilles, elbows and head in addition to other sites. Nursing to assess skin daily.     GI Ppx - Patient on Prilosec for GERD prophylaxis. Patient on Senna-docusate for constipation prophylaxis.        DVT Ppx - Patient not cleared for AC on transfer.    I personally performed a complete drug regimen review and no potential  clinically significant medication issues were identified.     Goals/Expected Level of Function Based on Current Medical and Functional Status:  (may change based on patient's medical status and rate of impairment recovery)  Transfers:   Modified Independent  Mobility/Gait:   Modified Independent  ADL's:   Modified Independent  Cognition:  Trudy    DISPOSITION: Discharge to pre-morbid independent living setting with the supportive care of patient's family.    ELOS: 10-17 days  ____________________________________    T. Yordy Salazar MD/PhD  [facility] - Physical Medicine & Rehabilitation   [facility] - Brain Injury Medicine   ____________________________________    Pt was seen today for 75 min. Time spent included pre-admission screening, pre-admission review of vitals and laboratory values/tests, unit/floor time, face-to-face time with the patient including physical examination, care coordination, counseling of patient and/or family, ordering medications/procedures/tests, discussion with other healthcare providers, and/or documentation in the electronic medical record.

## 2024-07-17 NOTE — PROGRESS NOTES
Pt arrived at [facility] from [facility] via [facility]. Dr Salazar to follow for diagnosis of CVA. Initial assessment initiated. Pt oriented to room and facility routine and safety measures; pt education binder provided and discussed. Pt A/O x 4, continent of bowel and bladder. Min x 1 assist for transfers. All wounds photographed and documented; photos uploaded to Media Manager. Pt denies pain or discomfort at this time. Pt positioned for comfort in bed. Call light within reach, safety measures in place. Pt viewed fall prevention video.

## 2024-07-18 ENCOUNTER — APPOINTMENT (OUTPATIENT)
Dept: PHYSICAL THERAPY | Facility: REHABILITATION | Age: 69
DRG: 056 | End: 2024-07-18
Attending: PHYSICAL MEDICINE & REHABILITATION
Payer: MEDICARE

## 2024-07-18 ENCOUNTER — APPOINTMENT (OUTPATIENT)
Dept: OCCUPATIONAL THERAPY | Facility: REHABILITATION | Age: 69
DRG: 056 | End: 2024-07-18
Attending: PHYSICAL MEDICINE & REHABILITATION
Payer: MEDICARE

## 2024-07-18 ENCOUNTER — APPOINTMENT (OUTPATIENT)
Dept: SPEECH THERAPY | Facility: REHABILITATION | Age: 69
DRG: 056 | End: 2024-07-18
Attending: PHYSICAL MEDICINE & REHABILITATION
Payer: MEDICARE

## 2024-07-18 ENCOUNTER — APPOINTMENT (OUTPATIENT)
Dept: RADIOLOGY | Facility: REHABILITATION | Age: 69
DRG: 056 | End: 2024-07-18
Attending: HOSPITALIST
Payer: MEDICARE

## 2024-07-18 LAB
25(OH)D3 SERPL-MCNC: 17 NG/ML (ref 30–100)
ALBUMIN SERPL BCP-MCNC: 2.7 G/DL (ref 3.2–4.9)
ALBUMIN/GLOB SERPL: 1.4 G/DL
ALP SERPL-CCNC: 95 U/L (ref 30–99)
ALT SERPL-CCNC: 21 U/L (ref 2–50)
ANION GAP SERPL CALC-SCNC: 10 MMOL/L (ref 7–16)
AST SERPL-CCNC: 14 U/L (ref 12–45)
BASOPHILS # BLD AUTO: 0.5 % (ref 0–1.8)
BASOPHILS # BLD: 0.04 K/UL (ref 0–0.12)
BILIRUB SERPL-MCNC: 0.5 MG/DL (ref 0.1–1.5)
BUN SERPL-MCNC: 15 MG/DL (ref 8–22)
CALCIUM ALBUM COR SERPL-MCNC: 8.6 MG/DL (ref 8.5–10.5)
CALCIUM SERPL-MCNC: 7.6 MG/DL (ref 8.5–10.5)
CHLORIDE SERPL-SCNC: 103 MMOL/L (ref 96–112)
CO2 SERPL-SCNC: 26 MMOL/L (ref 20–33)
CREAT SERPL-MCNC: 0.64 MG/DL (ref 0.5–1.4)
EOSINOPHIL # BLD AUTO: 0.06 K/UL (ref 0–0.51)
EOSINOPHIL NFR BLD: 0.7 % (ref 0–6.9)
ERYTHROCYTE [DISTWIDTH] IN BLOOD BY AUTOMATED COUNT: 49.3 FL (ref 35.9–50)
EST. AVERAGE GLUCOSE BLD GHB EST-MCNC: 108 MG/DL
GFR SERPLBLD CREATININE-BSD FMLA CKD-EPI: 101 ML/MIN/1.73 M 2
GLOBULIN SER CALC-MCNC: 2 G/DL (ref 1.9–3.5)
GLUCOSE BLD STRIP.AUTO-MCNC: 119 MG/DL (ref 65–99)
GLUCOSE BLD STRIP.AUTO-MCNC: 158 MG/DL (ref 65–99)
GLUCOSE BLD STRIP.AUTO-MCNC: 194 MG/DL (ref 65–99)
GLUCOSE BLD STRIP.AUTO-MCNC: 200 MG/DL (ref 65–99)
GLUCOSE SERPL-MCNC: 108 MG/DL (ref 65–99)
HBA1C MFR BLD: 5.4 % (ref 4–5.6)
HCT VFR BLD AUTO: 26.7 % (ref 42–52)
HGB BLD-MCNC: 8.9 G/DL (ref 14–18)
IMM GRANULOCYTES # BLD AUTO: 0.04 K/UL (ref 0–0.11)
IMM GRANULOCYTES NFR BLD AUTO: 0.5 % (ref 0–0.9)
LYMPHOCYTES # BLD AUTO: 1.7 K/UL (ref 1–4.8)
LYMPHOCYTES NFR BLD: 20.2 % (ref 22–41)
MCH RBC QN AUTO: 29.1 PG (ref 27–33)
MCHC RBC AUTO-ENTMCNC: 33.3 G/DL (ref 32.3–36.5)
MCV RBC AUTO: 87.3 FL (ref 81.4–97.8)
MONOCYTES # BLD AUTO: 0.71 K/UL (ref 0–0.85)
MONOCYTES NFR BLD AUTO: 8.4 % (ref 0–13.4)
NEUTROPHILS # BLD AUTO: 5.88 K/UL (ref 1.82–7.42)
NEUTROPHILS NFR BLD: 69.7 % (ref 44–72)
NRBC # BLD AUTO: 0 K/UL
NRBC BLD-RTO: 0 /100 WBC (ref 0–0.2)
PLATELET # BLD AUTO: 396 K/UL (ref 164–446)
PMV BLD AUTO: 9.8 FL (ref 9–12.9)
POTASSIUM SERPL-SCNC: 3.8 MMOL/L (ref 3.6–5.5)
PROT SERPL-MCNC: 4.7 G/DL (ref 6–8.2)
RBC # BLD AUTO: 3.06 M/UL (ref 4.7–6.1)
SODIUM SERPL-SCNC: 139 MMOL/L (ref 135–145)
TSH SERPL DL<=0.005 MIU/L-ACNC: 1.23 UIU/ML (ref 0.38–5.33)
WBC # BLD AUTO: 8.4 K/UL (ref 4.8–10.8)

## 2024-07-18 PROCEDURE — 97535 SELF CARE MNGMENT TRAINING: CPT

## 2024-07-18 PROCEDURE — 99222 1ST HOSP IP/OBS MODERATE 55: CPT | Performed by: HOSPITALIST

## 2024-07-18 PROCEDURE — 85025 COMPLETE CBC W/AUTO DIFF WBC: CPT

## 2024-07-18 PROCEDURE — 84443 ASSAY THYROID STIM HORMONE: CPT

## 2024-07-18 PROCEDURE — 92523 SPEECH SOUND LANG COMPREHEN: CPT

## 2024-07-18 PROCEDURE — 36415 COLL VENOUS BLD VENIPUNCTURE: CPT

## 2024-07-18 PROCEDURE — 99233 SBSQ HOSP IP/OBS HIGH 50: CPT | Performed by: PHYSICAL MEDICINE & REHABILITATION

## 2024-07-18 PROCEDURE — 700102 HCHG RX REV CODE 250 W/ 637 OVERRIDE(OP): Performed by: PHYSICAL MEDICINE & REHABILITATION

## 2024-07-18 PROCEDURE — 770010 HCHG ROOM/CARE - REHAB SEMI PRIVAT*

## 2024-07-18 PROCEDURE — 80053 COMPREHEN METABOLIC PANEL: CPT

## 2024-07-18 PROCEDURE — 82962 GLUCOSE BLOOD TEST: CPT

## 2024-07-18 PROCEDURE — 97166 OT EVAL MOD COMPLEX 45 MIN: CPT

## 2024-07-18 PROCEDURE — 700102 HCHG RX REV CODE 250 W/ 637 OVERRIDE(OP): Performed by: HOSPITALIST

## 2024-07-18 PROCEDURE — A9270 NON-COVERED ITEM OR SERVICE: HCPCS | Performed by: PHYSICAL MEDICINE & REHABILITATION

## 2024-07-18 PROCEDURE — 97162 PT EVAL MOD COMPLEX 30 MIN: CPT

## 2024-07-18 PROCEDURE — 700111 HCHG RX REV CODE 636 W/ 250 OVERRIDE (IP): Performed by: PHYSICAL MEDICINE & REHABILITATION

## 2024-07-18 PROCEDURE — 71045 X-RAY EXAM CHEST 1 VIEW: CPT

## 2024-07-18 PROCEDURE — A9270 NON-COVERED ITEM OR SERVICE: HCPCS | Performed by: HOSPITALIST

## 2024-07-18 PROCEDURE — 83036 HEMOGLOBIN GLYCOSYLATED A1C: CPT

## 2024-07-18 PROCEDURE — 82306 VITAMIN D 25 HYDROXY: CPT

## 2024-07-18 RX ORDER — FUROSEMIDE 20 MG
20 TABLET ORAL DAILY
Status: DISCONTINUED | OUTPATIENT
Start: 2024-07-19 | End: 2024-08-01 | Stop reason: HOSPADM

## 2024-07-18 RX ORDER — VITAMIN B COMPLEX
2000 TABLET ORAL DAILY
Status: DISCONTINUED | OUTPATIENT
Start: 2024-07-18 | End: 2024-08-01 | Stop reason: HOSPADM

## 2024-07-18 RX ADMIN — CALCIUM CARBONATE (ANTACID) CHEW TAB 500 MG 1000 MG: 500 CHEW TAB at 11:43

## 2024-07-18 RX ADMIN — CALCIUM CARBONATE (ANTACID) CHEW TAB 500 MG 1000 MG: 500 CHEW TAB at 17:12

## 2024-07-18 RX ADMIN — CALCIUM CARBONATE (ANTACID) CHEW TAB 500 MG 1000 MG: 500 CHEW TAB at 08:45

## 2024-07-18 RX ADMIN — AMIODARONE HYDROCHLORIDE 400 MG: 200 TABLET ORAL at 05:35

## 2024-07-18 RX ADMIN — METFORMIN HYDROCHLORIDE 1000 MG: 500 TABLET ORAL at 05:36

## 2024-07-18 RX ADMIN — METFORMIN HYDROCHLORIDE 1000 MG: 500 TABLET ORAL at 17:12

## 2024-07-18 RX ADMIN — ATORVASTATIN CALCIUM 40 MG: 40 TABLET, FILM COATED ORAL at 20:31

## 2024-07-18 RX ADMIN — OMEPRAZOLE 40 MG: 20 CAPSULE, DELAYED RELEASE ORAL at 20:31

## 2024-07-18 RX ADMIN — FUROSEMIDE 20 MG: 10 INJECTION INTRAMUSCULAR; INTRAVENOUS at 05:38

## 2024-07-18 RX ADMIN — INSULIN HUMAN 3 UNITS: 100 INJECTION, SOLUTION PARENTERAL at 11:43

## 2024-07-18 RX ADMIN — INSULIN GLARGINE-YFGN 10 UNITS: 100 INJECTION, SOLUTION SUBCUTANEOUS at 20:26

## 2024-07-18 RX ADMIN — Medication 2000 UNITS: at 17:12

## 2024-07-18 RX ADMIN — AMIODARONE HYDROCHLORIDE 400 MG: 200 TABLET ORAL at 17:11

## 2024-07-18 ASSESSMENT — BRIEF INTERVIEW FOR MENTAL STATUS (BIMS)
ASKED TO RECALL BLUE: YES, NO CUE REQUIRED
ASKED TO RECALL SOCK: NO, COULD NOT RECALL
ASKED TO RECALL BED: YES, NO CUE REQUIRED
WHAT DAY OF THE WEEK IS IT: CORRECT
ASKED TO RECALL SOCK: YES, NO CUE REQUIRED
WHAT MONTH IS IT: ACCURATE WITHIN 5 DAYS
BIMS SUMMARY SCORE: 12
INITIAL REPETITION OF BED BLUE SOCK - FIRST ATTEMPT: 3
WHAT DAY OF THE WEEK IS IT: CORRECT
INITIAL REPETITION OF BED BLUE SOCK - FIRST ATTEMPT: 3
ASKED TO RECALL BED: YES, AFTER CUEING (A PIECE OF FURNITURE")"
ASKED TO RECALL BLUE: YES, NO CUE REQUIRED
BIMS SUMMARY SCORE: 15
WHAT YEAR IS IT: CORRECT
WHAT MONTH IS IT: ACCURATE WITHIN 5 DAYS
WHAT YEAR IS IT: CORRECT

## 2024-07-18 ASSESSMENT — ACTIVITIES OF DAILY LIVING (ADL)
BED_CHAIR_WHEELCHAIR_TRANSFER_DESCRIPTION: SQUAT PIVOT TRANSFER TO WHEELCHAIR
TOILETING: INDEPENDENT

## 2024-07-18 ASSESSMENT — GAIT ASSESSMENTS
GAIT LEVEL OF ASSIST: MINIMAL ASSIST
ASSISTIVE DEVICE: OTHER (COMMENTS)
DISTANCE (FEET): 50

## 2024-07-18 ASSESSMENT — PAIN DESCRIPTION - PAIN TYPE: TYPE: ACUTE PAIN

## 2024-07-18 NOTE — THERAPY
"Occupational Therapy   Initial Evaluation     Patient Name: Pj Freeman  Age:  69 y.o., Sex:  male  Medical Record #: [id number]  Today's Date: 7/18/2024     Subjective    Pt agreeable for an OT initial eval.     Objective       07/18/24 0701   OT Charge Group   OT Self Care / ADL (Units) 1   OT Evaluation OT Evaluation Mod  (3 units)   OT Total Time Spent   OT Individual Total Time Spent (Mins) 60   Prior Living Situation   Prior Services Home-Independent   Housing / Facility 1 Story House   Steps Into Home 3   Bathroom Set up Bathtub / Shower Combination   Equipment Owned Crutches;Scooter  (knee scooter.)   Lives with - Patient's Self Care Capacity Spouse   Prior Level of ADL Function   Self Feeding Independent   Grooming / Hygiene Independent   Bathing Independent   Dressing Independent   Toileting Independent   Prior Level of IADL Function   Medication Management Independent   Laundry Independent   Kitchen Mobility Independent   Finances Independent   Home Management Independent   Shopping Independent   Prior Level Of Mobility Independent Without Device in Community   Driving / Transportation Driving Independent   Occupation (Pre-Hospital Vocational) Retired Due To Age   Leisure Interests Family;Hobbies   Prior Functioning: Everyday Activities   Self Care Independent   Indoor Mobility (Ambulation) Independent   Stairs Independent   Functional Cognition Independent   Prior Device Use None of the given options   Pain   Intervention Declines   Pain 0 - 10 Group   Location Foot   Location Orientation Left   Pain Rating Scale (NPRS) 0   Cognition    Level of Consciousness Alert   Cognitive Pattern Assessment   Cognitive Pattern Assessment Used BIMS   Brief Interview for Mental Status (BIMS)   Repetition of Three Words (First Attempt) 3   Temporal Orientation: Year Correct   Temporal Orientation: Month Accurate within 5 days   Temporal Orientation: Day Correct   Recall: \"Sock\" Yes, no cue required   Recall: \"Blue\" " "Yes, no cue required   Recall: \"Bed\" Yes, no cue required   BIMS Summary Score 15   Confusion Assessment Method (CAM)   Is there evidence of an acute change in mental status from the patient's baseline? No   Inattention Behavior not present   Disorganized thinking Behavior not present   Altered level of consciousness Behavior not present   Active ROM Upper Body   Active ROM Upper Body  WDL   Strength Upper Body   Upper Body Strength  WDL   Upper Body Muscle Tone   Upper Body Muscle Tone  WDL   Balance Assessment   Sitting Balance (Static) Good   Sitting Balance (Dynamic) Fair +   Bed Mobility    Supine to Sit Standby Assist   Sit to Supine Standby Assist   Sit to Stand Minimal Assist   Scooting Standby Assist   Hearing, Speech, and Vision   Ability to Hear Adequate   Ability to See in Adequate Light Adequate   Expression of Ideas and Wants Without difficulty   Understanding Verbal and Non-Verbal Content Understands   Functional Level of Assist   Eating Independent   Grooming Standby Assist   Grooming Description Seated in wheelchair at sink;Increased time;Initial preparation for task;Set-up of equipment   Bathing Minimal Assist   Bathing Description Grab bar;Hand held shower;Assit with perineal;Increased time;Initial preparation for task;Set-up of equipment;Set up for shower sleeve;Set up for wound protection;Supervision for safety   Upper Body Dressing Stand by Assist   Upper Body Dressing Description Set-up of equipment;Increased time;Initial preparation for task   Lower Body Dressing Moderate Assist   Lower Body Dressing Description Set-up of equipment;Initial preparation for task;Increased time;Supervision for safety;Other (comment)  (Assist with pulling up underwear and shorts/pants over legs and hips.)   Toileting Moderate Assist   Toileting Description Assist for hygiene;Assist to pull pants up;Assist to pull pants down;Assist for standing balance;Grab bar;Increased time;Initial preparation for task;Set-up of " equipment;Supervision for safety   Bed, Chair, Wheelchair Transfer Minimal Assist   Bed Chair Wheelchair Transfer Description Increased time;Initial preparation for task;Set-up of equipment;Squat pivot transfer to wheelchair;Supervision for safety;Assist with one limb   Toilet Transfers Moderate Assist   Toilet Transfer Description Initial preparation for task;Increased time;Set-up of equipment;Supervision for safety;Assist with one limb;Grab bar   Tub / Shower Transfers Moderate Assist   Tub Shower Transfer Description Increased time;Initial preparation for task;Set-up of equipment;Supervision for safety;Assist with one limb;Shower bench;Grab bar   Comprehension Independent   Expression Independent   Problem Solving Modified Independent   Memory Independent   Eating   Assistance Needed Independent   Physical Assistance Level No physical assistance   CARE Score - Eating 6   Eating Discharge Goal   Discharge Goal 6   Oral Hygiene   Assistance Needed Set-up / clean-up   Physical Assistance Level No physical assistance   CARE Score - Oral Hygiene 5   Oral Hygiene Discharge Goal   Discharge Goal 6   Shower/Bathe Self   Assistance Needed Physical assistance   Physical Assistance Level 25% or less   CARE Score - Shower/Bathe Self 3   Upper Body Dressing   Assistance Needed Supervision   Physical Assistance Level No physical assistance   CARE Score - Upper Body Dressing 4   Lower Body Dressing   Assistance Needed Physical assistance   Physical Assistance Level 26%-50%   CARE Score - Lower Body Dressing 3   Putting On/Taking Off Footwear   Assistance Needed Physical assistance   Physical Assistance Level 25% or less   CARE Score - Putting On/Taking Off Footwear 3   Toileting Hygiene   Assistance Needed Physical assistance   Physical Assistance Level 51%-75%   CARE Score - Toileting Hygiene 2   Toilet Transfer   Assistance Needed Physical assistance   Physical Assistance Level 51%-75%   CARE Score - Toilet Transfer 2    Problem List   Problem List Decreased Active Daily Living Skills;Decreased Functional Mobility;Decreased Activity Tolerance   Precautions   Precautions Fall Risk;Non Weight Bearing Left Lower Extremity   Current Discharge Plan   Current Discharge Plan Return to Prior Living Situation   Benefit    Therapy Benefit Patient Would Benefit from Inpatient Rehab Occupational Therapy to Maximize [place] with ADLs, IADLs and Functional Mobility.   Interdisciplinary Plan of Care Collaboration   IDT Collaboration with  Nursing   Patient Position at End of Therapy Seated;Chair Alarm On;Self Releasing Lap Belt Applied;Call Light within Reach;Tray Table within Reach;Phone within Reach   OT DME Recommendations   Bathroom Equipment 3 in 1 Commode   Strengths & Barriers   Strengths Able to follow instructions;Alert and oriented;Effective communication skills;Independent prior level of function;Motivated for self care and independence;Pleasant and cooperative;Supportive family;Willingly participates in therapeutic activities   Barriers Decreased endurance;Generalized weakness;Impaired activity tolerance;Tube feeding;Fatigue       Assessment  Patient is a 69 y.o. male with a PMH of DM2, HTN, PVD, and recent toe amputation (NWB LLE) who was admitted at [facility] on 7/6/24 after having syncope due to blood loss. He reportedly was transfused 4 U at [facility]. He was also found to have a L CVA on MRI as well as an NSTEMI. His stay was complicated by new A fib, anemia and melena requiring higher level of care and was transferred to [facility] on 7/8/24 for ongoing GI bleed. .  Additional factors influencing patient status / progress (ie: cognitive factors, co-morbidities, social support, etc)      Plan  Recommend Occupational Therapy  minutes per day 5-7 days per week for 10-17 days for the following treatments:  OT Self Care/ADL, OT Neuro Re-Ed/Balance, OT Therapeutic Activity, and OT Therapeutic Exercise.    Passport items to be  completed:  Perform bathroom transfers, complete dressing, complete feeding, get ready for the day, prepare a simple meal, participate in household tasks, adapt home for safety needs, demonstrate home exercise program, complete caregiver training     Goals:  Long term and short term goals have been discussed with patient and they are in agreement.    Occupational Therapy Goals (Active)       Problem: Bathing       Dates: Start:  07/18/24         Goal: STG-Within one week, patient will bathe with SBA.       Dates: Start:  07/18/24               Problem: Dressing       Dates: Start:  07/18/24         Goal: STG-Within one week, patient will dress LB with Min A.       Dates: Start:  07/18/24               Problem: Functional Transfers       Dates: Start:  07/18/24         Goal: STG-Within one week, patient will transfer to toilet with Min A.       Dates: Start:  07/18/24            Goal: STG-Within one week, patient will transfer to tub/shower with Min A.       Dates: Start:  07/18/24               Problem: OT Long Term Goals       Dates: Start:  07/18/24         Goal: LTG-By discharge, patient will complete basic self care tasks at Mod Independent level.       Dates: Start:  07/18/24            Goal: LTG-By discharge, patient will perform bathroom transfers at Mod Independent level.       Dates: Start:  07/18/24               Problem: Toileting       Dates: Start:  07/18/24         Goal: STG-Within one week, patient will complete toileting tasks with Min A.       Dates: Start:  07/18/24

## 2024-07-18 NOTE — CARE PLAN
The patient is Watcher - Medium risk of patient condition declining or worsening    Shift Goals  Clinical Goals: Safety, pain control  Patient Goals: pain control, gain strength  Family Goals: no family present    Progress made toward(s) clinical / shift goals:    Problem: Psychosocial  Goal: Patient's level of anxiety will decrease  Outcome: Progressing     Problem: Infection - Standard  Goal: Patient will remain free from infection  Outcome: Progressing  Note: Patient remains free from s/s infection; afebrile.  Will continue to monitor.     Problem: Diabetes Management  Goal: Patient's ability to maintain appropriate glucose levels will be maintained or improve  Note: Blood sugar check 250 mg/dl. 4 units regular Insulin and Lantus 10 units given SQ. No S/S hypo/hyperglycemia noted.  Patient alert and oriented X 4, on room air, No SOB, denies pain or discomfort. Sleeping soundly during rounds.

## 2024-07-18 NOTE — FLOWSHEET NOTE
07/17/24 1750   Events/Summary/Plan   Events/Summary/Plan RT Assessment   Vital Signs   Pulse 82   Respiration 16   Pulse Oximetry 96 %   $ Pulse Oximetry (Spot Check) Yes   Respiratory Assessment   Respiratory Pattern Within Normal Limits   Level of Consciousness Alert   Chest Exam   Work Of Breathing / Effort Within Normal Limits   Oxygen   O2 Delivery Device None - Room Air   Smoking History   Have you ever smoked Never

## 2024-07-18 NOTE — CARE PLAN
The patient is Stable - Low risk of patient condition declining or worsening    Shift Goals  Clinical Goals: Safety  Patient Goals: Safety  Family Goals: no family present    Progress made toward(s) clinical / shift goals:    Problem: Hemodynamics  Goal: Patient's hemodynamics, fluid balance and neurologic status will be stable or improve  Outcome: Progressing  Note:    Latest Reference Range & Units 07/18/24 06:33   WBC 4.8 - 10.8 K/uL 8.4   RBC 4.70 - 6.10 M/uL 3.06 (L)   Hemoglobin 14.0 - 18.0 g/dL 8.9 (L)   Hematocrit 42.0 - 52.0 % 26.7 (L)   MCV 81.4 - 97.8 fL 87.3   MCH 27.0 - 33.0 pg 29.1   MCHC 32.3 - 36.5 g/dL 33.3   RDW 35.9 - 50.0 fL 49.3   Platelet Count 164 - 446 K/uL 396   MPV 9.0 - 12.9 fL 9.8   Neutrophils-Polys 44.00 - 72.00 % 69.70   Neutrophils (Absolute) 1.82 - 7.42 K/uL 5.88   Lymphocytes 22.00 - 41.00 % 20.20 (L)   Lymphs (Absolute) 1.00 - 4.80 K/uL 1.70   Monocytes 0.00 - 13.40 % 8.40   Monos (Absolute) 0.00 - 0.85 K/uL 0.71   Eosinophils 0.00 - 6.90 % 0.70   Eos (Absolute) 0.00 - 0.51 K/uL 0.06   Basophils 0.00 - 1.80 % 0.50   Baso (Absolute) 0.00 - 0.12 K/uL 0.04   Immature Granulocytes 0.00 - 0.90 % 0.50   Immature Granulocytes (abs) 0.00 - 0.11 K/uL 0.04   Nucleated RBC 0.00 - 0.20 /100 WBC 0.00   NRBC (Absolute) K/uL 0.00   Sodium 135 - 145 mmol/L 139   Potassium 3.6 - 5.5 mmol/L 3.8   Chloride 96 - 112 mmol/L 103   Co2 20 - 33 mmol/L 26   Anion Gap 7.0 - 16.0  10.0   Glucose 65 - 99 mg/dL 108 (H)   Bun 8 - 22 mg/dL 15   Creatinine 0.50 - 1.40 mg/dL 0.64   GFR (CKD-EPI) >60 mL/min/1.73 m 2 101   Calcium 8.5 - 10.5 mg/dL 7.6 (L)   Correct Calcium 8.5 - 10.5 mg/dL 8.6   AST(SGOT) 12 - 45 U/L 14   ALT(SGPT) 2 - 50 U/L 21   Alkaline Phosphatase 30 - 99 U/L 95   Total Bilirubin 0.1 - 1.5 mg/dL 0.5   Albumin 3.2 - 4.9 g/dL 2.7 (L)   Total Protein 6.0 - 8.2 g/dL 4.7 (L)   Globulin 1.9 - 3.5 g/dL 2.0   A-G Ratio g/dL 1.4   Glycohemoglobin 4.0 - 5.6 % 5.4   Estim. Avg Glu mg/dL 108   25-Hydroxy    Vitamin D 25 30 - 100 ng/mL 17 (L)   TSH 0.380 - 5.330 uIU/mL 1.230      Problem: Diabetes Management  Goal: Patient's ability to maintain appropriate glucose levels will be maintained or improve  Outcome: Progressing  Note: BG FS monitored, Insulin administered as ordered.

## 2024-07-18 NOTE — THERAPY
Physical Therapy   Initial Evaluation     Patient Name: Pj Freeman  Age:  69 y.o., Sex:  male  Medical Record #: [id number]  Today's Date: 7/18/2024     Subjective    Patient is doing well, minimal complaints of pain      Objective       07/18/24 0901   PT Charge Group   PT Evaluation PT Evaluation Mod   Supervising Physical Therapist Jake Smith   PT Total Time Spent   PT Individual Total Time Spent (Mins) 60   Prior Living Situation   Prior Services Home-Independent   Housing / Facility 1 Story House   Steps Into Home 3   Steps In Home 0   Rail Both Rail (Steps into Home)  (however spaced apart and could not use both at the same time)   Elevator No   Equipment Owned Crutches  (kneeling scooter)   Lives with - Patient's Self Care Capacity Spouse   Comments has been using kneeling scooter or crutches since February   Prior Level of Functional Mobility   Bed Mobility Independent   Transfer Status Independent   Ambulation Independent   Distance Ambulation (Feet)   (household)   Assistive Devices Used Crutches  (or kneeling scooter)   Stairs Independent   Prior Functioning: Everyday Activities   Self Care Independent   Indoor Mobility (Ambulation) Independent   Stairs Independent   Functional Cognition Independent   Prior Device Use None of the given options   Passive ROM Lower Body   Lt Ankle Dorsiflexion Degrees   (limited to neutral)   Rt Ankle Dorsiflexion Degrees   (limited to neutral)   Active ROM Lower Body    Active ROM Lower Body  X   Comments limited ankle dorsiflexion   Strength Lower Body   Lower Body Strength    (limited functional right hip extensor strength)   Sensation Lower Body   Lower Extremity Sensation   X   Rt Lower Extremity Light Touch Absent  (distal tibia and foot)   Lt Lower Extremity Light Touch Absent  (distal tibia and foot)   Lower Body Muscle Tone   Lower Body Muscle Tone  WDL   Balance Assessment   Sitting Balance (Static) Good   Sitting Balance (Dynamic) Good   Standing Balance  (Static) Poor -  (due to NWB left LE)   Standing Balance (Dynamic) Poor -  (due to left LE NWB)   Weight Shift Sitting Good   Weight Shift Standing Poor   Bed Mobility    Supine to Sit Supervised   Sit to Supine Supervised   Sit to Stand Moderate Assist   Scooting Standby Assist   Rolling Independent   Neurological Concerns   Neurological Concerns No   Coordination Lower Body    Coordination Lower Body  WDL   Roll Left and Right   Assistance Needed Independent   CARE Score - Roll Left and Right 6   Roll Left and Right Discharge Goal   Discharge Goal 6   Sit to Lying   Assistance Needed Independent   CARE Score - Sit to Lying 6   Sit to Lying Discharge Goal   Discharge Goal 6   Lying to Sitting on Side of Bed   Assistance Needed Independent   CARE Score - Lying to Sitting on Side of Bed 6   Lying to Sitting on Side of Bed Discharge Goal   Discharge Goal 6   Sit to Stand   Assistance Needed Physical assistance   Physical Assistance Level 26%-50%   CARE Score - Sit to Stand 3   Sit to Stand Discharge Goal   Discharge Goal 6   Chair/Bed-to-Chair Transfer   Assistance Needed Physical assistance   Physical Assistance Level 26%-50%   CARE Score - Chair/Bed-to-Chair Transfer 3   Chair/Bed-to-Chair Transfer Discharge Goal   Discharge Goal 6   Car Transfer   Assistance Needed Physical assistance   Physical Assistance Level 26%-50%   CARE Score - Car Transfer 3   Car Transfer Discharge Goal   Discharge Goal 6   Walk 10 Feet   Assistance Needed Incidental touching   Physical Assistance Level No physical assistance   CARE Score - Walk 10 Feet 4   Walk 10 Feet Discharge Goal   Discharge Goal 6   Walk 50 Feet with Two Turns   Assistance Needed Physical assistance   Physical Assistance Level 25% or less   CARE Score - Walk 50 Feet with Two Turns 3   Walk 50 Feet with Two Turns Discharge Goal   Discharge Goal 6   Walk 150 Feet   Assistance Needed Physical assistance   Physical Assistance Level 26%-50%   CARE Score - Walk 150 Feet 3    Walk 150 Feet Discharge Goal   Discharge Goal 6   Walking 10 Feet on Uneven Surfaces   Assistance Needed Incidental touching   Physical Assistance Level No physical assistance   CARE Score - Walking 10 Feet on Uneven Surfaces 4   Walking 10 Feet on Uneven Surfaces Discharge Goal   Discharge Goal 6   1 Step (Curb)   Assistance Needed Incidental touching   Physical Assistance Level No physical assistance   CARE Score - 1 Step (Curb) 4   1 Step (Curb) Discharge Goal   Discharge Goal 6   4 Steps   Reason if not Attempted Safety concerns   CARE Score - 4 Steps 88   4 Steps Discharge Goal   Discharge Goal 4   12 Steps   Reason if not Attempted Medical concerns   CARE Score - 12 Steps 88   12 Steps Discharge Goal   Discharge Goal 4   Picking Up Object   Assistance Needed Adaptive equipment   CARE Score - Picking Up Object 6   Picking Up Object Discharge Goal   Discharge Goal 6   Wheel 50 Feet with Two Turns   Assistance Needed Verbal cues   CARE Score - Wheel 50 Feet with Two Turns 4   Wheel 50 Feet with Two Turns Discharge Goal   Discharge Goal 6   Wheel 150 Feet   Assistance Needed Verbal cues   CARE Score - Wheel 150 Feet 4   Wheel 150 Feet Discharge Goal   Discharge Goal 6   Gait Functional Level of Assist    Gait Level Of Assist Minimal Assist   Assistive Device Other (Comments)  (kneeling scooter)   Distance (Feet) 50  (also parallel bars 10 feet)   # of Times Distance was Traveled 1   Deviation   (limited balance on kneeling scooter)   Wheelchair Functional Level of Assist   Wheelchair Assist Stand by Assist   Distance Wheelchair (Feet or Distance) 150   Stairs Functional Level of Assist   Level of Assist with Stairs Contact Guard Assist   # of Stairs Climbed 1   Stairs Description   (4 inch step in bars)   Transfer Functional Level of Assist   Bed, Chair, Wheelchair Transfer Moderate Assist   Bed Chair Wheelchair Transfer Description Squat pivot transfer to wheelchair  (reach/pivot to chair)   Problem List     Problems Impaired Transfers;Impaired Ambulation;Functional Strength Deficit;Impaired Balance;Impaired Coordination;Decreased Activity Tolerance  (decreased adherence to NWB left LE)   Precautions   Precautions Fall Risk;Non Weight Bearing Left Lower Extremity   Comments left transmetatarsal amputation   Current Discharge Plan   Current Discharge Plan Return to Prior Living Situation   Interdisciplinary Plan of Care Collaboration   Patient Position at End of Therapy Seated;Chair Alarm On;Call Light within Reach   Physical Therapist Assigned   Assigned PT / Treatment Time / Comments Jake 30/60   Benefit   Therapy Benefit Patient Would Benefit from Inpatient Rehabilitation Physical Therapy to Maximize Functional [place] with ADLs, IADLs and Mobility.   Strengths & Barriers   Strengths Able to follow instructions;Alert and oriented;Independent prior level of function;Pleasant and cooperative;Willingly participates in therapeutic activities;Motivated for self care and independence   Barriers Impaired balance;Impaired activity tolerance;Limited mobility       Assessment  Patient is 69 y.o. male who presents with complaints of lower extremity weakness and difficulty walking. Patient has a medical history (+) for type II diabetes, peripheral vascular disease. Had a left great toe amputation February of this year that never fully healed properly and eventually required a left transmetatarsal 7/3/24. Patient was at home recovering when he had a syncopal episode/fall and was taken to ER. Found to anemia, melena and duodenal ulcers that required clipping. Subsequent symptoms and testing led to discovery of a small left CVA and non ST elevation MI. Patient is NWB left LE    Has poor functional LE strength, decreased balance, requires physical assist for transfers and is not 100% adherent to NWB status    Plan  Recommend Physical Therapy  minutes per day 5-7 days per week for 2-3 weeks for the following treatments:   PT Gait Training, PT Self Care/Home Eval, PT Therapeutic Exercises, PT Neuro Re-Ed/Balance, PT Therapeutic Activity, and PT Manual Therapy.    Passport items to be completed:  Get in/out of bed safely, in/out of a vehicle, safely use mobility device, walk or wheel around home/community, navigate up and down stairs, show how to get up/down from the ground, ensure home is accessible, demonstrate HEP, complete caregiver training    Goals:  Long term and short term goals have been discussed with patient and they are in agreement.    Physical Therapy Problems (Active)       Problem: Mobility       Dates: Start:  07/18/24         Goal: STG-Within one week, patient will ambulate 50 feet with kneeling scooter SBA       Dates: Start:  07/18/24            Goal: STG-Within one week, patient will ambulate up/down a curb with FWW CGA       Dates: Start:  07/18/24               Problem: Mobility Transfers       Dates: Start:  07/18/24         Goal: STG-Within one week, patient will sit to stand with CGA       Dates: Start:  07/18/24               Problem: PT-Long Term Goals       Dates: Start:  07/18/24         Goal: LTG-By discharge, patient will ambulate with kneeling scooter >150 feet supervised       Dates: Start:  07/18/24            Goal: LTG-By discharge, patient will transfer one surface to another supervised        Dates: Start:  07/18/24            Goal: LTG-By discharge, patient will ambulate up/down 4-6 stairs with single railing supervised       Dates: Start:  07/18/24            Goal: LTG-By discharge, patient will transfer in/out of a car supervised       Dates: Start:  07/18/24

## 2024-07-18 NOTE — PROGRESS NOTES
NURSING DAILY NOTE    Name: Pj Freeman   Date of Admission: 7/17/2024   Admitting Diagnosis: Acute CVA (cerebrovascular accident) ([facility])  Attending Physician: Dilia Salazar M.d.  Allergies: Patient has no known allergies.    Safety  Patient Assist     Patient Precautions     Precaution Comments     Bed Transfer Status     Toilet Transfer Status      Assistive Devices  Rails  Oxygen  None - Room Air  Diet/Therapeutic Dining  Current Diet Order   Procedures    Diet Order Diet: Low Fiber(GI Soft); Second Modifier: (optional): Consistent CHO (Diabetic)     Pill Administration  whole  Agitated Behavioral Scale     ABS Level of Severity       Fall Risk  Has the patient had a fall this admission?      Taylor Gallego Fall Risk Scoring  12, MODERATE RISK  Fall Risk Safety Measures  bed alarm, chair alarm, poor balance, and low vision/ hearing    Vitals  Temperature: 36.5 °C (97.7 °F)  Temp src: Oral  Pulse: 82  Respiration: 16  Blood Pressure : (!) 145/71  Blood Pressure MAP (Calculated): 96 MM HG  BP Location: Left, Upper Arm  Patient BP Position: Supine     Oxygen  Pulse Oximetry: 96 %  O2 (LPM): 0  O2 Delivery Device: None - Room Air    Bowel and Bladder  Last Bowel Movement  07/17/24  Stool Type  Type 6: Fluffy pieces with ragged edges, a mushy stool  Bowel Device  Bathroom  Continent  Bladder: Continent void   Bowel: Continent movement  Bladder Function  Urine Void (mL): 100 ml  Number of Times Voided: 1  Urine Color: Yellow  Genitourinary Assessment   Bladder Assessment (WDL):  Within Defined Limits  [place]ordova Catheter: Not Applicable  Urine Color: Yellow  Time Void: Yes  Bladder Scan: Post Void  $ Bladder Scan Results (mL): 1    Skin  Rocky Score   18  Sensory Interventions   Bed Types: Standard/Trauma Mattress with Overlay  Skin Preventative Measures: Waffle Overlay  Moisture Interventions  Moisturizers/Barriers: Barrier Cream      Pain  Pain Rating  Scale  0 - No Pain  Pain Location  Foot  Pain Location Orientation  Left  Pain Interventions   Declines    ADLs    Bathing      Linen Change      Personal Hygiene  Perineal Care, Moist Samia Wipes  Chlorhexidine Bath      Oral Care     Teeth/Dentures     Shave     Nutrition Percentage Eaten     Environmental Precautions     Patient Turns/Positioning  Patient Turns Self from Side to Side  Patient Turns Assistance/Tolerance     Bed Positions     Head of Bed Elevated         Psychosocial/Neurologic Assessment  Psychosocial Assessment  Psychosocial (WDL):  Within Defined Limits  Family Behaviors: No Family Present  Neurologic Assessment  Level of Consciousness: Alert  EENT (WDL):  WDL Except    Cardio/Pulmonary Assessment  Edema   RLE Edema: 2+  LLE Edema: 2+  Respiratory Breath Sounds     Cardiac Assessment   Cardiac (WDL):  WDL Except (hx: HTN)

## 2024-07-18 NOTE — CONSULTS
HOSPITAL MEDICINE CONSULTATION    Requesting Physician:  Dr. Salazar    Reason for Consult:  Diabetes Mellitus    History of Present Illness:  The patient is a 69-year-old  male with past medical history significant for diabetes mellitus.  More recently, he underwent left transmetatarsal amputation on 7/3/24 for osteomyelitis.  On this occasion, the patient was admitted to [facility] on 7/8/24 as a transfer from [facility] for escalation of care and subspecialty consultation.  He presented to the hospital with syncope secondary to gastrointestinal bleeding, for which he was transfused with four units of packed red blood cells.  Esophagogastroduodenoscopy revealed six duodenal ulcers with one actively bleeding, which underwent clipping.  The patient's hospital course was complicated by left cerebrovascular accident, non-ST elevation myocardial infarction, and new atrial fibrillation.  Due to his ongoing functional debility, the patient was transferred to [facility] on 7/17/24.  Hospital Medicine consultation is requested to assist in the management of this patient's DM.  He is also noted to have vitamin D deficiency.    Review of Systems:  Review of Systems   Constitutional:  Negative for chills and fever.   HENT: Negative.     Eyes: Negative.    Respiratory:  Negative for cough and shortness of breath.    Cardiovascular:  Negative for chest pain and palpitations.   Gastrointestinal:  Negative for abdominal pain, nausea and vomiting.   Musculoskeletal:         Wound pain   Skin:  Negative for itching and rash.   Endo/Heme/Allergies:  Negative for polydipsia. Does not bruise/bleed easily.   All other systems reviewed and are negative.      Allergies:  No Known Allergies    Medications:    Current Facility-Administered Medications:     [START ON 7/23/2024] potassium chloride SA (Kdur) tablet 10 mEq, 10 mEq, Oral, DAILY, Rena Childs M.D.    SITagliptin  (Januvia) tablet 25 mg, 25 mg, Oral, DAILY, Rena Childs M.D., 25 mg at 07/22/24 0824    furosemide (Lasix) tablet 20 mg, 20 mg, Oral, DAILY, Dilia Salazar M.D., 20 mg at 07/22/24 0826    insulin regular (HumuLIN R,NovoLIN R) injection, 2-12 Units, Subcutaneous, 4X/DAY ACHS, 2 Units at 07/22/24 1742 **AND** POC blood glucose manual result, , , Q AC AND BEDTIME(S) **AND** NOTIFY MD and PharmD, , , Once **AND** Administer 20 grams of glucose (approximately 8 ounces of fruit juice) every 15 minutes PRN FSBG less than 70 mg/dL, , , PRN **AND** dextrose 10 % BOLUS 25 g, 25 g, Intravenous, Q15 MIN PRN, Rena Childs M.D.    vitamin D3 (Cholecalciferol) tablet 2,000 Units, 2,000 Units, Oral, DAILY, Rena Childs M.D., 2,000 Units at 07/22/24 0825    Respiratory Therapy Consult, , Nebulization, Continuous RT, Dilia Salazar M.D.    hydrALAZINE (Apresoline) tablet 25 mg, 25 mg, Oral, Q8HRS PRN, Dilia Salazar M.D.    acetaminophen (Tylenol) tablet 650 mg, 650 mg, Oral, Q4HRS PRN, Dilia Salazar M.D.    senna-docusate (Pericolace Or Senokot S) 8.6-50 MG per tablet 2 Tablet, 2 Tablet, Oral, BID, 2 Tablet at 07/20/24 0828 **AND** polyethylene glycol/lytes (Miralax) Packet 1 Packet, 1 Packet, Oral, QDAY PRN, Dilia Salazar M.D.    docusate sodium (Enemeez) enema 283 mg, 283 mg, Rectal, QDAY PRN, Dilia Salazar M.D.    magnesium hydroxide (Milk Of Magnesia) suspension 30 mL, 30 mL, Oral, QDAY PRN, Dobbs Yordy Martin, M.D.    carboxymethylcellulose (Refresh Tears) 0.5 % ophthalmic drops 1 Drop, 1 Drop, Both Eyes, PRN, Dilia Salazar M.D.    mag hydrox-al hydrox-simeth (Maalox Plus Es Or Mylanta Ds) suspension 20 mL, 20 mL, Oral, Q2HRS PRN, Dilia Salazar M.D.    ondansetron (Zofran ODT) dispertab 4 mg, 4 mg, Oral, 4X/DAY PRN **OR** ondansetron (Zofran) syringe/vial injection 4 mg, 4 mg, Intramuscular, 4X/DAY PRN, Dilia Salazar M.D.    traZODone  (Desyrel) tablet 50 mg, 50 mg, Oral, QHS PRN, Dilia Salazar M.D., 50 mg at 07/21/24 2321    sodium chloride (Ocean) 0.65 % nasal spray 2 Spray, 2 Spray, Nasal, PRN, Dilia Salazar M.D.    oxyCODONE immediate-release (Roxicodone) tablet 5 mg, 5 mg, Oral, Q3HRS PRN **OR** oxyCODONE immediate release (Roxicodone) tablet 10 mg, 10 mg, Oral, Q3HRS PRN, Dilia Salazar M.D.    amiodarone (Cordarone) tablet 400 mg, 400 mg, Oral, TWICE DAILY, Dilia Salazar M.D., 400 mg at 07/22/24 1750    atorvastatin (Lipitor) tablet 40 mg, 40 mg, Oral, Q EVENING, Dilia Salazar M.D., 40 mg at 07/21/24 2013    calcium carbonate (Tums) chewable tab 1,000 mg, 1,000 mg, Oral, TID WITH MEALS, Dilia Salazar M.D., 1,000 mg at 07/22/24 1750    metFORMIN (Glucophage) tablet 1,000 mg, 1,000 mg, Oral, BID, Dilia Salazar M.D., 1,000 mg at 07/22/24 1750    omeprazole (PriLOSEC) capsule 40 mg, 40 mg, Oral, BID, Dilia Salazar M.D., 40 mg at 07/22/24 0826    bisacodyl (Dulcolax) suppository 10 mg, 10 mg, Rectal, QDAY PRN, Dilia Salazar M.D.    Past Medical/Surgical History:  No past medical history on file.  Past Surgical History:   Procedure Laterality Date    [place] UPPER GI ENDOSCOPY,DIAGNOSIS N/A 7/9/2024    Procedure: GASTROSCOPY;  Surgeon: Olu Diop M.D.;  Location: SURGERY SAME DAY [facility];  Service: Gastroenterology    NY UPPER GI ENDOSCOPY,CTRL BLEED  7/9/2024    Procedure: EGD, WITH CLIP PLACEMENT;  Surgeon: Olu Diop M.D.;  Location: SURGERY SAME DAY [facility];  Service: Gastroenterology    [place] UPPER GI ENDOSCOPY,BIOPSY N/A 7/9/2024    Procedure: GASTROSCOPY, WITH BIOPSY;  Surgeon: Olu Diop M.D.;  Location: SURGERY SAME DAY [facility];  Service: Gastroenterology       Social History:  Social History     Socioeconomic History    Marital status:      Spouse name: Not on file    Number of children: Not on file    Years of education: Not  "on file    Highest education level: Not on file   Occupational History    Not on file   Tobacco Use    Smoking status: Never    Smokeless tobacco: Never   Substance and Sexual Activity    Alcohol use: Not on file    Drug use: Not Currently     Comment: \"pot\" about a week ago    Sexual activity: Not on file   Other Topics Concern    Not on file   Social History Narrative    Not on file     Social Determinants of Health     Financial Resource Strain: Not on File (4/29/2024)    Received from [facility]    Financial Resource Strain     Financial Resource Strain: 0   Food Insecurity: No Food Insecurity (7/8/2024)    Hunger Vital Sign     Worried About Running Out of Food in the Last Year: Never true     Ran Out of Food in the Last Year: Never true   Transportation Needs: No Transportation Needs (7/22/2024)    PRAPARE - Transportation     Lack of Transportation (Medical): No     Lack of Transportation (Non-Medical): No   Physical Activity: Not on File (4/29/2024)    Received from [facility]    Physical Activity     Physical Activity: 0   Stress: Not on File (4/29/2024)    Received from [facility]    Stress     Stress: 0   Social Connections: Not on File (4/29/2024)    Received from [facility]    Social Connections     Social Connections and Isolation: 0   Intimate Partner Violence: Not At Risk (7/8/2024)    Humiliation, Afraid, Rape, and Kick questionnaire     Fear of Current or Ex-Partner: No     Emotionally Abused: No     Physically Abused: No     Sexually Abused: No   Housing Stability: Low Risk  (7/8/2024)    Housing Stability Vital Sign     Unable to Pay for Housing in the Last Year: No     Number of Places Lived in the Last Year: 1     Unstable Housing in the Last Year: No       Family History:  No family history on file.    Physical Examination:     Physical Exam  Vitals reviewed.   Constitutional:       General: He is not in acute distress.     Appearance: Normal appearance. He is not ill-appearing.   HENT:      Head: Normocephalic " and atraumatic.      Right Ear: External ear normal.      Left Ear: External ear normal.      Nose: Nose normal.      Mouth/Throat:      Pharynx: Oropharynx is clear.   Eyes:      General:         Right eye: No discharge.         Left eye: No discharge.      Extraocular Movements: Extraocular movements intact.      Conjunctiva/sclera: Conjunctivae normal.   Cardiovascular:      Rate and Rhythm: Normal rate and regular rhythm.   Pulmonary:      Effort: No respiratory distress.      Breath sounds: No wheezing.      Comments: Decreased BS  Abdominal:      General: Bowel sounds are normal. There is no distension.      Palpations: Abdomen is soft.      Tenderness: There is no abdominal tenderness. There is no guarding or rebound.   Musculoskeletal:      Cervical back: Normal range of motion and neck supple.      Right lower leg: Edema present.      Left lower leg: Edema present.   Skin:     General: Skin is warm and dry.   Neurological:      Mental Status: He is alert and oriented to person, place, and time.         Laboratory Data:      Imaging:      Impressions/Recommendations:  Acute osteomyelitis of left foot ([facility])  S/P recent L TMA on 7/3/24 at [facility]  Wound care and pain control per Physiatry    Acute CVA (cerebrovascular accident) ([facility])  Was on Plavix and Lipitor  Antiplatelet presently on hold d/t recent GIB    NSTEMI (non-ST elevated myocardial infarction) ([facility])  Was on Plavix and Lipitor  Antiplatelet presently on hold d/t recent GIB    Upper GI bleed  Pt presented w/ syncope  EGD DU x 6 S/P clip x 1 for active bleeding  S/P PRBC x 4 u  Continue high dose PPI bid    A-fib ([facility])  Echo EF 60%, RVSP 80 mmHg, severe MS, small pericardial effusion, pleural effusion noted  On Amiodarone and Lasix  AC on hold d/t recent GIB  Check BNP and CXR  Needs Cardiology F/U    Acute blood loss anemia  2/2 GIB  Has normocytic indices  Check Fe Panel and Ferritin  Follow H/H    Vitamin D deficiency  Vit D level 17  Start  supplementation    Type 2 diabetes mellitus (HCC)  HbA1c 5.4  Continue Lantus and Metformin  Adjust SSI    Full Code    Thank you for the opportunity to assist in this patient's care.  We will continue to follow along with you.

## 2024-07-18 NOTE — PROGRESS NOTES
"  Physical Medicine & Rehabilitation Progress Note    Encounter Date: 7/18/2024    Chief Complaint: Decreased mobility, weakness    Interval Events (Subjective):  Patient sitting up in room. He reports he slept well last night. He reports pain is controlled. He has ongoing anemia, hyperglycemia and discussed will consult hospitalist    Objective:  VITAL SIGNS: /71   Pulse 66   Temp 36.6 °C (97.9 °F) (Oral)   Resp 17   Ht 1.803 m (5' 11\")   Wt 74.8 kg (165 lb)   SpO2 97%   BMI 23.01 kg/m²   Gen: NAD  Psych: Mood and affect appropriate  CV: RRR, 0 edema  Resp: CTAB, no upper airway sounds  Abd: NTND  Neuro: AOx4, following commands    Laboratory Values:  Recent Results (from the past 72 hour(s))   POCT glucose device results    Collection Time: 07/15/24 11:35 AM   Result Value Ref Range    POC Glucose, Blood 137 (H) 65 - 99 mg/dL   HGB    Collection Time: 07/15/24  2:58 PM   Result Value Ref Range    Hemoglobin 8.8 (L) 14.0 - 18.0 g/dL   POCT glucose device results    Collection Time: 07/15/24  6:01 PM   Result Value Ref Range    POC Glucose, Blood 294 (H) 65 - 99 mg/dL   POCT glucose device results    Collection Time: 07/15/24  8:25 PM   Result Value Ref Range    POC Glucose, Blood 248 (H) 65 - 99 mg/dL   HGB    Collection Time: 07/15/24  9:36 PM   Result Value Ref Range    Hemoglobin 8.5 (L) 14.0 - 18.0 g/dL   Comp Metabolic Panel    Collection Time: 07/16/24  5:57 AM   Result Value Ref Range    Sodium 134 (L) 135 - 145 mmol/L    Potassium 3.6 3.6 - 5.5 mmol/L    Chloride 105 96 - 112 mmol/L    Co2 21 20 - 33 mmol/L    Anion Gap 8.0 7.0 - 16.0    Glucose 158 (H) 65 - 99 mg/dL    Bun 11 8 - 22 mg/dL    Creatinine 0.50 0.50 - 1.40 mg/dL    Calcium 7.0 (L) 8.5 - 10.5 mg/dL    Correct Calcium 8.4 (L) 8.5 - 10.5 mg/dL    AST(SGOT) 16 12 - 45 U/L    ALT(SGPT) 24 2 - 50 U/L    Alkaline Phosphatase 81 30 - 99 U/L    Total Bilirubin 0.6 0.1 - 1.5 mg/dL    Albumin 2.3 (L) 3.2 - 4.9 g/dL    Total Protein 4.1 (L) " 6.0 - 8.2 g/dL    Globulin 1.8 (L) 1.9 - 3.5 g/dL    A-G Ratio 1.3 g/dL   CBC WITH DIFFERENTIAL    Collection Time: 07/16/24  5:57 AM   Result Value Ref Range    WBC 8.3 4.8 - 10.8 K/uL    RBC 2.81 (L) 4.70 - 6.10 M/uL    Hemoglobin 8.3 (L) 14.0 - 18.0 g/dL    Hematocrit 24.4 (L) 42.0 - 52.0 %    MCV 86.8 81.4 - 97.8 fL    MCH 29.5 27.0 - 33.0 pg    MCHC 34.0 32.3 - 36.5 g/dL    RDW 49.4 35.9 - 50.0 fL    Platelet Count 328 164 - 446 K/uL    MPV 9.9 9.0 - 12.9 fL    Neutrophils-Polys 71.30 44.00 - 72.00 %    Lymphocytes 17.40 (L) 22.00 - 41.00 %    Monocytes 9.20 0.00 - 13.40 %    Eosinophils 0.80 0.00 - 6.90 %    Basophils 0.60 0.00 - 1.80 %    Immature Granulocytes 0.70 0.00 - 0.90 %    Nucleated RBC 0.00 0.00 - 0.20 /100 WBC    Neutrophils (Absolute) 5.88 1.82 - 7.42 K/uL    Lymphs (Absolute) 1.44 1.00 - 4.80 K/uL    Monos (Absolute) 0.76 0.00 - 0.85 K/uL    Eos (Absolute) 0.07 0.00 - 0.51 K/uL    Baso (Absolute) 0.05 0.00 - 0.12 K/uL    Immature Granulocytes (abs) 0.06 0.00 - 0.11 K/uL    NRBC (Absolute) 0.00 K/uL   IONIZED CALCIUM    Collection Time: 07/16/24  5:57 AM   Result Value Ref Range    Ionized Calcium 1.1 1.1 - 1.3 mmol/L   ESTIMATED GFR    Collection Time: 07/16/24  5:57 AM   Result Value Ref Range    GFR (CKD-EPI) 109 >60 mL/min/1.73 m 2   POCT glucose device results    Collection Time: 07/16/24  6:50 AM   Result Value Ref Range    POC Glucose, Blood 171 (H) 65 - 99 mg/dL   POCT glucose device results    Collection Time: 07/16/24 11:57 AM   Result Value Ref Range    POC Glucose, Blood 364 (H) 65 - 99 mg/dL   HGB    Collection Time: 07/16/24  1:49 PM   Result Value Ref Range    Hemoglobin 9.1 (L) 14.0 - 18.0 g/dL   POCT glucose device results    Collection Time: 07/16/24  5:06 PM   Result Value Ref Range    POC Glucose, Blood 199 (H) 65 - 99 mg/dL   POCT glucose device results    Collection Time: 07/16/24  9:09 PM   Result Value Ref Range    POC Glucose, Blood 261 (H) 65 - 99 mg/dL   POCT glucose  device results    Collection Time: 07/17/24  6:19 AM   Result Value Ref Range    POC Glucose, Blood 91 65 - 99 mg/dL   Comp Metabolic Panel    Collection Time: 07/17/24  7:02 AM   Result Value Ref Range    Sodium 138 135 - 145 mmol/L    Potassium 3.4 (L) 3.6 - 5.5 mmol/L    Chloride 104 96 - 112 mmol/L    Co2 24 20 - 33 mmol/L    Anion Gap 10.0 7.0 - 16.0    Glucose 99 65 - 99 mg/dL    Bun 11 8 - 22 mg/dL    Creatinine 0.57 0.50 - 1.40 mg/dL    Calcium 7.6 (L) 8.5 - 10.5 mg/dL    Correct Calcium 8.7 8.5 - 10.5 mg/dL    AST(SGOT) 13 12 - 45 U/L    ALT(SGPT) 21 2 - 50 U/L    Alkaline Phosphatase 93 30 - 99 U/L    Total Bilirubin 0.5 0.1 - 1.5 mg/dL    Albumin 2.6 (L) 3.2 - 4.9 g/dL    Total Protein 4.7 (L) 6.0 - 8.2 g/dL    Globulin 2.1 1.9 - 3.5 g/dL    A-G Ratio 1.2 g/dL   CBC WITH DIFFERENTIAL    Collection Time: 07/17/24  7:02 AM   Result Value Ref Range    WBC 8.8 4.8 - 10.8 K/uL    RBC 3.28 (L) 4.70 - 6.10 M/uL    Hemoglobin 9.3 (L) 14.0 - 18.0 g/dL    Hematocrit 28.5 (L) 42.0 - 52.0 %    MCV 86.9 81.4 - 97.8 fL    MCH 28.4 27.0 - 33.0 pg    MCHC 32.6 32.3 - 36.5 g/dL    RDW 50.2 (H) 35.9 - 50.0 fL    Platelet Count 363 164 - 446 K/uL    MPV 9.7 9.0 - 12.9 fL    Neutrophils-Polys 67.30 44.00 - 72.00 %    Lymphocytes 20.70 (L) 22.00 - 41.00 %    Monocytes 10.00 0.00 - 13.40 %    Eosinophils 0.90 0.00 - 6.90 %    Basophils 0.50 0.00 - 1.80 %    Immature Granulocytes 0.60 0.00 - 0.90 %    Nucleated RBC 0.00 0.00 - 0.20 /100 WBC    Neutrophils (Absolute) 5.93 1.82 - 7.42 K/uL    Lymphs (Absolute) 1.82 1.00 - 4.80 K/uL    Monos (Absolute) 0.88 (H) 0.00 - 0.85 K/uL    Eos (Absolute) 0.08 0.00 - 0.51 K/uL    Baso (Absolute) 0.04 0.00 - 0.12 K/uL    Immature Granulocytes (abs) 0.05 0.00 - 0.11 K/uL    NRBC (Absolute) 0.00 K/uL   ESTIMATED GFR    Collection Time: 07/17/24  7:02 AM   Result Value Ref Range    GFR (CKD-EPI) 105 >60 mL/min/1.73 m 2   POCT glucose device results    Collection Time: 07/17/24 12:27 PM   Result  Value Ref Range    POC Glucose, Blood 128 (H) 65 - 99 mg/dL   POCT glucose device results    Collection Time: 07/17/24  5:25 PM   Result Value Ref Range    POC Glucose, Blood 195 (H) 65 - 99 mg/dL   POCT glucose device results    Collection Time: 07/17/24  8:46 PM   Result Value Ref Range    POC Glucose, Blood 250 (H) 65 - 99 mg/dL   CBC with Differential    Collection Time: 07/18/24  6:33 AM   Result Value Ref Range    WBC 8.4 4.8 - 10.8 K/uL    RBC 3.06 (L) 4.70 - 6.10 M/uL    Hemoglobin 8.9 (L) 14.0 - 18.0 g/dL    Hematocrit 26.7 (L) 42.0 - 52.0 %    MCV 87.3 81.4 - 97.8 fL    MCH 29.1 27.0 - 33.0 pg    MCHC 33.3 32.3 - 36.5 g/dL    RDW 49.3 35.9 - 50.0 fL    Platelet Count 396 164 - 446 K/uL    MPV 9.8 9.0 - 12.9 fL    Neutrophils-Polys 69.70 44.00 - 72.00 %    Lymphocytes 20.20 (L) 22.00 - 41.00 %    Monocytes 8.40 0.00 - 13.40 %    Eosinophils 0.70 0.00 - 6.90 %    Basophils 0.50 0.00 - 1.80 %    Immature Granulocytes 0.50 0.00 - 0.90 %    Nucleated RBC 0.00 0.00 - 0.20 /100 WBC    Neutrophils (Absolute) 5.88 1.82 - 7.42 K/uL    Lymphs (Absolute) 1.70 1.00 - 4.80 K/uL    Monos (Absolute) 0.71 0.00 - 0.85 K/uL    Eos (Absolute) 0.06 0.00 - 0.51 K/uL    Baso (Absolute) 0.04 0.00 - 0.12 K/uL    Immature Granulocytes (abs) 0.04 0.00 - 0.11 K/uL    NRBC (Absolute) 0.00 K/uL   Comp Metabolic Panel (CMP)    Collection Time: 07/18/24  6:33 AM   Result Value Ref Range    Sodium 139 135 - 145 mmol/L    Potassium 3.8 3.6 - 5.5 mmol/L    Chloride 103 96 - 112 mmol/L    Co2 26 20 - 33 mmol/L    Anion Gap 10.0 7.0 - 16.0    Glucose 108 (H) 65 - 99 mg/dL    Bun 15 8 - 22 mg/dL    Creatinine 0.64 0.50 - 1.40 mg/dL    Calcium 7.6 (L) 8.5 - 10.5 mg/dL    Correct Calcium 8.6 8.5 - 10.5 mg/dL    AST(SGOT) 14 12 - 45 U/L    ALT(SGPT) 21 2 - 50 U/L    Alkaline Phosphatase 95 30 - 99 U/L    Total Bilirubin 0.5 0.1 - 1.5 mg/dL    Albumin 2.7 (L) 3.2 - 4.9 g/dL    Total Protein 4.7 (L) 6.0 - 8.2 g/dL    Globulin 2.0 1.9 - 3.5 g/dL     A-G Ratio 1.4 g/dL   HEMOGLOBIN A1C    Collection Time: 07/18/24  6:33 AM   Result Value Ref Range    Glycohemoglobin 5.4 4.0 - 5.6 %    Est Avg Glucose 108 mg/dL   TSH with Reflex to FT4    Collection Time: 07/18/24  6:33 AM   Result Value Ref Range    TSH 1.230 0.380 - 5.330 uIU/mL   Vitamin D, 25-hydroxy (blood)    Collection Time: 07/18/24  6:33 AM   Result Value Ref Range    25-Hydroxy   Vitamin D 25 17 (L) 30 - 100 ng/mL   ESTIMATED GFR    Collection Time: 07/18/24  6:33 AM   Result Value Ref Range    GFR (CKD-EPI) 101 >60 mL/min/1.73 m 2   POCT glucose device results    Collection Time: 07/18/24  7:23 AM   Result Value Ref Range    POC Glucose, Blood 119 (H) 65 - 99 mg/dL       Medications:  Scheduled Medications   Medication Dose Frequency    Pharmacy Consult Request  1 Each PHARMACY TO DOSE    senna-docusate  2 Tablet BID    amiodarone  400 mg TWICE DAILY    atorvastatin  40 mg Q EVENING    calcium carbonate  1,000 mg TID WITH MEALS    furosemide  20 mg Q DAY    insulin GLARGINE  10 Units Q EVENING    insulin regular  3-14 Units 4X/DAY ACHS    metFORMIN  1,000 mg BID    omeprazole  40 mg BID     PRN medications: Respiratory Therapy Consult, hydrALAZINE, acetaminophen, senna-docusate **AND** polyethylene glycol/lytes, docusate sodium, magnesium hydroxide, carboxymethylcellulose, mag hydrox-al hydrox-simeth, ondansetron **OR** ondansetron, traZODone, sodium chloride, oxyCODONE immediate-release **OR** oxyCODONE immediate-release, insulin regular **AND** POC blood glucose manual result **AND** NOTIFY MD and PharmD **AND** Administer 20 grams of glucose (approximately 8 ounces of fruit juice) every 15 minutes PRN FSBG less than 70 mg/dL **AND** dextrose bolus, bisacodyl    Diet:  Current Diet Order   Procedures    Diet Order Diet: Low Fiber(GI Soft); Second Modifier: (optional): Consistent CHO (Diabetic)       Medical Decision Making and Plan:  L CVA -Patient with L parietal CVA in 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  Stroke Ppx has been held due to GI bleed  -PT and OT for mobility and ADLs. Per guidelines, 15 hours per week between PT, OT and/or SLP.  -Follow-up Neurology     HTN/CAD/Mitral stenosis/A fib - Patient on Amiodarone 400 mg BID, Lasix 20 mg daily     MT amputation - Recent at OSH. NWB reportedly on LLE     HLD - Patient on Atorvastatin 40 mg daily     GI bleed - Check AM CBC. Continue PPI BID. Hgb 8.9, consult hospitalist     DM2 with hyperglycemia - Patient on Lantus 5 U and SSI. Previously on metformin and Jardiance. Consult hospitalist     Anemia - Check AM CBC - 8.9 on admission.      Hypokalemia - Check AM CMP - 3.8, will monitor     Hypocalcemia - Check AM CMP - 7.6 on admission, will monitor     Pain - Patient on PRN Tylenol/Oxycodone     Skin - Patient at risk for skin breakdown due to debility in areas including sacrum, achilles, elbows and head in addition to other sites. Nursing to assess skin daily.      GI Ppx - Patient on Prilosec for GERD prophylaxis. Patient on Senna-docusate for constipation prophylaxis.      DVT Ppx - Patient not cleared for AC on transfer.  ___________________________________    T. Yordy Salazar MD/PhD  [facility] - Physical Medicine & Rehabilitation   [facility] - Brain Injury Medicine   ____________________________________    Total time:  50 minutes. Time spent included pre-rounding review of vitals and tests, unit/floor time, face-to-face time with the patient including physical examination, care coordination, counseling of patient and/or family, ordering medications/procedures/tests, discussion with CM, PT, OT, SLP and/or other healthcare providers, and documentation in the electronic medical record. Topics discussed included admission labs, anemia, hyperglycemia, and consult hospitalist. Patient's case was discussed face to face with Hospitalist on [facility] floor.

## 2024-07-18 NOTE — THERAPY
"Speech Language Pathology   Initial Assessment     Patient Name: Pj Freeman  AGE:  69 y.o., SEX:  male  Medical Record #: [id number]  Today's Date: 7/18/2024     Subjective    Patient agreeable to evaluation.  Hyperverbose and self distracting at times.  Patient repeated several stories through out session.         Objective       07/18/24 1031   Evaluation Charges   Charges Yes   SLP Speech Language Evaluation Speech Sound Language Comprehension   SLP Total Time Spent   SLP Individual Total Time Spent (Mins) 60   Prior Living Situation   Prior Services Home-Independent   Housing / Facility 1 Story House   Lives with - Patient's Self Care Capacity Spouse   Prior Level Of Function   Communication Unknown   Hearing Within Functional Limits for Evaluation   Hearing Aid None   Vision Reading    Patient's Primary Language English   Education Completed College   Occupation (Pre-Hospital Vocational) Retired Due To Age   Comments retired    Cognitive Pattern Assessment   Cognitive Pattern Assessment Used BIMS   Brief Interview for Mental Status (BIMS)   Repetition of Three Words (First Attempt) 3   Temporal Orientation: Year Correct   Temporal Orientation: Month Accurate within 5 days   Temporal Orientation: Day Correct   Recall: \"Sock\" No, could not recall   Recall: \"Blue\" Yes, no cue required   Recall: \"Bed\" Yes, after cueing (\"a piece of furniture\")   BIMS Summary Score 12   Confusion Assessment Method (CAM)   Is there evidence of an acute change in mental status from the patient's baseline? No   Inattention Behavior present, fluctuates (comes and goes, changes in severity)   Disorganized thinking Behavior present, fluctuates (comes and goes, changes in severity)   Altered level of consciousness Behavior not present   Swallowing/Nutritional Status   Swallowing/Nutritional Status Modified food consistency  (GI soft for GI issues.)   Functional Level of Assist   Comprehension Supervision   Expression " Independent   Social Interaction Modified Independent   Social Interaction Description Verbal cues   Problem Solving Minimal Assist   Problem Solving Description Increased time;Bed/chair alarm;Seat belt;Supervision;Therapy schedule;Verbal cueing   Memory Minimal Assist   Memory Description Increased time;Bed/chair alarm;Seat belt;Supervision;Therapy schedule;Verbal cueing   Outcome Measures   Outcome Measures Utilized SCCAN   SCCAN (Scales of Cognitive and Communicative Ability for Neurorehabilitation)   Oral Expression - Raw Score 19   Oral Expression - Scale Performance Score 100   Orientation - Raw Score 12   Orientation - Scale Performance Score 100   Memory - Raw Score 15   Memory - Scale Performance Score 79   Speech Comprehension - Raw Score 12   Speech Comprehension - Scale Performance Score 92   Reading Comprehension - Raw Score 7   Reading Comprehension - Scale Performance Score 58   Writing - Raw Score 7   Writing - Scale Performance Score 100   Attention - Raw Score 13   Attention - Scale Performance Score 81   Problem Solving - Raw Score 20   Problem Solving - Scale Performance Score 87   SCCAN Total Raw Score 82   SCCAN Degree of Severity Mild Impairment   Problem List   Problem List Cognitive-Linguistic Deficits;Attention Deficit;Memory Deficit;Executive Function Deficit;Reading Comprehension Deficit   Current Discharge Plan   Current Discharge Plan Return to Prior Living Situation   Benefit   Therapy Benefit Patient would benefit from Inpatient Rehab Speech-Language Pathology to address above identified deficits.   Strengths & Barriers   Strengths Able to follow instructions;Alert and oriented;Effective communication skills;Pleasant and cooperative;Motivated for self care and independence;Supportive family;Willingly participates in therapeutic activities   Barriers Impulsive;Impaired carryover of learning;Impaired functional cognition;Impaired balance  (impaired attention.)   Speech Language  Pathologist Assigned   Assigned SLP / Treatment Time / Comments [address].       Assessment    Per Dr. Salazar history and physical:  Patient is a 69 y.o. male with a PMH of DM2, HTN, PVD, and recent toe amputation (NWB LLE) who was admitted at [facility] on 7/6/24 after having syncope due to blood loss. He reportedly was transfused 4 U at [facility]. He was also found to have a small parietal L CVA on MRI as well as an NSTEMI. His stay was complicated by new A fib, anemia and melena requiring higher level of care and was transferred to [facility] on 7/8/24 for ongoing GI bleed. EGD was performed which showed multiple duodenal ulcers 1 of which was oozing requiring clipping. He was evaluated by Cardiology for severe mitral stenosis. Per discharge can consider restarting AC in 1-2 weeks.   Additional factors influencing patient status/progress (ie: cognitive factors, co-morbidities, social support, etc): Patient reports that he was driving and managing finances prior to hospitalization.  He reports that his daughter helped with medications after toe surgery but that he was managing them independently prior to that.  He is left handed.    Patient was seen for cognitive linguistic evaluation.  SCCAN administered.  Patient achieved a total raw score of 82 characteristic of an overall mild cognitive linguistic impairement.  Patient achieved the following percentage scores for given subtests:  Oral Expression 100, Orientation 100, Memory 79, Speech Comprehension 92, Reading Comprehension 58, Writing 100, Attention 81, Problem solving 87.    Patient displayed mild impairments of memory, and attention.  He displayed moderate impairment of reading comprehension characterized by rapid responses lacking full attention to detail before responding.  Patient is impulsive and self distracting, needing several verbal cues to redirect to assessment.   No aphasia/dysarthria appreciated during assessment.    Plan  Recommend Speech Therapy 30-60  minutes per day 5-6 days per week for 2-3 weeks for the following treatments:  SLP Self Care / ADL Training , SLP Cognitive Skill Development, and SLP Group Treatment.    Passport items to be completed:  Express basic needs, understand food/liquid recommendations, consistently follow swallow precautions, manage finances, manage medications, arrive to therapy appointments on time, complete daily memory log entries, solve problems related to safety situations, review education related to hospitalization, complete caregiver training     Goals:  Long term and short term goals have been discussed with patient and they are in agreement.    Speech Therapy Problems (Active)       Problem: Comprehension STGs       Dates: Start:  07/18/24         Goal: STG-Within one week, patient will perform reading comprehension task with        Dates: Start:  07/18/24            Goal: STG-Within one week, patient will       Dates: Start:  07/18/24               Problem: Memory STGs       Dates: Start:  07/18/24         Goal: STG-Within one week, patient will recall new training and safety sequencing with 80% acc  with use of external memory aid and compensatory strategies with min cues to improve.       Dates: Start:  07/18/24               Problem: Problem Solving STGs       Dates: Start:  07/18/24         Goal: STG-Within one week, patient will perform alternating attention tasks with 80% acc with min cues to improve.       Dates: Start:  07/18/24            Goal: STG-Within one week, patient will perform medication and financial management tasks with 80% acc with min cues to improve.       Dates: Start:  07/18/24               Problem: Speech/Swallowing LTGs       Dates: Start:  07/18/24         Goal: LTG-By discharge, patient will solve complex problems and recall safety training with 90% acc with spv. for safe discharge home.       Dates: Start:  07/18/24

## 2024-07-18 NOTE — FLOWSHEET NOTE
07/17/24 1749   Protocol Assessment   Initial Assessment Yes   Patient History   Pulmonary Diagnosis None   Procedures Relevant to Respiratory Status None   Home O2 No   Nocturnal CPAP No   Home Treatments/Frequency No   Protocol Pathways   Protocol Pathways None

## 2024-07-19 ENCOUNTER — APPOINTMENT (OUTPATIENT)
Dept: SPEECH THERAPY | Facility: REHABILITATION | Age: 69
DRG: 056 | End: 2024-07-19
Attending: PHYSICAL MEDICINE & REHABILITATION
Payer: MEDICARE

## 2024-07-19 ENCOUNTER — APPOINTMENT (OUTPATIENT)
Dept: OCCUPATIONAL THERAPY | Facility: REHABILITATION | Age: 69
DRG: 056 | End: 2024-07-19
Attending: PHYSICAL MEDICINE & REHABILITATION
Payer: MEDICARE

## 2024-07-19 ENCOUNTER — APPOINTMENT (OUTPATIENT)
Dept: PHYSICAL THERAPY | Facility: REHABILITATION | Age: 69
DRG: 056 | End: 2024-07-19
Attending: PHYSICAL MEDICINE & REHABILITATION
Payer: MEDICARE

## 2024-07-19 LAB
ANION GAP SERPL CALC-SCNC: 9 MMOL/L (ref 7–16)
BUN SERPL-MCNC: 16 MG/DL (ref 8–22)
CALCIUM SERPL-MCNC: 7.4 MG/DL (ref 8.5–10.5)
CHLORIDE SERPL-SCNC: 103 MMOL/L (ref 96–112)
CO2 SERPL-SCNC: 25 MMOL/L (ref 20–33)
CREAT SERPL-MCNC: 0.56 MG/DL (ref 0.5–1.4)
ERYTHROCYTE [DISTWIDTH] IN BLOOD BY AUTOMATED COUNT: 48.1 FL (ref 35.9–50)
FERRITIN SERPL-MCNC: 67.3 NG/ML (ref 22–322)
GFR SERPLBLD CREATININE-BSD FMLA CKD-EPI: 105 ML/MIN/1.73 M 2
GLUCOSE BLD STRIP.AUTO-MCNC: 162 MG/DL (ref 65–99)
GLUCOSE BLD STRIP.AUTO-MCNC: 172 MG/DL (ref 65–99)
GLUCOSE BLD STRIP.AUTO-MCNC: 188 MG/DL (ref 65–99)
GLUCOSE BLD STRIP.AUTO-MCNC: 75 MG/DL (ref 65–99)
GLUCOSE SERPL-MCNC: 59 MG/DL (ref 65–99)
HCT VFR BLD AUTO: 25.7 % (ref 42–52)
HGB BLD-MCNC: 8.5 G/DL (ref 14–18)
IRON SATN MFR SERPL: 8 % (ref 15–55)
IRON SERPL-MCNC: 20 UG/DL (ref 50–180)
MAGNESIUM SERPL-MCNC: 1.5 MG/DL (ref 1.5–2.5)
MCH RBC QN AUTO: 28.9 PG (ref 27–33)
MCHC RBC AUTO-ENTMCNC: 33.1 G/DL (ref 32.3–36.5)
MCV RBC AUTO: 87.4 FL (ref 81.4–97.8)
NT-PROBNP SERPL IA-MCNC: 5509 PG/ML (ref 0–125)
PHOSPHATE SERPL-MCNC: 2.6 MG/DL (ref 2.5–4.5)
PLATELET # BLD AUTO: 381 K/UL (ref 164–446)
PMV BLD AUTO: 9.7 FL (ref 9–12.9)
POTASSIUM SERPL-SCNC: 3.7 MMOL/L (ref 3.6–5.5)
RBC # BLD AUTO: 2.94 M/UL (ref 4.7–6.1)
SODIUM SERPL-SCNC: 137 MMOL/L (ref 135–145)
TIBC SERPL-MCNC: 248 UG/DL (ref 250–450)
UIBC SERPL-MCNC: 228 UG/DL (ref 110–370)
WBC # BLD AUTO: 8.2 K/UL (ref 4.8–10.8)

## 2024-07-19 PROCEDURE — A9270 NON-COVERED ITEM OR SERVICE: HCPCS | Performed by: HOSPITALIST

## 2024-07-19 PROCEDURE — 97130 THER IVNTJ EA ADDL 15 MIN: CPT

## 2024-07-19 PROCEDURE — 84100 ASSAY OF PHOSPHORUS: CPT

## 2024-07-19 PROCEDURE — 700102 HCHG RX REV CODE 250 W/ 637 OVERRIDE(OP): Performed by: PHYSICAL MEDICINE & REHABILITATION

## 2024-07-19 PROCEDURE — 83880 ASSAY OF NATRIURETIC PEPTIDE: CPT

## 2024-07-19 PROCEDURE — 83540 ASSAY OF IRON: CPT

## 2024-07-19 PROCEDURE — 97535 SELF CARE MNGMENT TRAINING: CPT | Mod: CO

## 2024-07-19 PROCEDURE — 700102 HCHG RX REV CODE 250 W/ 637 OVERRIDE(OP): Performed by: HOSPITALIST

## 2024-07-19 PROCEDURE — 82728 ASSAY OF FERRITIN: CPT

## 2024-07-19 PROCEDURE — 80048 BASIC METABOLIC PNL TOTAL CA: CPT

## 2024-07-19 PROCEDURE — 97530 THERAPEUTIC ACTIVITIES: CPT

## 2024-07-19 PROCEDURE — 82962 GLUCOSE BLOOD TEST: CPT | Mod: 91

## 2024-07-19 PROCEDURE — 99232 SBSQ HOSP IP/OBS MODERATE 35: CPT | Performed by: PHYSICAL MEDICINE & REHABILITATION

## 2024-07-19 PROCEDURE — 85027 COMPLETE CBC AUTOMATED: CPT

## 2024-07-19 PROCEDURE — 97129 THER IVNTJ 1ST 15 MIN: CPT

## 2024-07-19 PROCEDURE — 83550 IRON BINDING TEST: CPT

## 2024-07-19 PROCEDURE — 36415 COLL VENOUS BLD VENIPUNCTURE: CPT

## 2024-07-19 PROCEDURE — 83735 ASSAY OF MAGNESIUM: CPT

## 2024-07-19 PROCEDURE — 97110 THERAPEUTIC EXERCISES: CPT | Mod: CO

## 2024-07-19 PROCEDURE — A9270 NON-COVERED ITEM OR SERVICE: HCPCS | Performed by: PHYSICAL MEDICINE & REHABILITATION

## 2024-07-19 PROCEDURE — 97530 THERAPEUTIC ACTIVITIES: CPT | Mod: CO

## 2024-07-19 PROCEDURE — 770010 HCHG ROOM/CARE - REHAB SEMI PRIVAT*

## 2024-07-19 PROCEDURE — 99231 SBSQ HOSP IP/OBS SF/LOW 25: CPT | Performed by: HOSPITALIST

## 2024-07-19 PROCEDURE — 97116 GAIT TRAINING THERAPY: CPT

## 2024-07-19 RX ORDER — POTASSIUM CHLORIDE 1500 MG/1
40 TABLET, EXTENDED RELEASE ORAL ONCE
Status: DISCONTINUED | OUTPATIENT
Start: 2024-07-19 | End: 2024-07-19

## 2024-07-19 RX ADMIN — FUROSEMIDE 20 MG: 20 TABLET ORAL at 08:31

## 2024-07-19 RX ADMIN — METFORMIN HYDROCHLORIDE 1000 MG: 500 TABLET ORAL at 17:14

## 2024-07-19 RX ADMIN — OMEPRAZOLE 40 MG: 20 CAPSULE, DELAYED RELEASE ORAL at 20:33

## 2024-07-19 RX ADMIN — OMEPRAZOLE 40 MG: 20 CAPSULE, DELAYED RELEASE ORAL at 08:28

## 2024-07-19 RX ADMIN — CALCIUM CARBONATE (ANTACID) CHEW TAB 500 MG 1000 MG: 500 CHEW TAB at 17:13

## 2024-07-19 RX ADMIN — METFORMIN HYDROCHLORIDE 1000 MG: 500 TABLET ORAL at 05:35

## 2024-07-19 RX ADMIN — ATORVASTATIN CALCIUM 40 MG: 40 TABLET, FILM COATED ORAL at 20:33

## 2024-07-19 RX ADMIN — AMIODARONE HYDROCHLORIDE 400 MG: 200 TABLET ORAL at 05:35

## 2024-07-19 RX ADMIN — CALCIUM CARBONATE (ANTACID) CHEW TAB 500 MG 1000 MG: 500 CHEW TAB at 08:28

## 2024-07-19 RX ADMIN — CALCIUM CARBONATE (ANTACID) CHEW TAB 500 MG 1000 MG: 500 CHEW TAB at 11:07

## 2024-07-19 RX ADMIN — AMIODARONE HYDROCHLORIDE 400 MG: 200 TABLET ORAL at 17:14

## 2024-07-19 RX ADMIN — Medication 2000 UNITS: at 08:29

## 2024-07-19 RX ADMIN — TRAZODONE HYDROCHLORIDE 50 MG: 50 TABLET ORAL at 20:44

## 2024-07-19 ASSESSMENT — GAIT ASSESSMENTS
GAIT LEVEL OF ASSIST: CONTACT GUARD ASSIST
GAIT LEVEL OF ASSIST: CONTACT GUARD ASSIST
DISTANCE (FEET): 5
DISTANCE (FEET): 10
ASSISTIVE DEVICE: PARALLEL BARS;OTHER (COMMENTS)
ASSISTIVE DEVICE: FRONT WHEEL WALKER

## 2024-07-19 ASSESSMENT — PATIENT HEALTH QUESTIONNAIRE - PHQ9
1. LITTLE INTEREST OR PLEASURE IN DOING THINGS: NOT AT ALL
2. FEELING DOWN, DEPRESSED, IRRITABLE, OR HOPELESS: NOT AT ALL
2. FEELING DOWN, DEPRESSED, IRRITABLE, OR HOPELESS: NOT AT ALL
1. LITTLE INTEREST OR PLEASURE IN DOING THINGS: NOT AT ALL
SUM OF ALL RESPONSES TO PHQ9 QUESTIONS 1 AND 2: 0
SUM OF ALL RESPONSES TO PHQ9 QUESTIONS 1 AND 2: 0

## 2024-07-19 ASSESSMENT — ACTIVITIES OF DAILY LIVING (ADL): BED_CHAIR_WHEELCHAIR_TRANSFER_DESCRIPTION: SQUAT PIVOT TRANSFER TO WHEELCHAIR

## 2024-07-19 ASSESSMENT — PAIN DESCRIPTION - PAIN TYPE: TYPE: ACUTE PAIN

## 2024-07-19 NOTE — CARE PLAN
Problem: Self Care  Goal: Patient will have the ability to perform ADLs independently or with assistance (bathe, groom, dress, toilet and feed)  Outcome: Progressing  Patient requires moderate assist with self care needs.     Problem: Fall Risk - Rehab  Goal: Patient will remain free from falls  Outcome: Progressing  Patient reminded to call for assist with needs/transfers to prevent falls/injury.   The patient is Stable - Low risk of patient condition declining or worsening    Shift Goals  Clinical Goals: Safety  Patient Goals: Safety  Family Goals: no family present    Progress made toward(s) clinical / shift goals:      Patient is not progressing towards the following goals:

## 2024-07-19 NOTE — THERAPY
Speech Language Pathology  Daily Treatment     Patient Name: Pj Freeman  Age:  69 y.o., Sex:  male  Medical Record #: [id number]  Today's Date: 7/19/2024     Precautions  Precautions: Fall Risk, Non Weight Bearing Left Lower Extremity  Comments: left transmetatarsal amputation    Subjective    Pt was willing to participate in this ST session at bedside.       Objective       07/19/24 0901   Treatment Charges   SLP Cognitive Skill Development First 15 Minutes 1   SLP Cognitive Skill Development Additional 15 Minutes 1   SLP Total Time Spent   SLP Individual Total Time Spent (Mins) 30         Assessment    Reviewed pt's current medications.  When given the names of his medications pt was able to recall the purpose of approximately 75% of them.  Pt reported that he uses weekly pill boxes to organize his medications, but does sometimes forget to take the evening dose.      Strengths: Able to follow instructions, Alert and oriented, Effective communication skills, Pleasant and cooperative, Motivated for self care and independence, Supportive family, Willingly participates in therapeutic activities  Barriers: Impulsive, Impaired carryover of learning, Impaired functional cognition, Impaired balance (impaired attention.)    Plan    Mock medication sorting task, reading comprehension, financial management       Speech Therapy Problems (Active)       Problem: Comprehension STGs       Dates: Start:  07/18/24         Goal: STG-Within one week, patient will perform functional reading comprehension tasks with targets for attention with 80% acc with min cues to improve.       Dates: Start:  07/18/24               Problem: Memory STGs       Dates: Start:  07/18/24         Goal: STG-Within one week, patient will recall new training and safety sequencing with 80% acc  with use of external memory aid and compensatory strategies with min cues to improve.       Dates: Start:  07/18/24               Problem: Problem Solving STGs        Dates: Start:  07/18/24         Goal: STG-Within one week, patient will perform alternating attention tasks with 80% acc with min cues to improve.       Dates: Start:  07/18/24            Goal: STG-Within one week, patient will perform medication and financial management tasks with 80% acc with min cues to improve.       Dates: Start:  07/18/24               Problem: Speech/Swallowing LTGs       Dates: Start:  07/18/24         Goal: LTG-By discharge, patient will solve complex problems and recall safety training with 90% acc with spv. for safe discharge home.       Dates: Start:  07/18/24

## 2024-07-19 NOTE — THERAPY
"Occupational Therapy  Daily Treatment     Patient Name: Pj Freeman  Age:  69 y.o., Sex:  male  Medical Record #: [id number]  Today's Date: 7/19/2024     Precautions  Precautions: (P) Fall Risk, Non Weight Bearing Left Lower Extremity  Comments: (P) left transmetatarsal amputation         Subjective    \" The dining room was cold today.\"     Objective       07/19/24 1231   OT Charge Group   OT Self Care / ADL (Units) 2   OT Therapy Activity (Units) 1   OT Therapeutic Exercise (Units) 1   OT Total Time Spent   OT Individual Total Time Spent (Mins) 60   Precautions   Precautions Fall Risk;Non Weight Bearing Left Lower Extremity   Comments left transmetatarsal amputation   Functional Level of Assist   Toileting Maximal Assist   Bed, Chair, Wheelchair Transfer Minimal Assist  (and mod cues for lateral scoot from w/c <->  sitting edge of mat)   Toilet Transfers Moderate Assist   Sitting Upper Body Exercises   Lat Pull 3 sets of 10;Bilateral  (weighted pulley 15lbs)   Bilateral Row 3 sets of 10;Bilateral  (weighted pulley  20lbs)   Upper Extremity Bike Level 2 Resistance  (x 5  minutes  motomed)   Interdisciplinary Plan of Care Collaboration   Patient Position at End of Therapy Seated;Call Light within Reach;Tray Table within Reach;Chair Alarm On         Assessment     Difficulty with   NWB  left LE during  transfer and  sit to stands.      Motivated to make functional gains and return home   Participates to the best of his ability with tasks presented this session     Strengths: Able to follow instructions, Alert and oriented, Effective communication skills, Independent prior level of function, Motivated for self care and independence, Pleasant and cooperative, Supportive family, Willingly participates in therapeutic activities  Barriers: Decreased endurance, Generalized weakness, Impaired activity tolerance, Tube feeding, Fatigue    Plan    ADL  IADL  , related mobility and cognition   strength/endurance building  " standing tolerance and balance activity   all incorporating  NWB  left  LE       DME  OT DME Recommendations  Bathroom Equipment: 3 in 1 Commode        Occupational Therapy Goals (Active)       Problem: Bathing       Dates: Start:  07/18/24         Goal: STG-Within one week, patient will bathe with SBA.       Dates: Start:  07/18/24               Problem: Dressing       Dates: Start:  07/18/24         Goal: STG-Within one week, patient will dress LB with Min A.       Dates: Start:  07/18/24               Problem: Functional Transfers       Dates: Start:  07/18/24         Goal: STG-Within one week, patient will transfer to toilet with Min A.       Dates: Start:  07/18/24            Goal: STG-Within one week, patient will transfer to tub/shower with Min A.       Dates: Start:  07/18/24               Problem: OT Long Term Goals       Dates: Start:  07/18/24         Goal: LTG-By discharge, patient will complete basic self care tasks at Mod Independent level.       Dates: Start:  07/18/24            Goal: LTG-By discharge, patient will perform bathroom transfers at Mod Independent level.       Dates: Start:  07/18/24               Problem: Toileting       Dates: Start:  07/18/24         Goal: STG-Within one week, patient will complete toileting tasks with Min A.       Dates: Start:  07/18/24

## 2024-07-19 NOTE — PROGRESS NOTES
"  Physical Medicine & Rehabilitation Progress Note    Encounter Date: 7/19/2024    Chief Complaint: Decreased mobility, weakness    Interval Events (Subjective):  Patient sitting up in room. He reports therapy is going well. He reports he is sleeping OK. Denies NVD.     Objective:  VITAL SIGNS: /66   Pulse 75   Temp 36.4 °C (97.5 °F) (Oral)   Resp 18   Ht 1.803 m (5' 11\")   Wt 74.8 kg (165 lb)   SpO2 96%   BMI 23.01 kg/m²   Gen: NAD  Psych: Mood and affect appropriate  CV: RRR, 0 edema  Resp: CTAB, no upper airway sounds  Abd: NTND  Neuro: AOx4, following commands  Unchanged from 7/18/24    Laboratory Values:  Recent Results (from the past 72 hour(s))   HGB    Collection Time: 07/16/24  1:49 PM   Result Value Ref Range    Hemoglobin 9.1 (L) 14.0 - 18.0 g/dL   POCT glucose device results    Collection Time: 07/16/24  5:06 PM   Result Value Ref Range    POC Glucose, Blood 199 (H) 65 - 99 mg/dL   POCT glucose device results    Collection Time: 07/16/24  9:09 PM   Result Value Ref Range    POC Glucose, Blood 261 (H) 65 - 99 mg/dL   POCT glucose device results    Collection Time: 07/17/24  6:19 AM   Result Value Ref Range    POC Glucose, Blood 91 65 - 99 mg/dL   Comp Metabolic Panel    Collection Time: 07/17/24  7:02 AM   Result Value Ref Range    Sodium 138 135 - 145 mmol/L    Potassium 3.4 (L) 3.6 - 5.5 mmol/L    Chloride 104 96 - 112 mmol/L    Co2 24 20 - 33 mmol/L    Anion Gap 10.0 7.0 - 16.0    Glucose 99 65 - 99 mg/dL    Bun 11 8 - 22 mg/dL    Creatinine 0.57 0.50 - 1.40 mg/dL    Calcium 7.6 (L) 8.5 - 10.5 mg/dL    Correct Calcium 8.7 8.5 - 10.5 mg/dL    AST(SGOT) 13 12 - 45 U/L    ALT(SGPT) 21 2 - 50 U/L    Alkaline Phosphatase 93 30 - 99 U/L    Total Bilirubin 0.5 0.1 - 1.5 mg/dL    Albumin 2.6 (L) 3.2 - 4.9 g/dL    Total Protein 4.7 (L) 6.0 - 8.2 g/dL    Globulin 2.1 1.9 - 3.5 g/dL    A-G Ratio 1.2 g/dL   CBC WITH DIFFERENTIAL    Collection Time: 07/17/24  7:02 AM   Result Value Ref Range    WBC 8.8 " 4.8 - 10.8 K/uL    RBC 3.28 (L) 4.70 - 6.10 M/uL    Hemoglobin 9.3 (L) 14.0 - 18.0 g/dL    Hematocrit 28.5 (L) 42.0 - 52.0 %    MCV 86.9 81.4 - 97.8 fL    MCH 28.4 27.0 - 33.0 pg    MCHC 32.6 32.3 - 36.5 g/dL    RDW 50.2 (H) 35.9 - 50.0 fL    Platelet Count 363 164 - 446 K/uL    MPV 9.7 9.0 - 12.9 fL    Neutrophils-Polys 67.30 44.00 - 72.00 %    Lymphocytes 20.70 (L) 22.00 - 41.00 %    Monocytes 10.00 0.00 - 13.40 %    Eosinophils 0.90 0.00 - 6.90 %    Basophils 0.50 0.00 - 1.80 %    Immature Granulocytes 0.60 0.00 - 0.90 %    Nucleated RBC 0.00 0.00 - 0.20 /100 WBC    Neutrophils (Absolute) 5.93 1.82 - 7.42 K/uL    Lymphs (Absolute) 1.82 1.00 - 4.80 K/uL    Monos (Absolute) 0.88 (H) 0.00 - 0.85 K/uL    Eos (Absolute) 0.08 0.00 - 0.51 K/uL    Baso (Absolute) 0.04 0.00 - 0.12 K/uL    Immature Granulocytes (abs) 0.05 0.00 - 0.11 K/uL    NRBC (Absolute) 0.00 K/uL   ESTIMATED GFR    Collection Time: 07/17/24  7:02 AM   Result Value Ref Range    GFR (CKD-EPI) 105 >60 mL/min/1.73 m 2   POCT glucose device results    Collection Time: 07/17/24 12:27 PM   Result Value Ref Range    POC Glucose, Blood 128 (H) 65 - 99 mg/dL   POCT glucose device results    Collection Time: 07/17/24  5:25 PM   Result Value Ref Range    POC Glucose, Blood 195 (H) 65 - 99 mg/dL   POCT glucose device results    Collection Time: 07/17/24  8:46 PM   Result Value Ref Range    POC Glucose, Blood 250 (H) 65 - 99 mg/dL   CBC with Differential    Collection Time: 07/18/24  6:33 AM   Result Value Ref Range    WBC 8.4 4.8 - 10.8 K/uL    RBC 3.06 (L) 4.70 - 6.10 M/uL    Hemoglobin 8.9 (L) 14.0 - 18.0 g/dL    Hematocrit 26.7 (L) 42.0 - 52.0 %    MCV 87.3 81.4 - 97.8 fL    MCH 29.1 27.0 - 33.0 pg    MCHC 33.3 32.3 - 36.5 g/dL    RDW 49.3 35.9 - 50.0 fL    Platelet Count 396 164 - 446 K/uL    MPV 9.8 9.0 - 12.9 fL    Neutrophils-Polys 69.70 44.00 - 72.00 %    Lymphocytes 20.20 (L) 22.00 - 41.00 %    Monocytes 8.40 0.00 - 13.40 %    Eosinophils 0.70 0.00 - 6.90  %    Basophils 0.50 0.00 - 1.80 %    Immature Granulocytes 0.50 0.00 - 0.90 %    Nucleated RBC 0.00 0.00 - 0.20 /100 WBC    Neutrophils (Absolute) 5.88 1.82 - 7.42 K/uL    Lymphs (Absolute) 1.70 1.00 - 4.80 K/uL    Monos (Absolute) 0.71 0.00 - 0.85 K/uL    Eos (Absolute) 0.06 0.00 - 0.51 K/uL    Baso (Absolute) 0.04 0.00 - 0.12 K/uL    Immature Granulocytes (abs) 0.04 0.00 - 0.11 K/uL    NRBC (Absolute) 0.00 K/uL   Comp Metabolic Panel (CMP)    Collection Time: 07/18/24  6:33 AM   Result Value Ref Range    Sodium 139 135 - 145 mmol/L    Potassium 3.8 3.6 - 5.5 mmol/L    Chloride 103 96 - 112 mmol/L    Co2 26 20 - 33 mmol/L    Anion Gap 10.0 7.0 - 16.0    Glucose 108 (H) 65 - 99 mg/dL    Bun 15 8 - 22 mg/dL    Creatinine 0.64 0.50 - 1.40 mg/dL    Calcium 7.6 (L) 8.5 - 10.5 mg/dL    Correct Calcium 8.6 8.5 - 10.5 mg/dL    AST(SGOT) 14 12 - 45 U/L    ALT(SGPT) 21 2 - 50 U/L    Alkaline Phosphatase 95 30 - 99 U/L    Total Bilirubin 0.5 0.1 - 1.5 mg/dL    Albumin 2.7 (L) 3.2 - 4.9 g/dL    Total Protein 4.7 (L) 6.0 - 8.2 g/dL    Globulin 2.0 1.9 - 3.5 g/dL    A-G Ratio 1.4 g/dL   HEMOGLOBIN A1C    Collection Time: 07/18/24  6:33 AM   Result Value Ref Range    Glycohemoglobin 5.4 4.0 - 5.6 %    Est Avg Glucose 108 mg/dL   TSH with Reflex to FT4    Collection Time: 07/18/24  6:33 AM   Result Value Ref Range    TSH 1.230 0.380 - 5.330 uIU/mL   Vitamin D, 25-hydroxy (blood)    Collection Time: 07/18/24  6:33 AM   Result Value Ref Range    25-Hydroxy   Vitamin D 25 17 (L) 30 - 100 ng/mL   ESTIMATED GFR    Collection Time: 07/18/24  6:33 AM   Result Value Ref Range    GFR (CKD-EPI) 101 >60 mL/min/1.73 m 2   POCT glucose device results    Collection Time: 07/18/24  7:23 AM   Result Value Ref Range    POC Glucose, Blood 119 (H) 65 - 99 mg/dL   POCT glucose device results    Collection Time: 07/18/24 11:37 AM   Result Value Ref Range    POC Glucose, Blood 158 (H) 65 - 99 mg/dL   POCT glucose device results    Collection Time:  07/18/24  5:10 PM   Result Value Ref Range    POC Glucose, Blood 200 (H) 65 - 99 mg/dL   POCT glucose device results    Collection Time: 07/18/24  8:25 PM   Result Value Ref Range    POC Glucose, Blood 194 (H) 65 - 99 mg/dL   CBC WITHOUT DIFFERENTIAL    Collection Time: 07/19/24  5:44 AM   Result Value Ref Range    WBC 8.2 4.8 - 10.8 K/uL    RBC 2.94 (L) 4.70 - 6.10 M/uL    Hemoglobin 8.5 (L) 14.0 - 18.0 g/dL    Hematocrit 25.7 (L) 42.0 - 52.0 %    MCV 87.4 81.4 - 97.8 fL    MCH 28.9 27.0 - 33.0 pg    MCHC 33.1 32.3 - 36.5 g/dL    RDW 48.1 35.9 - 50.0 fL    Platelet Count 381 164 - 446 K/uL    MPV 9.7 9.0 - 12.9 fL   Basic Metabolic Panel    Collection Time: 07/19/24  5:44 AM   Result Value Ref Range    Sodium 137 135 - 145 mmol/L    Potassium 3.7 3.6 - 5.5 mmol/L    Chloride 103 96 - 112 mmol/L    Co2 25 20 - 33 mmol/L    Glucose 59 (L) 65 - 99 mg/dL    Bun 16 8 - 22 mg/dL    Creatinine 0.56 0.50 - 1.40 mg/dL    Calcium 7.4 (L) 8.5 - 10.5 mg/dL    Anion Gap 9.0 7.0 - 16.0   MAGNESIUM    Collection Time: 07/19/24  5:44 AM   Result Value Ref Range    Magnesium 1.5 1.5 - 2.5 mg/dL   PHOSPHORUS    Collection Time: 07/19/24  5:44 AM   Result Value Ref Range    Phosphorus 2.6 2.5 - 4.5 mg/dL   proBrain Natriuretic Peptide, NT    Collection Time: 07/19/24  5:44 AM   Result Value Ref Range    NT-proBNP 5509 (H) 0 - 125 pg/mL   IRON/TOTAL IRON BIND    Collection Time: 07/19/24  5:44 AM   Result Value Ref Range    Iron 20 (L) 50 - 180 ug/dL    Total Iron Binding 248 (L) 250 - 450 ug/dL    Unsat Iron Binding 228 110 - 370 ug/dL    % Saturation 8 (L) 15 - 55 %   FERRITIN    Collection Time: 07/19/24  5:44 AM   Result Value Ref Range    Ferritin 67.3 22.0 - 322.0 ng/mL   ESTIMATED GFR    Collection Time: 07/19/24  5:44 AM   Result Value Ref Range    GFR (CKD-EPI) 105 >60 mL/min/1.73 m 2   POCT glucose device results    Collection Time: 07/19/24  7:23 AM   Result Value Ref Range    POC Glucose, Blood 75 65 - 99 mg/dL        Medications:  Scheduled Medications   Medication Dose Frequency    [START ON 7/20/2024] insulin GLARGINE  10 Units QAM INSULIN    furosemide  20 mg DAILY    insulin regular  2-12 Units 4X/DAY ACHS    vitamin D3  2,000 Units DAILY    Pharmacy Consult Request  1 Each PHARMACY TO DOSE    senna-docusate  2 Tablet BID    amiodarone  400 mg TWICE DAILY    atorvastatin  40 mg Q EVENING    calcium carbonate  1,000 mg TID WITH MEALS    metFORMIN  1,000 mg BID    omeprazole  40 mg BID     PRN medications: insulin regular **AND** POC blood glucose manual result **AND** NOTIFY MD and PharmD **AND** Administer 20 grams of glucose (approximately 8 ounces of fruit juice) every 15 minutes PRN FSBG less than 70 mg/dL **AND** dextrose bolus, Respiratory Therapy Consult, hydrALAZINE, acetaminophen, senna-docusate **AND** polyethylene glycol/lytes, docusate sodium, magnesium hydroxide, carboxymethylcellulose, mag hydrox-al hydrox-simeth, ondansetron **OR** ondansetron, traZODone, sodium chloride, oxyCODONE immediate-release **OR** oxyCODONE immediate-release, bisacodyl    Diet:  Current Diet Order   Procedures    Diet Order Diet: Low Fiber(GI Soft); Second Modifier: (optional): Consistent CHO (Diabetic)       Medical Decision Making and Plan:  L CVA -Patient with L parietal CVA in 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 Stroke Ppx has been held due to GI bleed  -PT and OT for mobility and ADLs. Per guidelines, 15 hours per week between PT, OT and/or SLP.  -Follow-up Neurology     HTN/CAD/Mitral stenosis/A fib - Patient on Amiodarone 400 mg BID, Lasix 20 mg daily. Consulted hospitalist. Continue Amiodarone 400 mg BID, Lasix 20 mg      MT amputation - Recent at OSH. NWB reportedly on LLE     HLD - Patient on Atorvastatin 40 mg daily     GI bleed - Check AM CBC. Continue PPI BID. Hgb 8.9, consult hospitalist     DM2 with hyperglycemia - Patient on Lantus 5 U and SSI. Previously on metformin and Jardiance. Consult hospitalist. Increased Lantus.  Started on metformin. Continue Metformin 1000 mg BID, Lantus 10 U and SSI     Anemia - Check AM CBC - 8.9 on admission.      Hypokalemia - Check AM CMP - 3.8, will monitor     Hypocalcemia - Check AM CMP - 7.6 on admission, will monitor     Pain - Patient on PRN Tylenol/Oxycodone     Skin - Patient at risk for skin breakdown due to debility in areas including sacrum, achilles, elbows and head in addition to other sites. Nursing to assess skin daily.      GI Ppx - Patient on Prilosec for GERD prophylaxis. Patient on Senna-docusate for constipation prophylaxis.      DVT Ppx - Patient not cleared for AC on transfer.  ___________________________________    T. Yordy Salazar MD/PhD  [facility] - Physical Medicine & Rehabilitation   [facility] - Brain Injury Medicine   ____________________________________

## 2024-07-19 NOTE — THERAPY
Speech Language Pathology  Daily Treatment     Patient Name: Pj Freeman  Age:  69 y.o., Sex:  male  Medical Record #: [id number]  Today's Date: 7/19/2024     Precautions  Precautions: Fall Risk, Non Weight Bearing Left Lower Extremity  Comments: left transmetatarsal amputation    Subjective    Pt pleasant and cooperative, therapy completed at bedside.      Objective       07/19/24 0933   Treatment Charges   SLP Cognitive Skill Development First 15 Minutes 1   SLP Cognitive Skill Development Additional 15 Minutes 1   SLP Total Time Spent   SLP Individual Total Time Spent (Mins) 30         Assessment    Pt completed who has more coins with 35/40 calculations correct indep. Pt noted to check work indep throughout in which pt was able to self identify and correct what would have been incorrect answers.     Strengths: Able to follow instructions, Alert and oriented, Effective communication skills, Pleasant and cooperative, Motivated for self care and independence, Supportive family, Willingly participates in therapeutic activities  Barriers: Impulsive, Impaired carryover of learning, Impaired functional cognition, Impaired balance (impaired attention.)    Plan    Complete functional medication sort     Speech Therapy Problems (Active)       Problem: Comprehension STGs       Dates: Start:  07/18/24         Goal: STG-Within one week, patient will perform functional reading comprehension tasks with targets for attention with 80% acc with min cues to improve.       Dates: Start:  07/18/24               Problem: Memory STGs       Dates: Start:  07/18/24         Goal: STG-Within one week, patient will recall new training and safety sequencing with 80% acc  with use of external memory aid and compensatory strategies with min cues to improve.       Dates: Start:  07/18/24               Problem: Problem Solving STGs       Dates: Start:  07/18/24         Goal: STG-Within one week, patient will perform alternating attention  tasks with 80% acc with min cues to improve.       Dates: Start:  07/18/24            Goal: STG-Within one week, patient will perform medication and financial management tasks with 80% acc with min cues to improve.       Dates: Start:  07/18/24               Problem: Speech/Swallowing LTGs       Dates: Start:  07/18/24         Goal: LTG-By discharge, patient will solve complex problems and recall safety training with 90% acc with spv. for safe discharge home.       Dates: Start:  07/18/24

## 2024-07-19 NOTE — PROGRESS NOTES
NURSING DAILY NOTE    Name: Pj Freeman   Date of Admission: 7/17/2024   Admitting Diagnosis: Acute CVA (cerebrovascular accident) ([facility])  Attending Physician: Dilia Salazar M.d.  Allergies: Patient has no known allergies.    Safety  Patient Assist     Patient Precautions  Fall Risk, Non Weight Bearing Left Lower Extremity  Precaution Comments  left transmetatarsal amputation  Bed Transfer Status  Moderate Assist  Toilet Transfer Status   Moderate Assist  Assistive Devices  Wheelchair  Oxygen  None - Room Air  Diet/Therapeutic Dining  Current Diet Order   Procedures    Diet Order Diet: Low Fiber(GI Soft); Second Modifier: (optional): Consistent CHO (Diabetic)     Pill Administration  whole  Agitated Behavioral Scale     ABS Level of Severity       Fall Risk  Has the patient had a fall this admission?   No  Taylor Gallego Fall Risk Scoring  12, MODERATE RISK  Fall Risk Safety Measures  bed alarm, chair alarm, and seatbelt alarm    Vitals  Temperature: 36.8 °C (98.3 °F)  Temp src: Oral  Pulse: 68  Respiration: 18  Blood Pressure : 132/67  Blood Pressure MAP (Calculated): 89 MM HG  BP Location: Left, Upper Arm  Patient BP Position: Supine     Oxygen  Pulse Oximetry: 95 %  O2 (LPM): 0  O2 Delivery Device: None - Room Air    Bowel and Bladder  Last Bowel Movement  07/18/24  Stool Type  Type 4: Like a sausage or snake, smooth and soft  Bowel Device  Bathroom  Continent  Bladder: Continent void   Bowel: Continent movement  Bladder Function  Urine Void (mL): 300 ml  Number of Times Voided: 1  Urine Color: Yellow  Genitourinary Assessment   Bladder Assessment (WDL):  WDL Except  [place] Catheter: Not Applicable  Urine Color: Yellow  Bladder Device: Urinal  Time Void: Yes  Bladder Scan: Post Void  $ Bladder Scan Results (mL): 219  Bladder Medications: Yes    Skin  Rocky Score   18  Sensory Interventions   Bed Types: Standard/Trauma Mattress  Skin  Preventative Measures: Pillows in Use for Support / Positioning  Moisture Interventions  Moisturizers/Barriers: Barrier Cream      Pain  Pain Rating Scale  0 - No Pain  Pain Location  Foot  Pain Location Orientation  Left  Pain Interventions   Declines    ADLs    Bathing   Shower, * * With Assistance from, Staff  Linen Change      Personal Hygiene  Perineal Care, Moist Samia Wipes  Chlorhexidine Bath      Oral Care     Teeth/Dentures     Shave     Nutrition Percentage Eaten  Breakfast, Between % Consumed  Environmental Precautions     Patient Turns/Positioning  Patient Turns Self from Side to Side  Patient Turns Assistance/Tolerance     Bed Positions  Bed Controls On, Bed Locked  Head of Bed Elevated         Psychosocial/Neurologic Assessment  Psychosocial Assessment  Psychosocial (WDL):  Within Defined Limits  Patient Behaviors: Fatigue  Family Behaviors: No Family Present  Neurologic Assessment  Neuro (WDL): Within Defined Limits  Level of Consciousness: Alert  EENT (WDL):  WDL Except    Cardio/Pulmonary Assessment  Edema   RLE Edema: 2+, Pitting  LLE Edema: 2+, Pitting  Respiratory Breath Sounds     Cardiac Assessment   Cardiac (WDL):  WDL Except (HTN, PVD, PAD,  new-onset A-fib)

## 2024-07-19 NOTE — THERAPY
Occupational Therapy  Daily Treatment     Patient Name: Pj Freeman  Age:  69 y.o., Sex:  male  Medical Record #: [id number]  Today's Date: 7/19/2024     Precautions  Precautions: (P) Fall Risk, Non Weight Bearing Left Lower Extremity  Comments: (P) left transmetatarsal amputation         Subjective    Pt encountered for OT supine in bed. Pleasant and agreeable to participate.      Objective       07/19/24 1101   OT Charge Group   OT Therapy Activity (Units) 2   OT Total Time Spent   OT Individual Total Time Spent (Mins) 30   Precautions   Precautions Fall Risk;Non Weight Bearing Left Lower Extremity   Comments left transmetatarsal amputation   Functional Level of Assist   Bed, Chair, Wheelchair Transfer Moderate Assist   Bed Chair Wheelchair Transfer Description Squat pivot transfer to wheelchair  (reach pivot to chair)   Balance   Standing Balance (Dynamic) Poor -   Skilled Intervention Facilitation;Sequencing;Tactile Cuing   Comments Pt standing at // while engaged in a throwing task to address standing balance and adherence to NWB. Min VC needed to adhere to NWB precautions. Pt relied heavily on BUE for balance, but able to reach for objects placed on his R side while his L hand was support on the bars. CGA for standing balance, Coreen for STS from    Interdisciplinary Plan of Care Collaboration   IDT Collaboration with  Nursing   Patient Position at End of Therapy Seated;Chair Alarm On;Self Releasing Lap Belt Applied;Other (Comments)  (in cafeteria for lunch)   Collaboration Comments Med pass       Assessment    Pt with fair tolerance to standing task and functional transfers limited by impaired UB strength and standing balance with one hand support on GB. Pt appears highly motivated to participate and return to PLOF.     Strengths: Able to follow instructions, Alert and oriented, Effective communication skills, Independent prior level of function, Motivated for self care and independence, Pleasant and  cooperative, Supportive family, Willingly participates in therapeutic activities  Barriers: Decreased endurance, Generalized weakness, Impaired activity tolerance, Tube feeding, Fatigue    Plan    Cont to address ADLs, functional transfers while adhering to NWB precautions, neuro re-ed/balance, and thera act/ex to maximize functional recovery for safe DC home.     DME  OT DME Recommendations  Bathroom Equipment: 3 in 1 Commode    Passport items to be completed:  Perform bathroom transfers, complete dressing, complete feeding, get ready for the day, prepare a simple meal, participate in household tasks, adapt home for safety needs, demonstrate home exercise program, complete caregiver training     Occupational Therapy Goals (Active)       Problem: Bathing       Dates: Start:  07/18/24         Goal: STG-Within one week, patient will bathe with SBA.       Dates: Start:  07/18/24               Problem: Dressing       Dates: Start:  07/18/24         Goal: STG-Within one week, patient will dress LB with Min A.       Dates: Start:  07/18/24               Problem: Functional Transfers       Dates: Start:  07/18/24         Goal: STG-Within one week, patient will transfer to toilet with Min A.       Dates: Start:  07/18/24            Goal: STG-Within one week, patient will transfer to tub/shower with Min A.       Dates: Start:  07/18/24               Problem: OT Long Term Goals       Dates: Start:  07/18/24         Goal: LTG-By discharge, patient will complete basic self care tasks at Mod Independent level.       Dates: Start:  07/18/24            Goal: LTG-By discharge, patient will perform bathroom transfers at Mod Independent level.       Dates: Start:  07/18/24               Problem: Toileting       Dates: Start:  07/18/24         Goal: STG-Within one week, patient will complete toileting tasks with Min A.       Dates: Start:  07/18/24

## 2024-07-19 NOTE — PROGRESS NOTES
NURSING DAILY NOTE    Name: Pj Freeman   Date of Admission: 7/17/2024   Admitting Diagnosis: Acute CVA (cerebrovascular accident) ([facility])  Attending Physician: Dilia Salazar M.d.  Allergies: Patient has no known allergies.    Safety  Patient Assist     Patient Precautions  Fall Risk, Non Weight Bearing Left Lower Extremity  Precaution Comments  left transmetatarsal amputation  Bed Transfer Status  Moderate Assist  Toilet Transfer Status   Moderate Assist  Assistive Devices  Wheelchair  Oxygen  None - Room Air  Diet/Therapeutic Dining  Current Diet Order   Procedures    Diet Order Diet: Low Fiber(GI Soft); Second Modifier: (optional): Consistent CHO (Diabetic)     Pill Administration  whole  Agitated Behavioral Scale     ABS Level of Severity       Fall Risk  Has the patient had a fall this admission?   No  Taylor Gallego Fall Risk Scoring  12, MODERATE RISK  Fall Risk Safety Measures  bed alarm, chair alarm, poor balance, and ok to leave pt in bathroom    Vitals  Temperature: 37.1 °C (98.7 °F)  Temp src: Oral  Pulse: 69  Respiration: 17  Blood Pressure : (!) 144/68  Blood Pressure MAP (Calculated): 93 MM HG  BP Location: Left, Upper Arm  Patient BP Position: Sitting     Oxygen  Pulse Oximetry: 95 %  O2 (LPM): 0  O2 Delivery Device: None - Room Air    Bowel and Bladder  Last Bowel Movement  07/18/24  Stool Type  Type 4: Like a sausage or snake, smooth and soft  Bowel Device  Bathroom  Continent  Bladder: Continent void   Bowel: Continent movement  Bladder Function  Urine Void (mL): 250 ml (urinal)  Number of Times Voided: 1  Urine Color: Yellow  Genitourinary Assessment   Bladder Assessment (WDL):  WDL Except  Cordova Catheter: Not Applicable  Urine Color: Yellow  Bladder Device: Urinal  Time Void: Yes  Bladder Scan: Post Void  $ Bladder Scan Results (mL): 219  Bladder Medications: Yes    Skin  Rocky Score   18  Sensory Interventions   Bed Types:  Standard/Trauma Mattress with Overlay  Skin Preventative Measures: Pillows in Use for Support / Positioning, Pillows in Use to Float Heels  Moisture Interventions  Moisturizers/Barriers: Barrier Cream      Pain  Pain Rating Scale  0 - No Pain  Pain Location  Foot  Pain Location Orientation  Left  Pain Interventions   Declines    ADLs    Bathing   Shower, * * With Assistance from, Staff  Linen Change      Personal Hygiene  Perineal Care, Moist Samia Wipes  Chlorhexidine Bath      Oral Care     Teeth/Dentures     Shave     Nutrition Percentage Eaten  *  * Meal *  *, Dinner, Between % Consumed  Environmental Precautions     Patient Turns/Positioning  Patient Turns Self from Side to Side  Patient Turns Assistance/Tolerance     Bed Positions  Bed Controls On, Bed Locked  Head of Bed Elevated         Psychosocial/Neurologic Assessment  Psychosocial Assessment  Psychosocial (WDL):  Within Defined Limits  Patient Behaviors: Fatigue  Family Behaviors: No Family Present  Neurologic Assessment  Neuro (WDL): Within Defined Limits  Level of Consciousness: Alert  EENT (WDL):  WDL Except    Cardio/Pulmonary Assessment  Edema   RLE Edema: 2+, Pitting  LLE Edema: 2+, Pitting  Respiratory Breath Sounds     Cardiac Assessment   Cardiac (WDL):  WDL Except (HTN, PVD, PAD,  new-onset A-fib)

## 2024-07-19 NOTE — CARE PLAN
The patient is Stable - Low risk of patient condition declining or worsening    Shift Goals  Clinical Goals: Safety  Patient Goals: Safety  Family Goals: no family present    Progress made toward(s) clinical / shift goals:   Problem: Knowledge Deficit - Standard  Goal: Patient and family/care givers will demonstrate understanding of plan of care, disease process/condition, diagnostic tests and medications  Outcome: Progressing  Note: Pt educated and states understanding of POC and disease process. All questions answered at this time. Call light within reach

## 2024-07-19 NOTE — PROGRESS NOTES
NURSING DAILY NOTE    Name: Pj Freeman   Date of Admission: 7/17/2024   Admitting Diagnosis: Acute CVA (cerebrovascular accident) ([facility])  Attending Physician: Dilia Salazar M.d.  Allergies: Patient has no known allergies.    Safety  Patient Assist     Patient Precautions  Fall Risk, Non Weight Bearing Left Lower Extremity  Precaution Comments  left transmetatarsal amputation  Bed Transfer Status  Moderate Assist  Toilet Transfer Status   Moderate Assist  Assistive Devices  Wheelchair  Oxygen  None - Room Air  Diet/Therapeutic Dining  Current Diet Order   Procedures    Diet Order Diet: Low Fiber(GI Soft); Second Modifier: (optional): Consistent CHO (Diabetic)     Pill Administration  whole  Agitated Behavioral Scale     ABS Level of Severity       Fall Risk  Has the patient had a fall this admission?      Taylor Gallego Fall Risk Scoring  12, MODERATE RISK  Fall Risk Safety Measures  bed alarm, chair alarm, poor balance, and ok to leave pt in bathroom    Vitals  Temperature: 37.1 °C (98.7 °F)  Temp src: Oral  Pulse: 69  Respiration: 17  Blood Pressure : (!) 144/68  Blood Pressure MAP (Calculated): 93 MM HG  BP Location: Left, Upper Arm  Patient BP Position: Sitting     Oxygen  Pulse Oximetry: 95 %  O2 (LPM): 0  O2 Delivery Device: None - Room Air    Bowel and Bladder  Last Bowel Movement  07/18/24  Stool Type  Type 4: Like a sausage or snake, smooth and soft  Bowel Device  Bathroom  Continent  Bladder: Continent void   Bowel: Continent movement  Bladder Function  Urine Void (mL): 400 ml  Number of Times Voided: 1  Urine Color: Unable To Evaluate  Genitourinary Assessment   Bladder Assessment (WDL):  Within Defined Limits  Cordova Catheter: Not Applicable  Urine Color: Unable To Evaluate  Bladder Device: Urinal  Time Void: Yes  Bladder Scan: Post Void  $ Bladder Scan Results (mL): 219    Skin  Rocky Score   18  Sensory Interventions   Bed Types:  Standard/Trauma Mattress with Overlay  Skin Preventative Measures: Waffle Overlay  Moisture Interventions  Moisturizers/Barriers: Barrier Cream      Pain  Pain Rating Scale  0 - No Pain  Pain Location  Foot  Pain Location Orientation  Left  Pain Interventions   Declines    ADLs    Bathing   Shower, * * With Assistance from, Staff  Linen Change      Personal Hygiene  Perineal Care, Moist Samia Wipes  Chlorhexidine Bath      Oral Care     Teeth/Dentures     Shave     Nutrition Percentage Eaten  Lunch, Between 50-75% Consumed  Environmental Precautions     Patient Turns/Positioning  Patient Turns Self from Side to Side  Patient Turns Assistance/Tolerance     Bed Positions     Head of Bed Elevated         Psychosocial/Neurologic Assessment  Psychosocial Assessment  Psychosocial (WDL):  Within Defined Limits  Family Behaviors: No Family Present  Neurologic Assessment  Neuro (WDL): Within Defined Limits  Level of Consciousness: Alert  EENT (WDL):  WDL Except    Cardio/Pulmonary Assessment  Edema   RLE Edema: 2+  LLE Edema: 2+  Respiratory Breath Sounds     Cardiac Assessment   Cardiac (WDL):  Within Defined Limits

## 2024-07-19 NOTE — THERAPY
Physical Therapy   Daily Treatment     Patient Name: Pj Freeman  Age:  69 y.o., Sex:  male  Medical Record #: [id number]  Today's Date: 7/19/2024     Precautions  Precautions: (P) Fall Risk, Non Weight Bearing Left Lower Extremity  Comments: (P) left transmetatarsal amputation    Subjective    Patient had a hard time sleeping last night and is tired      Objective       07/19/24 1015 07/19/24 1431   PT Charge Group   PT Gait Training (Units) 1 1   PT Therapeutic Activities (Units) 1 1   PT Total Time Spent   PT Individual Total Time Spent (Mins) 30 30   Precautions   Precautions Fall Risk;Non Weight Bearing Left Lower Extremity Fall Risk;Non Weight Bearing Left Lower Extremity   Comments left transmetatarsal amputation left transmetatarsal amputation   Gait Functional Level of Assist    Gait Level Of Assist Contact Guard Assist Contact Guard Assist   Assistive Device Parallel Bars;Other (Comments)  (also practiced kneeling scooter with CGA 2 x 100 feet) Front Wheel Walker   Distance (Feet) 10 5   # of Times Distance was Traveled 2 2   Deviation   (NWB Left LE - tends to move abruptly)   (NWB Left LE)   Stairs Functional Level of Assist   Level of Assist with Stairs Contact Guard Assist  --    # of Stairs Climbed 1  --    Stairs Description   (in bars up/over 4 inch step x 4 reps)  --    Transfer Functional Level of Assist   Bed, Chair, Wheelchair Transfer Minimal Assist Minimal Assist   Bed Chair Wheelchair Transfer Description  --    (lateral scoot from chair to mat)   Bed Mobility    Supine to Sit Standby Assist Standby Assist   Sit to Supine Standby Assist Standby Assist   Sit to Stand Moderate Assist  (requires moderate assist from w/c when he actually maintains NWB Left LE) Minimal Assist  (from slightly elevated mat table - otherwise moderate assist and max cues to maintain NWB Left LE)   Scooting Standby Assist Standby Assist   Interdisciplinary Plan of Care Collaboration   Patient Position at End of  Therapy Seated;Chair Alarm On;Call Light within Reach In Bed;Bed Alarm On;Call Light within Reach   Physical Therapist Assigned   Assigned PT / Treatment Time / Comments Jake 30/60 Jake 30/60   Strengths & Barriers   Barriers Fatigue;Difficulty following instructions;Decreased endurance;Impaired activity tolerance;Limited mobility  (limited ability to maintain weightbearing precaution and tends to lose focus/becomes distracted)  --      Spent the bulk of both sessions practicing sit to stand and transfers with NWB on left LE in addition to some gait training with NWB    Assessment    Patient has great difficulty maintaining left NWB status, limited some by cognitive deficits in which he loses focus and tends to repeat the same stories.     Strengths: Able to follow instructions, Alert and oriented, Independent prior level of function, Pleasant and cooperative, Willingly participates in therapeutic activities, Motivated for self care and independence  Barriers: (P) Fatigue, Difficulty following instructions, Decreased endurance, Impaired activity tolerance, Limited mobility (limited ability to maintain weightbearing precaution and tends to lose focus/becomes distracted)    Plan    Focus treatment on sit to stand and transfers with NWB left LE, car transfers, right LE strengthening     DME       Passport items to be completed:  Get in/out of bed safely, in/out of a vehicle, safely use mobility device, walk or wheel around home/community, navigate up and down stairs, show how to get up/down from the ground, ensure home is accessible, demonstrate HEP, complete caregiver training    Physical Therapy Problems (Active)       Problem: Mobility       Dates: Start:  07/18/24         Goal: STG-Within one week, patient will ambulate 50 feet with kneeling scooter SBA       Dates: Start:  07/18/24            Goal: STG-Within one week, patient will ambulate up/down a curb with FWW CGA       Dates: Start:  07/18/24                Problem: Mobility Transfers       Dates: Start:  07/18/24         Goal: STG-Within one week, patient will sit to stand with CGA       Dates: Start:  07/18/24               Problem: PT-Long Term Goals       Dates: Start:  07/18/24         Goal: LTG-By discharge, patient will ambulate with kneeling scooter >150 feet supervised       Dates: Start:  07/18/24            Goal: LTG-By discharge, patient will transfer one surface to another supervised        Dates: Start:  07/18/24            Goal: LTG-By discharge, patient will ambulate up/down 4-6 stairs with single railing supervised       Dates: Start:  07/18/24            Goal: LTG-By discharge, patient will transfer in/out of a car supervised       Dates: Start:  07/18/24

## 2024-07-20 ENCOUNTER — APPOINTMENT (OUTPATIENT)
Dept: PHYSICAL THERAPY | Facility: REHABILITATION | Age: 69
DRG: 056 | End: 2024-07-20
Attending: PHYSICAL MEDICINE & REHABILITATION
Payer: MEDICARE

## 2024-07-20 ENCOUNTER — APPOINTMENT (OUTPATIENT)
Dept: OCCUPATIONAL THERAPY | Facility: REHABILITATION | Age: 69
DRG: 056 | End: 2024-07-20
Attending: PHYSICAL MEDICINE & REHABILITATION
Payer: MEDICARE

## 2024-07-20 LAB
GLUCOSE BLD STRIP.AUTO-MCNC: 121 MG/DL (ref 65–99)
GLUCOSE BLD STRIP.AUTO-MCNC: 141 MG/DL (ref 65–99)
GLUCOSE BLD STRIP.AUTO-MCNC: 167 MG/DL (ref 65–99)
GLUCOSE BLD STRIP.AUTO-MCNC: 170 MG/DL (ref 65–99)

## 2024-07-20 PROCEDURE — 99231 SBSQ HOSP IP/OBS SF/LOW 25: CPT | Performed by: HOSPITALIST

## 2024-07-20 PROCEDURE — 94760 N-INVAS EAR/PLS OXIMETRY 1: CPT

## 2024-07-20 PROCEDURE — 700102 HCHG RX REV CODE 250 W/ 637 OVERRIDE(OP): Performed by: HOSPITALIST

## 2024-07-20 PROCEDURE — 770010 HCHG ROOM/CARE - REHAB SEMI PRIVAT*

## 2024-07-20 PROCEDURE — 82962 GLUCOSE BLOOD TEST: CPT

## 2024-07-20 PROCEDURE — 97116 GAIT TRAINING THERAPY: CPT

## 2024-07-20 PROCEDURE — 97530 THERAPEUTIC ACTIVITIES: CPT

## 2024-07-20 PROCEDURE — A9270 NON-COVERED ITEM OR SERVICE: HCPCS | Performed by: HOSPITALIST

## 2024-07-20 PROCEDURE — 97110 THERAPEUTIC EXERCISES: CPT

## 2024-07-20 PROCEDURE — 700102 HCHG RX REV CODE 250 W/ 637 OVERRIDE(OP): Performed by: PHYSICAL MEDICINE & REHABILITATION

## 2024-07-20 PROCEDURE — A9270 NON-COVERED ITEM OR SERVICE: HCPCS | Performed by: PHYSICAL MEDICINE & REHABILITATION

## 2024-07-20 RX ADMIN — AMIODARONE HYDROCHLORIDE 400 MG: 200 TABLET ORAL at 05:44

## 2024-07-20 RX ADMIN — METFORMIN HYDROCHLORIDE 1000 MG: 500 TABLET ORAL at 05:43

## 2024-07-20 RX ADMIN — OMEPRAZOLE 40 MG: 20 CAPSULE, DELAYED RELEASE ORAL at 08:28

## 2024-07-20 RX ADMIN — METFORMIN HYDROCHLORIDE 1000 MG: 500 TABLET ORAL at 17:59

## 2024-07-20 RX ADMIN — CALCIUM CARBONATE (ANTACID) CHEW TAB 500 MG 1000 MG: 500 CHEW TAB at 11:49

## 2024-07-20 RX ADMIN — AMIODARONE HYDROCHLORIDE 400 MG: 200 TABLET ORAL at 17:59

## 2024-07-20 RX ADMIN — FUROSEMIDE 20 MG: 20 TABLET ORAL at 08:27

## 2024-07-20 RX ADMIN — OMEPRAZOLE 40 MG: 20 CAPSULE, DELAYED RELEASE ORAL at 20:47

## 2024-07-20 RX ADMIN — CALCIUM CARBONATE (ANTACID) CHEW TAB 500 MG 1000 MG: 500 CHEW TAB at 08:27

## 2024-07-20 RX ADMIN — SENNOSIDES AND DOCUSATE SODIUM 2 TABLET: 50; 8.6 TABLET ORAL at 08:28

## 2024-07-20 RX ADMIN — CALCIUM CARBONATE (ANTACID) CHEW TAB 500 MG 1000 MG: 500 CHEW TAB at 17:58

## 2024-07-20 RX ADMIN — ATORVASTATIN CALCIUM 40 MG: 40 TABLET, FILM COATED ORAL at 20:47

## 2024-07-20 RX ADMIN — Medication 2000 UNITS: at 08:29

## 2024-07-20 RX ADMIN — TRAZODONE HYDROCHLORIDE 50 MG: 50 TABLET ORAL at 20:47

## 2024-07-20 ASSESSMENT — GAIT ASSESSMENTS
DISTANCE (FEET): 200
GAIT LEVEL OF ASSIST: CONTACT GUARD ASSIST

## 2024-07-20 NOTE — CARE PLAN
The patient is Stable - Low risk of patient condition declining or worsening    Shift Goals  Clinical Goals: safety  Patient Goals: sleep well  Family Goals: no family present    Progress made toward(s) clinical / shift goals:    Problem: Pain - Standard  Goal: Alleviation of pain or a reduction in pain to the patient’s comfort goal  Outcome: Progressing. Patient denies having any pain over shift. Patient has prn tylenol and prn oxycodone available if pain develops and calls appropriately if needed.      Problem: Fall Risk - Rehab  Goal: Patient will remain free from falls  Outcome: Progressing. Patient bed in lowest locked position with bed alarm on and call light within reach. Patient has not attempted to self transfer over shift.

## 2024-07-20 NOTE — PROGRESS NOTES
NURSING DAILY NOTE    Name: Pj Freeman  Date of Admission: 7/17/2024  Admitting Diagnosis: Acute CVA (cerebrovascular accident) ([facility])  Attending Physician: Dilia Salazar M.d.  Allergies: Patient has no known allergies.    Safety  Patient Assist  osba  Patient Precautions  oFall Risk, Non Weight Bearing Left Lower Extremity  Precaution Comments  oleft transmetatarsal amputation  Bed Transfer Status  oMinimal Assist  Toilet Transfer Status  oModerate Assist  Assistive Devices  oRails, Wheelchair  Oxygen  oNone - Room Air  Diet/Therapeutic Dining  o  Current Diet Order   Procedures    Diet Order Diet: Low Fiber(GI Soft); Second Modifier: (optional): Consistent CHO (Diabetic)     Pill Administration  owhole  Agitated Behavioral Scale  o   ABS Level of Severity  o     Fall Risk  Has the patient had a fall this admission?  Hansa  Taylor Gallego Fall Risk Scoring  o15, HIGH RISK  Fall Risk Safety Measures  kirby alarm and chair alarm    Vitals  Temperature: 37 °C (98.6 °F)  Temp src: Temporal  Pulse: 74  Respiration: 18  Blood Pressure : 132/68  Blood Pressure MAP (Calculated): 89 MM HG  BP Location: Right, Upper Arm  Patient BP Position: Supine    Oxygen  Pulse Oximetry: 93 %  O2 (LPM): 0  O2 Delivery Device: None - Room Air    Bowel and Bladder  Last Bowel Movement  o07/18/24  Stool Type  oType 4: Like a sausage or snake, smooth and soft  Bowel Device  oBathroom  Continent  oBladder: Continent void  oBowel: Continent movement  Bladder Function  oUrine Void (mL): 300 ml (urinals)  Number of Times Voided: 1  Urine Color: Yellow  Genitourinary Assessment  oBladder Assessment (WDL):  WDL Except  Cordova Catheter: Not Applicable  Urine Color: Yellow  Bladder Device: Urinal  Time Void: Yes  Bladder Scan: Post Void  $ Bladder Scan Results (mL): 219  Bladder Medications: Yes    Skin  Rocky Score  o 18  Sensory Interventions  o Bed Types: Standard/Trauma Mattress  Skin Preventative Measures: Pillows in Use for  Support / Positioning  Moisture Interventions  oMoisturizers/Barriers: Barrier Wipes, Barrier Cream      Pain  Pain Rating Scale  o0 - No Pain  Pain Location  oFoot  Pain Location Orientation  oLeft  Pain Interventions  oDeclines    ADLs    Bathing  oShower, * * With Assistance from, Staff  Linen Change  o   Personal Hygiene  oPerineal Care, Moist Samia Wipes  Chlorhexidine Bath  o   Oral Care  o   Teeth/Dentures  o   Shave  o   Nutrition Percentage Eaten  o*  * Meal *  *, Between % Consumed  Environmental Precautions  Dairn in Low Position, Treaded Slipper Socks on Patient  Patient Turns/Positioning  oPatient Turns Self from Side to Side  Patient Turns Assistance/Tolerance  o   Bed Positions  Darin Controls On  Head of Bed Elevated  oSelf regulated      Psychosocial/Neurologic Assessment  Psychosocial Assessment  oPsychosocial (WDL):  Within Defined Limits  Patient Behaviors: Fatigue  Family Behaviors: No Family Present  Neurologic Assessment  oNeuro (WDL): Within Defined Limits  Level of Consciousness: Alert  oEENT (WDL):  WDL Except    Cardio/Pulmonary Assessment  Edema  oRLE Edema: 2+, Pitting  LLE Edema: 2+, Pitting  Respiratory Breath Sounds  o   Cardiac Assessment  oCardiac (WDL):  WDL Except

## 2024-07-20 NOTE — THERAPY
"Occupational Therapy  Daily Treatment     Patient Name: Pj Freeman  Age:  69 y.o., Sex:  male  Medical Record #: [id number]  Today's Date: 7/20/2024     Precautions  Precautions: Fall Risk, Non Weight Bearing Left Lower Extremity  Comments: left transmetatarsal amputation         Subjective    \"I am an ENFP according to the [place]\"     Objective       07/20/24 1431   OT Charge Group   OT Therapy Activity (Units) 3   OT Therapeutic Exercise (Units) 1   OT Total Time Spent   OT Individual Total Time Spent (Mins) 60   Sitting Upper Body Exercises   Upper Extremity Bike Level 5 Resistance  (motomed 5 mins, rest break 3 mins, then 6 more mins)   Bed Mobility    Supine to Sit Minimal Assist   Outcome Measures   Outcome Measures Utilized Box and Blocks Test   Interdisciplinary Plan of Care Collaboration   Patient Position at End of Therapy Chair Alarm On;Seated;Self Releasing Lap Belt Applied;Call Light within Reach;Tray Table within Reach       Box and Blocks Test   Right hand blocks moved in 60 seconds 27   Left hand blocks moved in 60 seconds 28   Avg (normative value) for males 70-74 RIGHT HAND  66.3 (SD=9.2)   Avg (normative value) for males 70-74 LEFT HAND 64.3 (SD=9.8)         Assessment    Pt seen for OT tx session. Assessed pt's coordination through Box and Blocks test. Pt scored significantly below average for his age and sex. Pt would benefit from further coordination assessment in order to target coordination needed for independence with daily activities.   Strengths: Able to follow instructions, Alert and oriented, Effective communication skills, Independent prior level of function, Motivated for self care and independence, Pleasant and cooperative, Supportive family, Willingly participates in therapeutic activities  Barriers: Decreased endurance, Generalized weakness, Impaired activity tolerance, Tube feeding, Fatigue    Plan    Kitchen task (pt likes baking cookies from scratch)  Balance " task  [place]/safety with ADLs, IADLs, tranfsers  UE strengthening  Family training as needed    DME  OT DME Recommendations  Bathroom Equipment: 3 in 1 Commode    Passport items to be completed:  Perform bathroom transfers, complete dressing, complete feeding, get ready for the day, prepare a simple meal, participate in household tasks, adapt home for safety needs, demonstrate home exercise program, complete caregiver training     Occupational Therapy Goals (Active)       Problem: Bathing       Dates: Start:  07/18/24         Goal: STG-Within one week, patient will bathe with SBA.       Dates: Start:  07/18/24               Problem: Dressing       Dates: Start:  07/18/24         Goal: STG-Within one week, patient will dress LB with Min A.       Dates: Start:  07/18/24               Problem: Functional Transfers       Dates: Start:  07/18/24         Goal: STG-Within one week, patient will transfer to toilet with Min A.       Dates: Start:  07/18/24            Goal: STG-Within one week, patient will transfer to tub/shower with Min A.       Dates: Start:  07/18/24               Problem: OT Long Term Goals       Dates: Start:  07/18/24         Goal: LTG-By discharge, patient will complete basic self care tasks at Mod Independent level.       Dates: Start:  07/18/24            Goal: LTG-By discharge, patient will perform bathroom transfers at Mod Independent level.       Dates: Start:  07/18/24               Problem: Toileting       Dates: Start:  07/18/24         Goal: STG-Within one week, patient will complete toileting tasks with Min A.       Dates: Start:  07/18/24

## 2024-07-20 NOTE — THERAPY
Occupational Therapy  Daily Treatment     Patient Name: Pj Freeman  Age:  69 y.o., Sex:  male  Medical Record #: [id number]  Today's Date: 7/20/2024     Precautions  Precautions: (P) Fall Risk, Non Weight Bearing Left Lower Extremity  Comments: (P) left transmetatarsal amputation      Subjective    Found sitting up in w/c and agreeable to OT treatment session.     Objective     07/20/24 1101   OT Charge Group   OT Therapeutic Exercise (Units) 2   OT Total Time Spent   OT Individual Total Time Spent (Mins) 30   Precautions   Precautions Fall Risk;Non Weight Bearing Left Lower Extremity   Comments left transmetatarsal amputation   Sitting Upper Body Exercises   Lat Pull 3 sets of 10;Bilateral  (20#)   Tricep Press 3 sets of 10;Bilateral  (30#)   Interdisciplinary Plan of Care Collaboration   IDT Collaboration with  Certified Nursing Assistant   Patient Position at End of Therapy Chair Alarm On;Seated  (In community dining area)   Collaboration Comments Location after OT session     Treatment session focused on UE strengthening tasks as described above.    Assessment    Good response to OT treatment with verbal cues for technique and body mechanics for joint protection during exercises; carryover over across opportunities with min errors. Recommend continued skilled OT to support functional performance.    Strengths: Able to follow instructions, Alert and oriented, Effective communication skills, Independent prior level of function, Motivated for self care and independence, Pleasant and cooperative, Supportive family, Willingly participates in therapeutic activities  Barriers: Decreased endurance, Generalized weakness, Impaired activity tolerance, Tube feeding, Fatigue    Plan    Cont to address ADLs, functional transfers while adhering to NWB precautions, neuro re-ed/balance, and thera act/ex to maximize functional recovery for safe DC home.      DME  OT DME Recommendations  Bathroom Equipment: 3 in 1 Commode      Passport items to be completed:  Perform bathroom transfers, complete dressing, complete feeding, get ready for the day, prepare a simple meal, participate in household tasks, adapt home for safety needs, demonstrate home exercise program, complete caregiver training     Occupational Therapy Goals (Active)       Problem: Bathing       Dates: Start:  07/18/24         Goal: STG-Within one week, patient will bathe with SBA.       Dates: Start:  07/18/24               Problem: Dressing       Dates: Start:  07/18/24         Goal: STG-Within one week, patient will dress LB with Min A.       Dates: Start:  07/18/24               Problem: Functional Transfers       Dates: Start:  07/18/24         Goal: STG-Within one week, patient will transfer to toilet with Min A.       Dates: Start:  07/18/24            Goal: STG-Within one week, patient will transfer to tub/shower with Min A.       Dates: Start:  07/18/24               Problem: OT Long Term Goals       Dates: Start:  07/18/24         Goal: LTG-By discharge, patient will complete basic self care tasks at Mod Independent level.       Dates: Start:  07/18/24            Goal: LTG-By discharge, patient will perform bathroom transfers at Mod Independent level.       Dates: Start:  07/18/24               Problem: Toileting       Dates: Start:  07/18/24         Goal: STG-Within one week, patient will complete toileting tasks with Min A.       Dates: Start:  07/18/24

## 2024-07-20 NOTE — THERAPY
Physical Therapy   Daily Treatment     Patient Name: Pj Freeman  Age:  69 y.o., Sex:  male  Medical Record #: [id number]  Today's Date: 2024     Precautions  Precautions: Fall Risk, Non Weight Bearing Left Lower Extremity  Comments: left transmetatarsal amputation    Subjective    Patient was found in bed and was agreeable to therapy. Patient reports he felt good on the knee scooter the day prior, he is unsure what assistive device he will want to use at home, but does have a knee scooter in [place] as an option.      Objective       24 0915   PT Charge Group   PT Gait Training (Units) 2   PT Therapeutic Exercise (Units) 2   PT Total Time Spent   PT Individual Total Time Spent (Mins) 60   Gait Functional Level of Assist    Gait Level Of Assist Contact Guard Assist  (See note below)   Assistive Device   (knee scooter)   Distance (Feet) 200  (also performed 10ft w/ FWW)   # of Times Distance was Traveled 1   Bed Mobility    Sit to Stand Minimal Assist  (3 sets of 5 lowering mat table each time (required prolonge rest in between sets))   Car Transfer   Assistance Needed Verbal cues;Set-up / clean-up;Incidental touching;Adaptive equipment   Physical Assistance Level 25% or less   Comment required ques for set up and some light touch to reinforce balance   CARE Score - Car Transfer 3     Gait Trainin ft with knee scooter  10 ft w/ FWW, bradykinetic, short hops, maintained NWB status of L leg  Up and back along parallel bars 10 hops each direction hopping on R leg to practice hopping using railings to go up stairs.     Sit to Stands used for R LE strengthening  3 set 5 reps beginning at about 26 inches, lowering mat table height each time to increase challenge, ending at about 20 in. Needed reminders to avoid WB through L leg as table got lower. Required long rests between reps and frequent cueing to keep right hand on mat and to lean forward.     Patient was left seated in wheelchair, chair alarm on,  seat belt on, call light in reach, phone and TV within reach.     Assessment    Patient is progressing well and improving in strength and endurance. Although patient still required cueing during sit to stands he did a good job maintaining NWB status of LLE. During car transfer patient required many verbal cues and some assistance to maintain balance but performed safely with minimal assistance. Patient was steady on knee scooter but was slow and fatigued quickly using the FWW. Continued therapy is needed to improve RLE in order to perform sit to stands at standard chair height and practice stairs while mainting NWB of the LLE. During hops along parallel bars patient's foot clearance was not adequately high to clear step if going up stairs; he also requires more arm strength and explosive power in order to make sure he could safely ascend/descend stairs hopping on one foot using railings.     Strengths: Able to follow instructions, Alert and oriented, Independent prior level of function, Pleasant and cooperative, Willingly participates in therapeutic activities, Motivated for self care and independence  Barriers: Fatigue, Difficulty following instructions, Decreased endurance, Impaired activity tolerance, Limited mobility (limited ability to maintain weightbearing precaution and tends to lose focus/becomes distracted)    Plan    Focus treatment on sit to stand and transfers with NWB left LE, car transfers, right LE strengthening     DME       Passport items to be completed:  Get in/out of bed safely, in/out of a vehicle, safely use mobility device, walk or wheel around home/community, navigate up and down stairs, show how to get up/down from the ground, ensure home is accessible, demonstrate HEP, complete caregiver training    Physical Therapy Problems (Active)       Problem: Mobility       Dates: Start:  07/18/24         Goal: STG-Within one week, patient will ambulate 50 feet with kneeling scooter SBA       Dates:  Start:  07/18/24            Goal: STG-Within one week, patient will ambulate up/down a curb with FWW CGA       Dates: Start:  07/18/24               Problem: Mobility Transfers       Dates: Start:  07/18/24         Goal: STG-Within one week, patient will sit to stand with CGA       Dates: Start:  07/18/24               Problem: PT-Long Term Goals       Dates: Start:  07/18/24         Goal: LTG-By discharge, patient will ambulate with kneeling scooter >150 feet supervised       Dates: Start:  07/18/24            Goal: LTG-By discharge, patient will transfer one surface to another supervised        Dates: Start:  07/18/24            Goal: LTG-By discharge, patient will ambulate up/down 4-6 stairs with single railing supervised       Dates: Start:  07/18/24            Goal: LTG-By discharge, patient will transfer in/out of a car supervised       Dates: Start:  07/18/24

## 2024-07-21 ENCOUNTER — APPOINTMENT (OUTPATIENT)
Dept: SPEECH THERAPY | Facility: REHABILITATION | Age: 69
DRG: 056 | End: 2024-07-21
Attending: PHYSICAL MEDICINE & REHABILITATION
Payer: MEDICARE

## 2024-07-21 LAB
GLUCOSE BLD STRIP.AUTO-MCNC: 147 MG/DL (ref 65–99)
GLUCOSE BLD STRIP.AUTO-MCNC: 155 MG/DL (ref 65–99)
GLUCOSE BLD STRIP.AUTO-MCNC: 176 MG/DL (ref 65–99)
GLUCOSE BLD STRIP.AUTO-MCNC: 178 MG/DL (ref 65–99)

## 2024-07-21 PROCEDURE — A9270 NON-COVERED ITEM OR SERVICE: HCPCS | Performed by: PHYSICAL MEDICINE & REHABILITATION

## 2024-07-21 PROCEDURE — 770010 HCHG ROOM/CARE - REHAB SEMI PRIVAT*

## 2024-07-21 PROCEDURE — 82962 GLUCOSE BLOOD TEST: CPT

## 2024-07-21 PROCEDURE — 700102 HCHG RX REV CODE 250 W/ 637 OVERRIDE(OP): Performed by: PHYSICAL MEDICINE & REHABILITATION

## 2024-07-21 PROCEDURE — 700102 HCHG RX REV CODE 250 W/ 637 OVERRIDE(OP): Performed by: HOSPITALIST

## 2024-07-21 PROCEDURE — 97129 THER IVNTJ 1ST 15 MIN: CPT

## 2024-07-21 PROCEDURE — 97130 THER IVNTJ EA ADDL 15 MIN: CPT

## 2024-07-21 PROCEDURE — 99231 SBSQ HOSP IP/OBS SF/LOW 25: CPT | Performed by: HOSPITALIST

## 2024-07-21 PROCEDURE — A9270 NON-COVERED ITEM OR SERVICE: HCPCS | Performed by: HOSPITALIST

## 2024-07-21 RX ADMIN — AMIODARONE HYDROCHLORIDE 400 MG: 200 TABLET ORAL at 17:30

## 2024-07-21 RX ADMIN — OMEPRAZOLE 40 MG: 20 CAPSULE, DELAYED RELEASE ORAL at 08:31

## 2024-07-21 RX ADMIN — AMIODARONE HYDROCHLORIDE 400 MG: 200 TABLET ORAL at 05:27

## 2024-07-21 RX ADMIN — CALCIUM CARBONATE (ANTACID) CHEW TAB 500 MG 1000 MG: 500 CHEW TAB at 08:31

## 2024-07-21 RX ADMIN — ATORVASTATIN CALCIUM 40 MG: 40 TABLET, FILM COATED ORAL at 20:13

## 2024-07-21 RX ADMIN — METFORMIN HYDROCHLORIDE 1000 MG: 500 TABLET ORAL at 17:30

## 2024-07-21 RX ADMIN — CALCIUM CARBONATE (ANTACID) CHEW TAB 500 MG 1000 MG: 500 CHEW TAB at 17:30

## 2024-07-21 RX ADMIN — OMEPRAZOLE 40 MG: 20 CAPSULE, DELAYED RELEASE ORAL at 20:12

## 2024-07-21 RX ADMIN — Medication 2000 UNITS: at 08:32

## 2024-07-21 RX ADMIN — CALCIUM CARBONATE (ANTACID) CHEW TAB 500 MG 1000 MG: 500 CHEW TAB at 11:34

## 2024-07-21 RX ADMIN — TRAZODONE HYDROCHLORIDE 50 MG: 50 TABLET ORAL at 23:21

## 2024-07-21 RX ADMIN — FUROSEMIDE 20 MG: 20 TABLET ORAL at 08:32

## 2024-07-21 RX ADMIN — METFORMIN HYDROCHLORIDE 1000 MG: 500 TABLET ORAL at 05:27

## 2024-07-21 ASSESSMENT — FIBROSIS 4 INDEX: FIB4 SCORE: 0.57

## 2024-07-21 NOTE — CARE PLAN
Problem: Bowel Elimination  Goal: Patient will participate in bowel management program  Outcome: Progressing  Loose BM this am with incontinence. Will hold stool softners

## 2024-07-21 NOTE — PROGRESS NOTES
NURSING DAILY NOTE    Name: Pj Freeman   Date of Admission: 7/17/2024   Admitting Diagnosis: Acute CVA (cerebrovascular accident) ([facility])  Attending Physician: Dilia Salazar M.d.  Allergies: Patient has no known allergies.    Safety  Patient Assist  sba  Patient Precautions  Fall Risk, Non Weight Bearing Left Lower Extremity  Precaution Comments  left transmetatarsal amputation  Bed Transfer Status  Minimal Assist  Toilet Transfer Status   Moderate Assist  Assistive Devices  Rails, Wheelchair  Oxygen  None - Room Air  Diet/Therapeutic Dining  Current Diet Order   Procedures    Diet Order Diet: Low Fiber(GI Soft); Second Modifier: (optional): Consistent CHO (Diabetic)     Pill Administration  whole  Agitated Behavioral Scale     ABS Level of Severity       Fall Risk  Has the patient had a fall this admission?   No  Taylor Gallego Fall Risk Scoring  15, HIGH RISK  Fall Risk Safety Measures  bed alarm and chair alarm    Vitals  Temperature: 36.7 °C (98 °F)  Temp src: Oral  Pulse: 67  Respiration: 20  Blood Pressure : 139/67  Blood Pressure MAP (Calculated): 91 MM HG  BP Location: Upper Arm, Right  Patient BP Position: Supine     Oxygen  Pulse Oximetry: 95 %  O2 (LPM): 0  O2 Delivery Device: None - Room Air    Bowel and Bladder  Last Bowel Movement  07/20/24  Stool Type  Type 4: Like a sausage or snake, smooth and soft  Bowel Device  Bathroom  Continent  Bladder: Continent void   Bowel: Continent movement  Bladder Function  Urine Void (mL): 250 ml  Number of Times Voided: 1  Urine Color: Yellow  Genitourinary Assessment   Bladder Assessment (WDL):  WDL Except  Cordova Catheter: Not Applicable  Urine Color: Yellow  Bladder Device: Urinal  Time Void: Yes  Bladder Scan: Post Void  $ Bladder Scan Results (mL): 186  Bladder Medications: Yes    Skin  Rocky Score   18  Sensory Interventions   Bed Types: Standard/Trauma Mattress  Skin Preventative Measures:  Pillows in Use for Support / Positioning  Moisture Interventions  Moisturizers/Barriers: Barrier Wipes, Barrier Cream      Pain  Pain Rating Scale  0 - No Pain  Pain Location  Foot  Pain Location Orientation  Left  Pain Interventions   Declines    ADLs    Bathing   Patient Refused Bathing  Linen Change      Personal Hygiene  Perineal Care, Moist Samia Wipes  Chlorhexidine Bath      Oral Care     Teeth/Dentures     Shave     Nutrition Percentage Eaten  *  * Meal *  *, Dinner, Between % Consumed  Environmental Precautions  Bed in Low Position, Treaded Slipper Socks on Patient  Patient Turns/Positioning  Patient Turns Self from Side to Side  Patient Turns Assistance/Tolerance     Bed Positions  Bed Controls On  Head of Bed Elevated  Self regulated      Psychosocial/Neurologic Assessment  Psychosocial Assessment  Psychosocial (WDL):  WDL Except  Patient Behaviors: Fatigue  Family Behaviors: No Family Present  Neurologic Assessment  Neuro (WDL): Within Defined Limits  Level of Consciousness: Alert  EENT (WDL):  WDL Except    Cardio/Pulmonary Assessment  Edema   RLE Edema: 2+, Pitting  LLE Edema: 2+, Pitting  Respiratory Breath Sounds     Cardiac Assessment   Cardiac (WDL):  WDL Except (HTN,  PVD, PAD, Afib)

## 2024-07-21 NOTE — PROGRESS NOTES
NURSING DAILY NOTE    Name: Pj Freeman   Date of Admission: 7/17/2024   Admitting Diagnosis: Acute CVA (cerebrovascular accident) ([facility])  Attending Physician: Dilia Salazar M.d.  Allergies: Patient has no known allergies.    Safety  Patient Assist  sba  Patient Precautions  Fall Risk, Non Weight Bearing Left Lower Extremity  Precaution Comments  left transmetatarsal amputation  Bed Transfer Status  Minimal Assist  Toilet Transfer Status   Moderate Assist  Assistive Devices  Wheelchair  Oxygen  None - Room Air  Diet/Therapeutic Dining  Current Diet Order   Procedures    Diet Order Diet: Low Fiber(GI Soft); Second Modifier: (optional): Consistent CHO (Diabetic)     Pill Administration  whole  Agitated Behavioral Scale     ABS Level of Severity       Fall Risk  Has the patient had a fall this admission?   No  Taylor Gallego Fall Risk Scoring  15, HIGH RISK  Fall Risk Safety Measures  bed alarm, chair alarm, and poor balance    Vitals  Temperature: 36.6 °C (97.8 °F)  Temp src: Oral  Pulse: 74  Respiration: 17  Blood Pressure : 136/72  Blood Pressure MAP (Calculated): 93 MM HG  BP Location: Left, Upper Arm  Patient BP Position: Sitting     Oxygen  Pulse Oximetry: 94 %  O2 (LPM): 0  O2 Delivery Device: None - Room Air    Bowel and Bladder  Last Bowel Movement  07/20/24  Stool Type  Type 4: Like a sausage or snake, smooth and soft  Bowel Device  Bathroom  Continent  Bladder: Continent void   Bowel: Continent movement  Bladder Function  Urine Void (mL): 300 ml  Number of Times Voided: 1  Urine Color: Unable To Evaluate  Genitourinary Assessment   Bladder Assessment (WDL):  WDL Except  Cordova Catheter: Not Applicable  Urine Color: Unable To Evaluate  Bladder Device: Urinal  Time Void: Yes  Bladder Scan: Post Void  $ Bladder Scan Results (mL): 219  Bladder Medications: Yes    Skin  Rocky Score   18  Sensory Interventions   Bed Types: Standard/Trauma  Mattress  Skin Preventative Measures: Pillows in Use for Support / Positioning  Moisture Interventions  Moisturizers/Barriers: Barrier Wipes, Barrier Cream      Pain  Pain Rating Scale  0 - No Pain  Pain Location  Foot  Pain Location Orientation  Left  Pain Interventions   Declines    ADLs    Bathing   Patient Refused Bathing  Linen Change      Personal Hygiene  Perineal Care, Moist Samia Wipes  Chlorhexidine Bath      Oral Care     Teeth/Dentures     Shave     Nutrition Percentage Eaten  *  * Meal *  *, Dinner, Between % Consumed  Environmental Precautions  Bed in Low Position, Treaded Slipper Socks on Patient  Patient Turns/Positioning  Patient Turns Self from Side to Side  Patient Turns Assistance/Tolerance     Bed Positions  Bed Controls On  Head of Bed Elevated  Self regulated      Psychosocial/Neurologic Assessment  Psychosocial Assessment  Psychosocial (WDL):  WDL Except  Patient Behaviors: Fatigue  Family Behaviors: No Family Present  Neurologic Assessment  Neuro (WDL): Within Defined Limits  Level of Consciousness: Alert  EENT (WDL):  WDL Except    Cardio/Pulmonary Assessment  Edema   RLE Edema: 2+, Pitting  LLE Edema: 2+, Pitting  Respiratory Breath Sounds     Cardiac Assessment   Cardiac (WDL):  WDL Except (HTN, PVD, PAD,  new-onset A-fib)

## 2024-07-21 NOTE — THERAPY
Speech Language Pathology  Daily Treatment     Patient Name: Pj Freeman  Age:  69 y.o., Sex:  male  Medical Record #: [id number]  Today's Date: 7/21/2024     Precautions  Precautions: Fall Risk, Non Weight Bearing Left Lower Extremity  Comments: left transmetatarsal amputation    Subjective    Pt seen in ST office; pleasant and cooperative during ST      Objective       07/21/24 1331   Treatment Charges   SLP Cognitive Skill Development First 15 Minutes 1   SLP Cognitive Skill Development Additional 15 Minutes 3   SLP Total Time Spent   SLP Individual Total Time Spent (Mins) 60   Interdisciplinary Plan of Care Collaboration   Patient Position at End of Therapy Seated;Chair Alarm On;Self Releasing Lap Belt Applied;Tray Table within Reach;Phone within Reach;Family / Friend in Room         Assessment    Pt completed functional medication sort with current medications and use of 3 x daily pill box. Pt required occasional assistance problem solving medication labels and interpreting frequencies. Pt sorted medications indep with 100% accuracy.     Strengths: Able to follow instructions, Alert and oriented, Effective communication skills, Pleasant and cooperative, Motivated for self care and independence, Supportive family, Willingly participates in therapeutic activities  Barriers: Impulsive, Impaired carryover of learning, Impaired functional cognition, Impaired balance (impaired attention.)    Plan    Functional medication sort for ensure 100% accuracy     Passport items to be completed:  Express basic needs, understand food/liquid recommendations, consistently follow swallow precautions, manage finances, manage medications, arrive to therapy appointments on time, complete daily memory log entries, solve problems related to safety situations, review education related to hospitalization, complete caregiver training     Speech Therapy Problems (Active)       Problem: Comprehension STGs       Dates: Start:  07/18/24          Goal: STG-Within one week, patient will perform functional reading comprehension tasks with targets for attention with 80% acc with min cues to improve.       Dates: Start:  07/18/24               Problem: Memory STGs       Dates: Start:  07/18/24         Goal: STG-Within one week, patient will recall new training and safety sequencing with 80% acc  with use of external memory aid and compensatory strategies with min cues to improve.       Dates: Start:  07/18/24               Problem: Problem Solving STGs       Dates: Start:  07/18/24         Goal: STG-Within one week, patient will perform alternating attention tasks with 80% acc with min cues to improve.       Dates: Start:  07/18/24            Goal: STG-Within one week, patient will perform medication and financial management tasks with 80% acc with min cues to improve.       Dates: Start:  07/18/24               Problem: Speech/Swallowing LTGs       Dates: Start:  07/18/24         Goal: LTG-By discharge, patient will solve complex problems and recall safety training with 90% acc with spv. for safe discharge home.       Dates: Start:  07/18/24

## 2024-07-21 NOTE — CARE PLAN
"The patient is Stable - Low risk of patient condition declining or worsening      Problem: Fall Risk - Rehab  Goal: Patient will remain free from falls  Outcome: Progressing   Taylor Gallego Fall risk Assessment Score: 15    High fall risk Interventions   - Bed and strip alarm   - Yellow sign by the door   - Yellow wrist band \"Fall risk\"  - Room near to the nurse station  - Do not leave patient unattended in the bathroom  - Fall risk education provided      Problem: Diabetes Management  Goal: Patient's ability to maintain appropriate glucose levels will be maintained or improve  Outcome: Progressing   Note: BS at HS was 121. No insulin coverage administered per insulin sliding scale sapna MAR. Snacks offered.      "

## 2024-07-22 ENCOUNTER — APPOINTMENT (OUTPATIENT)
Dept: PHYSICAL THERAPY | Facility: REHABILITATION | Age: 69
DRG: 056 | End: 2024-07-22
Attending: PHYSICAL MEDICINE & REHABILITATION
Payer: MEDICARE

## 2024-07-22 ENCOUNTER — APPOINTMENT (OUTPATIENT)
Dept: OCCUPATIONAL THERAPY | Facility: REHABILITATION | Age: 69
DRG: 056 | End: 2024-07-22
Attending: PHYSICAL MEDICINE & REHABILITATION
Payer: MEDICARE

## 2024-07-22 ENCOUNTER — APPOINTMENT (OUTPATIENT)
Dept: SPEECH THERAPY | Facility: REHABILITATION | Age: 69
DRG: 056 | End: 2024-07-22
Attending: PHYSICAL MEDICINE & REHABILITATION
Payer: MEDICARE

## 2024-07-22 PROBLEM — I48.91 A-FIB (HCC): Status: ACTIVE | Noted: 2024-07-22

## 2024-07-22 PROBLEM — E55.9 VITAMIN D DEFICIENCY: Status: ACTIVE | Noted: 2024-07-22

## 2024-07-22 LAB
ANION GAP SERPL CALC-SCNC: 10 MMOL/L (ref 7–16)
BUN SERPL-MCNC: 15 MG/DL (ref 8–22)
CALCIUM SERPL-MCNC: 7.6 MG/DL (ref 8.5–10.5)
CHLORIDE SERPL-SCNC: 104 MMOL/L (ref 96–112)
CO2 SERPL-SCNC: 23 MMOL/L (ref 20–33)
CREAT SERPL-MCNC: 0.64 MG/DL (ref 0.5–1.4)
ERYTHROCYTE [DISTWIDTH] IN BLOOD BY AUTOMATED COUNT: 49 FL (ref 35.9–50)
GFR SERPLBLD CREATININE-BSD FMLA CKD-EPI: 101 ML/MIN/1.73 M 2
GLUCOSE BLD STRIP.AUTO-MCNC: 115 MG/DL (ref 65–99)
GLUCOSE BLD STRIP.AUTO-MCNC: 159 MG/DL (ref 65–99)
GLUCOSE BLD STRIP.AUTO-MCNC: 168 MG/DL (ref 65–99)
GLUCOSE BLD STRIP.AUTO-MCNC: 186 MG/DL (ref 65–99)
GLUCOSE SERPL-MCNC: 115 MG/DL (ref 65–99)
HCT VFR BLD AUTO: 25 % (ref 42–52)
HGB BLD-MCNC: 8 G/DL (ref 14–18)
MCH RBC QN AUTO: 28 PG (ref 27–33)
MCHC RBC AUTO-ENTMCNC: 32 G/DL (ref 32.3–36.5)
MCV RBC AUTO: 87.4 FL (ref 81.4–97.8)
NT-PROBNP SERPL IA-MCNC: 5594 PG/ML (ref 0–125)
PLATELET # BLD AUTO: 377 K/UL (ref 164–446)
PMV BLD AUTO: 10.1 FL (ref 9–12.9)
POTASSIUM SERPL-SCNC: 3.2 MMOL/L (ref 3.6–5.5)
RBC # BLD AUTO: 2.86 M/UL (ref 4.7–6.1)
SODIUM SERPL-SCNC: 137 MMOL/L (ref 135–145)
WBC # BLD AUTO: 7.8 K/UL (ref 4.8–10.8)

## 2024-07-22 PROCEDURE — 80048 BASIC METABOLIC PNL TOTAL CA: CPT

## 2024-07-22 PROCEDURE — 97530 THERAPEUTIC ACTIVITIES: CPT

## 2024-07-22 PROCEDURE — 97110 THERAPEUTIC EXERCISES: CPT

## 2024-07-22 PROCEDURE — A9270 NON-COVERED ITEM OR SERVICE: HCPCS | Performed by: PHYSICAL MEDICINE & REHABILITATION

## 2024-07-22 PROCEDURE — 97535 SELF CARE MNGMENT TRAINING: CPT | Mod: CO

## 2024-07-22 PROCEDURE — 36415 COLL VENOUS BLD VENIPUNCTURE: CPT

## 2024-07-22 PROCEDURE — A9270 NON-COVERED ITEM OR SERVICE: HCPCS | Performed by: HOSPITALIST

## 2024-07-22 PROCEDURE — 97116 GAIT TRAINING THERAPY: CPT

## 2024-07-22 PROCEDURE — 83880 ASSAY OF NATRIURETIC PEPTIDE: CPT

## 2024-07-22 PROCEDURE — 97130 THER IVNTJ EA ADDL 15 MIN: CPT

## 2024-07-22 PROCEDURE — 99232 SBSQ HOSP IP/OBS MODERATE 35: CPT | Performed by: HOSPITALIST

## 2024-07-22 PROCEDURE — 85027 COMPLETE CBC AUTOMATED: CPT

## 2024-07-22 PROCEDURE — 82962 GLUCOSE BLOOD TEST: CPT | Mod: 91

## 2024-07-22 PROCEDURE — 99232 SBSQ HOSP IP/OBS MODERATE 35: CPT | Performed by: PHYSICAL MEDICINE & REHABILITATION

## 2024-07-22 PROCEDURE — 97129 THER IVNTJ 1ST 15 MIN: CPT

## 2024-07-22 PROCEDURE — 700102 HCHG RX REV CODE 250 W/ 637 OVERRIDE(OP): Performed by: HOSPITALIST

## 2024-07-22 PROCEDURE — 700102 HCHG RX REV CODE 250 W/ 637 OVERRIDE(OP): Performed by: PHYSICAL MEDICINE & REHABILITATION

## 2024-07-22 PROCEDURE — 770010 HCHG ROOM/CARE - REHAB SEMI PRIVAT*

## 2024-07-22 RX ORDER — POTASSIUM CHLORIDE 1500 MG/1
40 TABLET, EXTENDED RELEASE ORAL ONCE
Status: COMPLETED | OUTPATIENT
Start: 2024-07-22 | End: 2024-07-22

## 2024-07-22 RX ORDER — POTASSIUM CHLORIDE 1500 MG/1
10 TABLET, EXTENDED RELEASE ORAL DAILY
Status: DISCONTINUED | OUTPATIENT
Start: 2024-07-23 | End: 2024-08-01 | Stop reason: HOSPADM

## 2024-07-22 RX ADMIN — AMIODARONE HYDROCHLORIDE 400 MG: 200 TABLET ORAL at 17:50

## 2024-07-22 RX ADMIN — POTASSIUM CHLORIDE 40 MEQ: 1500 TABLET, EXTENDED RELEASE ORAL at 11:45

## 2024-07-22 RX ADMIN — METFORMIN HYDROCHLORIDE 1000 MG: 500 TABLET ORAL at 05:20

## 2024-07-22 RX ADMIN — CALCIUM CARBONATE (ANTACID) CHEW TAB 500 MG 1000 MG: 500 CHEW TAB at 17:50

## 2024-07-22 RX ADMIN — OMEPRAZOLE 40 MG: 20 CAPSULE, DELAYED RELEASE ORAL at 20:00

## 2024-07-22 RX ADMIN — OMEPRAZOLE 40 MG: 20 CAPSULE, DELAYED RELEASE ORAL at 08:26

## 2024-07-22 RX ADMIN — METFORMIN HYDROCHLORIDE 1000 MG: 500 TABLET ORAL at 17:50

## 2024-07-22 RX ADMIN — AMIODARONE HYDROCHLORIDE 400 MG: 200 TABLET ORAL at 05:20

## 2024-07-22 RX ADMIN — SITAGLIPTIN 25 MG: 50 TABLET, FILM COATED ORAL at 08:24

## 2024-07-22 RX ADMIN — ATORVASTATIN CALCIUM 40 MG: 40 TABLET, FILM COATED ORAL at 20:00

## 2024-07-22 RX ADMIN — FUROSEMIDE 20 MG: 20 TABLET ORAL at 08:26

## 2024-07-22 RX ADMIN — Medication 2000 UNITS: at 08:25

## 2024-07-22 RX ADMIN — CALCIUM CARBONATE (ANTACID) CHEW TAB 500 MG 1000 MG: 500 CHEW TAB at 08:25

## 2024-07-22 RX ADMIN — CALCIUM CARBONATE (ANTACID) CHEW TAB 500 MG 1000 MG: 500 CHEW TAB at 11:45

## 2024-07-22 ASSESSMENT — ENCOUNTER SYMPTOMS
EYES NEGATIVE: 1
COUGH: 0
VOMITING: 0
ABDOMINAL PAIN: 0
BRUISES/BLEEDS EASILY: 0
SHORTNESS OF BREATH: 0
PALPITATIONS: 0
NAUSEA: 0
POLYDIPSIA: 0
FEVER: 0
CHILLS: 0

## 2024-07-22 ASSESSMENT — GAIT ASSESSMENTS
DEVIATION: DECREASED TOE OFF;DECREASED HEEL STRIKE
GAIT LEVEL OF ASSIST: CONTACT GUARD ASSIST
DISTANCE (FEET): 30
ASSISTIVE DEVICE: PARALLEL BARS

## 2024-07-22 ASSESSMENT — ACTIVITIES OF DAILY LIVING (ADL)
TUB_SHOWER_TRANSFER_DESCRIPTION: GRAB BAR
BED_CHAIR_WHEELCHAIR_TRANSFER_DESCRIPTION: INCREASED TIME;INITIAL PREPARATION FOR TASK;SUPERVISION FOR SAFETY;SET-UP OF EQUIPMENT

## 2024-07-22 NOTE — PROGRESS NOTES
"  Physical Medicine & Rehabilitation Progress Note    Encounter Date: 7/22/2024    Chief Complaint: Decreased mobility, weakness    Interval Events (Subjective):  Patient sitting up in room. He reports he is doing well. He reports therapy is going well. Discussed about low K level, he reports he ate extra banana so that he does not have to take the pills. Discussed would monitor and recheck in a few days.     Objective:  VITAL SIGNS: /60   Pulse 73   Temp 36.6 °C (97.8 °F) (Oral)   Resp 18   Ht 1.803 m (5' 11\")   Wt 75.2 kg (165 lb 12.8 oz)   SpO2 95%   BMI 23.12 kg/m²   Gen: NAD  Psych: Mood and affect appropriate  CV: RRR, 0 edema  Resp: CTAB, no upper airway sounds  Abd: NTND  Neuro: AOx4, following commands    Laboratory Values:  Recent Results (from the past 72 hour(s))   POCT glucose device results    Collection Time: 07/19/24  5:05 PM   Result Value Ref Range    POC Glucose, Blood 162 (H) 65 - 99 mg/dL   POCT glucose device results    Collection Time: 07/19/24  8:36 PM   Result Value Ref Range    POC Glucose, Blood 172 (H) 65 - 99 mg/dL   POCT glucose device results    Collection Time: 07/20/24  7:45 AM   Result Value Ref Range    POC Glucose, Blood 141 (H) 65 - 99 mg/dL   POCT glucose device results    Collection Time: 07/20/24 11:37 AM   Result Value Ref Range    POC Glucose, Blood 167 (H) 65 - 99 mg/dL   POCT glucose device results    Collection Time: 07/20/24  5:20 PM   Result Value Ref Range    POC Glucose, Blood 170 (H) 65 - 99 mg/dL   POCT glucose device results    Collection Time: 07/20/24  8:45 PM   Result Value Ref Range    POC Glucose, Blood 121 (H) 65 - 99 mg/dL   POCT glucose device results    Collection Time: 07/21/24  7:53 AM   Result Value Ref Range    POC Glucose, Blood 176 (H) 65 - 99 mg/dL   POCT glucose device results    Collection Time: 07/21/24 11:25 AM   Result Value Ref Range    POC Glucose, Blood 178 (H) 65 - 99 mg/dL   POCT glucose device results    Collection Time: " 07/21/24  5:28 PM   Result Value Ref Range    POC Glucose, Blood 147 (H) 65 - 99 mg/dL   POCT glucose device results    Collection Time: 07/21/24  8:06 PM   Result Value Ref Range    POC Glucose, Blood 155 (H) 65 - 99 mg/dL   CBC WITHOUT DIFFERENTIAL    Collection Time: 07/22/24  5:26 AM   Result Value Ref Range    WBC 7.8 4.8 - 10.8 K/uL    RBC 2.86 (L) 4.70 - 6.10 M/uL    Hemoglobin 8.0 (L) 14.0 - 18.0 g/dL    Hematocrit 25.0 (L) 42.0 - 52.0 %    MCV 87.4 81.4 - 97.8 fL    MCH 28.0 27.0 - 33.0 pg    MCHC 32.0 (L) 32.3 - 36.5 g/dL    RDW 49.0 35.9 - 50.0 fL    Platelet Count 377 164 - 446 K/uL    MPV 10.1 9.0 - 12.9 fL   Basic Metabolic Panel    Collection Time: 07/22/24  5:26 AM   Result Value Ref Range    Sodium 137 135 - 145 mmol/L    Potassium 3.2 (L) 3.6 - 5.5 mmol/L    Chloride 104 96 - 112 mmol/L    Co2 23 20 - 33 mmol/L    Glucose 115 (H) 65 - 99 mg/dL    Bun 15 8 - 22 mg/dL    Creatinine 0.64 0.50 - 1.40 mg/dL    Calcium 7.6 (L) 8.5 - 10.5 mg/dL    Anion Gap 10.0 7.0 - 16.0   proBrain Natriuretic Peptide, NT    Collection Time: 07/22/24  5:26 AM   Result Value Ref Range    NT-proBNP 5594 (H) 0 - 125 pg/mL   ESTIMATED GFR    Collection Time: 07/22/24  5:26 AM   Result Value Ref Range    GFR (CKD-EPI) 101 >60 mL/min/1.73 m 2   POCT glucose device results    Collection Time: 07/22/24  7:23 AM   Result Value Ref Range    POC Glucose, Blood 115 (H) 65 - 99 mg/dL   POCT glucose device results    Collection Time: 07/22/24 11:39 AM   Result Value Ref Range    POC Glucose, Blood 168 (H) 65 - 99 mg/dL       Medications:  Scheduled Medications   Medication Dose Frequency    [START ON 7/23/2024] potassium chloride SA  10 mEq DAILY    SITagliptin  25 mg DAILY    furosemide  20 mg DAILY    insulin regular  2-12 Units 4X/DAY ACHS    vitamin D3  2,000 Units DAILY    Pharmacy Consult Request  1 Each PHARMACY TO DOSE    senna-docusate  2 Tablet BID    amiodarone  400 mg TWICE DAILY    atorvastatin  40 mg Q EVENING     calcium carbonate  1,000 mg TID WITH MEALS    metFORMIN  1,000 mg BID    omeprazole  40 mg BID     PRN medications: insulin regular **AND** POC blood glucose manual result **AND** NOTIFY MD and PharmD **AND** Administer 20 grams of glucose (approximately 8 ounces of fruit juice) every 15 minutes PRN FSBG less than 70 mg/dL **AND** dextrose bolus, Respiratory Therapy Consult, hydrALAZINE, acetaminophen, senna-docusate **AND** polyethylene glycol/lytes, docusate sodium, magnesium hydroxide, carboxymethylcellulose, mag hydrox-al hydrox-simeth, ondansetron **OR** ondansetron, traZODone, sodium chloride, oxyCODONE immediate-release **OR** oxyCODONE immediate-release, bisacodyl    Diet:  Current Diet Order   Procedures    Diet Order Diet: Low Fiber(GI Soft); Second Modifier: (optional): Consistent CHO (Diabetic)       Medical Decision Making and Plan:  L CVA -Patient with L parietal CVA in 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 Stroke Ppx has been held due to GI bleed  -PT and OT for mobility and ADLs. Per guidelines, 15 hours per week between PT, OT and/or SLP.  -Follow-up Neurology     HTN/CAD/Mitral stenosis/A fib - Patient on Amiodarone 400 mg BID, Lasix 20 mg daily. Consulted hospitalist. Continue Amiodarone 400 mg BID, Lasix 20 mg and      MT amputation - Recent at OSH. NWB reportedly on LLE     HLD - Patient on Atorvastatin 40 mg daily     GI bleed - Check AM CBC. Continue PPI BID. Hgb 8.9, consult hospitalist     DM2 with hyperglycemia - Patient on Lantus 5 U and SSI. Previously on metformin and Jardiance. Consult hospitalist. Increased Lantus. Started on metformin. Improving sugars, lantus discontinued, continue Metformin 1000 mg BID and SSI     Anemia - Check AM CBC - 8.9 on admission.      Hypokalemia - Check AM CMP - 3.8, will monitor. Repeat 3.1, started on 20 mEq     Hypocalcemia - Check AM CMP - 7.6 on admission, will monitor     Pain - Patient on PRN Tylenol/Oxycodone     Skin - Patient at risk for skin breakdown due  to debility in areas including sacrum, achilles, elbows and head in addition to other sites. Nursing to assess skin daily.      GI Ppx - Patient on Prilosec for GERD prophylaxis. Patient on Senna-docusate for constipation prophylaxis.      DVT Ppx - Patient not cleared for AC on transfer.   ___________________________________    T. Yordy Salazar MD/PhD  [facility] - Physical Medicine & Rehabilitation   [facility] - Brain Injury Medicine   ____________________________________

## 2024-07-22 NOTE — PROGRESS NOTES
NURSING DAILY NOTE    Name: Pj Freeman   Date of Admission: 7/17/2024   Admitting Diagnosis: Acute CVA (cerebrovascular accident) ([facility])  Attending Physician: Dilia Salazar M.d.  Allergies: Patient has no known allergies.    Safety  Patient Assist  pooja a  Patient Precautions  Fall Risk, Non Weight Bearing Left Lower Extremity  Precaution Comments  L TMA > NWB  Bed Transfer Status  Minimal Assist  Toilet Transfer Status   Moderate Assist  Assistive Devices  Wheelchair, Rails  Oxygen  None - Room Air  Diet/Therapeutic Dining  Current Diet Order   Procedures    Diet Order Diet: Low Fiber(GI Soft); Second Modifier: (optional): Consistent CHO (Diabetic)     Pill Administration  whole and floated  Agitated Behavioral Scale     ABS Level of Severity       Fall Risk  Has the patient had a fall this admission?   No  Taylor Gallego Fall Risk Scoring  15, HIGH RISK  Fall Risk Safety Measures  bed alarm, chair alarm, and poor balance    Vitals  Temperature: 36.6 °C (97.8 °F)  Temp src: Oral  Pulse: 73  Respiration: 18  Blood Pressure : 120/60  Blood Pressure MAP (Calculated): 80 MM HG  BP Location: Left, Upper Arm  Patient BP Position: Sitting     Oxygen  Pulse Oximetry: 95 %  O2 (LPM): 0  O2 Delivery Device: None - Room Air    Bowel and Bladder  Last Bowel Movement  07/21/24  Stool Type  Type 6: Fluffy pieces with ragged edges, a mushy stool  Bowel Device  Bathroom  Continent  Bladder: Continent void   Bowel: Continent movement  Bladder Function  Urine Void (mL): 300 ml  Number of Times Voided: 1  Urine Color: Yellow  Genitourinary Assessment   Bladder Assessment (WDL):  WDL Except  Cordova Catheter: Not Applicable  Urine Color: Yellow  Bladder Device: Urinal  Time Void: Yes  Bladder Scan: Post Void  $ Bladder Scan Results (mL): 155  Bladder Medications: Yes    Skin  Rocky Score   18  Sensory Interventions   Bed Types: Standard/Trauma Mattress  Skin  Preventative Measures: Pillows in Use for Support / Positioning  Moisture Interventions  Moisturizers/Barriers: Barrier Wipes, Barrier Cream      Pain  Pain Rating Scale  0 - No Pain  Pain Location  Foot  Pain Location Orientation  Left  Pain Interventions   Declines    ADLs    Bathing   Staff, Shower  Linen Change      Personal Hygiene  Perineal Care, Samia Bottle, Change Samia Pads, Moist Samia Wipes  Chlorhexidine Bath      Oral Care  Brushed Teeth  Teeth/Dentures     Shave     Nutrition Percentage Eaten  Breakfast, Between % Consumed  Environmental Precautions  Bed in Low Position, Treaded Slipper Socks on Patient  Patient Turns/Positioning  Patient Turns Self from Side to Side  Patient Turns Assistance/Tolerance     Bed Positions  Bed Controls On  Head of Bed Elevated  Self regulated      Psychosocial/Neurologic Assessment  Psychosocial Assessment  Psychosocial (WDL):  WDL Except  Patient Behaviors: Fatigue  Family Behaviors: No Family Present  Neurologic Assessment  Neuro (WDL): Within Defined Limits  Level of Consciousness: Alert  EENT (WDL):  WDL Except    Cardio/Pulmonary Assessment  Edema   RLE Edema: 2+, Pitting  LLE Edema: 2+, Pitting  Respiratory Breath Sounds     Cardiac Assessment   Cardiac (WDL):  WDL Except (HTN, PVD, PAD, Afib)

## 2024-07-22 NOTE — CARE PLAN
Problem: Skin Integrity  Goal: Skin integrity is maintained or improved  Outcome: Progressing   Incision without redness, swelling or drainage and skin edges are approximated

## 2024-07-22 NOTE — THERAPY
Occupational Therapy  Daily Treatment     Patient Name: Pj Freeman  Age:  69 y.o., Sex:  male  Medical Record #: [id number]  Today's Date: 7/22/2024     Precautions  Precautions: Fall Risk, Non Weight Bearing Left Lower Extremity  Comments: L TMA > NWB         Subjective    Received pt up in chair, willing to work with OT.      Objective       07/22/24 1331   OT Charge Group   OT Therapy Activity (Units) 1   OT Therapeutic Exercise (Units) 3   OT Total Time Spent   OT Individual Total Time Spent (Mins) 60   Sitting Upper Body Exercises   Front Arm Raise 3 sets of 10;Bilateral  (alternating left/right. 5lb weights for first round, 3lb weights for second and third)   Bicep Curls 3 sets of 10;Bilateral  (alternating arms. 5lb dumbbells)   Upper Extremity Bike Level 5 Resistance  (Level 5 motomed. Total 20 minutes with 2.5 min rest breaks at 5 min intervals)     Pt completed peg board design activity. Copied triangles of various sizes/colors from design onto peg board. Activity done to target finger/hand dexterity and problem solving skills.     Assessment    Pt seen for OT tx session. Tolerated exercises well, despite some discomfort in L shoulder from an injury many years ago.   Strengths: Able to follow instructions, Alert and oriented, Effective communication skills, Independent prior level of function, Motivated for self care and independence, Pleasant and cooperative, Supportive family, Willingly participates in therapeutic activities  Barriers: Decreased endurance, Generalized weakness, Impaired activity tolerance, Tube feeding, Fatigue    Plan    ADL IADL , related mobility and cognition strength/endurance building standing tolerance and balance activity all incorporating NWB left LE     DME  OT DME Recommendations  Bathroom Equipment: 3 in 1 Commode    Passport items to be completed:  Perform bathroom transfers, complete dressing, complete feeding, get ready for the day, prepare a simple meal, participate  in household tasks, adapt home for safety needs, demonstrate home exercise program, complete caregiver training     Occupational Therapy Goals (Active)       Problem: Bathing       Dates: Start:  07/18/24         Goal: STG-Within one week, patient will bathe with SBA.       Dates: Start:  07/18/24               Problem: Dressing       Dates: Start:  07/18/24         Goal: STG-Within one week, patient will dress LB with Min A.       Dates: Start:  07/18/24               Problem: Functional Transfers       Dates: Start:  07/18/24         Goal: STG-Within one week, patient will transfer to toilet with Min A.       Dates: Start:  07/18/24            Goal: STG-Within one week, patient will transfer to tub/shower with Min A.       Dates: Start:  07/18/24               Problem: OT Long Term Goals       Dates: Start:  07/18/24         Goal: LTG-By discharge, patient will complete basic self care tasks at Mod Independent level.       Dates: Start:  07/18/24            Goal: LTG-By discharge, patient will perform bathroom transfers at Mod Independent level.       Dates: Start:  07/18/24               Problem: Toileting       Dates: Start:  07/18/24         Goal: STG-Within one week, patient will complete toileting tasks with Min A.       Dates: Start:  07/18/24

## 2024-07-22 NOTE — PROGRESS NOTES
NURSING DAILY NOTE    Name: Pj Freeman   Date of Admission: 7/17/2024   Admitting Diagnosis: Acute CVA (cerebrovascular accident) (formerly [facility])  Attending Physician: Dilia Salazar M.d.  Allergies: Patient has no known allergies.    Safety  Patient Assist  pooja a  Patient Precautions  Fall Risk, Non Weight Bearing Left Lower Extremity  Precaution Comments  left transmetatarsal amputation  Bed Transfer Status  Minimal Assist  Toilet Transfer Status   Moderate Assist  Assistive Devices  Wheelchair, Rails  Oxygen  None - Room Air  Diet/Therapeutic Dining  Current Diet Order   Procedures    Diet Order Diet: Low Fiber(GI Soft); Second Modifier: (optional): Consistent CHO (Diabetic)     Pill Administration  whole  Agitated Behavioral Scale     ABS Level of Severity       Fall Risk  Has the patient had a fall this admission?   No  Taylor Gallego Fall Risk Scoring  15, HIGH RISK  Fall Risk Safety Measures  bed alarm, chair alarm, and poor balance    Vitals  Temperature: 36.8 °C (98.2 °F)  Temp src: Oral  Pulse: 71  Respiration: 17  Blood Pressure : 131/70  Blood Pressure MAP (Calculated): 90 MM HG  BP Location: Right, Upper Arm  Patient BP Position: Supine     Oxygen  Pulse Oximetry: 95 %  O2 (LPM): 0  O2 Delivery Device: None - Room Air    Bowel and Bladder  Last Bowel Movement  07/21/24  Stool Type  Type 6: Fluffy pieces with ragged edges, a mushy stool  Bowel Device  Bathroom  Continent  Bladder: Continent void   Bowel: Continent movement  Bladder Function  Urine Void (mL): 250 ml  Number of Times Voided: 1  Urine Color: Yellow  Genitourinary Assessment   Bladder Assessment (WDL):  WDL Except  Cordova Catheter: Not Applicable  Urine Color: Yellow  Bladder Device: Urinal  Time Void: Yes  Bladder Scan: Post Void  $ Bladder Scan Results (mL): 186  Bladder Medications: Yes    Skin  Rocky Score   18  Sensory Interventions   Bed Types: Standard/Trauma  Mattress  Skin Preventative Measures: Pillows in Use for Support / Positioning  Moisture Interventions  Moisturizers/Barriers: Barrier Wipes, Barrier Cream      Pain  Pain Rating Scale  0 - No Pain  Pain Location  Foot  Pain Location Orientation  Left  Pain Interventions   Declines    ADLs    Bathing   Partial Bed Bath  Linen Change      Personal Hygiene  Perineal Care, Samia Bottle, Change Samia Pads, Moist Samia Wipes  Chlorhexidine Bath      Oral Care  Brushed Teeth  Teeth/Dentures     Shave     Nutrition Percentage Eaten  *  * Meal *  *, Breakfast, Between 50-75% Consumed  Environmental Precautions  Bed in Low Position, Treaded Slipper Socks on Patient  Patient Turns/Positioning  Patient Turns Self from Side to Side  Patient Turns Assistance/Tolerance     Bed Positions  Bed Controls On  Head of Bed Elevated  Self regulated      Psychosocial/Neurologic Assessment  Psychosocial Assessment  Psychosocial (WDL):  WDL Except  Patient Behaviors: Fatigue  Family Behaviors: No Family Present  Neurologic Assessment  Neuro (WDL): Within Defined Limits  Level of Consciousness: Alert  EENT (WDL):  WDL Except    Cardio/Pulmonary Assessment  Edema   RLE Edema: 2+, Pitting  LLE Edema: 2+, Pitting  Respiratory Breath Sounds     Cardiac Assessment   Cardiac (WDL):  WDL Except (HTN,  PVD, PAD, Afib)

## 2024-07-22 NOTE — CARE PLAN
"The patient is Stable - Low risk of patient condition declining or worsening      Problem: Fall Risk - Rehab  Goal: Patient will remain free from falls  Outcome: Progressing   Taylor Gallego Fall risk Assessment Score: 15    High fall risk Interventions   - Bed and strip alarm   - Yellow sign by the door   - Yellow wrist band \"Fall risk\"  - Room near to the nurse station  - Do not leave patient unattended in the bathroom  - Fall risk education provided      Problem: Diabetes Management  Goal: Patient's ability to maintain appropriate glucose levels will be maintained or improve  Outcome: Progressing   Note: BS at bedtime was 155. 2 units of insulin regular was administered per insulin sliding scale per MAR. Provided bedtime snacks.        "

## 2024-07-22 NOTE — THERAPY
"Physical Therapy   Daily Treatment     Patient Name: Pj Freeman  Age:  69 y.o., Sex:  male  Medical Record #: [id number]  Today's Date: 7/22/2024     Precautions  Precautions: (P) Fall Risk, Non Weight Bearing Left Lower Extremity  Comments: (P) L TMA > NWB    Subjective    Pt was seated in room and agreeable to participate in therapy services      Objective       07/22/24 0901   Precautions   Precautions Fall Risk;Non Weight Bearing Left Lower Extremity   Comments L TMA > NWB   Gait Functional Level of Assist    Gait Level Of Assist Contact Guard Assist   Assistive Device Parallel Bars   Distance (Feet) 30  (x 2)   # of Times Distance was Traveled 2   Deviation Decreased Toe Off;Decreased Heel Strike  (NWB LLE)   Wheelchair Functional Level of Assist   Wheelchair Assist Stand by Assist   Distance Wheelchair (Feet or Distance) 150   Wheelchair Description Extra time;Leg rest management;Supervision for safety   Transfer Functional Level of Assist   Bed, Chair, Wheelchair Transfer Minimal Assist   Bed Chair Wheelchair Transfer Description Increased time;Initial preparation for task;Supervision for safety;Set-up of equipment  (sqt pivot w/c <> mat table)   Supine Lower Body Exercise   Straight Leg Raises Back ;1 set of 10;Right  (AAROM for glute activation)   Sitting Lower Body Exercises   Long Arc Quad 2 sets of 10;Right  (4#)   Marching 2 sets of 10  (Right side with 4# on knee)   Hamstring Curl 2 sets of 10;Light Resistance Theraband;Right   Bed Mobility    Supine to Sit Supervised   Sit to Supine Standby Assist   Sit to Stand Minimal Assist   Scooting Standby Assist   Rolling Independent   Interdisciplinary Plan of Care Collaboration   IDT Collaboration with  Nursing   Patient Position at End of Therapy Seated;Chair Alarm On;Call Light within Reach;Tray Table within Reach;Phone within Reach   Collaboration Comments Boot for LLE protection     Single leg hopping in // bars onto 2\" step  2 sets x 5 reps "     Sidelying donkey kicks 2 x 10 reps     STS transfer practice BUE support pulling to  // bars    Assessment    Pt demos proximal hip weakness and RLE weakness that are limiting his independence with transfers. Pt struggles achieving full hip and knee extension while rising from a chair. Unable to perform gluteus based therex against gravity. Pt able to maintain NWB on LLE during STS transfers.     Strengths: Able to follow instructions, Alert and oriented, Independent prior level of function, Pleasant and cooperative, Willingly participates in therapeutic activities, Motivated for self care and independence  Barriers: Fatigue, Difficulty following instructions, Decreased endurance, Impaired activity tolerance, Limited mobility (limited ability to maintain weightbearing precaution and tends to lose focus/becomes distracted)    Plan    Focus treatment on sit to stand and transfers with NWB left LE, car transfers, right LE strengthening     DME       Passport items to be completed:  Get in/out of bed safely, in/out of a vehicle, safely use mobility device, walk or wheel around home/community, navigate up and down stairs, show how to get up/down from the ground, ensure home is accessible, demonstrate HEP, complete caregiver training    Physical Therapy Problems (Active)       Problem: Mobility       Dates: Start:  07/18/24         Goal: STG-Within one week, patient will ambulate 50 feet with kneeling scooter SBA       Dates: Start:  07/18/24            Goal: STG-Within one week, patient will ambulate up/down a curb with FWW CGA       Dates: Start:  07/18/24               Problem: Mobility Transfers       Dates: Start:  07/18/24         Goal: STG-Within one week, patient will sit to stand with CGA       Dates: Start:  07/18/24               Problem: PT-Long Term Goals       Dates: Start:  07/18/24         Goal: LTG-By discharge, patient will ambulate with kneeling scooter >150 feet supervised       Dates:  Start:  07/18/24            Goal: LTG-By discharge, patient will transfer one surface to another supervised        Dates: Start:  07/18/24            Goal: LTG-By discharge, patient will ambulate up/down 4-6 stairs with single railing supervised       Dates: Start:  07/18/24            Goal: LTG-By discharge, patient will transfer in/out of a car supervised       Dates: Start:  07/18/24

## 2024-07-22 NOTE — THERAPY
Speech Language Pathology  Daily Treatment     Patient Name: Pj Freeman  Age:  69 y.o., Sex:  male  Medical Record #: [id number]  Today's Date: 7/22/2024     Precautions  Precautions: Fall Risk, Non Weight Bearing Left Lower Extremity  Comments: L TMA > NWB    Subjective    Pt pleasant and cooperative.      Objective       07/22/24 1033   Treatment Charges   SLP Cognitive Skill Development First 15 Minutes 1   SLP Cognitive Skill Development Additional 15 Minutes 3   SLP Total Time Spent   SLP Individual Total Time Spent (Mins) 60         Assessment    Pt completed repeat med sort with 100% accuracy indep. Pt introduced to who has more dollars and coins, pt completed first and second page with 4 total errors. Rec reviewing errors and completing final page in future ST session.     Strengths: Able to follow instructions, Alert and oriented, Effective communication skills, Pleasant and cooperative, Motivated for self care and independence, Supportive family, Willingly participates in therapeutic activities  Barriers: Impulsive, Impaired carryover of learning, Impaired functional cognition, Impaired balance (impaired attention.)    Plan    Correct and cont who has more, stroke education and consider decrease or dc from ST as appropriate.     Speech Therapy Problems (Active)       Problem: Comprehension STGs       Dates: Start:  07/18/24         Goal: STG-Within one week, patient will perform functional reading comprehension tasks with targets for attention with 80% acc with min cues to improve.       Dates: Start:  07/18/24               Problem: Memory STGs       Dates: Start:  07/18/24         Goal: STG-Within one week, patient will recall new training and safety sequencing with 80% acc  with use of external memory aid and compensatory strategies with min cues to improve.       Dates: Start:  07/18/24               Problem: Problem Solving STGs       Dates: Start:  07/18/24         Goal: STG-Within one week,  patient will perform alternating attention tasks with 80% acc with min cues to improve.       Dates: Start:  07/18/24            Goal: STG-Within one week, patient will perform medication and financial management tasks with 80% acc with min cues to improve.       Dates: Start:  07/18/24               Problem: Speech/Swallowing LTGs       Dates: Start:  07/18/24         Goal: LTG-By discharge, patient will solve complex problems and recall safety training with 90% acc with spv. for safe discharge home.       Dates: Start:  07/18/24

## 2024-07-22 NOTE — PROGRESS NOTES
NURSING DAILY NOTE    Name: Pj Freeman   Date of Admission: 7/17/2024   Admitting Diagnosis: Acute CVA (cerebrovascular accident) ([facility])  Attending Physician: Dilia Salazar M.d.  Allergies: Patient has no known allergies.    Safety  Patient Assist  pooja a  Patient Precautions  Fall Risk, Non Weight Bearing Left Lower Extremity  Precaution Comments  left transmetatarsal amputation  Bed Transfer Status  Minimal Assist  Toilet Transfer Status   Moderate Assist  Assistive Devices  Wheelchair, Rails  Oxygen  None - Room Air  Diet/Therapeutic Dining  Current Diet Order   Procedures    Diet Order Diet: Low Fiber(GI Soft); Second Modifier: (optional): Consistent CHO (Diabetic)     Pill Administration  whole  Agitated Behavioral Scale     ABS Level of Severity       Fall Risk  Has the patient had a fall this admission?   No  Taylor Gallego Fall Risk Scoring  15, HIGH RISK  Fall Risk Safety Measures  bed alarm and chair alarm    Vitals  Temperature: 36.6 °C (97.9 °F)  Temp src: Temporal  Pulse: 66  Respiration: 18  Blood Pressure : (!) 146/73  Blood Pressure MAP (Calculated): 97 MM HG  BP Location: Right, Upper Arm  Patient BP Position: Supine     Oxygen  Pulse Oximetry: 92 %  O2 (LPM): 0  O2 Delivery Device: None - Room Air    Bowel and Bladder  Last Bowel Movement  07/21/24  Stool Type  Type 6: Fluffy pieces with ragged edges, a mushy stool  Bowel Device  Bathroom  Continent  Bladder: Continent void   Bowel: Continent movement  Bladder Function  Urine Void (mL): 200 ml  Number of Times Voided: 1  Urine Color: Yellow  Genitourinary Assessment   Bladder Assessment (WDL):  WDL Except  Cordova Catheter: Not Applicable  Urine Color: Yellow  Bladder Device: Urinal  Time Void: Yes  Bladder Scan: Post Void  $ Bladder Scan Results (mL): 155  Bladder Medications: Yes    Skin  Rocky Score   18  Sensory Interventions   Bed Types: Standard/Trauma Mattress  Skin  Preventative Measures: Pillows in Use for Support / Positioning  Moisture Interventions  Moisturizers/Barriers: Barrier Wipes, Barrier Cream      Pain  Pain Rating Scale  0 - No Pain  Pain Location  Foot  Pain Location Orientation  Left  Pain Interventions   Declines    ADLs    Bathing   Partial Bed Bath  Linen Change      Personal Hygiene  Perineal Care, Samia Bottle, Change Samia Pads, Moist Samia Wipes  Chlorhexidine Bath      Oral Care  Brushed Teeth  Teeth/Dentures     Shave     Nutrition Percentage Eaten  *  * Meal *  *, Dinner, Between % Consumed  Environmental Precautions  Bed in Low Position, Treaded Slipper Socks on Patient  Patient Turns/Positioning  Patient Turns Self from Side to Side  Patient Turns Assistance/Tolerance     Bed Positions  Bed Controls On  Head of Bed Elevated  Self regulated      Psychosocial/Neurologic Assessment  Psychosocial Assessment  Psychosocial (WDL):  WDL Except  Patient Behaviors: Fatigue  Family Behaviors: No Family Present  Neurologic Assessment  Neuro (WDL): Within Defined Limits  Level of Consciousness: Alert  EENT (WDL):  WDL Except    Cardio/Pulmonary Assessment  Edema   RLE Edema: 2+, Pitting  LLE Edema: 2+, Pitting  Respiratory Breath Sounds     Cardiac Assessment   Cardiac (WDL):  WDL Except (HTN, PVD, PAD, Afib)

## 2024-07-22 NOTE — DISCHARGE PLANNING
CASE MANAGEMENT INITIAL ASSESSMENT    Admit Date:  7/17/2024     Patient is a  69 y.o. male transferred from [facility] where he was hosptalized from 7/8 to 7/17.     Diagnosis: Left sided cerebral hemisphere cerebrovascular accident (CVA) (Former[facility]) [I63.9]  S/p  closure of 1 cm surgical wound dehiscence by ortho on 7/10 Left transmetatarsal amputation performed in [facility].     Co-morbidities:   Patient Active Problem List    Diagnosis Date Noted    Left sided cerebral hemisphere cerebrovascular accident (CVA) ([facility]) 07/17/2024    Acute coronary syndrome ([facility]) 07/13/2024    Hypomagnesemia 07/12/2024    Hypokalemia 07/12/2024    Upper GI bleed 07/08/2024    Acute blood loss anemia 07/08/2024    Acute CVA (cerebrovascular accident) ([facility]) 07/08/2024    NSTEMI (non-ST elevated myocardial infarction) ([facility]) 07/08/2024    Mitral stenosis 07/08/2024    Acute osteomyelitis of left foot ([facility]) 07/08/2024    Type 2 diabetes mellitus ([facility]) 07/08/2024     Prior Living Situation:  Housing / Facility: 1 [facility] (in [place], [place]); 2 [facility].  Lives with - Patient's Self Care Capacity: Spouse (+adult dtr and 2 grandkids)    Prior Level of Function:  Medication Management: Independent  Finances: Independent  Home Management: Independent  Shopping: Requires Assist  Prior Level Of Mobility: Independent With Device in Community  Driving / Transportation: Driving Independent (usually drives; hasn't due to L toe amputation)    Support Systems:  Primary : Kathy Mireles  Other support systems: adult dtr   Power of  (Name & Phone): none    Previous Services Utilized:   Equipment Owned:  (Kneeling scooter, crutches, and bedside commode,)  Prior Services: Home-Independent; out pt wound care @ [facility] in [place]     Other Information:  Occupation (Pre-Hospital Vocational): Retired Due To Age  Primary Payor Source: Medicare A  Primary Care Practitioner : RONY Coker  Other MDs:   [facility], Dr Olu Diop GI; Dr Field Ortho; lizet Riggs Neurology    Patient / Family Goal:  Patient / Family Goal: Return home    Plan:  1. Continue to follow patient through hospitalization and provide discharge planning in collaboration with patient, family, physicians and ancillary services.     2. Follow up with:    GI follow up OP for repeat EGD in ~3 months   Healing of previous TMA by ortho (7/3)- ortho follow up   Stroke bridge clinic   Cardiology follow up for valve/A.fib follow up   PCP  Neurology  Anticipate home health / family training.

## 2024-07-22 NOTE — THERAPY
"Occupational Therapy  Daily Treatment     Patient Name: Pj Freeman  Age:  69 y.o., Sex:  male  Medical Record #: [id number]  Today's Date: 7/22/2024     Precautions  Precautions: Fall Risk, Non Weight Bearing Left Lower Extremity  Comments: left transmetatarsal amputation         Subjective    \"  I had  speech therapy yesterday.  I sorted pills .\"     Objective       07/22/24 0701   OT Charge Group   OT Self Care / ADL (Units) 4   OT Total Time Spent   OT Individual Total Time Spent (Mins) 60   Precautions   Precautions Fall Risk;Non Weight Bearing Left Lower Extremity   Comments left transmetatarsal amputation   Functional Level of Assist   Grooming Independent  (seated at sink   oral care)   Bathing Minimal Assist  (side to side lean   to wash buttocks    assist for quality)   Upper Body Dressing Supervision  (setup  to don pull over shirt)   Lower Body Dressing Moderate Assist  (able to don   right sock and shoe  and left sock with setup.   donnned pull up brief and pants  over feet  .  in standing  at grab bar required therapist to pull up brief and  pants)   Bed, Chair, Wheelchair Transfer Contact Guard Assist  (lateral scoot   edge of bed to w/c)   Tub / Shower Transfers Minimal Assist  (lateral scoot    w/c to shower bench  min assist for  partial squat pivot  shower bench back to w/c)   Tub Shower Transfer Description Grab bar   Bed Mobility    Supine to Sit Supervised   Interdisciplinary Plan of Care Collaboration   Patient Position at End of Therapy Seated;Chair Alarm On;Self Releasing Lap Belt Applied  (in room    to eat breakfast   dining room too cold)      Collaborated with nursing.     Asking where patients post op shoe for protection is.    Tonja reports its at home   ( 6 + hours away)    He thinks his spouse may be coming to visit today.  He will contact her  and  see if she can bring the  post op shoe he had been issued     Assessment      Continues  to be limited by decreased strength/endurance. "  He demonstrated improved ability to NWB  on left foot when standing  and with stand to sit but  continued difficulty with sit  to stand transition     Strengths: Able to follow instructions, Alert and oriented, Effective communication skills, Independent prior level of function, Motivated for self care and independence, Pleasant and cooperative, Supportive family, Willingly participates in therapeutic activities  Barriers: Decreased endurance, Generalized weakness, Impaired activity tolerance, Tube feeding, Fatigue    Plan    ADL IADL , related mobility and cognition strength/endurance building standing tolerance and balance activity all incorporating NWB left LE     DME  OT DME Recommendations  Bathroom Equipment: 3 in 1 Commode        Occupational Therapy Goals (Active)       Problem: Bathing       Dates: Start:  07/18/24         Goal: STG-Within one week, patient will bathe with SBA.       Dates: Start:  07/18/24               Problem: Dressing       Dates: Start:  07/18/24         Goal: STG-Within one week, patient will dress LB with Min A.       Dates: Start:  07/18/24               Problem: Functional Transfers       Dates: Start:  07/18/24         Goal: STG-Within one week, patient will transfer to toilet with Min A.       Dates: Start:  07/18/24            Goal: STG-Within one week, patient will transfer to tub/shower with Min A.       Dates: Start:  07/18/24               Problem: OT Long Term Goals       Dates: Start:  07/18/24         Goal: LTG-By discharge, patient will complete basic self care tasks at Mod Independent level.       Dates: Start:  07/18/24            Goal: LTG-By discharge, patient will perform bathroom transfers at Mod Independent level.       Dates: Start:  07/18/24               Problem: Toileting       Dates: Start:  07/18/24         Goal: STG-Within one week, patient will complete toileting tasks with Min A.       Dates: Start:  07/18/24

## 2024-07-23 ENCOUNTER — APPOINTMENT (OUTPATIENT)
Dept: SPEECH THERAPY | Facility: REHABILITATION | Age: 69
DRG: 056 | End: 2024-07-23
Attending: PHYSICAL MEDICINE & REHABILITATION
Payer: MEDICARE

## 2024-07-23 ENCOUNTER — APPOINTMENT (OUTPATIENT)
Dept: PHYSICAL THERAPY | Facility: REHABILITATION | Age: 69
DRG: 056 | End: 2024-07-23
Attending: PHYSICAL MEDICINE & REHABILITATION
Payer: MEDICARE

## 2024-07-23 ENCOUNTER — APPOINTMENT (OUTPATIENT)
Dept: OCCUPATIONAL THERAPY | Facility: REHABILITATION | Age: 69
DRG: 056 | End: 2024-07-23
Attending: PHYSICAL MEDICINE & REHABILITATION
Payer: MEDICARE

## 2024-07-23 LAB
GLUCOSE BLD STRIP.AUTO-MCNC: 117 MG/DL (ref 65–99)
GLUCOSE BLD STRIP.AUTO-MCNC: 132 MG/DL (ref 65–99)
GLUCOSE BLD STRIP.AUTO-MCNC: 199 MG/DL (ref 65–99)
GLUCOSE BLD STRIP.AUTO-MCNC: 201 MG/DL (ref 65–99)
HCT VFR BLD AUTO: 26.1 % (ref 42–52)
HGB BLD-MCNC: 8.1 G/DL (ref 14–18)
POTASSIUM SERPL-SCNC: 3.8 MMOL/L (ref 3.6–5.5)

## 2024-07-23 PROCEDURE — 97110 THERAPEUTIC EXERCISES: CPT

## 2024-07-23 PROCEDURE — 36415 COLL VENOUS BLD VENIPUNCTURE: CPT

## 2024-07-23 PROCEDURE — 99232 SBSQ HOSP IP/OBS MODERATE 35: CPT | Performed by: HOSPITALIST

## 2024-07-23 PROCEDURE — 85014 HEMATOCRIT: CPT

## 2024-07-23 PROCEDURE — 99232 SBSQ HOSP IP/OBS MODERATE 35: CPT | Performed by: PHYSICAL MEDICINE & REHABILITATION

## 2024-07-23 PROCEDURE — 97130 THER IVNTJ EA ADDL 15 MIN: CPT

## 2024-07-23 PROCEDURE — A9270 NON-COVERED ITEM OR SERVICE: HCPCS | Performed by: PHYSICAL MEDICINE & REHABILITATION

## 2024-07-23 PROCEDURE — 700102 HCHG RX REV CODE 250 W/ 637 OVERRIDE(OP): Performed by: HOSPITALIST

## 2024-07-23 PROCEDURE — 97129 THER IVNTJ 1ST 15 MIN: CPT

## 2024-07-23 PROCEDURE — 770010 HCHG ROOM/CARE - REHAB SEMI PRIVAT*

## 2024-07-23 PROCEDURE — 97112 NEUROMUSCULAR REEDUCATION: CPT

## 2024-07-23 PROCEDURE — 97530 THERAPEUTIC ACTIVITIES: CPT | Mod: CQ

## 2024-07-23 PROCEDURE — 84132 ASSAY OF SERUM POTASSIUM: CPT

## 2024-07-23 PROCEDURE — A9270 NON-COVERED ITEM OR SERVICE: HCPCS | Performed by: HOSPITALIST

## 2024-07-23 PROCEDURE — 85018 HEMOGLOBIN: CPT

## 2024-07-23 PROCEDURE — 82962 GLUCOSE BLOOD TEST: CPT | Mod: 91

## 2024-07-23 PROCEDURE — 700102 HCHG RX REV CODE 250 W/ 637 OVERRIDE(OP): Performed by: PHYSICAL MEDICINE & REHABILITATION

## 2024-07-23 RX ORDER — AMIODARONE HYDROCHLORIDE 200 MG/1
400 TABLET ORAL TWICE DAILY
Status: DISCONTINUED | OUTPATIENT
Start: 2024-07-23 | End: 2024-07-24

## 2024-07-23 RX ORDER — FERROUS SULFATE 325(65) MG
325 TABLET ORAL 2 TIMES DAILY WITH MEALS
Status: DISCONTINUED | OUTPATIENT
Start: 2024-07-23 | End: 2024-08-01 | Stop reason: HOSPADM

## 2024-07-23 RX ORDER — INSULIN LISPRO 100 [IU]/ML
2-12 INJECTION, SOLUTION INTRAVENOUS; SUBCUTANEOUS
Status: DISCONTINUED | OUTPATIENT
Start: 2024-07-23 | End: 2024-08-01 | Stop reason: HOSPADM

## 2024-07-23 RX ADMIN — CALCIUM CARBONATE (ANTACID) CHEW TAB 500 MG 1000 MG: 500 CHEW TAB at 11:23

## 2024-07-23 RX ADMIN — METFORMIN HYDROCHLORIDE 1000 MG: 500 TABLET ORAL at 05:21

## 2024-07-23 RX ADMIN — INSULIN LISPRO 2 UNITS: 100 INJECTION, SOLUTION INTRAVENOUS; SUBCUTANEOUS at 20:33

## 2024-07-23 RX ADMIN — AMIODARONE HYDROCHLORIDE 400 MG: 200 TABLET ORAL at 05:21

## 2024-07-23 RX ADMIN — OMEPRAZOLE 40 MG: 20 CAPSULE, DELAYED RELEASE ORAL at 07:39

## 2024-07-23 RX ADMIN — AMIODARONE HYDROCHLORIDE 400 MG: 200 TABLET ORAL at 17:28

## 2024-07-23 RX ADMIN — OMEPRAZOLE 40 MG: 20 CAPSULE, DELAYED RELEASE ORAL at 20:32

## 2024-07-23 RX ADMIN — Medication 2000 UNITS: at 07:39

## 2024-07-23 RX ADMIN — ATORVASTATIN CALCIUM 40 MG: 40 TABLET, FILM COATED ORAL at 20:32

## 2024-07-23 RX ADMIN — FERROUS SULFATE TAB 325 MG (65 MG ELEMENTAL FE) 325 MG: 325 (65 FE) TAB at 17:31

## 2024-07-23 RX ADMIN — FUROSEMIDE 20 MG: 20 TABLET ORAL at 07:40

## 2024-07-23 RX ADMIN — INSULIN LISPRO 4 UNITS: 100 INJECTION, SOLUTION INTRAVENOUS; SUBCUTANEOUS at 11:25

## 2024-07-23 RX ADMIN — CALCIUM CARBONATE (ANTACID) CHEW TAB 500 MG 1000 MG: 500 CHEW TAB at 17:28

## 2024-07-23 RX ADMIN — METFORMIN HYDROCHLORIDE 1000 MG: 500 TABLET ORAL at 17:28

## 2024-07-23 RX ADMIN — CALCIUM CARBONATE (ANTACID) CHEW TAB 500 MG 1000 MG: 500 CHEW TAB at 07:39

## 2024-07-23 RX ADMIN — POTASSIUM CHLORIDE 10 MEQ: 1500 TABLET, EXTENDED RELEASE ORAL at 11:22

## 2024-07-23 RX ADMIN — FERROUS SULFATE TAB 325 MG (65 MG ELEMENTAL FE) 325 MG: 325 (65 FE) TAB at 11:23

## 2024-07-23 RX ADMIN — SITAGLIPTIN 25 MG: 50 TABLET, FILM COATED ORAL at 07:39

## 2024-07-23 ASSESSMENT — ENCOUNTER SYMPTOMS
FEVER: 0
VOMITING: 0
EYES NEGATIVE: 1
SHORTNESS OF BREATH: 0
DIARRHEA: 0
ABDOMINAL PAIN: 0
COUGH: 0
NERVOUS/ANXIOUS: 0
PALPITATIONS: 0
BRUISES/BLEEDS EASILY: 0
POLYDIPSIA: 0
CHILLS: 0
NAUSEA: 0

## 2024-07-23 ASSESSMENT — GAIT ASSESSMENTS
DISTANCE (FEET): 10
GAIT LEVEL OF ASSIST: CONTACT GUARD ASSIST
ASSISTIVE DEVICE: FRONT WHEEL WALKER

## 2024-07-23 ASSESSMENT — ACTIVITIES OF DAILY LIVING (ADL)
BED_CHAIR_WHEELCHAIR_TRANSFER_DESCRIPTION: ADAPTIVE EQUIPMENT;INCREASED TIME;INITIAL PREPARATION FOR TASK;REQUIRES LIFT;SET-UP OF EQUIPMENT;SUPERVISION FOR SAFETY;VERBAL CUEING

## 2024-07-23 ASSESSMENT — PAIN DESCRIPTION - PAIN TYPE: TYPE: ACUTE PAIN

## 2024-07-23 NOTE — PROGRESS NOTES
Hospital Medicine Daily Progress Note      Chief Complaint  Diabetes Mellitus    Interval Problem Update  FSBS low this morning.  Lab and imaging results reviewed.    Review of Systems  Review of Systems   Constitutional:  Negative for chills and fever.   HENT: Negative.     Eyes: Negative.    Respiratory:  Negative for cough and shortness of breath.    Cardiovascular:  Negative for chest pain and palpitations.   Gastrointestinal:  Negative for nausea and vomiting.   Musculoskeletal:         Wound pain   Skin:  Negative for itching and rash.   Endo/Heme/Allergies:  Negative for polydipsia. Does not bruise/bleed easily.        Physical Exam  Temp:  [36.6 °C (97.8 °F)-36.6 °C (97.9 °F)] 36.6 °C (97.9 °F)  Pulse:  [66-81] 81  Resp:  [18] 18  BP: (103-146)/(60-73) 103/66  SpO2:  [92 %-96 %] 96 %    Physical Exam  Vitals reviewed.   Constitutional:       General: He is not in acute distress.     Appearance: Normal appearance. He is not ill-appearing.   HENT:      Head: Normocephalic and atraumatic.      Right Ear: External ear normal.      Left Ear: External ear normal.      Nose: Nose normal.      Mouth/Throat:      Mouth: Mucous membranes are dry.      Pharynx: Oropharynx is clear.   Eyes:      General:         Right eye: No discharge.         Left eye: No discharge.      Extraocular Movements: Extraocular movements intact.      Conjunctiva/sclera: Conjunctivae normal.   Cardiovascular:      Rate and Rhythm: Normal rate and regular rhythm.   Pulmonary:      Effort: No respiratory distress.      Breath sounds: No wheezing.      Comments: Decreased BS  Abdominal:      General: Bowel sounds are normal. There is no distension.      Palpations: Abdomen is soft.      Tenderness: There is no abdominal tenderness.   Musculoskeletal:      Cervical back: Normal range of motion and neck supple.      Right lower leg: Edema present.      Left lower leg: Edema present.   Skin:     General: Skin is warm and dry.   Neurological:       Mental Status: He is alert and oriented to person, place, and time.           Laboratory                Assessment/Plan  * Acute CVA (cerebrovascular accident) ([facility])- (present on admission)  Assessment & Plan  Was on Plavix and Lipitor  Antiplatelet presently on hold d/t recent GIB    Vitamin D deficiency  Assessment & Plan  Vit D level 17  Continue supplementation    A-fib ([facility])  Assessment & Plan  Echo EF 60%, RVSP 80 mmHg, severe MS, small pericardial effusion, pleural effusion noted  BNP 5509  CXR mod R basilar opacity and suspected small pleural effusion  On Amiodarone and Lasix  AC on hold d/t recent GIB  Start IS  Needs Cardiology F/U    Type 2 diabetes mellitus ([facility])- (present on admission)  Assessment & Plan  HbA1c 5.4  Change Lantus to qhs dosing to avoid morning hypoglycemia  Continue Metformin and SSI  Adjust SSI    Acute osteomyelitis of left foot ([facility])- (present on admission)  Assessment & Plan  S/P recent L TMA on 7/3/24 at [facility]  Wound care and pain control per Physiatry    NSTEMI (non-ST elevated myocardial infarction) ([facility])- (present on admission)  Assessment & Plan  Was on Plavix and Lipitor  Antiplatelet presently on hold d/t recent GIB    Acute blood loss anemia- (present on admission)  Assessment & Plan  2/2 GIB  Has normocytic indices  Fe 20 and Ferritin 67  May start Fe supplements if no contraindications  Follow H/H    Upper GI bleed- (present on admission)  Assessment & Plan  Pt presented w/ syncope  EGD DU x 6 S/P clip x 1 for active bleeding  S/P PRBC x 4 u  Continue high dose PPI bid    Full Code

## 2024-07-23 NOTE — CARE PLAN
Problem: Bathing  Goal: STG-Within one week, patient will bathe with SBA.  Outcome: Not Met  Note:  Requires min assist     Limited by  decreased strength/endurance and  difficulty maintaining NWB  left LE  during standing and sit <-> stands   Continue goal      Problem: Dressing  Goal: STG-Within one week, patient will dress LB with Min A.  Outcome: Not Met  Note: Mod assist   Limited by  decreased strength/endurance and  difficulty maintaining NWB  left LE  during standing and sit <-> stands   Continue goal      Problem: Toileting  Goal: STG-Within one week, patient will complete toileting tasks with Min A.  Outcome: Not Met  Note: Max assist   Limited by  decreased strength/endurance and  difficulty maintaining NWB  left LE  during standing and sit <-> stands   Continue goal      Problem: Functional Transfers  Goal: STG-Within one week, patient will transfer to toilet with Min A.  Outcome: Not Met  Note: Max assist   Limited by  decreased strength/endurance and  difficulty maintaining NWB  left LE  during standing and sit <-> stands   Continue goal

## 2024-07-23 NOTE — PROGRESS NOTES
Hospital Medicine Daily Progress Note      Chief Complaint  Diabetes Mellitus    Interval Problem Update  No chest pain, shortness of breath, or palpitations.    Review of Systems  Review of Systems   Constitutional:  Negative for chills and fever.   HENT: Negative.     Eyes: Negative.    Respiratory:  Negative for cough and shortness of breath.    Cardiovascular:  Negative for chest pain and palpitations.   Gastrointestinal:  Negative for nausea and vomiting.   Musculoskeletal:         Wound pain   Skin:  Negative for itching and rash.   Endo/Heme/Allergies:  Negative for polydipsia. Does not bruise/bleed easily.        Physical Exam  Temp:  [36.6 °C (97.8 °F)-36.6 °C (97.9 °F)] 36.6 °C (97.9 °F)  Pulse:  [66-81] 81  Resp:  [18] 18  BP: (103-146)/(60-73) 103/66  SpO2:  [92 %-96 %] 96 %    Physical Exam  Vitals reviewed.   Constitutional:       General: He is not in acute distress.     Appearance: Normal appearance. He is not ill-appearing.   HENT:      Head: Normocephalic and atraumatic.      Right Ear: External ear normal.      Left Ear: External ear normal.      Nose: Nose normal.      Mouth/Throat:      Mouth: Mucous membranes are dry.      Pharynx: Oropharynx is clear.   Eyes:      General:         Right eye: No discharge.         Left eye: No discharge.      Extraocular Movements: Extraocular movements intact.      Conjunctiva/sclera: Conjunctivae normal.   Cardiovascular:      Rate and Rhythm: Normal rate and regular rhythm.   Pulmonary:      Effort: No respiratory distress.      Breath sounds: No wheezing.      Comments: Decreased BS  Abdominal:      General: Bowel sounds are normal. There is no distension.      Palpations: Abdomen is soft.      Tenderness: There is no abdominal tenderness.   Musculoskeletal:      Cervical back: Normal range of motion and neck supple.      Right lower leg: Edema present.      Left lower leg: Edema present.   Skin:     General: Skin is warm and dry.   Neurological:       Mental Status: He is alert and oriented to person, place, and time.         Fluids                Assessment/Plan  * Acute CVA (cerebrovascular accident) ([facility])- (present on admission)  Assessment & Plan  Was on Plavix and Lipitor  Antiplatelet presently on hold d/t recent GIB    Vitamin D deficiency  Assessment & Plan  Vit D level 17  Continue supplementation    A-fib ([facility])  Assessment & Plan  Echo EF 60%, RVSP 80 mmHg, severe MS, small pericardial effusion, pleural effusion noted  BNP 5509  CXR mod R basilar opacity and suspected small pleural effusion  On Amiodarone and Lasix  AC on hold d/t recent GIB  Continue IS  Needs Cardiology F/U    Type 2 diabetes mellitus ([facility])- (present on admission)  Assessment & Plan  HbA1c 5.4  On Metformin, Lantus, and SSI  Will resume Januvia at low dose and D/C Lantus  Jardiance not available at this facility  Outpt meds include Metformin 1000 mg bid, Januvia 100 mg qd, Jardiance 25 mg qd, and Lantus 10 u qhs    Acute osteomyelitis of left foot ([facility])- (present on admission)  Assessment & Plan  S/P recent L TMA on 7/3/24 at [facility]  Wound care and pain control per Physiatry    NSTEMI (non-ST elevated myocardial infarction) ([facility])- (present on admission)  Assessment & Plan  Was on Plavix and Lipitor  Antiplatelet presently on hold d/t recent GIB    Acute blood loss anemia- (present on admission)  Assessment & Plan  2/2 GIB  Has normocytic indices  Fe 20 and Ferritin 67  May start Fe supplements if no contraindications  Follow H/H  Check F/U labs in AM     Upper GI bleed- (present on admission)  Assessment & Plan  Pt presented w/ syncope  EGD DU x 6 S/P clip x 1 for active bleeding  S/P PRBC x 4 u  Continue high dose PPI bid    Full Code

## 2024-07-23 NOTE — THERAPY
Speech Language Pathology  Daily Treatment     Patient Name: Pj Freeman  Age:  69 y.o., Sex:  male  Medical Record #: [id number]  Today's Date: 7/23/2024     Precautions  Precautions: Fall Risk, Non Weight Bearing Left Lower Extremity  Comments: L TMA > NWB    Subjective    Pt pleasant and cooperative.      Objective       07/23/24 1033   Treatment Charges   SLP Cognitive Skill Development First 15 Minutes 1   SLP Cognitive Skill Development Additional 15 Minutes 3   SLP Total Time Spent   SLP Individual Total Time Spent (Mins) 60         Assessment    Pt corrected and continued who has more dollars and coins, additional time for processing and completion noted. Pt required MOD A to breakdown information and write information down vs keeping everything in his head. Pt provided with stroke education with written aid provided. SLP reviewed all information and instructed pt to review as for SLP to assess recall of information and address any questions had tomorrow during therapy.     Strengths: Able to follow instructions, Alert and oriented, Effective communication skills, Pleasant and cooperative, Motivated for self care and independence, Supportive family, Willingly participates in therapeutic activities  Barriers: Impulsive, Impaired carryover of learning, Impaired functional cognition, Impaired balance (impaired attention.)    Plan    Assess stroke education recall      Speech Therapy Problems (Active)       Problem: Speech/Swallowing LTGs       Dates: Start:  07/18/24         Goal: LTG-By discharge, patient will solve complex problems and recall safety training with 90% acc with spv. for safe discharge home.       Dates: Start:  07/18/24

## 2024-07-23 NOTE — THERAPY
Recreational Therapy   Initial Evaluation     Patient Name: Pj Freeman  Age:  69 y.o., Sex:  male  Medical Record #: [id number]  Today's Date: 7/23/2024     Subjective    Patient was willing to participate in the assessment interview.     Objective       07/23/24 1301   Procedural Tracking   Procedural Tracking Community Re-Integration;Leisure Skills Awareness   Treatment Time   Total Time Spent (mins) 30   Leisure History   Leisure Interests Other (Comments);Gardening  (feeding the birds, reading, gardening/home maintenence, and cooking.)   Pre-Morbid Leisure Lifestyle Individual;Occasionally Active   Prior Living Arrangements   Lives with - Patient's Self Care Capacity Spouse   Functional Ability Status - Physical   Left Leg Weak  (Non weight baring due to recent toe amputation)   Functional Ability Status - Cognitive   Attention Span Remains on Task   Judgment Able to Make Independent Decisions   Functional Ability Status - Emotional    Affect Appropriate   Mood Appropriate   Behavior Appropriate;Cooperative   Leisure Competence Measure   Leisure Awareness Independent   Leisure Attitude Independent   Leisure Skills Independent   Cultural / Social Behaviors Independent   Interpersonal Skills Independent   Community Integration Skills Independent   Social Contact Cueing Assist   Community Participation Independent   Clinical Impression   Clinical Impression Impaired Gross Motor Leisure Functioning   Current Discharge Plan   Current Discharge Plan Return to Prior Living Situation   Benefit    Benefit Patient would Benefit from Inpatient Recreational Therapy to Maximize Independent Leisure Functioning    Interdisciplinary Plan of Care Collaboration   Patient Position at End of Therapy Seated;Call Light within Reach   Strengths & Barriers   Strengths Alert and oriented;Independent prior level of function;Pleasant and cooperative;Willingly participates in therapeutic activities;Motivated for self care and  independence   Barriers Limited mobility;Decreased endurance;Impaired activity tolerance         Assessment  Patient is 69 y.o. male with a diagnosis of Acute CVA .  Additional factors influencing patient status / progress (ie: cognitive factors, co-morbidities, social support, etc): Patient has Diabetes.     Per H&P, patient has a prior medical history of DM2, HTN, PVD, and recent toe amputation (NWB LLE) who was admitted at [facility] on 7/6/24 after having syncope due to blood loss. He reportedly was transfused 4 U at [facility]. He was also found to have a L CVA on MRI as well as an NSTEMI.  His stay was complicated by new A fib, anemia and melena requiring higher level of care and was transferred to [facility] on 7/8/24 for ongoing GI bleed.  EGD was performed which showed multiple duodenal ulcers 1 of which was oozing requiring clipping.  He was evaluated by Cardiology for severe mitral stenosis. Per discharge can consider restarting AC in 1-2 weeks.      Patient reports that he enjoys feeding the birds and care taking his land (mowing and snow plowing). He reports that he helps with the garden and enjoys cooking. Patient reports that he reads magazines and newspapers and used to enjoy painting. When asked how he mckenna, he said that he thinks about things and rationalizes. He also said he is spiritual and uses humor to cope. When asked who he spends most of his time with his said his dog. Patient reports that he is non-weight bearing on his left foot due to recent toe amputation and needs to get his right leg stronger. He was willing to work on standing tolerance and trying some painting.     Plan  Recommend Recreational Therapy 30-60 minutes per day  2-3  days per week for 2 weeks for the following treatments:  Leisure Skills Development and Gross Motor Functional Leisure Skills    Passport items to be completed:  Verbalize two positive leisure activities, discuss returning to work, hobbies, community groups or volunteer  activities, explore community resources     Goals:  Long term and short term goals have been discussed with patient and they are in agreement.    Recreation Therapy Problems (Active)       Problem: Recreation Therapy       Dates: Start:  07/23/24         Goal: STG-Within one week, patient will demonstrate a standing tolerance to 10 minutes with less than two breaks.        Dates: Start:  07/23/24            Goal: LTG-By discharge, patient will demonstrate a standing tolerance of 15 minutes with no breaks.        Dates: Start:  07/23/24

## 2024-07-23 NOTE — PROGRESS NOTES
NURSING DAILY NOTE    Name: Pj Freeman   Date of Admission: 7/17/2024   Admitting Diagnosis: Acute CVA (cerebrovascular accident) ([facility])  Attending Physician: Dilia Slaazar M.d.  Allergies: Patient has no known allergies.    Safety  Patient Assist  pooja a  Patient Precautions  Fall Risk, Non Weight Bearing Left Lower Extremity  Precaution Comments  L TMA > NWB  Bed Transfer Status  Minimal Assist  Toilet Transfer Status   Moderate Assist  Assistive Devices  Wheelchair, Rails  Oxygen  None - Room Air  Diet/Therapeutic Dining  Current Diet Order   Procedures    Diet Order Diet: Low Fiber(GI Soft); Second Modifier: (optional): Consistent CHO (Diabetic)     Pill Administration  whole  Agitated Behavioral Scale     ABS Level of Severity       Fall Risk  Has the patient had a fall this admission?   No  Taylor Gallego Fall Risk Scoring  15, HIGH RISK  Fall Risk Safety Measures  bed alarm and chair alarm    Vitals  Temperature: 36.6 °C (97.9 °F)  Temp src: Oral  Pulse: 81  Respiration: 18  Blood Pressure : 103/66  Blood Pressure MAP (Calculated): 78 MM HG  BP Location: Left, Upper Arm  Patient BP Position: Sitting     Oxygen  Pulse Oximetry: 96 %  O2 (LPM): 0  O2 Delivery Device: None - Room Air    Bowel and Bladder  Last Bowel Movement  07/21/24  Stool Type  Type 6: Fluffy pieces with ragged edges, a mushy stool  Bowel Device  Bathroom  Continent  Bladder: Continent void   Bowel: Continent movement  Bladder Function  Urine Void (mL): 250 ml  Number of Times Voided: 1  Urine Color: Yellow  Genitourinary Assessment   Bladder Assessment (WDL):  WDL Except  [place] Catheter: Not Applicable  Urine Color: Yellow  Bladder Device: Urinal  Time Void: Yes  Bladder Scan: Post Void  $ Bladder Scan Results (mL): 155  Bladder Medications: No    Skin  Rocky Score   18  Sensory Interventions   Bed Types: Standard/Trauma Mattress  Skin Preventative Measures: Pillows in  Use for Support / Positioning  Moisture Interventions  Moisturizers/Barriers: Barrier Wipes, Barrier Paste      Pain  Pain Rating Scale  0 - No Pain  Pain Location  Foot  Pain Location Orientation  Left  Pain Interventions   Declines    ADLs    Bathing   Staff, Shower  Linen Change      Personal Hygiene  Perineal Care, Samia Bottle, Change Samia Pads, Moist Samia Wipes  Chlorhexidine Bath      Oral Care  Brushed Teeth  Teeth/Dentures     Shave     Nutrition Percentage Eaten  *  * Meal *  *, Between % Consumed  Environmental Precautions  Bed in Low Position, Treaded Slipper Socks on Patient  Patient Turns/Positioning  Patient Turns Self from Side to Side  Patient Turns Assistance/Tolerance     Bed Positions  Bed Controls On  Head of Bed Elevated  Self regulated      Psychosocial/Neurologic Assessment  Psychosocial Assessment  Psychosocial (WDL):  WDL Except  Patient Behaviors: Fatigue  Family Behaviors: No Family Present  Neurologic Assessment  Neuro (WDL): Within Defined Limits  Level of Consciousness: Alert  EENT (WDL):  WDL Except    Cardio/Pulmonary Assessment  Edema   RLE Edema: 2+, Pitting  LLE Edema: 2+, Pitting  Respiratory Breath Sounds     Cardiac Assessment   Cardiac (WDL):  WDL Except (HTN, PVD, PAD,  new-onset A-fib)

## 2024-07-23 NOTE — THERAPY
Physical Therapy   Daily Treatment     Patient Name: Pj Freeman  Age:  69 y.o., Sex:  male  Medical Record #: [id number]  Today's Date: 7/23/2024     Precautions  Precautions: Fall Risk, Non Weight Bearing Left Lower Extremity  Comments: L TMA > NWB    Subjective    Patient can tell that he is very weak and not strong enough to go home yet      Objective       07/23/24 0933   PT Charge Group   PT Therapeutic Activities (Units) 2   PT Total Time Spent   PT Individual Total Time Spent (Mins) 30   Precautions   Precautions Fall Risk;Non Weight Bearing Left Lower Extremity   Comments L TMA > NWB   Gait Functional Level of Assist    Gait Level Of Assist Contact Guard Assist   Assistive Device Front Wheel Walker  (also kneeling scooter 150 feet with CGA)   Distance (Feet) 10   # of Times Distance was Traveled 1   Wheelchair Functional Level of Assist   Wheelchair Assist Stand by Assist   Distance Wheelchair (Feet or Distance) 150   Stairs Functional Level of Assist   Level of Assist with Stairs Contact Guard Assist   # of Stairs Climbed 1   Stairs Description   (parallel bars)   Transfer Functional Level of Assist   Bed, Chair, Wheelchair Transfer Minimal Assist   Sitting Lower Body Exercises   Long Arc Quad 2 sets of 10;Weight (See Comments for lbs)  (5# with 5 second holds)   Bed Mobility    Supine to Sit Supervised   Sit to Supine Supervised   Sit to Stand Moderate Assist   Scooting Standby Assist   Rolling Independent   Interdisciplinary Plan of Care Collaboration   Patient Position at End of Therapy Seated;Chair Alarm On;Call Light within Reach   Roll Left and Right   Assistance Needed Independent   CARE Score - Roll Left and Right 6   Sit to Lying   Assistance Needed Independent   CARE Score - Sit to Lying 6   Lying to Sitting on Side of Bed   Assistance Needed Independent   CARE Score - Lying to Sitting on Side of Bed 6   Sit to Stand   Assistance Needed Physical assistance   Physical Assistance Level 26%-50%    CARE Score - Sit to Stand 3   Chair/Bed-to-Chair Transfer   Assistance Needed Physical assistance   Physical Assistance Level 26%-50%   CARE Score - Chair/Bed-to-Chair Transfer 3   Car Transfer   Assistance Needed Physical assistance   Physical Assistance Level 26%-50%   CARE Score - Car Transfer 3     Spent the majority of session practicing transfer techniques with NWB adherence on the left LE. Does better with compliance of weight bearing status with lateral scoot/squat pivot technique vs sit to stand with FWW/hop    Assessment    Patient is still unable to independently stand from a standard height chair and maintain NWB status. Concerned about his lack of focus/attention mid functional activity/transfer and ability for self care    Strengths: Able to follow instructions, Alert and oriented, Independent prior level of function, Pleasant and cooperative, Willingly participates in therapeutic activities, Motivated for self care and independence  Barriers: Fatigue, Difficulty following instructions, Decreased endurance, Impaired activity tolerance, Limited mobility (limited ability to maintain weightbearing precaution and tends to lose focus/becomes distracted)    Plan    Continue to focus on transfers and short distance gait with emphasis on left LE NWB    DME       Passport items to be completed:  Get in/out of bed safely, in/out of a vehicle, safely use mobility device, walk or wheel around home/community, navigate up and down stairs, show how to get up/down from the ground, ensure home is accessible, demonstrate HEP, complete caregiver training    Physical Therapy Problems (Active)       Problem: Mobility       Dates: Start:  07/18/24         Goal: STG-Within one week, patient will ambulate 50 feet with kneeling scooter SBA       Dates: Start:  07/18/24            Goal: STG-Within one week, patient will ambulate up/down a curb with FWW CGA       Dates: Start:  07/18/24               Problem: Mobility  Transfers       Dates: Start:  07/18/24         Goal: STG-Within one week, patient will sit to stand with CGA       Dates: Start:  07/18/24               Problem: PT-Long Term Goals       Dates: Start:  07/18/24         Goal: LTG-By discharge, patient will ambulate with kneeling scooter >150 feet supervised       Dates: Start:  07/18/24            Goal: LTG-By discharge, patient will transfer one surface to another supervised        Dates: Start:  07/18/24            Goal: LTG-By discharge, patient will ambulate up/down 4-6 stairs with single railing supervised       Dates: Start:  07/18/24            Goal: LTG-By discharge, patient will transfer in/out of a car supervised       Dates: Start:  07/18/24

## 2024-07-23 NOTE — DISCHARGE PLANNING
Case Management/IDT follow up.   IDT continues to recommend IRF level of care as patient continue to make progress with all therapies. Projected dc date set for 8/1    Follow up with:    GI follow up OP for repeat EGD in ~3 months   Healing of previous TMA by ortho (7/3)- ortho follow up   Stroke bridge clinic   Cardiology follow up for valve/A.fib follow up   PCP  Neurology  home health / family training.      Tc to pt's wife Kathy providing update from IDT and discussed plan of care.  She confirms pt has used [facility] in the past; podiatrist in [place] amputated 1 toe in 2/2024 and then amputated remaining 4 toes in 7/2024.  (Dr Hagan and Dr Mckeon); pt does have a bedside commode, she confirms his knee scooter fits in the bathroom; she is able to be w/ patient for several weeks before she has to return to work; she will provide transportation home; I discussed required follow up appts also.     Plan:  Monitor need for wc/cushion @ time of dc.

## 2024-07-23 NOTE — PROGRESS NOTES
Physical Medicine & Rehabilitation Progress Note    Encounter Date: 7/23/2024    Chief Complaint: Decreased mobility, weakness    Interval Events (Subjective):  Patient sitting up in room. He reports therapy is going well. He reports he feels like he is getting better and knows he needs more time. Discussed would have IDT later today to discuss discharge planning.     _____________________________________  Interdisciplinary Team Conference   Most recent IDT on 7/23/2024    IDilia M.D./Ph.D., was present and led the interdisciplinary team conference on 7/23/2024.  I led the IDT conference and agree with the IDT conference documentation and plan of care as noted below.     Nursing:  Diet Current Diet Order   Procedures    Diet Order Diet: Low Fiber(GI Soft); Second Modifier: (optional): Consistent CHO (Diabetic)       Eating ADL Independent      % of Last Meal  Oral Nutrition: Breakfast, Between % Consumed   Sleep    Bowel Last BM: 07/21/24   Bladder Continent   Barriers to Discharge Home:      Physical Therapy:  Bed Mobility    Transfers Minimal Assist  Increased time, Initial preparation for task, Supervision for safety, Set-up of equipment (sqt pivot w/c <> mat table)   Mobility Contact Guard Assist   Stairs    Barriers to Discharge Home:  Limited by weight bearing and following precautions    Occupational Therapy:  Grooming Independent (seated at sink   oral care)   Bathing Minimal Assist (side to side lean   to wash buttocks    assist for quality)   UB Dressing Supervision (setup  to don pull over shirt)   LB Dressing Moderate Assist (able to don   right sock and shoe  and left sock with setup.   donnned pull up brief and pants  over feet  .  in standing  at grab bar required therapist to pull up brief and  pants)   Toileting Maximal Assist   Shower & Transfer    Barriers to Discharge Home:  Tangential ; repetitive     Speech-Language Pathology:  Comprehension:   "Supervision  Comprehension Description:     Expression:  Independent  Expression Description:     Social Interaction:  Modified Independent  Social Interaction Description:  Verbal cues  Problem Solving:  Minimal Assist  Problem Solving Description:  Increased time, Bed/chair alarm, Seat belt, Supervision, Therapy schedule, Verbal cueing  Memory:  Minimal Assist  Memory Description:  Increased time, Bed/chair alarm, Seat belt, Supervision, Therapy schedule, Verbal cueing  Barriers to Discharge Home:    Rec Therapy:  Pending eval    Case Management:  Continues to work on disposition and DME needs.      Discharge Date/Disposition:  8/1/24  _____________________________________    Objective:  VITAL SIGNS: BP (!) 141/72   Pulse 77   Temp 36.7 °C (98.1 °F) (Oral)   Resp 18   Ht 1.803 m (5' 11\")   Wt 75.2 kg (165 lb 12.8 oz)   SpO2 95%   BMI 23.12 kg/m²   Gen: NAD  Psych: Mood and affect appropriate  CV: RRR, 0 edema  Resp: CTAB, no upper airway sounds  Abd: NTND  Neuro: AOx4, following commands  Unchanged from 7/22/24    Laboratory Values:  Recent Results (from the past 72 hour(s))   POCT glucose device results    Collection Time: 07/20/24  5:20 PM   Result Value Ref Range    POC Glucose, Blood 170 (H) 65 - 99 mg/dL   POCT glucose device results    Collection Time: 07/20/24  8:45 PM   Result Value Ref Range    POC Glucose, Blood 121 (H) 65 - 99 mg/dL   POCT glucose device results    Collection Time: 07/21/24  7:53 AM   Result Value Ref Range    POC Glucose, Blood 176 (H) 65 - 99 mg/dL   POCT glucose device results    Collection Time: 07/21/24 11:25 AM   Result Value Ref Range    POC Glucose, Blood 178 (H) 65 - 99 mg/dL   POCT glucose device results    Collection Time: 07/21/24  5:28 PM   Result Value Ref Range    POC Glucose, Blood 147 (H) 65 - 99 mg/dL   POCT glucose device results    Collection Time: 07/21/24  8:06 PM   Result Value Ref Range    POC Glucose, Blood 155 (H) 65 - 99 mg/dL   CBC WITHOUT DIFFERENTIAL "    Collection Time: 07/22/24  5:26 AM   Result Value Ref Range    WBC 7.8 4.8 - 10.8 K/uL    RBC 2.86 (L) 4.70 - 6.10 M/uL    Hemoglobin 8.0 (L) 14.0 - 18.0 g/dL    Hematocrit 25.0 (L) 42.0 - 52.0 %    MCV 87.4 81.4 - 97.8 fL    MCH 28.0 27.0 - 33.0 pg    MCHC 32.0 (L) 32.3 - 36.5 g/dL    RDW 49.0 35.9 - 50.0 fL    Platelet Count 377 164 - 446 K/uL    MPV 10.1 9.0 - 12.9 fL   Basic Metabolic Panel    Collection Time: 07/22/24  5:26 AM   Result Value Ref Range    Sodium 137 135 - 145 mmol/L    Potassium 3.2 (L) 3.6 - 5.5 mmol/L    Chloride 104 96 - 112 mmol/L    Co2 23 20 - 33 mmol/L    Glucose 115 (H) 65 - 99 mg/dL    Bun 15 8 - 22 mg/dL    Creatinine 0.64 0.50 - 1.40 mg/dL    Calcium 7.6 (L) 8.5 - 10.5 mg/dL    Anion Gap 10.0 7.0 - 16.0   proBrain Natriuretic Peptide, NT    Collection Time: 07/22/24  5:26 AM   Result Value Ref Range    NT-proBNP 5594 (H) 0 - 125 pg/mL   ESTIMATED GFR    Collection Time: 07/22/24  5:26 AM   Result Value Ref Range    GFR (CKD-EPI) 101 >60 mL/min/1.73 m 2   POCT glucose device results    Collection Time: 07/22/24  7:23 AM   Result Value Ref Range    POC Glucose, Blood 115 (H) 65 - 99 mg/dL   POCT glucose device results    Collection Time: 07/22/24 11:39 AM   Result Value Ref Range    POC Glucose, Blood 168 (H) 65 - 99 mg/dL   POCT glucose device results    Collection Time: 07/22/24  5:37 PM   Result Value Ref Range    POC Glucose, Blood 159 (H) 65 - 99 mg/dL   POCT glucose device results    Collection Time: 07/22/24  7:58 PM   Result Value Ref Range    POC Glucose, Blood 186 (H) 65 - 99 mg/dL   POTASSIUM SERUM (K)    Collection Time: 07/23/24  5:36 AM   Result Value Ref Range    Potassium 3.8 3.6 - 5.5 mmol/L   HEMOGLOBIN AND HEMATOCRIT    Collection Time: 07/23/24  5:36 AM   Result Value Ref Range    Hemoglobin 8.1 (L) 14.0 - 18.0 g/dL    Hematocrit 26.1 (L) 42.0 - 52.0 %   POCT glucose device results    Collection Time: 07/23/24  7:43 AM   Result Value Ref Range    POC Glucose,  Blood 117 (H) 65 - 99 mg/dL   POCT glucose device results    Collection Time: 07/23/24 11:21 AM   Result Value Ref Range    POC Glucose, Blood 201 (H) 65 - 99 mg/dL       Medications:  Scheduled Medications   Medication Dose Frequency    insulin lispro  2-12 Units 4X/DAY ACHS    ferrous sulfate  325 mg BID WITH MEALS    potassium chloride SA  10 mEq DAILY    SITagliptin  25 mg DAILY    furosemide  20 mg DAILY    vitamin D3  2,000 Units DAILY    senna-docusate  2 Tablet BID    atorvastatin  40 mg Q EVENING    calcium carbonate  1,000 mg TID WITH MEALS    metFORMIN  1,000 mg BID    omeprazole  40 mg BID     PRN medications: [DISCONTINUED] insulin regular **AND** POC blood glucose manual result **AND** NOTIFY MD and PharmD **AND** Administer 20 grams of glucose (approximately 8 ounces of fruit juice) every 15 minutes PRN FSBG less than 70 mg/dL **AND** dextrose bolus, Respiratory Therapy Consult, hydrALAZINE, acetaminophen, senna-docusate **AND** polyethylene glycol/lytes, docusate sodium, magnesium hydroxide, carboxymethylcellulose, mag hydrox-al hydrox-simeth, ondansetron **OR** ondansetron, traZODone, sodium chloride, oxyCODONE immediate-release **OR** oxyCODONE immediate-release, bisacodyl    Diet:  Current Diet Order   Procedures    Diet Order Diet: Low Fiber(GI Soft); Second Modifier: (optional): Consistent CHO (Diabetic)       Medical Decision Making and Plan:  L CVA -Patient with L parietal CVA in 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 Stroke Ppx has been held due to GI bleed  -PT and OT for mobility and ADLs. Per guidelines, 15 hours per week between PT, OT and/or SLP.  -Follow-up Neurology     HTN/CAD/Mitral stenosis/A fib - Patient on Amiodarone 400 mg BID, Lasix 20 mg daily. Consulted hospitalist. Amiodarone fell off MAR, did not miss dose, restart Amiodarone and discussed with hospitalist.  Continue Lasix 20 mg daily     MT amputation - Recent at OSH. NWB reportedly on LLE     HLD - Patient on Atorvastatin 40 mg  daily     GI bleed - Check AM CBC. Continue PPI BID. Hgb 8.9, consult hospitalist     DM2 with hyperglycemia - Patient on Lantus 5 U and SSI. Previously on metformin and Jardiance. Consult hospitalist. Increased Lantus. Started on metformin. Improving sugars, lantus discontinued,. Blood sugars controlled, continue Januvia, Metformin 1000 mg BID and SSI     Anemia - Check AM CBC - 8.9 on admission.      Hypokalemia - Check AM CMP - 3.8, will monitor. Repeat 3.1, started on 20 mEq. Repeat 3.8, improved.      Hypocalcemia - Check AM CMP - 7.6 on admission, will monitor     Pain - Patient on PRN Tylenol/Oxycodone     Skin - Patient at risk for skin breakdown due to debility in areas including sacrum, achilles, elbows and head in addition to other sites. Nursing to assess skin daily.      GI Ppx - Patient on Prilosec for GERD prophylaxis. Patient on Senna-docusate for constipation prophylaxis.      DVT Ppx - Patient not cleared for AC on transfer.   ___________________________________    T. Yordy Salazar MD/PhD  ABP - Physical Medicine & Rehabilitation   [facility] - Brain Injury Medicine   ____________________________________

## 2024-07-23 NOTE — ASSESSMENT & PLAN NOTE
S/P recent L TMA on 7/3/24 at [facility] [place]  Wound care and pain control per Physiatry

## 2024-07-23 NOTE — ASSESSMENT & PLAN NOTE
Pt presented w/ syncope  EGD DU x 6 S/P clip x 1 for active bleeding  S/P PRBC x 4 u  Continue high dose PPI bid  Recent FOB negative x 3

## 2024-07-23 NOTE — PROGRESS NOTES
Hospital Medicine Daily Progress Note      Chief Complaint  Diabetes Mellitus    Interval Problem Update  No significant changes overnight.    Review of Systems  Review of Systems   Constitutional:  Negative for chills and fever.   Respiratory:  Negative for shortness of breath.    Cardiovascular:  Negative for chest pain.   Gastrointestinal:  Negative for abdominal pain, diarrhea, nausea and vomiting.   Psychiatric/Behavioral:  The patient is not nervous/anxious.         Physical Exam  Temp:  [36.6 °C (97.8 °F)-36.6 °C (97.9 °F)] 36.6 °C (97.9 °F)  Pulse:  [71-81] 71  Resp:  [18] 18  BP: (103-143)/(60-71) 143/71  SpO2:  [93 %-96 %] 93 %    Physical Exam  Vitals and nursing note reviewed.   Constitutional:       Appearance: Normal appearance.   HENT:      Head: Atraumatic.   Eyes:      Conjunctiva/sclera: Conjunctivae normal.      Pupils: Pupils are equal, round, and reactive to light.   Cardiovascular:      Rate and Rhythm: Normal rate and regular rhythm.      Heart sounds: No murmur heard.  Pulmonary:      Effort: Pulmonary effort is normal.      Breath sounds: No stridor. No wheezing or rales.   Abdominal:      General: There is no distension.      Palpations: Abdomen is soft.      Tenderness: There is no abdominal tenderness.   Musculoskeletal:      Cervical back: Normal range of motion and neck supple.      Right lower leg: Edema present.      Left lower leg: Edema present.   Skin:     General: Skin is warm and dry.      Findings: No rash.   Neurological:      Mental Status: He is alert and oriented to person, place, and time.   Psychiatric:         Mood and Affect: Mood normal.         Behavior: Behavior normal.         Fluids    Intake/Output Summary (Last 24 hours) at 7/23/2024 1039  Last data filed at 7/23/2024 0900  Gross per 24 hour   Intake 1440 ml   Output 550 ml   Net 890 ml       Laboratory  Recent Labs     07/22/24  0526 07/23/24  0536   WBC 7.8  --    RBC 2.86*  --    HEMOGLOBIN 8.0* 8.1*    HEMATOCRIT 25.0* 26.1*   MCV 87.4  --    MCH 28.0  --    MCHC 32.0*  --    RDW 49.0  --    PLATELETCT 377  --    MPV 10.1  --      Recent Labs     07/22/24  0526 07/23/24  0536   SODIUM 137  --    POTASSIUM 3.2* 3.8   CHLORIDE 104  --    CO2 23  --    GLUCOSE 115*  --    BUN 15  --    CREATININE 0.64  --    CALCIUM 7.6*  --                    Assessment/Plan  * Acute CVA (cerebrovascular accident) ([facility])- (present on admission)  Assessment & Plan  Was on Plavix and Lipitor  Antiplatelet presently on hold d/t recent GIB    Vitamin D deficiency  Assessment & Plan  Vit D: 17  Cont supplements    A-fib ([facility])  Assessment & Plan  HR ok  Echo EF 60%, RVSP 80 mmHg, severe MS, small pericardial effusion, pleural effusion noted  BNP stable: 5594   CXR: mod right basilar opacity and suspected small pleural effusion  Cont Lasix  K+: 3.8 (7/23)  AC on hold 2nd to recent GIB  Ont to monitor    Type 2 diabetes mellitus ([facility])- (present on admission)  Assessment & Plan  Hba1c 5.4 (7/18)  -186  Off Glargine  Cont Metformin  Cont Januvia, and SSI  Will change SS coverage to Lispro  Note: home meds include Metformin 1000 mg bid, Januvia 100 mg qd, Jardiance 25 mg qd, and Lantus 10 u qhs  Cont to monitor    Acute osteomyelitis of left foot ([facility])- (present on admission)  Assessment & Plan  S/P recent left TMA on 7/3/24 (at [facility])    NSTEMI (non-ST elevated myocardial infarction) ([facility])- (present on admission)  Assessment & Plan  Was on Plavix and Lipitor  Antiplatelet presently on hold d/t recent GIB    Acute blood loss anemia- (present on admission)  Assessment & Plan  H&H stable with Hb 8.1 (7/23)  H&H was wnl prior to adm  Had recent GIB  Fe 20 and Ferritin 67  Will start Fe supplements  Cont to monitor    Upper GI bleed- (present on admission)  Assessment & Plan  Pt presented with syncope  EGD showed a DU x 6 -- s/p clip x 1 for active bleeding  S/P PRBC x 4 units  Cont PPI bid

## 2024-07-23 NOTE — CARE PLAN
The patient is Watcher - Medium risk of patient condition declining or worsening    Shift Goals  Clinical Goals: Safety  Patient Goals: Rest  Family Goals: no family present    Problem: Psychosocial  Goal: Patient's level of anxiety will decrease  Outcome: Progressing     Patient is calm and pleasant without s/s of anxiety noted at this time.    Problem: Communication  Goal: The ability to communicate needs accurately and effectively will improve  Outcome: Progressing    Patient is able to communicate effectively and can advocate for himself.

## 2024-07-23 NOTE — CARE PLAN
Problem: Problem Solving STGs  Goal: STG-Within one week, patient will perform alternating attention tasks with 80% acc with min cues to improve.  Outcome: Met  Goal: STG-Within one week, patient will perform medication and financial management tasks with 80% acc with min cues to improve.  Outcome: Met     Problem: Memory STGs  Goal: STG-Within one week, patient will recall new training and safety sequencing with 80% acc  with use of external memory aid and compensatory strategies with min cues to improve.  Outcome: Met     Problem: Comprehension STGs  Goal: STG-Within one week, patient will perform functional reading comprehension tasks with targets for attention with 80% acc with min cues to improve.  Outcome: Met

## 2024-07-23 NOTE — PROGRESS NOTES
NURSING DAILY NOTE    Name: Pj Freeman   Date of Admission: 7/17/2024   Admitting Diagnosis: Acute CVA (cerebrovascular accident) ([facility])  Attending Physician: Dilia Salazar M.d.  Allergies: Patient has no known allergies.    Safety  Patient Assist  pooja a  Patient Precautions  Fall Risk, Non Weight Bearing Left Lower Extremity  Precaution Comments  L TMA > NWB  Bed Transfer Status  Moderate Assist  Toilet Transfer Status   Moderate Assist  Assistive Devices  Wheelchair, Rails  Oxygen  None - Room Air  Diet/Therapeutic Dining  Current Diet Order   Procedures    Diet Order Diet: Low Fiber(GI Soft); Second Modifier: (optional): Consistent CHO (Diabetic)     Pill Administration  whole  Agitated Behavioral Scale     ABS Level of Severity       Fall Risk  Has the patient had a fall this admission?   No  Taylor Gallego Fall Risk Scoring  15, HIGH RISK  Fall Risk Safety Measures  bed alarm and chair alarm    Vitals  Temperature: 36.4 °C (97.6 °F)  Temp src: Oral  Pulse: 70  Respiration: 18  Blood Pressure : 136/69  Blood Pressure MAP (Calculated): 91 MM HG  BP Location: Left, Upper Arm  Patient BP Position: Supine     Oxygen  Pulse Oximetry: 94 %  O2 (LPM): 0  O2 Delivery Device: None - Room Air    Bowel and Bladder  Last Bowel Movement  07/21/24  Stool Type  Type 6: Fluffy pieces with ragged edges, a mushy stool  Bowel Device  Bathroom  Continent  Bladder: Continent void   Bowel: Continent movement  Bladder Function  Urine Void (mL): 400 ml  Number of Times Voided: 1  Urine Color: Yellow  Genitourinary Assessment   Bladder Assessment (WDL):  WDL Except  [place] Catheter: Not Applicable  Urine Color: Yellow  Bladder Device: Urinal  Time Void: Yes  Bladder Scan: Post Void  $ Bladder Scan Results (mL): 155  Bladder Medications: No    Skin  Rocky Score   18  Sensory Interventions   Bed Types: Standard/Trauma Mattress  Skin Preventative Measures: Pillows in  Use for Support / Positioning  Moisture Interventions  Moisturizers/Barriers: Barrier Wipes, Barrier Paste      Pain  Pain Rating Scale  0 - No Pain  Pain Location  Foot  Pain Location Orientation  Left  Pain Interventions   Declines    ADLs    Bathing   Staff, Shower  Linen Change      Personal Hygiene  Perineal Care, Samia Bottle, Change Samia Pads, Moist Samia Wipes  Chlorhexidine Bath      Oral Care  Brushed Teeth  Teeth/Dentures     Shave     Nutrition Percentage Eaten  Between % Consumed  Environmental Precautions  Bed in Low Position, Treaded Slipper Socks on Patient  Patient Turns/Positioning  Patient Turns Self from Side to Side  Patient Turns Assistance/Tolerance     Bed Positions  Bed Controls On  Head of Bed Elevated  Self regulated      Psychosocial/Neurologic Assessment  Psychosocial Assessment  Psychosocial (WDL):  WDL Except  Patient Behaviors: Fatigue  Family Behaviors: No Family Present  Neurologic Assessment  Neuro (WDL): Within Defined Limits  Level of Consciousness: Alert  EENT (WDL):  WDL Except    Cardio/Pulmonary Assessment  Edema   RLE Edema: 2+, Pitting  LLE Edema: 2+, Pitting  Respiratory Breath Sounds     Cardiac Assessment   Cardiac (WDL):  WDL Except (HTN, PAD, PVD, new on-set a-fib)

## 2024-07-23 NOTE — PROGRESS NOTES
Hospital Medicine Daily Progress Note      Chief Complaint  Diabetes Mellitus    Interval Problem Update  No 24 hour clinical changes.  Labs reviewed.    Review of Systems  Review of Systems   Constitutional:  Negative for chills and fever.   HENT: Negative.     Eyes: Negative.    Respiratory:  Negative for cough and shortness of breath.    Cardiovascular:  Negative for chest pain and palpitations.   Gastrointestinal:  Negative for nausea and vomiting.   Musculoskeletal:         Wound pain   Skin:  Negative for itching and rash.   Endo/Heme/Allergies:  Negative for polydipsia. Does not bruise/bleed easily.        Physical Exam  Temp:  [36.6 °C (97.8 °F)-36.6 °C (97.9 °F)] 36.6 °C (97.9 °F)  Pulse:  [66-81] 81  Resp:  [18] 18  BP: (103-146)/(60-73) 103/66  SpO2:  [92 %-96 %] 96 %    Physical Exam  Vitals reviewed.   Constitutional:       General: He is not in acute distress.     Appearance: Normal appearance. He is not ill-appearing.   HENT:      Head: Normocephalic and atraumatic.      Right Ear: External ear normal.      Left Ear: External ear normal.      Nose: Nose normal.      Mouth/Throat:      Pharynx: Oropharynx is clear.   Eyes:      General:         Right eye: No discharge.         Left eye: No discharge.      Extraocular Movements: Extraocular movements intact.      Conjunctiva/sclera: Conjunctivae normal.   Cardiovascular:      Rate and Rhythm: Normal rate and regular rhythm.   Pulmonary:      Effort: No respiratory distress.      Breath sounds: No wheezing.      Comments: Decreased BS  Abdominal:      General: Bowel sounds are normal. There is no distension.      Palpations: Abdomen is soft.      Tenderness: There is no abdominal tenderness.   Musculoskeletal:      Cervical back: Normal range of motion and neck supple.      Right lower leg: Edema present.      Left lower leg: Edema present.   Skin:     General: Skin is warm and dry.   Neurological:      Mental Status: He is alert and oriented to person,  place, and time.         Fluids    Intake/Output Summary (Last 24 hours) at 7/23/2024 0028  Last data filed at 7/22/2024 2006  Gross per 24 hour   Intake 1080 ml   Output 550 ml   Net 530 ml       Laboratory  Recent Labs     07/22/24  0526   WBC 7.8   RBC 2.86*   HEMOGLOBIN 8.0*   HEMATOCRIT 25.0*   MCV 87.4   MCH 28.0   MCHC 32.0*   RDW 49.0   PLATELETCT 377   MPV 10.1     Recent Labs     07/22/24  0526   SODIUM 137   POTASSIUM 3.2*   CHLORIDE 104   CO2 23   GLUCOSE 115*   BUN 15   CREATININE 0.64   CALCIUM 7.6*                   Assessment/Plan  * Acute CVA (cerebrovascular accident) ([facility])- (present on admission)  Assessment & Plan  Was on Plavix and Lipitor  Antiplatelet presently on hold d/t recent GIB    Vitamin D deficiency  Assessment & Plan  Vit D level 17  Continue supplementation    A-fib ([facility])  Assessment & Plan  Echo EF 60%, RVSP 80 mmHg, severe MS, small pericardial effusion, pleural effusion noted  BNP elevated but stable  CXR mod R basilar opacity and suspected small pleural effusion  On Amiodarone and Lasix  AC on hold d/t recent GIB  Continue IS  Needs Cardiology F/U    Hypokalemia- (present on admission)  Assessment & Plan  2/2 Lasix  Start supplement  Check F/U labs in AM    Type 2 diabetes mellitus ([facility])- (present on admission)  Assessment & Plan  HbA1c 5.4  Observe serum glucose trends on Metformin, Januvia, and SSI  Lantus discontinued  Outpt meds include Metformin 1000 mg bid, Januvia 100 mg qd, Jardiance 25 mg qd, and Lantus 10 u qhs    Acute osteomyelitis of left foot ([facility])- (present on admission)  Assessment & Plan  S/P recent L TMA on 7/3/24 at [facility]  Wound care and pain control per Physiatry    NSTEMI (non-ST elevated myocardial infarction) ([facility])- (present on admission)  Assessment & Plan  Was on Plavix and Lipitor  Antiplatelet presently on hold d/t recent GIB    Acute blood loss anemia- (present on admission)  Assessment & Plan  2/2 GIB  Has normocytic indices  Fe 20 and Ferritin 67  May  start Fe supplements if no contraindications  Check FOB x 3 for downtrending Hb  Follow H/H  Check F/U labs in AM     Upper GI bleed- (present on admission)  Assessment & Plan  Pt presented w/ syncope  EGD DU x 6 S/P clip x 1 for active bleeding  S/P PRBC x 4 u  Continue high dose PPI bid    Full Code

## 2024-07-23 NOTE — ASSESSMENT & PLAN NOTE
2/2 GIB  Has normocytic indices  Fe 20 and Ferritin 67  Now on Fe supplements  Recent FOB negative x 3  Follow H/H

## 2024-07-23 NOTE — ASSESSMENT & PLAN NOTE
HbA1c 5.4  Will increase Januvia back to home dose  Also on Metformin  Continue SSI while inpt  Lantus discontinued  Outpt meds include Metformin 1000 mg bid, Januvia 100 mg qd, Jardiance 25 mg qd, and Lantus 10 u qhs

## 2024-07-23 NOTE — CARE PLAN
Problem: Mobility  Goal: STG-Within one week, patient will ambulate 50 feet with kneeling scooter SBA  Outcome: Progressing  Goal: STG-Within one week, patient will ambulate up/down a curb with FWW CGA  Outcome: Progressing     Problem: Mobility Transfers  Goal: STG-Within one week, patient will sit to stand with CGA  Outcome: Progressing

## 2024-07-23 NOTE — THERAPY
"Occupational Therapy  Daily Treatment     Patient Name: Pj Freeman  Age:  69 y.o., Sex:  male  Medical Record #: [id number]  Today's Date: 7/23/2024     Precautions  Precautions: (P) Fall Risk, Non Weight Bearing Left Lower Extremity  Comments: (P) L TMA > NWB    Subjective    Pt seated in w/c upon arrival, finishing breakfast. Agreeable to participate in OT.      Objective     07/23/24 0831   OT Charge Group   OT Neuromuscular Re-education / Balance (Units) 2   OT Therapeutic Exercise (Units) 2   OT Total Time Spent   OT Individual Total Time Spent (Mins) 60   Precautions   Precautions Fall Risk;Non Weight Bearing Left Lower Extremity   Comments L TMA > NWB   Cognition    Level of Consciousness Alert   Sleep/Wake Cycle   Sleep & Rest Awake   Sitting Upper Body Exercises   Tricep Press 3 sets of 10;Bilateral;Weight (See Comments for lbs)  (Reny fwd 40#)   Interdisciplinary Plan of Care Collaboration   IDT Collaboration with  Physical Therapist   Patient Position at End of Therapy Seated;Self Releasing Lap Belt Applied   Collaboration Comments Txfr'd care to PT     Outdoor w/c mobility ~200' w/ increased time; min to CGA over level ground, mod A to negotiate uneven terrain    STS x 5 from elevated mat (25\") - varied between min-mod and CGA, cues for technique     R hip flexion x 10 seated in w/c    STS x 5 in // bars w/ min-mod A     Assessment    Pt w/ variable STS performance (Mod - CGA) w/ primary barrier being WB precautions, decreased strength and fatigue. STS performance improved to as good as CGA when completing from elevated mat.   Strengths: Able to follow instructions, Alert and oriented, Effective communication skills, Independent prior level of function, Motivated for self care and independence, Pleasant and cooperative, Supportive family, Willingly participates in therapeutic activities  Barriers: Decreased endurance, Generalized weakness, Impaired activity tolerance, Tube feeding, " Fatigue    Plan    ADL IADLs , related mobility and cognition strength/endurance building standing tolerance and balance activity all incorporating NWB left LE     DME  OT DME Recommendations  Bathroom Equipment: 3 in 1 Commode    Passport items to be completed:  Perform bathroom transfers, complete dressing, complete feeding, get ready for the day, prepare a simple meal, participate in household tasks, adapt home for safety needs, demonstrate home exercise program, complete caregiver training     Occupational Therapy Goals (Active)       Problem: Bathing       Dates: Start:  07/18/24         Goal: STG-Within one week, patient will bathe with SBA.       Dates: Start:  07/18/24         Goal Note filed on 07/23/24 0956 by Antolin Juarez, C.O.T.A.        Requires min assist     Limited by  decreased strength/endurance and  difficulty maintaining NWB  left LE  during standing and sit <-> stands   Continue goal                  Problem: Dressing       Dates: Start:  07/18/24         Goal: STG-Within one week, patient will dress LB with Min A.       Dates: Start:  07/18/24         Goal Note filed on 07/23/24 0956 by Antolin Juarez, C.O.T.A.       Mod assist   Limited by  decreased strength/endurance and  difficulty maintaining NWB  left LE  during standing and sit <-> stands   Continue goal                  Problem: Functional Transfers       Dates: Start:  07/18/24         Goal: STG-Within one week, patient will transfer to toilet with Min A.       Dates: Start:  07/18/24         Goal Note filed on 07/23/24 0956 by Antolin Juarez, C.O.T.A.       Max assist   Limited by  decreased strength/endurance and  difficulty maintaining NWB  left LE  during standing and sit <-> stands   Continue goal               Goal: STG-Within one week, patient will transfer to tub/shower with Min A.       Dates: Start:  07/18/24         Goal Note filed on 07/23/24 0956 by Antolin Juarez, C.O.T.A.         Min assist max cues Limited by   decreased strength/endurance and  difficulty maintaining NWB  left LE  during standing and sit <-> stands  note   has been performing lateral scoot not stand pivot  to and from shower bench    Continue goal                  Problem: OT Long Term Goals       Dates: Start:  07/18/24         Goal: LTG-By discharge, patient will complete basic self care tasks at Mod Independent level.       Dates: Start:  07/18/24            Goal: LTG-By discharge, patient will perform bathroom transfers at Mod Independent level.       Dates: Start:  07/18/24               Problem: Toileting       Dates: Start:  07/18/24         Goal: STG-Within one week, patient will complete toileting tasks with Min A.       Dates: Start:  07/18/24         Goal Note filed on 07/23/24 0956 by Antolin Juarez C.O.T.A.       Max assist   Limited by  decreased strength/endurance and  difficulty maintaining NWB  left LE  during standing and sit <-> stands   Continue goal

## 2024-07-23 NOTE — THERAPY
"Physical Therapy   Daily Treatment     Patient Name: Pj Freeman  Age:  69 y.o., Sex:  male  Medical Record #: [id number]  Today's Date: 7/23/2024     Precautions  Precautions: Fall Risk, Non Weight Bearing Left Lower Extremity  Comments: L TMA > NWB    Subjective    Pt seated in w/c upon arrival, agreeable to session.      Objective       07/23/24 1331   PT Charge Group   PT Therapeutic Activities (Units) 2   Supervising Physical Therapist Jake Smith   PT Total Time Spent   PT Individual Total Time Spent (Mins) 30   Cognition    Level of Consciousness Alert   New Learning Impaired   Attention Impaired   Wheelchair Functional Level of Assist   Wheelchair Assist Stand by Assist   Distance Wheelchair (Feet or Distance) 100   Wheelchair Description Extra time;Leg rest management;Impaired coordination;Requires incidental assist;Supervision for safety;Verbal cueing   Stairs Functional Level of Assist   Level of Assist with Stairs Maximal Assist   # of Stairs Climbed 1  (2\" curb step w/ FWW, anterior approach. attempted w/ posterior approach but unsuccessful)   Transfer Functional Level of Assist   Bed, Chair, Wheelchair Transfer Moderate Assist   Bed Chair Wheelchair Transfer Description Adaptive equipment;Increased time;Initial preparation for task;Requires lift;Set-up of equipment;Supervision for safety;Verbal cueing  (SPT w/ FWW)   Bed Mobility    Sit to Stand Moderate Assist   Scooting Standby Assist   Interdisciplinary Plan of Care Collaboration   IDT Collaboration with  Physical Therapist   Patient Position at End of Therapy Seated;Call Light within Reach;Tray Table within Reach;Phone within Reach   Collaboration Comments POC, CLOF     Completed car xfer into [facility] w/ FWW, modA overall to maintain NWB LLE     Assessment    Pt tolerated session fairly, he was hyperverbose but easily redirect back to task. Attempted 2\" curb step w/ both anterior and posterior approach, recommending second person present " for safety.       Strengths: Able to follow instructions, Alert and oriented, Independent prior level of function, Pleasant and cooperative, Willingly participates in therapeutic activities, Motivated for self care and independence  Barriers: Fatigue, Difficulty following instructions, Decreased endurance, Impaired activity tolerance, Limited mobility (limited ability to maintain weightbearing precaution and tends to lose focus/becomes distracted)    Plan    Continue to focus on transfers and short distance gait with emphasis on left LE NWB     DME        Passport items to be completed:  Get in/out of bed safely, in/out of a vehicle, safely use mobility device, walk or wheel around home/community, navigate up and down stairs, show how to get up/down from the ground, ensure home is accessible, demonstrate HEP, complete caregiver training       Physical Therapy Problems (Active)       Problem: Mobility       Dates: Start:  07/18/24         Goal: STG-Within one week, patient will ambulate 50 feet with kneeling scooter SBA       Dates: Start:  07/18/24            Goal: STG-Within one week, patient will ambulate up/down a curb with FWW CGA       Dates: Start:  07/18/24               Problem: Mobility Transfers       Dates: Start:  07/18/24         Goal: STG-Within one week, patient will sit to stand with CGA       Dates: Start:  07/18/24               Problem: PT-Long Term Goals       Dates: Start:  07/18/24         Goal: LTG-By discharge, patient will ambulate with kneeling scooter >150 feet supervised       Dates: Start:  07/18/24            Goal: LTG-By discharge, patient will transfer one surface to another supervised        Dates: Start:  07/18/24            Goal: LTG-By discharge, patient will ambulate up/down 4-6 stairs with single railing supervised       Dates: Start:  07/18/24            Goal: LTG-By discharge, patient will transfer in/out of a car supervised       Dates: Start:  07/18/24

## 2024-07-23 NOTE — CARE PLAN
"  Problem: Diabetes Management  Goal: Patient's ability to maintain appropriate glucose levels will be maintained or improve  Note: FSBS 186 at hs, coverage given with snack.Will continue to monitor.     Problem: Fall Risk - Rehab  Goal: Patient will remain free from falls  Note: Taylor Gallego Fall risk Assessment Score: 15    High fall risk Interventions   - Alarming seatbelt  - Bed and strip alarm   - Yellow sign by the door   - Yellow wrist band \"Fall risk\"  - Room near to the nurse station  - Do not leave patient unattended in the bathroom  - Fall risk education provided      Problem: Bowel Elimination  Goal: Patient will participate in bowel management program  Note: Pt refused scheduled senna at hs.Continent of bowel per report.LBM 7/21.Will continue to monitor.         "

## 2024-07-24 ENCOUNTER — APPOINTMENT (OUTPATIENT)
Dept: PHYSICAL THERAPY | Facility: REHABILITATION | Age: 69
DRG: 056 | End: 2024-07-24
Attending: PHYSICAL MEDICINE & REHABILITATION
Payer: MEDICARE

## 2024-07-24 ENCOUNTER — APPOINTMENT (OUTPATIENT)
Dept: SPEECH THERAPY | Facility: REHABILITATION | Age: 69
DRG: 056 | End: 2024-07-24
Attending: PHYSICAL MEDICINE & REHABILITATION
Payer: MEDICARE

## 2024-07-24 ENCOUNTER — APPOINTMENT (OUTPATIENT)
Dept: OCCUPATIONAL THERAPY | Facility: REHABILITATION | Age: 69
DRG: 056 | End: 2024-07-24
Attending: PHYSICAL MEDICINE & REHABILITATION
Payer: MEDICARE

## 2024-07-24 LAB
GLUCOSE BLD STRIP.AUTO-MCNC: 149 MG/DL (ref 65–99)
GLUCOSE BLD STRIP.AUTO-MCNC: 152 MG/DL (ref 65–99)
GLUCOSE BLD STRIP.AUTO-MCNC: 173 MG/DL (ref 65–99)
GLUCOSE BLD STRIP.AUTO-MCNC: 174 MG/DL (ref 65–99)

## 2024-07-24 PROCEDURE — 700102 HCHG RX REV CODE 250 W/ 637 OVERRIDE(OP): Performed by: HOSPITALIST

## 2024-07-24 PROCEDURE — 99231 SBSQ HOSP IP/OBS SF/LOW 25: CPT | Performed by: HOSPITALIST

## 2024-07-24 PROCEDURE — 770010 HCHG ROOM/CARE - REHAB SEMI PRIVAT*

## 2024-07-24 PROCEDURE — A9270 NON-COVERED ITEM OR SERVICE: HCPCS | Performed by: PHYSICAL MEDICINE & REHABILITATION

## 2024-07-24 PROCEDURE — 97110 THERAPEUTIC EXERCISES: CPT | Mod: CO

## 2024-07-24 PROCEDURE — A9270 NON-COVERED ITEM OR SERVICE: HCPCS | Performed by: HOSPITALIST

## 2024-07-24 PROCEDURE — 82962 GLUCOSE BLOOD TEST: CPT | Mod: 91

## 2024-07-24 PROCEDURE — 700102 HCHG RX REV CODE 250 W/ 637 OVERRIDE(OP): Performed by: PHYSICAL MEDICINE & REHABILITATION

## 2024-07-24 PROCEDURE — 97110 THERAPEUTIC EXERCISES: CPT

## 2024-07-24 PROCEDURE — 97116 GAIT TRAINING THERAPY: CPT

## 2024-07-24 PROCEDURE — 97530 THERAPEUTIC ACTIVITIES: CPT | Mod: CO

## 2024-07-24 PROCEDURE — 97530 THERAPEUTIC ACTIVITIES: CPT

## 2024-07-24 PROCEDURE — 97130 THER IVNTJ EA ADDL 15 MIN: CPT

## 2024-07-24 PROCEDURE — 97129 THER IVNTJ 1ST 15 MIN: CPT

## 2024-07-24 RX ORDER — AMIODARONE HYDROCHLORIDE 200 MG/1
400 TABLET ORAL TWICE DAILY
Status: DISCONTINUED | OUTPATIENT
Start: 2024-07-24 | End: 2024-08-01 | Stop reason: HOSPADM

## 2024-07-24 RX ADMIN — CALCIUM CARBONATE (ANTACID) CHEW TAB 500 MG 1000 MG: 500 CHEW TAB at 17:16

## 2024-07-24 RX ADMIN — METFORMIN HYDROCHLORIDE 1000 MG: 500 TABLET ORAL at 17:16

## 2024-07-24 RX ADMIN — SITAGLIPTIN 25 MG: 50 TABLET, FILM COATED ORAL at 09:38

## 2024-07-24 RX ADMIN — Medication 2000 UNITS: at 09:38

## 2024-07-24 RX ADMIN — SENNOSIDES AND DOCUSATE SODIUM 2 TABLET: 50; 8.6 TABLET ORAL at 20:35

## 2024-07-24 RX ADMIN — INSULIN LISPRO 2 UNITS: 100 INJECTION, SOLUTION INTRAVENOUS; SUBCUTANEOUS at 20:42

## 2024-07-24 RX ADMIN — AMIODARONE HYDROCHLORIDE 400 MG: 200 TABLET ORAL at 17:16

## 2024-07-24 RX ADMIN — FUROSEMIDE 20 MG: 20 TABLET ORAL at 09:38

## 2024-07-24 RX ADMIN — METFORMIN HYDROCHLORIDE 1000 MG: 500 TABLET ORAL at 05:07

## 2024-07-24 RX ADMIN — CALCIUM CARBONATE (ANTACID) CHEW TAB 500 MG 1000 MG: 500 CHEW TAB at 11:34

## 2024-07-24 RX ADMIN — CALCIUM CARBONATE (ANTACID) CHEW TAB 500 MG 1000 MG: 500 CHEW TAB at 09:38

## 2024-07-24 RX ADMIN — INSULIN LISPRO 2 UNITS: 100 INJECTION, SOLUTION INTRAVENOUS; SUBCUTANEOUS at 17:21

## 2024-07-24 RX ADMIN — ATORVASTATIN CALCIUM 40 MG: 40 TABLET, FILM COATED ORAL at 20:34

## 2024-07-24 RX ADMIN — FERROUS SULFATE TAB 325 MG (65 MG ELEMENTAL FE) 325 MG: 325 (65 FE) TAB at 17:16

## 2024-07-24 RX ADMIN — FERROUS SULFATE TAB 325 MG (65 MG ELEMENTAL FE) 325 MG: 325 (65 FE) TAB at 09:37

## 2024-07-24 RX ADMIN — POTASSIUM CHLORIDE 10 MEQ: 1500 TABLET, EXTENDED RELEASE ORAL at 09:38

## 2024-07-24 RX ADMIN — INSULIN LISPRO 2 UNITS: 100 INJECTION, SOLUTION INTRAVENOUS; SUBCUTANEOUS at 08:13

## 2024-07-24 RX ADMIN — OMEPRAZOLE 40 MG: 20 CAPSULE, DELAYED RELEASE ORAL at 20:34

## 2024-07-24 RX ADMIN — OMEPRAZOLE 40 MG: 20 CAPSULE, DELAYED RELEASE ORAL at 09:39

## 2024-07-24 RX ADMIN — AMIODARONE HYDROCHLORIDE 400 MG: 200 TABLET ORAL at 05:07

## 2024-07-24 ASSESSMENT — ENCOUNTER SYMPTOMS
BLURRED VISION: 0
DIZZINESS: 0
PALPITATIONS: 0
HEADACHES: 0
NAUSEA: 0
FEVER: 0
HALLUCINATIONS: 0
VOMITING: 0
SHORTNESS OF BREATH: 0

## 2024-07-24 ASSESSMENT — GAIT ASSESSMENTS
ASSISTIVE DEVICE: FRONT WHEEL WALKER
DISTANCE (FEET): 10
DISTANCE (FEET): 10
GAIT LEVEL OF ASSIST: CONTACT GUARD ASSIST
ASSISTIVE DEVICE: PARALLEL BARS
GAIT LEVEL OF ASSIST: CONTACT GUARD ASSIST

## 2024-07-24 ASSESSMENT — PAIN DESCRIPTION - PAIN TYPE: TYPE: ACUTE PAIN

## 2024-07-24 ASSESSMENT — ACTIVITIES OF DAILY LIVING (ADL)
BED_CHAIR_WHEELCHAIR_TRANSFER_DESCRIPTION: SLIDEBOARD TRANSFER FROM BED TO WHEELCHAIR
BED_CHAIR_WHEELCHAIR_TRANSFER_DESCRIPTION: SLIDEBOARD TRANSFER FROM BED TO WHEELCHAIR

## 2024-07-24 NOTE — CARE PLAN
Problem: Diabetes Management  Goal: Patient's ability to maintain appropriate glucose levels will be maintained or improve  Note: FSBS 199 at hs, coverage given.Refused snack.Will continue to monitor.     Problem: Bowel Elimination  Goal: Patient will participate in bowel management program  Note: Pt continues to refused scheduled senna at hs.Education given on the importance of medication to no avail.LBM 7/21.Will continue to monitor.

## 2024-07-24 NOTE — PROGRESS NOTES
Hospital Medicine Daily Progress Note      Chief Complaint  Diabetes Mellitus    Interval Problem Update  No complaints.  Doing ok.    Review of Systems  Review of Systems   Constitutional:  Negative for fever.   Eyes:  Negative for blurred vision.   Respiratory:  Negative for shortness of breath.    Cardiovascular:  Negative for palpitations.   Gastrointestinal:  Negative for nausea and vomiting.   Neurological:  Negative for dizziness and headaches.   Psychiatric/Behavioral:  Negative for hallucinations.         Physical Exam  Temp:  [36.4 °C (97.6 °F)-36.7 °C (98.1 °F)] 36.7 °C (98.1 °F)  Pulse:  [70-77] 73  Resp:  [17-18] 17  BP: (127-141)/(69-72) 127/69  SpO2:  [92 %-95 %] 92 %    Physical Exam  Vitals and nursing note reviewed.   Constitutional:       General: He is not in acute distress.  HENT:      Mouth/Throat:      Mouth: Mucous membranes are moist.      Pharynx: Oropharynx is clear.   Eyes:      General: No scleral icterus.  Cardiovascular:      Rate and Rhythm: Normal rate and regular rhythm.      Heart sounds: No murmur heard.  Pulmonary:      Effort: Pulmonary effort is normal.      Breath sounds: Normal breath sounds. No stridor.   Abdominal:      General: There is no distension.      Palpations: Abdomen is soft.      Tenderness: There is no abdominal tenderness.   Musculoskeletal:      Cervical back: No rigidity.      Right lower leg: Edema present.      Left lower leg: Edema present.   Skin:     General: Skin is warm and dry.      Findings: No rash.   Neurological:      Mental Status: He is alert and oriented to person, place, and time.   Psychiatric:         Mood and Affect: Mood normal.         Behavior: Behavior normal.         Fluids    Intake/Output Summary (Last 24 hours) at 7/24/2024 1058  Last data filed at 7/24/2024 0900  Gross per 24 hour   Intake 600 ml   Output 1000 ml   Net -400 ml       Laboratory  Recent Labs     07/22/24  0526 07/23/24  0536   WBC 7.8  --    RBC 2.86*  --     HEMOGLOBIN 8.0* 8.1*   HEMATOCRIT 25.0* 26.1*   MCV 87.4  --    MCH 28.0  --    MCHC 32.0*  --    RDW 49.0  --    PLATELETCT 377  --    MPV 10.1  --      Recent Labs     07/22/24  0526 07/23/24  0536   SODIUM 137  --    POTASSIUM 3.2* 3.8   CHLORIDE 104  --    CO2 23  --    GLUCOSE 115*  --    BUN 15  --    CREATININE 0.64  --    CALCIUM 7.6*  --                    Assessment/Plan  * Acute CVA (cerebrovascular accident) ([facility])- (present on admission)  Assessment & Plan  Was on Plavix and Lipitor  Antiplatelet presently on hold d/t recent GIB    Vitamin D deficiency  Assessment & Plan  Vit D: 17  Cont supplements    A-fib ([facility])  Assessment & Plan  HR ok  Echo EF 60%, RVSP 80 mmHg, severe MS, small pericardial effusion, pleural effusion noted  BNP stable: 5594   CXR: mod right basilar opacity and suspected small pleural effusion  Cont Lasix  K+: 3.8 (7/23)  AC on hold 2nd to recent GIB  Ont to monitor    Type 2 diabetes mellitus ([facility])- (present on admission)  Assessment & Plan  Hba1c 5.4 (7/18)  BS labile: 117-201  Off Glargine  Cont Metformin  Cont Januvia  Note: home meds include Metformin 1000 mg bid, Januvia 100 mg qd, Jardiance 25 mg qd, and Lantus 10 u qhs  Cont to monitor    Acute osteomyelitis of left foot ([facility])- (present on admission)  Assessment & Plan  S/P recent left TMA on 7/3/24 (at [facility])    NSTEMI (non-ST elevated myocardial infarction) ([facility])- (present on admission)  Assessment & Plan  Was on Plavix and Lipitor  Antiplatelet presently on hold d/t recent GIB    Acute blood loss anemia- (present on admission)  Assessment & Plan  H&H stable with Hb 8.1 (7/23)  H&H was wnl prior to adm  Had recent GIB  Fe 20 and Ferritin 67  Cont Fe supplements  Cont to monitor    Upper GI bleed- (present on admission)  Assessment & Plan  Pt presented with syncope  EGD showed a DU x 6 -- s/p clip x 1 for active bleeding  S/P PRBC x 4 units  Cont PPI bid

## 2024-07-24 NOTE — THERAPY
"Occupational Therapy  Daily Treatment     Patient Name: Pj Freeman  Age:  69 y.o., Sex:  male  Medical Record #: [id number]  Today's Date: 7/24/2024     Precautions  Precautions: (P) Fall Risk, Non Weight Bearing Left Lower Extremity  Comments: (P) left Transmetatarsal amputation         Subjective    \" Jake called the surgeon and sent him a picture.  He said one more week of non weight bearing.\"     Objective/  Assessment       07/24/24 1231   OT Charge Group   OT Therapy Activity (Units) 2   OT Therapeutic Exercise (Units) 2   OT Total Time Spent   OT Individual Total Time Spent (Mins) 60   Precautions   Precautions Fall Risk;Non Weight Bearing Left Lower Extremity   Comments left Transmetatarsal amputation   Functional Level of Assist   Toilet Transfers Moderate Assist  (mod assist using grab bar  for stand pivot   and mod to min assist using  FWW     performed 3 times   mod cues for technique  ability to NWB   left LE improved with each  attempt)   Sitting Upper Body Exercises   Upper Extremity Bike Level 3 Resistance  (x 5 minutes x 2)   Other Exercise biceps/ Triceps strengthening   Rickshaw   x10 reps x 3  facing with  35lbs and backed in x 10 reps x 3    25lbs   Balance   Standing Balance (Static) Fair  (NWB left LE  standing in // bars for static  standing ring toss    standing  x 1 to 1.5 minutes  4 times.Sit to stands required cues and Min to CGA  2 trials required  CGA  and  therapists foot under left LE to maintain NWB  during  sit to stand)   Interdisciplinary Plan of Care Collaboration   IDT Collaboration with  Physician   Patient Position at End of Therapy Seated;Chair Alarm On;Self Releasing Lap Belt Applied  (in dining room  for lunch)   Collaboration Comments Dr Salazar  rounded with  Pat during a rest break .   relayed that he would contact the surgeon regrading  being able to put on a  offloading boot/ shoe. Despite having told me 3 times this session that Jake PT called the surgeion and " "provided photos and that surgeon  instructed  NWB for one more week. He did not tell Dr Salazar.   When I asked him why he said .  \" Well Dr to \"   with further questioning   is was relayed that  he thought maybe the surgeon would provide a different answer if he was contacted by the rehab Dr.        With   2 of 4 sit to stands in // bars  Pat was able to maintain  NWB  by  placing  left LE out in front of him.       Increased difficulty with NWB for sit to  bathroom   facing the grab bar but as activity progressed his ability improved     Strengths: Able to follow instructions, Alert and oriented, Effective communication skills, Independent prior level of function, Motivated for self care and independence, Pleasant and cooperative, Supportive family, Willingly participates in therapeutic activities  Barriers: Decreased endurance, Generalized weakness, Impaired activity tolerance, Tube feeding, Fatigue    Plan    ADL IADL , related mobility and cognition strength/endurance building standing tolerance and balance activity all incorporating NWB left LE     DME  OT DME Recommendations  Bathroom Equipment: 3 in 1 Commode      Occupational Therapy Goals (Active)       Problem: Bathing       Dates: Start:  07/18/24         Goal: STG-Within one week, patient will bathe with SBA.       Dates: Start:  07/18/24         Goal Note filed on 07/23/24 0956 by Antolin Juarez, C.O.T.A.        Requires min assist     Limited by  decreased strength/endurance and  difficulty maintaining NWB  left LE  during standing and sit <-> stands   Continue goal                  Problem: Dressing       Dates: Start:  07/18/24         Goal: STG-Within one week, patient will dress LB with Min A.       Dates: Start:  07/18/24         Goal Note filed on 07/23/24 0956 by Antolin Juarez, C.O.T.A.       Mod assist   Limited by  decreased strength/endurance and  difficulty maintaining NWB  left LE  during standing and sit <-> stands   Continue " goal                  Problem: Functional Transfers       Dates: Start:  07/18/24         Goal: STG-Within one week, patient will transfer to toilet with Min A.       Dates: Start:  07/18/24         Goal Note filed on 07/23/24 0956 by NICKO Lisa.O.T.A.       Max assist   Limited by  decreased strength/endurance and  difficulty maintaining NWB  left LE  during standing and sit <-> stands   Continue goal               Goal: STG-Within one week, patient will transfer to tub/shower with Min A.       Dates: Start:  07/18/24         Goal Note filed on 07/23/24 0956 by NICKO Lisa.O.T.A.         Min assist max cues Limited by  decreased strength/endurance and  difficulty maintaining NWB  left LE  during standing and sit <-> stands  note   has been performing lateral scoot not stand pivot  to and from shower bench    Continue goal                  Problem: OT Long Term Goals       Dates: Start:  07/18/24         Goal: LTG-By discharge, patient will complete basic self care tasks at Mod Independent level.       Dates: Start:  07/18/24            Goal: LTG-By discharge, patient will perform bathroom transfers at Mod Independent level.       Dates: Start:  07/18/24               Problem: Toileting       Dates: Start:  07/18/24         Goal: STG-Within one week, patient will complete toileting tasks with Min A.       Dates: Start:  07/18/24         Goal Note filed on 07/23/24 0956 by Antolin Juarez C.O.T.A.       Max assist   Limited by  decreased strength/endurance and  difficulty maintaining NWB  left LE  during standing and sit <-> stands   Continue goal

## 2024-07-24 NOTE — PROGRESS NOTES
NURSING DAILY NOTE    Name: Pj Freeman   Date of Admission: 7/17/2024   Admitting Diagnosis: Acute CVA (cerebrovascular accident) ([facility])  Attending Physician: Dilia Salazar M.d.  Allergies: Patient has no known allergies.    Safety  Patient Assist  pooja a  Patient Precautions  Fall Risk, Non Weight Bearing Left Lower Extremity  Precaution Comments  L TMA > NWB  Bed Transfer Status  Moderate Assist  Toilet Transfer Status   Moderate Assist  Assistive Devices  Wheelchair, Rails  Oxygen  None - Room Air  Diet/Therapeutic Dining  Current Diet Order   Procedures    Diet Order Diet: Low Fiber(GI Soft); Second Modifier: (optional): Consistent CHO (Diabetic)     Pill Administration  whole  Agitated Behavioral Scale     ABS Level of Severity       Fall Risk  Has the patient had a fall this admission?   No  Taylor Gallego Fall Risk Scoring  15, HIGH RISK  Fall Risk Safety Measures  bed alarm and chair alarm    Vitals  Temperature: 36.4 °C (97.6 °F)  Temp src: Oral  Pulse: 73  Respiration: 18  Blood Pressure : 127/69  Blood Pressure MAP (Calculated): 88 MM HG  BP Location: Left, Upper Arm  Patient BP Position: Supine     Oxygen  Pulse Oximetry: 94 %  O2 (LPM): 0  O2 Delivery Device: None - Room Air    Bowel and Bladder  Last Bowel Movement  07/21/24  Stool Type  Type 6: Fluffy pieces with ragged edges, a mushy stool  Bowel Device  Bathroom  Continent  Bladder: Continent void   Bowel: Continent movement  Bladder Function  Urine Void (mL): 300 ml  Number of Times Voided: 1  Urine Color: Yellow  Genitourinary Assessment   Bladder Assessment (WDL):  Within Defined Limits  [place] Catheter: Not Applicable  Urine Color: Yellow  Bladder Device: Urinal  Time Void: Yes  Bladder Scan: Post Void  $ Bladder Scan Results (mL): 155  Bladder Medications: No    Skin  Rocky Score   18  Sensory Interventions   Bed Types: Standard/Trauma Mattress  Skin Preventative Measures:  Pillows in Use for Support / Positioning  Moisture Interventions  Moisturizers/Barriers: Barrier Wipes, Barrier Paste      Pain  Pain Rating Scale  0 - No Pain  Pain Location  Foot  Pain Location Orientation  Left  Pain Interventions   Declines    ADLs    Bathing   Staff, Shower  Linen Change      Personal Hygiene  Perineal Care, Samia Bottle, Change Samia Pads, Moist Samia Wipes  Chlorhexidine Bath      Oral Care  Brushed Teeth  Teeth/Dentures     Shave     Nutrition Percentage Eaten  Between % Consumed  Environmental Precautions  Bed in Low Position, Treaded Slipper Socks on Patient  Patient Turns/Positioning  Patient Turns Self from Side to Side  Patient Turns Assistance/Tolerance     Bed Positions  Bed Controls On  Head of Bed Elevated  Self regulated      Psychosocial/Neurologic Assessment  Psychosocial Assessment  Psychosocial (WDL):  WDL Except  Patient Behaviors: Fatigue  Family Behaviors: No Family Present  Neurologic Assessment  Neuro (WDL): Within Defined Limits  Level of Consciousness: Alert  EENT (WDL):  WDL Except    Cardio/Pulmonary Assessment  Edema   RLE Edema: 2+, Pitting  LLE Edema: 2+, Pitting  Respiratory Breath Sounds     Cardiac Assessment   Cardiac (WDL):  WDL Except (HTN, PAD, PVD, new on-set a-fib)

## 2024-07-24 NOTE — THERAPY
Speech Language Pathology  Daily Treatment     Patient Name: Pj Freeman  Age:  69 y.o., Sex:  male  Medical Record #: [id number]  Today's Date: 7/24/2024     Precautions  Precautions: Fall Risk, Non Weight Bearing Left Lower Extremity  Comments: L TMA > NWB    Subjective    Pt pleasant and cooperative.      Objective       07/24/24 0733   Treatment Charges   SLP Cognitive Skill Development First 15 Minutes 1   SLP Cognitive Skill Development Additional 15 Minutes 1   SLP Total Time Spent   SLP Individual Total Time Spent (Mins) 30         Assessment    Stroke education recall was assessed at this time with handout provided. Pt indep able to recall type of stroke, able to identify changes as a results of stroke, how to prevent a stroke and high risk factors. Pt unable to recall BE FAST, and required direct assist from SLP with explanation of each from mild impairments to more severe impairments. Pt with 4 off topic comments/initiation of off topic conversation while working on task at hand.     Strengths: Able to follow instructions, Alert and oriented, Effective communication skills, Pleasant and cooperative, Motivated for self care and independence, Supportive family, Willingly participates in therapeutic activities  Barriers: Impulsive, Impaired carryover of learning, Impaired functional cognition, Impaired balance (impaired attention.)    Plan    Multitasking, alternating attention skills    Speech Therapy Problems (Active)       Problem: Speech/Swallowing LTGs       Dates: Start:  07/18/24         Goal: LTG-By discharge, patient will solve complex problems and recall safety training with 90% acc with spv. for safe discharge home.       Dates: Start:  07/18/24

## 2024-07-24 NOTE — THERAPY
NOTIFICATION RETURN TO WORK / SCHOOL 
 
10/24/2017 Ms. Gertrude Mosley [address] [facility] [address] [place] [id number] To Whom It May Concern: 
 
Kadie Harp is currently under the care of [facility]. She will return to work/school on: 10/24/2017 If there are questions or concerns please have the patient contact our office. Sincerely, Topher Leigh MD 
 
                                
 
 Physical Therapy   Daily Treatment     Patient Name: Pj Freeman  Age:  69 y.o., Sex:  male  Medical Record #: [id number]  Today's Date: 7/24/2024     Precautions  Precautions: (P) Fall Risk, Non Weight Bearing Left Lower Extremity  Comments: (P) left transmetatarsal amputation    Subjective    Patient feels like his right leg used to be so strong and now is very weak     Objective       07/24/24 0831 07/24/24 1531   PT Charge Group   PT Gait Training (Units) 1  --    PT Therapeutic Exercise (Units) 1  --    PT Therapeutic Activities (Units) 2 2   PT Total Time Spent   PT Individual Total Time Spent (Mins) 60 30   Precautions   Precautions Fall Risk;Non Weight Bearing Left Lower Extremity Fall Risk;Non Weight Bearing Left Lower Extremity   Comments left Transmetatarsal amputation left transmetatarsal amputation   Gait Functional Level of Assist    Gait Level Of Assist Contact Guard Assist Contact Guard Assist   Assistive Device Front Wheel Walker Parallel Bars   Distance (Feet) 10 10   # of Times Distance was Traveled 1 1   Wheelchair Functional Level of Assist   Wheelchair Assist  --  Supervised   Distance Wheelchair (Feet or Distance)  --  200   Stairs Functional Level of Assist   Level of Assist with Stairs  --  Minimal Assist   # of Stairs Climbed  --  1  (4 inch step 6 reps in bars - barely clears the step)   Transfer Functional Level of Assist   Bed, Chair, Wheelchair Transfer Standby Assist Minimal Assist   Bed Chair Wheelchair Transfer Description Slideboard transfer from bed to wheelchair  (lateral scoot no slideboard) Slideboard transfer from bed to wheelchair  (lateral scoot bed to chair without slideboard)   Sitting Lower Body Exercises   Nustep Resistance Level 1;Time (See Comments)  (6 minutes)  --    Bed Mobility    Supine to Sit Supervised Supervised   Sit to Supine Supervised Supervised   Sit to Stand Moderate Assist Maximal Assist   Scooting Supervised Supervised   Rolling Supervised Supervised  "  Interdisciplinary Plan of Care Collaboration   IDT Collaboration with  Other (See Comments)  (NP at Podiatric surgeons office)  --    Patient Position at End of Therapy Seated;Chair Alarm On;Call Light within Reach;Self Releasing Lap Belt Applied Seated;Chair Alarm On;Call Light within Reach   Collaboration Comments discussed NWB status and that based on pictures of residual limb - patient is to remain NWB for at least the next week - informed IDT  --    PT DME Recommendations   Wheelchair 18\" Width  (rental would be fine)  --    Cushion Standard  --    Physical Therapist Assigned   Assigned PT / Treatment Time / Comments Jake 30/60 Jake 30/60     Had lengthy dialogue with nurse practitioner at podiatric surgeon office, sent pictures of amputation incision and agreed that it is too early to weight bear on the left leg  Patient education on being NWB for DC and needing a ramp built and to be at wheelchair level  Practiced a lateral scoot transfer and stand pivot transfer in AM and PM sessions    Assessment    Patient still requires moderate and sometimes maximal assist with sit to stand from a standard height chair. Once he is in standing, he can safely hop NWB for transfer. Does better with lateral scoot transfer from surface to surface - however in the afternoon - needed moderate verbal cues for sequencing of this with wheelchair management and technique to scoot    Strengths: Able to follow instructions, Alert and oriented, Independent prior level of function, Pleasant and cooperative, Willingly participates in therapeutic activities, Motivated for self care and independence  Barriers: Fatigue, Difficulty following instructions, Decreased endurance, Impaired activity tolerance, Limited mobility (limited ability to maintain weightbearing precaution and tends to lose focus/becomes distracted)    Plan    Continue with emphasis on slideboard/lateral scoot vs stand pivot transfers, right LE strengthening, car " "transfers     DME  PT DME Recommendations  Wheelchair: 18\" Width (rental would be fine)  Cushion: Standard    Passport items to be completed:  Get in/out of bed safely, in/out of a vehicle, safely use mobility device, walk or wheel around home/community, navigate up and down stairs, show how to get up/down from the ground, ensure home is accessible, demonstrate HEP, complete caregiver training    Physical Therapy Problems (Active)       Problem: Mobility       Dates: Start:  07/18/24         Goal: STG-Within one week, patient will ambulate 50 feet with kneeling scooter SBA       Dates: Start:  07/18/24            Goal: STG-Within one week, patient will ambulate up/down a curb with FWW CGA       Dates: Start:  07/18/24               Problem: Mobility Transfers       Dates: Start:  07/18/24         Goal: STG-Within one week, patient will sit to stand with CGA       Dates: Start:  07/18/24               Problem: PT-Long Term Goals       Dates: Start:  07/18/24         Goal: LTG-By discharge, patient will ambulate with kneeling scooter >150 feet supervised       Dates: Start:  07/18/24            Goal: LTG-By discharge, patient will transfer one surface to another supervised        Dates: Start:  07/18/24            Goal: LTG-By discharge, patient will ambulate up/down 4-6 stairs with single railing supervised       Dates: Start:  07/18/24            Goal: LTG-By discharge, patient will transfer in/out of a car supervised       Dates: Start:  07/18/24              "

## 2024-07-24 NOTE — PROGRESS NOTES
"  Physical Medicine & Rehabilitation Progress Note    Encounter Date: 7/24/2024    Chief Complaint: Decreased mobility, weakness    Interval Events (Subjective):  Patient sitting up in room. He reports he is doing well. Denies pain. Denies NVD.     _____________________________________  Interdisciplinary Team Conference   Most recent IDT on 7/23/2024    Discharge Date/Disposition:  8/1/24  _____________________________________    Objective:  VITAL SIGNS: /72   Pulse 71   Temp 36.6 °C (97.8 °F) (Oral)   Resp 20   Ht 1.803 m (5' 11\")   Wt 75.2 kg (165 lb 12.8 oz)   SpO2 99%   BMI 23.12 kg/m²   Gen: NAD  Psych: Mood and affect appropriate  CV: RRR, 0 edema  Resp: CTAB, no upper airway sounds  Abd: NTND  Neuro: AOx4, following commands    Laboratory Values:  Recent Results (from the past 72 hour(s))   POCT glucose device results    Collection Time: 07/21/24  5:28 PM   Result Value Ref Range    POC Glucose, Blood 147 (H) 65 - 99 mg/dL   POCT glucose device results    Collection Time: 07/21/24  8:06 PM   Result Value Ref Range    POC Glucose, Blood 155 (H) 65 - 99 mg/dL   CBC WITHOUT DIFFERENTIAL    Collection Time: 07/22/24  5:26 AM   Result Value Ref Range    WBC 7.8 4.8 - 10.8 K/uL    RBC 2.86 (L) 4.70 - 6.10 M/uL    Hemoglobin 8.0 (L) 14.0 - 18.0 g/dL    Hematocrit 25.0 (L) 42.0 - 52.0 %    MCV 87.4 81.4 - 97.8 fL    MCH 28.0 27.0 - 33.0 pg    MCHC 32.0 (L) 32.3 - 36.5 g/dL    RDW 49.0 35.9 - 50.0 fL    Platelet Count 377 164 - 446 K/uL    MPV 10.1 9.0 - 12.9 fL   Basic Metabolic Panel    Collection Time: 07/22/24  5:26 AM   Result Value Ref Range    Sodium 137 135 - 145 mmol/L    Potassium 3.2 (L) 3.6 - 5.5 mmol/L    Chloride 104 96 - 112 mmol/L    Co2 23 20 - 33 mmol/L    Glucose 115 (H) 65 - 99 mg/dL    Bun 15 8 - 22 mg/dL    Creatinine 0.64 0.50 - 1.40 mg/dL    Calcium 7.6 (L) 8.5 - 10.5 mg/dL    Anion Gap 10.0 7.0 - 16.0   proBrain Natriuretic Peptide, NT    Collection Time: 07/22/24  5:26 AM "   Result Value Ref Range    NT-proBNP 5594 (H) 0 - 125 pg/mL   ESTIMATED GFR    Collection Time: 07/22/24  5:26 AM   Result Value Ref Range    GFR (CKD-EPI) 101 >60 mL/min/1.73 m 2   POCT glucose device results    Collection Time: 07/22/24  7:23 AM   Result Value Ref Range    POC Glucose, Blood 115 (H) 65 - 99 mg/dL   POCT glucose device results    Collection Time: 07/22/24 11:39 AM   Result Value Ref Range    POC Glucose, Blood 168 (H) 65 - 99 mg/dL   POCT glucose device results    Collection Time: 07/22/24  5:37 PM   Result Value Ref Range    POC Glucose, Blood 159 (H) 65 - 99 mg/dL   POCT glucose device results    Collection Time: 07/22/24  7:58 PM   Result Value Ref Range    POC Glucose, Blood 186 (H) 65 - 99 mg/dL   POTASSIUM SERUM (K)    Collection Time: 07/23/24  5:36 AM   Result Value Ref Range    Potassium 3.8 3.6 - 5.5 mmol/L   HEMOGLOBIN AND HEMATOCRIT    Collection Time: 07/23/24  5:36 AM   Result Value Ref Range    Hemoglobin 8.1 (L) 14.0 - 18.0 g/dL    Hematocrit 26.1 (L) 42.0 - 52.0 %   POCT glucose device results    Collection Time: 07/23/24  7:43 AM   Result Value Ref Range    POC Glucose, Blood 117 (H) 65 - 99 mg/dL   POCT glucose device results    Collection Time: 07/23/24 11:21 AM   Result Value Ref Range    POC Glucose, Blood 201 (H) 65 - 99 mg/dL   POCT glucose device results    Collection Time: 07/23/24  5:27 PM   Result Value Ref Range    POC Glucose, Blood 132 (H) 65 - 99 mg/dL   POCT glucose device results    Collection Time: 07/23/24  8:31 PM   Result Value Ref Range    POC Glucose, Blood 199 (H) 65 - 99 mg/dL   POCT glucose device results    Collection Time: 07/24/24  8:07 AM   Result Value Ref Range    POC Glucose, Blood 174 (H) 65 - 99 mg/dL       Medications:  Scheduled Medications   Medication Dose Frequency    insulin lispro  2-12 Units 4X/DAY ACHS    ferrous sulfate  325 mg BID WITH MEALS    amiodarone  400 mg TWICE DAILY    potassium chloride SA  10 mEq DAILY    SITagliptin  25 mg  DAILY    furosemide  20 mg DAILY    vitamin D3  2,000 Units DAILY    senna-docusate  2 Tablet BID    atorvastatin  40 mg Q EVENING    calcium carbonate  1,000 mg TID WITH MEALS    metFORMIN  1,000 mg BID    omeprazole  40 mg BID     PRN medications: [DISCONTINUED] insulin regular **AND** POC blood glucose manual result **AND** NOTIFY MD and PharmD **AND** Administer 20 grams of glucose (approximately 8 ounces of fruit juice) every 15 minutes PRN FSBG less than 70 mg/dL **AND** dextrose bolus, Respiratory Therapy Consult, hydrALAZINE, acetaminophen, senna-docusate **AND** polyethylene glycol/lytes, docusate sodium, magnesium hydroxide, carboxymethylcellulose, mag hydrox-al hydrox-simeth, ondansetron **OR** ondansetron, traZODone, sodium chloride, oxyCODONE immediate-release **OR** oxyCODONE immediate-release, bisacodyl    Diet:  Current Diet Order   Procedures    Diet Order Diet: Low Fiber(GI Soft); Second Modifier: (optional): Consistent CHO (Diabetic)       Medical Decision Making and Plan:  L CVA -Patient with L parietal CVA in 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 Stroke Ppx has been held due to GI bleed  -PT and OT for mobility and ADLs. Per guidelines, 15 hours per week between PT, OT and/or SLP.  -Follow-up Neurology     HTN/CAD/Mitral stenosis/A fib - Patient on Amiodarone 400 mg BID, Lasix 20 mg daily. Consulted hospitalist. Amiodarone fell off MAR, did not miss dose, restart Amiodarone and discussed with hospitalist. Continue Amiodarone 400 mg and Lasix 20 mg      MT amputation - Recent at OSH. NWB reportedly on LLE     HLD - Patient on Atorvastatin 40 mg daily     GI bleed - Check AM CBC. Continue PPI BID. Hgb 8.9, consult hospitalist     DM2 with hyperglycemia - Patient on Lantus 5 U and SSI. Previously on metformin and Jardiance. Consult hospitalist. Increased Lantus. Started on metformin. Improving sugars, lantus discontinued.  Blood sugars into 200s, continue SSI, Januvia and Metformin 1000 mg BID      Anemia -  Check AM CBC - 8.9 on admission.      Hypokalemia - Check AM CMP - 3.8, will monitor. Repeat 3.1, started on 20 mEq. Repeat 3.8, improved.      Hypocalcemia - Check AM CMP - 7.6 on admission, will monitor     Pain - Patient on PRN Tylenol/Oxycodone     Skin - Patient at risk for skin breakdown due to debility in areas including sacrum, achilles, elbows and head in addition to other sites. Nursing to assess skin daily.      GI Ppx - Patient on Prilosec for GERD prophylaxis. Patient on Senna-docusate for constipation prophylaxis.      DVT Ppx - Patient not cleared for AC on transfer.   ___________________________________    T. Yordy Salazar MD/PhD  [facility] - Physical Medicine & Rehabilitation   [facility] - Brain Injury Medicine   ____________________________________

## 2024-07-24 NOTE — CARE PLAN
Problem: Skin Integrity  Goal: Patient's skin integrity will be maintained or improve  Outcome: Progressing  Note: Patient's skin remains intact and free from new or accidental injury this shift.  Will continue to monitor.   The patient is Stable - Low risk of patient condition declining or worsening    Shift Goals  Clinical Goals: Safety  Patient Goals: Rest  Family Goals: no family present    Progress made toward(s) clinical / shift goals:  pt incision healing, no new injury or breakdown    Patient is not progressing towards the following goals:

## 2024-07-25 ENCOUNTER — APPOINTMENT (OUTPATIENT)
Dept: SPEECH THERAPY | Facility: REHABILITATION | Age: 69
DRG: 056 | End: 2024-07-25
Attending: PHYSICAL MEDICINE & REHABILITATION
Payer: MEDICARE

## 2024-07-25 ENCOUNTER — APPOINTMENT (OUTPATIENT)
Dept: PHYSICAL THERAPY | Facility: REHABILITATION | Age: 69
DRG: 056 | End: 2024-07-25
Attending: PHYSICAL MEDICINE & REHABILITATION
Payer: MEDICARE

## 2024-07-25 ENCOUNTER — APPOINTMENT (OUTPATIENT)
Dept: OCCUPATIONAL THERAPY | Facility: REHABILITATION | Age: 69
DRG: 056 | End: 2024-07-25
Attending: PHYSICAL MEDICINE & REHABILITATION
Payer: MEDICARE

## 2024-07-25 LAB
GLUCOSE BLD STRIP.AUTO-MCNC: 106 MG/DL (ref 65–99)
GLUCOSE BLD STRIP.AUTO-MCNC: 149 MG/DL (ref 65–99)
GLUCOSE BLD STRIP.AUTO-MCNC: 158 MG/DL (ref 65–99)
GLUCOSE BLD STRIP.AUTO-MCNC: 164 MG/DL (ref 65–99)

## 2024-07-25 PROCEDURE — 97129 THER IVNTJ 1ST 15 MIN: CPT

## 2024-07-25 PROCEDURE — 99232 SBSQ HOSP IP/OBS MODERATE 35: CPT | Performed by: HOSPITALIST

## 2024-07-25 PROCEDURE — 97530 THERAPEUTIC ACTIVITIES: CPT

## 2024-07-25 PROCEDURE — 97116 GAIT TRAINING THERAPY: CPT

## 2024-07-25 PROCEDURE — 97535 SELF CARE MNGMENT TRAINING: CPT | Mod: CO

## 2024-07-25 PROCEDURE — 700102 HCHG RX REV CODE 250 W/ 637 OVERRIDE(OP): Performed by: PHYSICAL MEDICINE & REHABILITATION

## 2024-07-25 PROCEDURE — 82962 GLUCOSE BLOOD TEST: CPT

## 2024-07-25 PROCEDURE — A9270 NON-COVERED ITEM OR SERVICE: HCPCS | Performed by: HOSPITALIST

## 2024-07-25 PROCEDURE — 97110 THERAPEUTIC EXERCISES: CPT

## 2024-07-25 PROCEDURE — 700102 HCHG RX REV CODE 250 W/ 637 OVERRIDE(OP): Performed by: HOSPITALIST

## 2024-07-25 PROCEDURE — 770010 HCHG ROOM/CARE - REHAB SEMI PRIVAT*

## 2024-07-25 PROCEDURE — 97130 THER IVNTJ EA ADDL 15 MIN: CPT

## 2024-07-25 PROCEDURE — A9270 NON-COVERED ITEM OR SERVICE: HCPCS | Performed by: PHYSICAL MEDICINE & REHABILITATION

## 2024-07-25 RX ADMIN — AMIODARONE HYDROCHLORIDE 400 MG: 200 TABLET ORAL at 05:24

## 2024-07-25 RX ADMIN — CALCIUM CARBONATE (ANTACID) CHEW TAB 500 MG 1000 MG: 500 CHEW TAB at 08:37

## 2024-07-25 RX ADMIN — SITAGLIPTIN 25 MG: 50 TABLET, FILM COATED ORAL at 08:38

## 2024-07-25 RX ADMIN — ATORVASTATIN CALCIUM 40 MG: 40 TABLET, FILM COATED ORAL at 20:41

## 2024-07-25 RX ADMIN — INSULIN LISPRO 2 UNITS: 100 INJECTION, SOLUTION INTRAVENOUS; SUBCUTANEOUS at 17:14

## 2024-07-25 RX ADMIN — CALCIUM CARBONATE (ANTACID) CHEW TAB 500 MG 1000 MG: 500 CHEW TAB at 12:16

## 2024-07-25 RX ADMIN — FERROUS SULFATE TAB 325 MG (65 MG ELEMENTAL FE) 325 MG: 325 (65 FE) TAB at 08:38

## 2024-07-25 RX ADMIN — SITAGLIPTIN 25 MG: 50 TABLET, FILM COATED ORAL at 12:16

## 2024-07-25 RX ADMIN — OMEPRAZOLE 40 MG: 20 CAPSULE, DELAYED RELEASE ORAL at 08:37

## 2024-07-25 RX ADMIN — INSULIN LISPRO 2 UNITS: 100 INJECTION, SOLUTION INTRAVENOUS; SUBCUTANEOUS at 08:36

## 2024-07-25 RX ADMIN — METFORMIN HYDROCHLORIDE 1000 MG: 500 TABLET ORAL at 17:16

## 2024-07-25 RX ADMIN — POTASSIUM CHLORIDE 10 MEQ: 1500 TABLET, EXTENDED RELEASE ORAL at 08:38

## 2024-07-25 RX ADMIN — SENNOSIDES AND DOCUSATE SODIUM 2 TABLET: 50; 8.6 TABLET ORAL at 08:38

## 2024-07-25 RX ADMIN — Medication 2000 UNITS: at 08:38

## 2024-07-25 RX ADMIN — METFORMIN HYDROCHLORIDE 1000 MG: 500 TABLET ORAL at 05:23

## 2024-07-25 RX ADMIN — AMIODARONE HYDROCHLORIDE 400 MG: 200 TABLET ORAL at 17:16

## 2024-07-25 RX ADMIN — CALCIUM CARBONATE (ANTACID) CHEW TAB 500 MG 1000 MG: 500 CHEW TAB at 17:16

## 2024-07-25 RX ADMIN — OMEPRAZOLE 40 MG: 20 CAPSULE, DELAYED RELEASE ORAL at 20:41

## 2024-07-25 RX ADMIN — FERROUS SULFATE TAB 325 MG (65 MG ELEMENTAL FE) 325 MG: 325 (65 FE) TAB at 17:16

## 2024-07-25 RX ADMIN — FUROSEMIDE 20 MG: 20 TABLET ORAL at 08:38

## 2024-07-25 ASSESSMENT — ENCOUNTER SYMPTOMS
BLURRED VISION: 0
NERVOUS/ANXIOUS: 0
DIARRHEA: 0
FEVER: 0
COUGH: 0
DIZZINESS: 0

## 2024-07-25 ASSESSMENT — GAIT ASSESSMENTS
ASSISTIVE DEVICE: FRONT WHEEL WALKER
DISTANCE (FEET): 10
GAIT LEVEL OF ASSIST: CONTACT GUARD ASSIST

## 2024-07-25 NOTE — PROGRESS NOTES
"  Physical Medicine & Rehabilitation Progress Note    Encounter Date: 7/25/2024    Chief Complaint: Decreased mobility, weakness    Interval Events (Subjective):  Patient sitting up in room. He reports therapy is going well. Denies pain. Denies NVD.     _____________________________________  Interdisciplinary Team Conference   Most recent IDT on 7/23/2024    Discharge Date/Disposition:  8/1/24  _____________________________________    Objective:  VITAL SIGNS: /69   Pulse 80   Temp 36.5 °C (97.7 °F) (Oral)   Resp 18   Ht 1.803 m (5' 11\")   Wt 75.2 kg (165 lb 12.8 oz)   SpO2 95%   BMI 23.12 kg/m²   Gen: NAD  Psych: Mood and affect appropriate  CV: RRR, 0 edema  Resp: CTAB, no upper airway sounds  Abd: NTND  Neuro: AOx4, following commands  Unchanged from 7/24/24    Laboratory Values:  Recent Results (from the past 72 hour(s))   POCT glucose device results    Collection Time: 07/22/24  5:37 PM   Result Value Ref Range    POC Glucose, Blood 159 (H) 65 - 99 mg/dL   POCT glucose device results    Collection Time: 07/22/24  7:58 PM   Result Value Ref Range    POC Glucose, Blood 186 (H) 65 - 99 mg/dL   POTASSIUM SERUM (K)    Collection Time: 07/23/24  5:36 AM   Result Value Ref Range    Potassium 3.8 3.6 - 5.5 mmol/L   HEMOGLOBIN AND HEMATOCRIT    Collection Time: 07/23/24  5:36 AM   Result Value Ref Range    Hemoglobin 8.1 (L) 14.0 - 18.0 g/dL    Hematocrit 26.1 (L) 42.0 - 52.0 %   POCT glucose device results    Collection Time: 07/23/24  7:43 AM   Result Value Ref Range    POC Glucose, Blood 117 (H) 65 - 99 mg/dL   POCT glucose device results    Collection Time: 07/23/24 11:21 AM   Result Value Ref Range    POC Glucose, Blood 201 (H) 65 - 99 mg/dL   POCT glucose device results    Collection Time: 07/23/24  5:27 PM   Result Value Ref Range    POC Glucose, Blood 132 (H) 65 - 99 mg/dL   POCT glucose device results    Collection Time: 07/23/24  8:31 PM   Result Value Ref Range    POC Glucose, Blood 199 (H) 65 - " 99 mg/dL   POCT glucose device results    Collection Time: 07/24/24  8:07 AM   Result Value Ref Range    POC Glucose, Blood 174 (H) 65 - 99 mg/dL   POCT glucose device results    Collection Time: 07/24/24 11:34 AM   Result Value Ref Range    POC Glucose, Blood 149 (H) 65 - 99 mg/dL   POCT glucose device results    Collection Time: 07/24/24  5:15 PM   Result Value Ref Range    POC Glucose, Blood 173 (H) 65 - 99 mg/dL   POCT glucose device results    Collection Time: 07/24/24  8:39 PM   Result Value Ref Range    POC Glucose, Blood 152 (H) 65 - 99 mg/dL   POCT glucose device results    Collection Time: 07/25/24  7:27 AM   Result Value Ref Range    POC Glucose, Blood 164 (H) 65 - 99 mg/dL   POCT glucose device results    Collection Time: 07/25/24 11:34 AM   Result Value Ref Range    POC Glucose, Blood 149 (H) 65 - 99 mg/dL       Medications:  Scheduled Medications   Medication Dose Frequency    [START ON 7/26/2024] SITagliptin  50 mg DAILY    SITagliptin  25 mg Once    amiodarone  400 mg TWICE DAILY    insulin lispro  2-12 Units 4X/DAY ACHS    ferrous sulfate  325 mg BID WITH MEALS    potassium chloride SA  10 mEq DAILY    furosemide  20 mg DAILY    vitamin D3  2,000 Units DAILY    senna-docusate  2 Tablet BID    atorvastatin  40 mg Q EVENING    calcium carbonate  1,000 mg TID WITH MEALS    metFORMIN  1,000 mg BID    omeprazole  40 mg BID     PRN medications: [DISCONTINUED] insulin regular **AND** POC blood glucose manual result **AND** NOTIFY MD and PharmD **AND** Administer 20 grams of glucose (approximately 8 ounces of fruit juice) every 15 minutes PRN FSBG less than 70 mg/dL **AND** dextrose bolus, Respiratory Therapy Consult, hydrALAZINE, acetaminophen, senna-docusate **AND** polyethylene glycol/lytes, docusate sodium, magnesium hydroxide, carboxymethylcellulose, mag hydrox-al hydrox-simeth, ondansetron **OR** ondansetron, traZODone, sodium chloride, oxyCODONE immediate-release **OR** oxyCODONE immediate-release,  bisacodyl    Diet:  Current Diet Order   Procedures    Diet Order Diet: Low Fiber(GI Soft); Second Modifier: (optional): Consistent CHO (Diabetic)       Medical Decision Making and Plan:  L CVA -Patient with L parietal CVA in 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 Stroke Ppx has been held due to GI bleed  -PT and OT for mobility and ADLs. Per guidelines, 15 hours per week between PT, OT and/or SLP.  -Follow-up Neurology     HTN/CAD/Mitral stenosis/A fib - Patient on Amiodarone 400 mg BID, Lasix 20 mg daily. Consulted hospitalist. Amiodarone fell off MAR, did not miss dose, restart Amiodarone and discussed with hospitalist. HR stable, continue Amiodarone 400 mg BID and Lasix 20 mg, discussed with hospitalist about possible reduction.      MT amputation - Recent at OSH. NWB reportedly on LLE     HLD - Patient on Atorvastatin 40 mg daily     GI bleed - Check AM CBC. Continue PPI BID. Hgb 8.9, consult hospitalist     DM2 with hyperglycemia - Patient on Lantus 5 U and SSI. Previously on metformin and Jardiance. Consult hospitalist. Increased Lantus. Started on metformin. Improving sugars, lantus discontinued. Blood sugars < 175, continue Metformin 1000 mg BID and Jardiance 25 mg     Anemia - Check AM CBC - 8.9 on admission.      Hypokalemia - Check AM CMP - 3.8, will monitor. Repeat 3.1, started on 20 mEq. Repeat 3.8, improved.      Hypocalcemia - Check AM CMP - 7.6 on admission, will monitor     Pain - Patient on PRN Tylenol/Oxycodone     Skin - Patient at risk for skin breakdown due to debility in areas including sacrum, achilles, elbows and head in addition to other sites. Nursing to assess skin daily.      GI Ppx - Patient on Prilosec for GERD prophylaxis. Patient on Senna-docusate for constipation prophylaxis.      DVT Ppx - Patient not cleared for AC on transfer.   ___________________________________    T. Yordy Salazar MD/PhD  [facility] - Physical Medicine & Rehabilitation   [facility] - Brain Injury Medicine    ____________________________________

## 2024-07-25 NOTE — PROGRESS NOTES
NURSING DAILY NOTE    Name: Pj Freeman   Date of Admission: 7/17/2024   Admitting Diagnosis: Acute CVA (cerebrovascular accident) ([facility])  Attending Physician: Dilia Salazar M.d.  Allergies: Patient has no known allergies.    Safety  Patient Assist  min-mod  Patient Precautions  Fall Risk, Non Weight Bearing Left Lower Extremity  Precaution Comments  left transmetatarsal amputation  Bed Transfer Status  Minimal Assist  Toilet Transfer Status   Moderate Assist (mod assist using grab bar  for stand pivot   and mod to min assist using  FWW     performed 3 times   mod cues for technique  ability to NWB   left LE improved with each  attempt)  Assistive Devices  Wheelchair, Rails  Oxygen  None - Room Air  Diet/Therapeutic Dining  Current Diet Order   Procedures    Diet Order Diet: Low Fiber(GI Soft); Second Modifier: (optional): Consistent CHO (Diabetic)     Pill Administration  whole  Agitated Behavioral Scale     ABS Level of Severity       Fall Risk  Has the patient had a fall this admission?   No  Taylor Gallego Fall Risk Scoring  15, HIGH RISK  Fall Risk Safety Measures  bed alarm and chair alarm    Vitals  Temperature: 36.5 °C (97.7 °F)  Temp src: Oral  Pulse: 86  Respiration: 18  Blood Pressure : 120/76  Blood Pressure MAP (Calculated): 91 MM HG  BP Location: Left, Upper Arm  Patient BP Position: Sitting     Oxygen  Pulse Oximetry: 97 %  O2 (LPM): 0  O2 Delivery Device: None - Room Air    Bowel and Bladder  Last Bowel Movement  07/24/24  Stool Type  Type 4: Like a sausage or snake, smooth and soft  Bowel Device  Bathroom  Continent  Bladder: Continent void   Bowel: Continent movement  Bladder Function  Urine Void (mL): 300 ml  Number of Times Voided: 1  Urine Color: Unable To Evaluate  Genitourinary Assessment   Bladder Assessment (WDL):  WDL Except  [place] Catheter: Not Applicable  Urine Color: Unable To Evaluate  Bladder Device: Urinal  Time  Void: Yes  Bladder Scan: Post Void  $ Bladder Scan Results (mL): 155  Bladder Medications: No    Skin  Rocky Score   17  Sensory Interventions   Bed Types: Standard/Trauma Mattress  Skin Preventative Measures: Pillows in Use for Support / Positioning  Moisture Interventions  Moisturizers/Barriers: Barrier Wipes      Pain  Pain Rating Scale  2 - Notice Pain, does not interfere with activities  Pain Location  Foot  Pain Location Orientation  Left  Pain Interventions   Declines    ADLs    Bathing   Staff, Shower  Linen Change      Personal Hygiene  Perineal Care, Samia Bottle, Change Samia Pads, Moist Samia Wipes  Chlorhexidine Bath      Oral Care  Brushed Teeth  Teeth/Dentures     Shave     Nutrition Percentage Eaten  Lunch, Between % Consumed  Environmental Precautions  Treaded Slipper Socks on Patient, Personal Belongings, Wastebasket, Call Bell etc. in Easy Reach, Transferred to Stronger Side, Report Given to Other Health Care Providers Regarding Fall Risk, Bed in Low Position  Patient Turns/Positioning  Patient Turns Self from Side to Side  Patient Turns Assistance/Tolerance     Bed Positions  Bed Controls On  Head of Bed Elevated  Self regulated      Psychosocial/Neurologic Assessment  Psychosocial Assessment  Psychosocial (WDL):  WDL Except  Patient Behaviors: Fatigue  Family Behaviors: No Family Present  Neurologic Assessment  Neuro (WDL): Within Defined Limits  Level of Consciousness: Alert  Orientation Level: Oriented X4  Cognition: Follows commands  Speech: Clear  EENT (WDL):  WDL Except    Cardio/Pulmonary Assessment  Edema   RLE Edema: 2+, Pitting  LLE Edema: 2+, Pitting  Respiratory Breath Sounds     Cardiac Assessment   Cardiac (WDL):  WDL Except (hx htn, pvd, afib, mitral valve stenosis)

## 2024-07-25 NOTE — THERAPY
Physical Therapy   Daily Treatment     Patient Name: jP Freeman  Age:  69 y.o., Sex:  male  Medical Record #: [id number]  Today's Date: 7/25/2024     Precautions  Precautions: (P) Fall Risk, Non Weight Bearing Left Lower Extremity  Comments: (P) left transmetatarsal amputation    Subjective    Patient reports having a lot of bowel movements today from being on stool softener      Objective       07/25/24 1431   PT Charge Group   PT Gait Training (Units) 1   PT Therapeutic Activities (Units) 3   PT Total Time Spent   PT Individual Total Time Spent (Mins) 60   Precautions   Precautions Fall Risk;Non Weight Bearing Left Lower Extremity   Comments left transmetatarsal amputation   Gait Functional Level of Assist    Gait Level Of Assist Contact Guard Assist   Assistive Device Front Wheel Walker   Distance (Feet) 10   # of Times Distance was Traveled 1   Wheelchair Functional Level of Assist   Wheelchair Assist Supervised   Distance Wheelchair (Feet or Distance) 200   Transfer Functional Level of Assist   Bed, Chair, Wheelchair Transfer Standby Assist   Bed Chair Wheelchair Transfer Description Slideboard transfer from bed to wheelchair;Verbal cueing   Bed Mobility    Sit to Stand Minimal Assist   Scooting Supervised   Interdisciplinary Plan of Care Collaboration   IDT Collaboration with  Certified Nursing Assistant   Patient Position at End of Therapy Seated;Chair Alarm On;Self Releasing Lap Belt Applied;Call Light within Reach;Tray Table within Reach   Collaboration Comments incontinence to bowel   PT DME Recommendations   Additional Equipment Other (Comments)  (slideboard)   Physical Therapist Assigned   Assigned PT / Treatment Time / Comments Jake 30/60     Upon entry to room, strong odor of feces - yet RN reports he had just been cleaned up after multiple large BM's. Patient reports that he did not have to go to bathroom again. Brought to gym for transfer training and once we performed a slideboard transfer -  "there was obvious feces transferring onto the slideboard and table. Sanitized the area and then brought patient to bathroom. Had feces throughout inside of pants and brief  - patient unaware. Continued to have BM. Practiced sit to stand and toilet transfer several times for cleaning purposes  Practiced sit to stand and slideboard transfers once cleaned up  Practiced laundry folding in standing with NWB left LE    Assessment    Patient is at risk of further medical decline/functional decline due to his incontinence to bowel and continued weakness that still requires assist for transfers. Does well with slideboard transfers - but needs consistent verbal cues. Sit to stand with FWW from taller surfaces is CGA - min-mod from standard height chair    Strengths: Able to follow instructions, Alert and oriented, Independent prior level of function, Pleasant and cooperative, Willingly participates in therapeutic activities, Motivated for self care and independence  Barriers: Fatigue, Difficulty following instructions, Decreased endurance, Impaired activity tolerance, Limited mobility (limited ability to maintain weightbearing precaution and tends to lose focus/becomes distracted)    Plan    Continue with emphasis on slideboard/lateral scoot vs stand pivot transfers, right LE strengthening, car transfers     DME  PT DME Recommendations  Wheelchair: 18\" Width (rental would be fine)  Cushion: Standard  Additional Equipment: (P) Other (Comments) (slideboard)    Passport items to be completed:  Get in/out of bed safely, in/out of a vehicle, safely use mobility device, walk or wheel around home/community, navigate up and down stairs, show how to get up/down from the ground, ensure home is accessible, demonstrate HEP, complete caregiver training    Physical Therapy Problems (Active)       Problem: Mobility       Dates: Start:  07/18/24         Goal: STG-Within one week, patient will ambulate 50 feet with kneeling scooter SBA       " Dates: Start:  07/18/24            Goal: STG-Within one week, patient will ambulate up/down a curb with FWW CGA       Dates: Start:  07/18/24               Problem: Mobility Transfers       Dates: Start:  07/18/24         Goal: STG-Within one week, patient will sit to stand with CGA       Dates: Start:  07/18/24               Problem: PT-Long Term Goals       Dates: Start:  07/18/24         Goal: LTG-By discharge, patient will ambulate with kneeling scooter >150 feet supervised       Dates: Start:  07/18/24            Goal: LTG-By discharge, patient will transfer one surface to another supervised        Dates: Start:  07/18/24            Goal: LTG-By discharge, patient will ambulate up/down 4-6 stairs with single railing supervised       Dates: Start:  07/18/24            Goal: LTG-By discharge, patient will transfer in/out of a car supervised       Dates: Start:  07/18/24

## 2024-07-25 NOTE — PROGRESS NOTES
NURSING DAILY NOTE    Name: Pj Freeman   Date of Admission: 7/17/2024   Admitting Diagnosis: Acute CVA (cerebrovascular accident) ([facility])  Attending Physician: Dilia Salazar M.d.  Allergies: Patient has no known allergies.    Safety  Patient Assist  min-mod  Patient Precautions  Fall Risk, Non Weight Bearing Left Lower Extremity  Precaution Comments  left transmetatarsal amputation  Bed Transfer Status  Minimal Assist  Toilet Transfer Status   Moderate Assist (mod assist using grab bar  for stand pivot   and mod to min assist using  FWW     performed 3 times   mod cues for technique  ability to NWB   left LE improved with each  attempt)  Assistive Devices  Rails  Oxygen  None - Room Air  Diet/Therapeutic Dining  Current Diet Order   Procedures    Diet Order Diet: Low Fiber(GI Soft); Second Modifier: (optional): Consistent CHO (Diabetic)     Pill Administration  whole and one at a time   Agitated Behavioral Scale     ABS Level of Severity       Fall Risk  Has the patient had a fall this admission?   No  Taylor Gallego Fall Risk Scoring  15, HIGH RISK  Fall Risk Safety Measures  bed alarm, chair alarm, and poor balance    Vitals  Temperature: 36.5 °C (97.7 °F)  Temp src: Oral  Pulse: 86  Respiration: 18  Blood Pressure : 120/76  Blood Pressure MAP (Calculated): 91 MM HG  BP Location: Left, Upper Arm  Patient BP Position: Sitting     Oxygen  Pulse Oximetry: 97 %  O2 (LPM): 0  O2 Delivery Device: None - Room Air    Bowel and Bladder  Last Bowel Movement  07/24/24  Stool Type  Type 5: Soft blob with clear cut edges (passed easily)  Bowel Device  Bathroom  Continent  Bladder: Continent void   Bowel: Continent movement  Bladder Function  Urine Void (mL): 300 ml  Number of Times Voided: 1  Urine Color: Yellow  Genitourinary Assessment   Bladder Assessment (WDL):  WDL Except  [place] Catheter: Not Applicable  Urine Color: Yellow  Bladder Device:  Urinal  Time Void: Yes  Bladder Scan: Post Void  $ Bladder Scan Results (mL): 155  Bladder Medications: No    Skin  Rocky Score   17  Sensory Interventions   Bed Types: Standard/Trauma Mattress  Skin Preventative Measures: Pillows in Use for Support / Positioning  Moisture Interventions  Moisturizers/Barriers: Barrier Wipes      Pain  Pain Rating Scale  0 - No Pain  Pain Location  Foot  Pain Location Orientation  Left  Pain Interventions   Declines    ADLs    Bathing   Staff, Shower  Linen Change      Personal Hygiene  Change Samia Pads, Perineal Care, Moist Samia Wipes  Chlorhexidine Bath      Oral Care  Brushed Teeth  Teeth/Dentures     Shave     Nutrition Percentage Eaten  Lunch, Between % Consumed  Environmental Precautions  Treaded Slipper Socks on Patient, Bed in Low Position  Patient Turns/Positioning  Patient Turns Self from Side to Side  Patient Turns Assistance/Tolerance     Bed Positions  Bed Controls On, Bed Locked  Head of Bed Elevated  Self regulated      Psychosocial/Neurologic Assessment  Psychosocial Assessment  Psychosocial (WDL):  WDL Except  Patient Behaviors: Fatigue  Family Behaviors: No Family Present  Neurologic Assessment  Neuro (WDL): Within Defined Limits  Level of Consciousness: Alert  Orientation Level: Oriented X4  Cognition: Follows commands  Speech: Clear  EENT (WDL):  WDL Except    Cardio/Pulmonary Assessment  Edema   RLE Edema: 2+, Pitting  LLE Edema: 2+, Pitting  Respiratory Breath Sounds     Cardiac Assessment   Cardiac (WDL):  WDL Except (hx htn, pvd, afib, mitral valve stenosis)

## 2024-07-25 NOTE — CARE PLAN
"  Problem: Fall Risk - Rehab  Goal: Patient will remain free from falls  Outcome: Progressing  Note: Taylor Julián Fall risk Assessment Score: 15    High fall risk Interventions   - Bed and strip alarm   - Yellow sign by the door   - Yellow wrist band \"Fall risk\"  - Room near to the nurse station  - Do not leave patient unattended in the bathroom  - Fall risk education provided     Problem: Pain - Standard  Goal: Alleviation of pain or a reduction in pain to the patient’s comfort goal  Outcome: Progressing  Note: Assessed for pain and discomfort , left foot dressing intact with moon boots on., NWB on the left leg, pain under control, on pain mgt. Needs anticipated and attended.     Problem: Diabetes Management  Goal: Patient's ability to maintain appropriate glucose levels will be maintained or improve  Outcome: Progressing  Note: Finger stick monitored hs- 152, due coverage given, snacks provided , no s/s of hypo and hyperglycemia noted.   The patient is Stable - Low risk of patient condition declining or worsening    Shift Goals  Clinical Goals: Safety  Patient Goals: Rest  Family Goals: no family present    Progress made toward(s) clinical / shift goals:  Pt free from fall and injury.      "

## 2024-07-25 NOTE — THERAPY
"Occupational Therapy  Daily Treatment     Patient Name: Pj Freeman  Age:  69 y.o., Sex:  male  Medical Record #: [id number]  Today's Date: 7/25/2024     Precautions  Precautions: (P) Fall Risk, Non Weight Bearing Left Lower Extremity  Comments: (P) left transmetatarsal amputation         Subjective    \" I   thought I just had to fart. But that's not what it was.\"     Objective       07/25/24 0701   OT Charge Group   OT Self Care / ADL (Units) 5   OT Total Time Spent   OT Individual Total Time Spent (Mins) 75   Precautions   Precautions Fall Risk;Non Weight Bearing Left Lower Extremity   Comments left transmetatarsal amputation   Functional Level of Assist   Eating Independent   Grooming Independent   Bathing Minimal Assist  (Therapist waterproof   IV site  and left foot.   mod assist for sit tostand in shower  and assist to wash dry buttocks primarily due to  bowel accident  once standing  demonstrated good endurance    stand to sit  CGA)   Upper Body Dressing Supervision  (setup)   Lower Body Dressing Minimal Assist  (socks and right shoe  setup pull up brief and pants.  Mod assist for sit to stand    standing and pulling up with CGA)   Bed, Chair, Wheelchair Transfer Minimal Assist   Tub / Shower Transfers Minimal Assist  (fair  NWB  left LE   sit lateral scoot and  partial squat today)   Interdisciplinary Plan of Care Collaboration   Patient Position at End of Therapy Seated;Chair Alarm On;Call Light within Reach;Tray Table within Reach   Eating   Assistance Needed Independent   Physical Assistance Level No physical assistance   CARE Score - Eating 6   Oral Hygiene   Assistance Needed Independent   Physical Assistance Level No physical assistance   CARE Score - Oral Hygiene 6   Shower/Bathe Self   Assistance Needed Physical assistance   Physical Assistance Level 25% or less   CARE Score - Shower/Bathe Self 3   Upper Body Dressing   Assistance Needed Set-up / clean-up   Physical Assistance Level No physical " assistance   CARE Score - Upper Body Dressing 5   Lower Body Dressing   Assistance Needed Physical assistance   Physical Assistance Level 25% or less   CARE Score - Lower Body Dressing 3   Putting On/Taking Off Footwear   Assistance Needed Set-up / clean-up   Physical Assistance Level No physical assistance   CARE Score - Putting On/Taking Off Footwear 5   Toileting Hygiene   Assistance Needed Physical assistance   Physical Assistance Level 51%-75%   CARE Score - Toileting Hygiene 2   Toilet Transfer   Assistance Needed Physical assistance   Physical Assistance Level 26%-50%   CARE Score - Toilet Transfer 3         Assessment     Decreased strength/endurance   occasional cues for task attention   and difficulty with NWB  left foot  with  transitional  sit to stand  and squat pivot   are barriers to independence     Strengths: Able to follow instructions, Alert and oriented, Effective communication skills, Independent prior level of function, Motivated for self care and independence, Pleasant and cooperative, Supportive family, Willingly participates in therapeutic activities  Barriers: Decreased endurance, Generalized weakness, Impaired activity tolerance, Tube feeding, Fatigue    Plan    ADL IADL , related mobility and cognition strength/endurance building standing tolerance and balance activity all incorporating NWB left LE        DME  OT DME Recommendations  Bathroom Equipment: 3 in 1 Commode      Occupational Therapy Goals (Active)       Problem: Bathing       Dates: Start:  07/18/24         Goal: STG-Within one week, patient will bathe with SBA.       Dates: Start:  07/18/24         Goal Note filed on 07/23/24 0956 by Antolin Juarez C.O.TKYARA        Requires min assist     Limited by  decreased strength/endurance and  difficulty maintaining NWB  left LE  during standing and sit <-> stands   Continue goal                  Problem: Dressing       Dates: Start:  07/18/24         Goal: STG-Within one week, patient  will dress LB with Min A.       Dates: Start:  07/18/24         Goal Note filed on 07/23/24 0956 by NICKO Lisa.O.T.A.       Mod assist   Limited by  decreased strength/endurance and  difficulty maintaining NWB  left LE  during standing and sit <-> stands   Continue goal                  Problem: Functional Transfers       Dates: Start:  07/18/24         Goal: STG-Within one week, patient will transfer to toilet with Min A.       Dates: Start:  07/18/24         Goal Note filed on 07/23/24 0956 by NICKO Lisa.O.T.A.       Max assist   Limited by  decreased strength/endurance and  difficulty maintaining NWB  left LE  during standing and sit <-> stands   Continue goal               Goal: STG-Within one week, patient will transfer to tub/shower with Min A.       Dates: Start:  07/18/24         Goal Note filed on 07/23/24 0956 by NICKO Lisa.O.T.A.         Min assist max cues Limited by  decreased strength/endurance and  difficulty maintaining NWB  left LE  during standing and sit <-> stands  note   has been performing lateral scoot not stand pivot  to and from shower bench    Continue goal                  Problem: OT Long Term Goals       Dates: Start:  07/18/24         Goal: LTG-By discharge, patient will complete basic self care tasks at Mod Independent level.       Dates: Start:  07/18/24            Goal: LTG-By discharge, patient will perform bathroom transfers at Mod Independent level.       Dates: Start:  07/18/24               Problem: Toileting       Dates: Start:  07/18/24         Goal: STG-Within one week, patient will complete toileting tasks with Min A.       Dates: Start:  07/18/24         Goal Note filed on 07/23/24 0956 by Antolin Juarez C.O.T.A.       Max assist   Limited by  decreased strength/endurance and  difficulty maintaining NWB  left LE  during standing and sit <-> stands   Continue goal

## 2024-07-25 NOTE — THERAPY
Occupational Therapy  Daily Treatment     Patient Name: Pj Freeman  Age:  69 y.o., Sex:  male  Medical Record #: [id number]  Today's Date: 7/25/2024     Precautions  Precautions: Fall Risk, Non Weight Bearing Left Lower Extremity  Comments: left transmetatarsal amputation    Subjective    Pt seated in w/c upon arrival, agreeable to participate in OT.      Objective     07/25/24 0831   OT Charge Group   OT Therapeutic Exercise (Units) 2   OT Total Time Spent   OT Individual Total Time Spent (Mins) 30   Precautions   Precautions Fall Risk;Non Weight Bearing Left Lower Extremity   Comments left transmetatarsal amputation   Cognition    Level of Consciousness Alert   Sleep/Wake Cycle   Sleep & Rest Awake   Sitting Upper Body Exercises   Tricep Press 3 sets of 10;Bilateral;Weight (See Comments for lbs)  ([employer] fwd 30#)   Interdisciplinary Plan of Care Collaboration   Patient Position at End of Therapy Seated;Self Releasing Lap Belt Applied     Min to CGA for wc <> mat txfrs    Seated EOM UE therex: Pt completed 1 set x 10 R/LUE of the following - side arm raise, shoulder press, external shoulder rotation w/ 4# dumbbell and 1 set x 15 R/LUE bicep curls     Assessment    Pt completed UE therex to the best of his abilities w/ no c/o pain (fatigue only). Primarily limited by generalized weakness/endurance and NWB precautions.   Strengths: Able to follow instructions, Alert and oriented, Effective communication skills, Independent prior level of function, Motivated for self care and independence, Pleasant and cooperative, Supportive family, Willingly participates in therapeutic activities  Barriers: Decreased endurance, Generalized weakness, Impaired activity tolerance, Tube feeding, Fatigue    Plan    ADL IADL , related mobility and cognition strength/endurance building standing tolerance and balance activity all incorporating NWB left LE     DME  OT DME Recommendations  Bathroom Equipment: 3 in 1 Commode  Additional  Equipment: Other (Comments) (slideboard)    Passport items to be completed:  Perform bathroom transfers, complete dressing, complete feeding, get ready for the day, prepare a simple meal, participate in household tasks, adapt home for safety needs, demonstrate home exercise program, complete caregiver training     Occupational Therapy Goals (Active)       Problem: Bathing       Dates: Start:  07/18/24         Goal: STG-Within one week, patient will bathe with SBA.       Dates: Start:  07/18/24         Goal Note filed on 07/23/24 0956 by Antolin Juarez C.O.T.A.        Requires min assist     Limited by  decreased strength/endurance and  difficulty maintaining NWB  left LE  during standing and sit <-> stands   Continue goal                  Problem: Dressing       Dates: Start:  07/18/24         Goal: STG-Within one week, patient will dress LB with Min A.       Dates: Start:  07/18/24         Goal Note filed on 07/23/24 0956 by Antolin Juarez C.O.T.A.       Mod assist   Limited by  decreased strength/endurance and  difficulty maintaining NWB  left LE  during standing and sit <-> stands   Continue goal                  Problem: Functional Transfers       Dates: Start:  07/18/24         Goal: STG-Within one week, patient will transfer to toilet with Min A.       Dates: Start:  07/18/24         Goal Note filed on 07/23/24 0956 by Antolin Juarez, C.O.T.A.       Max assist   Limited by  decreased strength/endurance and  difficulty maintaining NWB  left LE  during standing and sit <-> stands   Continue goal               Goal: STG-Within one week, patient will transfer to tub/shower with Min A.       Dates: Start:  07/18/24         Goal Note filed on 07/23/24 0956 by Antolin Juarez, C.O.T.A.         Min assist max cues Limited by  decreased strength/endurance and  difficulty maintaining NWB  left LE  during standing and sit <-> stands  note   has been performing lateral scoot not stand pivot  to and from shower bench     Continue goal                  Problem: OT Long Term Goals       Dates: Start:  07/18/24         Goal: LTG-By discharge, patient will complete basic self care tasks at Mod Independent level.       Dates: Start:  07/18/24            Goal: LTG-By discharge, patient will perform bathroom transfers at Mod Independent level.       Dates: Start:  07/18/24               Problem: Toileting       Dates: Start:  07/18/24         Goal: STG-Within one week, patient will complete toileting tasks with Min A.       Dates: Start:  07/18/24         Goal Note filed on 07/23/24 0956 by Antolin Juarez C.O.T.A.       Max assist   Limited by  decreased strength/endurance and  difficulty maintaining NWB  left LE  during standing and sit <-> stands   Continue goal

## 2024-07-25 NOTE — PROGRESS NOTES
NURSING DAILY NOTE    Name: Pj Freeman   Date of Admission: 7/17/2024   Admitting Diagnosis: Acute CVA (cerebrovascular accident) ([facility])  Attending Physician: Dilia Salazar M.d.  Allergies: Patient has no known allergies.    Safety  Patient Assist  MIN  Patient Precautions  Fall Risk, Non Weight Bearing Left Lower Extremity  Precaution Comments  left transmetatarsal amputation  Bed Transfer Status  Standby Assist  Toilet Transfer Status   Moderate Assist (mod assist using grab bar  for stand pivot   and mod to min assist using  FWW     performed 3 times   mod cues for technique  ability to NWB   left LE improved with each  attempt)  Assistive Devices  Wheelchair, Rails  Oxygen  None - Room Air  Diet/Therapeutic Dining  Current Diet Order   Procedures    Diet Order Diet: Low Fiber(GI Soft); Second Modifier: (optional): Consistent CHO (Diabetic)     Pill Administration  whole  Agitated Behavioral Scale     ABS Level of Severity       Fall Risk  Has the patient had a fall this admission?   No  Taylor Gallego Fall Risk Scoring  15, HIGH RISK  Fall Risk Safety Measures  bed alarm, chair alarm, poor balance, and low vision/ hearing    Vitals  Temperature: 36.5 °C (97.7 °F)  Temp src: Oral  Pulse: 80  Respiration: 18  Blood Pressure : 134/69  Blood Pressure MAP (Calculated): 91 MM HG  BP Location: Left, Upper Arm  Patient BP Position: Sitting     Oxygen  Pulse Oximetry: 95 %  O2 (LPM): 2  O2 Delivery Device: None - Room Air    Bowel and Bladder  Last Bowel Movement  07/25/24  Stool Type  Type 6: Fluffy pieces with ragged edges, a mushy stool  Bowel Device  Bathroom, Diaper  Continent  Bladder: Continent void   Bowel: Continent movement  Bladder Function  Urine Void (mL): 300 ml  Number of Times Voided: 1  Urine Color: Yellow  Genitourinary Assessment   Bladder Assessment (WDL):  WDL Except  Corodva Catheter: Not Applicable  Urine Color: Yellow  Bladder  Device: Urinal, Bathroom  Time Void: Yes  Bladder Scan: Post Void  $ Bladder Scan Results (mL): 155  Bladder Medications: No    Skin  Rocky Score   17  Sensory Interventions   Bed Types: Standard/Trauma Mattress with Overlay  Skin Preventative Measures: Pillows in Use for Support / Positioning, Seat Cushion in Use on Chair when Out of Bed  Moisture Interventions  Moisturizers/Barriers: Barrier Wipes      Pain  Pain Rating Scale  0 - No Pain  Pain Location  Foot  Pain Location Orientation  Left  Pain Interventions   Declines    ADLs    Bathing   Staff, Shower (OT Shower)  Linen Change      Personal Hygiene  Change Samia Pads, Perineal Care, Moist Samia Wipes  Chlorhexidine Bath      Oral Care  Brushed Teeth  Teeth/Dentures     Shave     Nutrition Percentage Eaten  Lunch, Between % Consumed  Environmental Precautions  Treaded Slipper Socks on Patient, Bed in Low Position  Patient Turns/Positioning  Patient Turns Self from Side to Side  Patient Turns Assistance/Tolerance     Bed Positions  Bed Controls On, Bed Locked  Head of Bed Elevated  Self regulated      Psychosocial/Neurologic Assessment  Psychosocial Assessment  Psychosocial (WDL):  WDL Except  Patient Behaviors: Fatigue  Family Behaviors: No Family Present  Neurologic Assessment  Neuro (WDL): Within Defined Limits  Level of Consciousness: Alert  Orientation Level: Oriented X4  Cognition: Follows commands  Speech: Clear  EENT (WDL):  WDL Except    Cardio/Pulmonary Assessment  Edema   RLE Edema: 2+, Pitting  LLE Edema: 2+, Pitting  Respiratory Breath Sounds     Cardiac Assessment   Cardiac (WDL):  WDL Except (hx htn, pvd, afib, mitral valve stenosis)

## 2024-07-25 NOTE — PROGRESS NOTES
Hospital Medicine Daily Progress Note      Chief Complaint  Diabetes Mellitus    Interval Problem Update  Discussed about his BS elevated and have increased the Januvia dose.    Review of Systems  Review of Systems   Constitutional:  Negative for fever.   Eyes:  Negative for blurred vision.   Respiratory:  Negative for cough.    Cardiovascular:  Negative for chest pain.   Gastrointestinal:  Negative for diarrhea.   Musculoskeletal:  Negative for joint pain.   Neurological:  Negative for dizziness.   Psychiatric/Behavioral:  The patient is not nervous/anxious.         Physical Exam  Temp:  [36.5 °C (97.7 °F)-36.7 °C (98 °F)] 36.7 °C (98 °F)  Pulse:  [71-86] 85  Resp:  [18-20] 18  BP: (118-147)/(72-78) 147/78  SpO2:  [94 %-99 %] 94 %    Physical Exam  Vitals and nursing note reviewed.   Constitutional:       Appearance: He is not diaphoretic.   HENT:      Mouth/Throat:      Pharynx: No oropharyngeal exudate or posterior oropharyngeal erythema.   Eyes:      Extraocular Movements: Extraocular movements intact.   Neck:      Vascular: No carotid bruit.   Cardiovascular:      Rate and Rhythm: Normal rate and regular rhythm.      Heart sounds: No murmur heard.  Pulmonary:      Effort: Pulmonary effort is normal.      Breath sounds: Normal breath sounds. No stridor.   Abdominal:      General: Bowel sounds are normal.      Palpations: Abdomen is soft.   Musculoskeletal:      Right lower leg: Edema present.      Left lower leg: Edema present.   Skin:     General: Skin is warm and dry.      Findings: No rash.   Neurological:      Mental Status: He is alert and oriented to person, place, and time.   Psychiatric:         Mood and Affect: Mood normal.         Behavior: Behavior normal.         Fluids    Intake/Output Summary (Last 24 hours) at 7/25/2024 1049  Last data filed at 7/25/2024 0800  Gross per 24 hour   Intake 720 ml   Output 1000 ml   Net -280 ml       Laboratory  Recent Labs     07/23/24  0536   HEMOGLOBIN 8.1*    HEMATOCRIT 26.1*     Recent Labs     07/23/24  0536   POTASSIUM 3.8                   Assessment/Plan  * Acute CVA (cerebrovascular accident) ([facility])- (present on admission)  Assessment & Plan  Was on Plavix and Lipitor  Antiplatelet presently on hold d/t recent GIB    Vitamin D deficiency  Assessment & Plan  Vit D: 17  Cont supplements    A-fib ([facility])  Assessment & Plan  HR ok  Echo EF 60%, RVSP 80 mmHg, severe MS, small pericardial effusion, pleural effusion noted  BNP stable: 5594   CXR: mod right basilar opacity and suspected small pleural effusion  Cont Lasix  K+: 3.8 (7/23)  AC on hold 2nd to recent GIB  Ont to monitor    Type 2 diabetes mellitus ([facility])- (present on admission)  Assessment & Plan  Hba1c 5.4 (7/18)  BS: 149-174  Off Glargine  Cont Metformin  Cont Januvia --> will increase dose  Note: home meds include Metformin 1000 mg bid, Januvia 100 mg qd, Jardiance 25 mg qd, and Lantus 10 u qhs  Cont to monitor    Acute osteomyelitis of left foot ([facility])- (present on admission)  Assessment & Plan  S/P recent left TMA on 7/3/24 (at [facility])    NSTEMI (non-ST elevated myocardial infarction) ([facility])- (present on admission)  Assessment & Plan  Was on Plavix and Lipitor  Antiplatelet presently on hold d/t recent GIB    Acute blood loss anemia- (present on admission)  Assessment & Plan  H&H stable with Hb 8.1 (7/23)  H&H was wnl prior to adm  Had recent GIB  Fe 20 and Ferritin 67  Cont Fe supplements  Cont to monitor    Upper GI bleed- (present on admission)  Assessment & Plan  Pt presented with syncope  EGD showed a DU x 6 -- s/p clip x 1 for active bleeding  S/P PRBC x 4 units  Cont PPI bid

## 2024-07-25 NOTE — CARE PLAN
The patient is Stable - Low risk of patient condition declining or worsening      Problem: Pain - Standard  Goal: Alleviation of pain or a reduction in pain to the patient’s comfort goal  Outcome: Progressing  Note: Patient able to perform regular activities this shift.  Pain control not needed  this shift.  Pain management includes PRN pain meds as well as non-pharmacological measures such as emotional support, rest, and repositioning.  Will continue to monitor.   Patient is not progressing towards the following goals:      Problem: IP BOWEL ELIMINATION  Goal: STG - patient will be accident free/content  Outcome: Not Progressing  Note: Patient has several incontinent episodes of bowel this shift. D/t  Pt was given bowel meds this shift.

## 2024-07-25 NOTE — THERAPY
"Recreational Therapy  Daily Treatment     Patient Name: Pj Freeman  AGE:  69 y.o., SEX:  male  Medical Record #: [id number]  Today's Date: 7/25/2024       Subjective    Patient said \"that was wonderful\" after the session.      Objective       07/25/24 0931   Procedural Tracking   Procedural Tracking Leisure Skills Development   Treatment Time   Total Time Spent (mins) 30   Functional Ability Status - Cognitive   Attention Span Remains on Task   Comprehension Follows Two Step Commands;Follows Three Step Commands   Judgment Able to Make Independent Decisions   Functional Ability Status - Emotional    Affect Appropriate   Mood Appropriate   Behavior Cooperative;Appropriate   Skilled Intervention    Skilled Intervention Relaxation / Coping Skills   Interdisciplinary Plan of Care Collaboration   IDT Collaboration with  Physician   Patient Position at End of Therapy Seated;Call Light within Reach   Collaboration Comments Physician visit during session   Strengths & Barriers   Strengths Alert and oriented;Independent prior level of function;Pleasant and cooperative;Willingly participates in therapeutic activities   Barriers Limited mobility;Generalized weakness;Decreased endurance     Patient water colored during the session. The plan was to work on standing balance but he was too weak to stand and when he tried his shoe started to slip, so we aborted the attempt. He said that he has gotten very weak since he has been in the hospital.     Assessment    Patient did not have one proper shoes for standing and was very weak, but did appear to enjoy painting.     Strengths: (P) Alert and oriented, Independent prior level of function, Pleasant and cooperative, Willingly participates in therapeutic activities  Barriers: (P) Limited mobility, Generalized weakness, Decreased endurance    Plan    Will make sure patient has proper shoes on so that he can work on standing tolerance next session.     Passport items to be " completed:  Verbalize two positive leisure activities, discuss returning to work, hobbies, community groups or volunteer activities, explore community resources

## 2024-07-25 NOTE — THERAPY
Speech Language Pathology  Daily Treatment     Patient Name: Pj Freeman  Age:  69 y.o., Sex:  male  Medical Record #: [id number]  Today's Date: 7/25/2024     Precautions  Precautions: Fall Risk, Non Weight Bearing Left Lower Extremity  Comments: left transmetatarsal amputation    Subjective    Pt pleasant and cooperative.     Objective       07/25/24 1103   Treatment Charges   SLP Cognitive Skill Development First 15 Minutes 1   SLP Cognitive Skill Development Additional 15 Minutes 1   SLP Total Time Spent   SLP Individual Total Time Spent (Mins) 30         Assessment    Pt presented with short story and target word THE, pt predicted that he located 100% of targets. Pt indep located 4/15 targets. Pt increased to 9/15 targets with additional review. Pt asked to keep activity for homework, SLP to assess completion in future ST session.    Strengths: Able to follow instructions, Alert and oriented, Effective communication skills, Pleasant and cooperative, Motivated for self care and independence, Supportive family, Willingly participates in therapeutic activities  Barriers: Impulsive, Impaired carryover of learning, Impaired functional cognition, Impaired balance (impaired attention.)    Plan    Cont to address attention skills      Speech Therapy Problems (Active)       Problem: Speech/Swallowing LTGs       Dates: Start:  07/18/24         Goal: LTG-By discharge, patient will solve complex problems and recall safety training with 90% acc with spv. for safe discharge home.       Dates: Start:  07/18/24

## 2024-07-26 ENCOUNTER — APPOINTMENT (OUTPATIENT)
Dept: PHYSICAL THERAPY | Facility: REHABILITATION | Age: 69
DRG: 056 | End: 2024-07-26
Attending: PHYSICAL MEDICINE & REHABILITATION
Payer: MEDICARE

## 2024-07-26 ENCOUNTER — APPOINTMENT (OUTPATIENT)
Dept: OCCUPATIONAL THERAPY | Facility: REHABILITATION | Age: 69
DRG: 056 | End: 2024-07-26
Attending: PHYSICAL MEDICINE & REHABILITATION
Payer: MEDICARE

## 2024-07-26 ENCOUNTER — APPOINTMENT (OUTPATIENT)
Dept: SPEECH THERAPY | Facility: REHABILITATION | Age: 69
DRG: 056 | End: 2024-07-26
Attending: PHYSICAL MEDICINE & REHABILITATION
Payer: MEDICARE

## 2024-07-26 LAB
GLUCOSE BLD STRIP.AUTO-MCNC: 144 MG/DL (ref 65–99)
GLUCOSE BLD STRIP.AUTO-MCNC: 151 MG/DL (ref 65–99)
GLUCOSE BLD STRIP.AUTO-MCNC: 181 MG/DL (ref 65–99)
GLUCOSE BLD STRIP.AUTO-MCNC: 183 MG/DL (ref 65–99)
HEMOCCULT STL QL: NEGATIVE

## 2024-07-26 PROCEDURE — 97129 THER IVNTJ 1ST 15 MIN: CPT

## 2024-07-26 PROCEDURE — 97110 THERAPEUTIC EXERCISES: CPT

## 2024-07-26 PROCEDURE — 700102 HCHG RX REV CODE 250 W/ 637 OVERRIDE(OP): Performed by: HOSPITALIST

## 2024-07-26 PROCEDURE — 97116 GAIT TRAINING THERAPY: CPT

## 2024-07-26 PROCEDURE — 700102 HCHG RX REV CODE 250 W/ 637 OVERRIDE(OP): Performed by: PHYSICAL MEDICINE & REHABILITATION

## 2024-07-26 PROCEDURE — A9270 NON-COVERED ITEM OR SERVICE: HCPCS | Performed by: HOSPITALIST

## 2024-07-26 PROCEDURE — 770010 HCHG ROOM/CARE - REHAB SEMI PRIVAT*

## 2024-07-26 PROCEDURE — 97130 THER IVNTJ EA ADDL 15 MIN: CPT

## 2024-07-26 PROCEDURE — 97535 SELF CARE MNGMENT TRAINING: CPT | Mod: CO

## 2024-07-26 PROCEDURE — 97110 THERAPEUTIC EXERCISES: CPT | Mod: CO

## 2024-07-26 PROCEDURE — 82272 OCCULT BLD FECES 1-3 TESTS: CPT

## 2024-07-26 PROCEDURE — 99231 SBSQ HOSP IP/OBS SF/LOW 25: CPT | Performed by: HOSPITALIST

## 2024-07-26 PROCEDURE — 82962 GLUCOSE BLOOD TEST: CPT | Mod: 91

## 2024-07-26 PROCEDURE — 97530 THERAPEUTIC ACTIVITIES: CPT

## 2024-07-26 PROCEDURE — A9270 NON-COVERED ITEM OR SERVICE: HCPCS | Performed by: PHYSICAL MEDICINE & REHABILITATION

## 2024-07-26 RX ADMIN — METFORMIN HYDROCHLORIDE 1000 MG: 500 TABLET ORAL at 16:56

## 2024-07-26 RX ADMIN — AMIODARONE HYDROCHLORIDE 400 MG: 200 TABLET ORAL at 05:36

## 2024-07-26 RX ADMIN — CALCIUM CARBONATE (ANTACID) CHEW TAB 500 MG 1000 MG: 500 CHEW TAB at 16:56

## 2024-07-26 RX ADMIN — INSULIN LISPRO 2 UNITS: 100 INJECTION, SOLUTION INTRAVENOUS; SUBCUTANEOUS at 16:59

## 2024-07-26 RX ADMIN — FUROSEMIDE 20 MG: 20 TABLET ORAL at 08:19

## 2024-07-26 RX ADMIN — POTASSIUM CHLORIDE 10 MEQ: 1500 TABLET, EXTENDED RELEASE ORAL at 08:19

## 2024-07-26 RX ADMIN — INSULIN LISPRO 2 UNITS: 100 INJECTION, SOLUTION INTRAVENOUS; SUBCUTANEOUS at 22:06

## 2024-07-26 RX ADMIN — FERROUS SULFATE TAB 325 MG (65 MG ELEMENTAL FE) 325 MG: 325 (65 FE) TAB at 16:56

## 2024-07-26 RX ADMIN — FERROUS SULFATE TAB 325 MG (65 MG ELEMENTAL FE) 325 MG: 325 (65 FE) TAB at 08:19

## 2024-07-26 RX ADMIN — AMIODARONE HYDROCHLORIDE 400 MG: 200 TABLET ORAL at 16:56

## 2024-07-26 RX ADMIN — Medication 2000 UNITS: at 08:19

## 2024-07-26 RX ADMIN — OMEPRAZOLE 40 MG: 20 CAPSULE, DELAYED RELEASE ORAL at 22:01

## 2024-07-26 RX ADMIN — ATORVASTATIN CALCIUM 40 MG: 40 TABLET, FILM COATED ORAL at 22:01

## 2024-07-26 RX ADMIN — SITAGLIPTIN 50 MG: 50 TABLET, FILM COATED ORAL at 08:19

## 2024-07-26 RX ADMIN — OMEPRAZOLE 40 MG: 20 CAPSULE, DELAYED RELEASE ORAL at 08:18

## 2024-07-26 RX ADMIN — INSULIN LISPRO 2 UNITS: 100 INJECTION, SOLUTION INTRAVENOUS; SUBCUTANEOUS at 08:14

## 2024-07-26 RX ADMIN — CALCIUM CARBONATE (ANTACID) CHEW TAB 500 MG 1000 MG: 500 CHEW TAB at 13:05

## 2024-07-26 RX ADMIN — SENNOSIDES AND DOCUSATE SODIUM 2 TABLET: 50; 8.6 TABLET ORAL at 22:01

## 2024-07-26 RX ADMIN — OXYCODONE HYDROCHLORIDE 5 MG: 5 TABLET ORAL at 22:18

## 2024-07-26 RX ADMIN — METFORMIN HYDROCHLORIDE 1000 MG: 500 TABLET ORAL at 05:37

## 2024-07-26 RX ADMIN — CALCIUM CARBONATE (ANTACID) CHEW TAB 500 MG 1000 MG: 500 CHEW TAB at 08:18

## 2024-07-26 ASSESSMENT — ENCOUNTER SYMPTOMS
DIARRHEA: 0
ABDOMINAL PAIN: 0
SHORTNESS OF BREATH: 0
CHILLS: 0
NAUSEA: 0
VOMITING: 0
FEVER: 0
NERVOUS/ANXIOUS: 0

## 2024-07-26 ASSESSMENT — GAIT ASSESSMENTS
ASSISTIVE DEVICE: FRONT WHEEL WALKER
GAIT LEVEL OF ASSIST: CONTACT GUARD ASSIST
DISTANCE (FEET): 10
DEVIATION: OTHER (COMMENT)

## 2024-07-26 ASSESSMENT — ACTIVITIES OF DAILY LIVING (ADL)
TOILET_TRANSFER_DESCRIPTION: GRAB BAR
BED_CHAIR_WHEELCHAIR_TRANSFER_DESCRIPTION: OTHER (COMMENT)
BED_CHAIR_WHEELCHAIR_TRANSFER_DESCRIPTION: SQUAT PIVOT TRANSFER TO WHEELCHAIR

## 2024-07-26 NOTE — PROGRESS NOTES
Hospital Medicine Daily Progress Note      Chief Complaint  Diabetes Mellitus    Interval Problem Update  No significant events overnight.    Review of Systems  Review of Systems   Constitutional:  Negative for chills and fever.   Respiratory:  Negative for shortness of breath.    Cardiovascular:  Negative for chest pain.   Gastrointestinal:  Negative for abdominal pain, diarrhea, nausea and vomiting.   Psychiatric/Behavioral:  The patient is not nervous/anxious.         Physical Exam  Temp:  [36.3 °C (97.4 °F)-36.6 °C (97.8 °F)] 36.3 °C (97.4 °F)  Pulse:  [74-78] 78  Resp:  [18] 18  BP: (136-156)/(73-84) 156/84  SpO2:  [92 %-97 %] 92 %    Physical Exam  Vitals and nursing note reviewed.   Constitutional:       Appearance: Normal appearance.   HENT:      Head: Atraumatic.   Eyes:      Conjunctiva/sclera: Conjunctivae normal.      Pupils: Pupils are equal, round, and reactive to light.   Cardiovascular:      Rate and Rhythm: Normal rate and regular rhythm.   Pulmonary:      Effort: Pulmonary effort is normal.      Breath sounds: Normal breath sounds.   Abdominal:      General: Bowel sounds are normal.      Palpations: Abdomen is soft.   Musculoskeletal:      Cervical back: Normal range of motion and neck supple.      Right lower leg: Edema present.      Left lower leg: Edema present.   Skin:     General: Skin is warm and dry.   Neurological:      Mental Status: He is alert and oriented to person, place, and time.   Psychiatric:         Mood and Affect: Mood normal.         Behavior: Behavior normal.         Fluids    Intake/Output Summary (Last 24 hours) at 7/26/2024 1056  Last data filed at 7/26/2024 0900  Gross per 24 hour   Intake 460 ml   Output 600 ml   Net -140 ml       Laboratory                            Assessment/Plan  * Acute CVA (cerebrovascular accident) (HCC)- (present on admission)  Assessment & Plan  Was on Plavix and Lipitor  Antiplatelet presently on hold d/t recent GIB    Vitamin D  deficiency  Assessment & Plan  Vit D: 17  Cont supplements    A-fib (HCC)  Assessment & Plan  HR ok  Echo EF 60%, RVSP 80 mmHg, severe MS, small pericardial effusion, pleural effusion noted  BNP stable: 5594   CXR: mod right basilar opacity and suspected small pleural effusion  Cont Lasix  K+: 3.8 (7/23)  AC on hold 2nd to recent GIB  Ont to monitor    Type 2 diabetes mellitus ([facility])- (present on admission)  Assessment & Plan  Hba1c 5.4 (7/18)  BS recently: 106-158  Off Glargine  Cont Metformin  Cont Januvia --> dose increased recently  Note: home meds include Metformin 1000 mg bid, Januvia 100 mg qd, Jardiance 25 mg qd, and Lantus 10 u qhs  Cont to monitor    Acute osteomyelitis of left foot ([facility])- (present on admission)  Assessment & Plan  S/P recent left TMA on 7/3/24 (at [facility])    NSTEMI (non-ST elevated myocardial infarction) ([facility])- (present on admission)  Assessment & Plan  Was on Plavix and Lipitor  Antiplatelet presently on hold d/t recent GIB    Acute blood loss anemia- (present on admission)  Assessment & Plan  H&H stable with Hb 8.1 (7/23)  H&H was wnl prior to adm  Had recent GIB  Fe 20 and Ferritin 67  Cont Fe supplements  Cont to monitor    Upper GI bleed- (present on admission)  Assessment & Plan  Pt presented with syncope  EGD showed a DU x 6 -- s/p clip x 1 for active bleeding  S/P PRBC x 4 units  Cont PPI bid

## 2024-07-26 NOTE — THERAPY
"Physical Therapy   Daily Treatment     Patient Name: Pj Freeman  Age:  69 y.o., Sex:  male  Medical Record #: [id number]  Today's Date: 7/26/2024     Precautions  Precautions: Fall Risk, Non Weight Bearing Left Lower Extremity  Comments: left transmetatarsal amputation    Subjective    Patient reports that he has spoken to Wife and she does not think that she will be able to care for him upon DC home     Objective       07/26/24 1401   PT Charge Group   PT Gait Training (Units) 1   PT Therapeutic Exercise (Units) 1   PT Therapeutic Activities (Units) 2   Supervising Physical Therapist Jake Smith   PT Total Time Spent   PT Individual Total Time Spent (Mins) 60   Precautions   Precautions Fall Risk;Non Weight Bearing Left Lower Extremity   Comments left transmetatarsal amputation   Gait Functional Level of Assist    Gait Level Of Assist Contact Guard Assist   Assistive Device Front Wheel Walker   Distance (Feet) 10   # of Times Distance was Traveled 3   Deviation Other (Comment)  (short hop length)   Wheelchair Functional Level of Assist   Wheelchair Assist Supervised   Distance Wheelchair (Feet or Distance) 200   Transfer Functional Level of Assist   Bed, Chair, Wheelchair Transfer Minimal Assist   Bed Chair Wheelchair Transfer Description Other (comment)  (car transfer)   Sitting Lower Body Exercises   Nustep Resistance Level 3;Time (See Comments)  (9 minutes)   Bed Mobility    Supine to Sit Supervised   Sit to Supine Supervised   Sit to Stand Minimal Assist   Scooting Supervised   Rolling Supervised   Interdisciplinary Plan of Care Collaboration   IDT Collaboration with     Patient Position at End of Therapy Seated;Chair Alarm On;Self Releasing Lap Belt Applied;Tray Table within Reach;Call Light within Reach   Collaboration Comments discussed DC, spouses concerns about care after rehab   PT DME Recommendations   Wheelchair 16\" Width   Cushion Standard   Physical Therapist Assigned   Assigned PT / " "Treatment Time / Comments Jake 30/60     Majority of session focused on technique training for NWB sit to stand and lateral scoot/slideboard transfers under a variety of circumstances including bed to chair and car transfer     Assessment    Patient demonstrates improvements in quality/ability of NWB transfers - however will still require minimal assist and supervision for verbal cues for safety of wheelchair management     Strengths: Able to follow instructions, Alert and oriented, Independent prior level of function, Pleasant and cooperative, Willingly participates in therapeutic activities, Motivated for self care and independence  Barriers: Fatigue, Difficulty following instructions, Decreased endurance, Impaired activity tolerance, Limited mobility (limited ability to maintain weightbearing precaution and tends to lose focus/becomes distracted)    Plan    Continue with emphasis on slideboard/lateral scoot vs stand pivot transfers, right LE strengthening, car transfers     DME  PT DME Recommendations  Wheelchair: (P) 16\" Width  Cushion: (P) Standard  Additional Equipment: Other (Comments) (slideboard)    Passport items to be completed:  Get in/out of bed safely, in/out of a vehicle, safely use mobility device, walk or wheel around home/community, navigate up and down stairs, show how to get up/down from the ground, ensure home is accessible, demonstrate HEP, complete caregiver training    Physical Therapy Problems (Active)       Problem: Mobility       Dates: Start:  07/18/24         Goal: STG-Within one week, patient will ambulate 50 feet with kneeling scooter SBA       Dates: Start:  07/18/24            Goal: STG-Within one week, patient will ambulate up/down a curb with FWW CGA       Dates: Start:  07/18/24               Problem: Mobility Transfers       Dates: Start:  07/18/24         Goal: STG-Within one week, patient will sit to stand with CGA       Dates: Start:  07/18/24               Problem: PT-Long " Term Goals       Dates: Start:  07/18/24         Goal: LTG-By discharge, patient will ambulate with kneeling scooter >150 feet supervised       Dates: Start:  07/18/24            Goal: LTG-By discharge, patient will transfer one surface to another supervised        Dates: Start:  07/18/24            Goal: LTG-By discharge, patient will ambulate up/down 4-6 stairs with single railing supervised       Dates: Start:  07/18/24            Goal: LTG-By discharge, patient will transfer in/out of a car supervised       Dates: Start:  07/18/24

## 2024-07-26 NOTE — PROGRESS NOTES
Received a text  message from the Lab, re; stool specimen collected by am shift was not labeled. To collect stool specimen , extra labels printed .

## 2024-07-26 NOTE — THERAPY
"Physical Therapy   Daily Treatment     Patient Name: Pj Freeman  Age:  69 y.o., Sex:  male  Medical Record #: [id number]  Today's Date: 7/26/2024     Precautions  Precautions: Fall Risk, Non Weight Bearing Left Lower Extremity  Comments: left transmetatarsal amputation    Subjective    \"I'd really like to use the NuStep.\" Pt in w/c at arrival, agreeable to PT tx.      Objective       07/26/24 0931   PT Charge Group   PT Therapeutic Exercise (Units) 2   PT Total Time Spent   PT Individual Total Time Spent (Mins) 30   Transfer Functional Level of Assist   Bed, Chair, Wheelchair Transfer Moderate Assist   Bed Chair Wheelchair Transfer Description Squat pivot transfer to wheelchair  (help for lifting and guiding pelvis, VC sequencing)   Sitting Lower Body Exercises   Nustep Resistance Level 4;Time (See Comments)  (BUE/BLE, cued for LLE to \"ride along\"; 1 x 20'00\", 650 steps)   Interdisciplinary Plan of Care Collaboration   Patient Position at End of Therapy Seated;Call Light within Reach;Tray Table within Reach;Phone within Reach   Physical Therapist Assigned   Assigned PT / Treatment Time / Comments Jake 30/60     NuStep for BUE/LE strength/ROM/coordination and CV endurance.   Transfer practice w/c<>NuStep c/ help at pelvis for hip clearance and efficient pivot to chair, VC for LLE placement to adhere to NWB prx.     Assessment    Pat with good tolerance to today's activity, continues to need assist for efficient squat pivot transfers.     Strengths: Able to follow instructions, Alert and oriented, Independent prior level of function, Pleasant and cooperative, Willingly participates in therapeutic activities, Motivated for self care and independence  Barriers: Fatigue, Difficulty following instructions, Decreased endurance, Impaired activity tolerance, Limited mobility (limited ability to maintain weightbearing precaution and tends to lose focus/becomes distracted)    Plan  Continue with emphasis on " "slideboard/lateral scoot vs stand pivot transfers, right LE strengthening, car transfers     DME  PT DME Recommendations  Wheelchair: 18\" Width (rental would be fine)  Cushion: Standard  Additional Equipment: Other (Comments) (slideboard)    Passport items to be completed:  Get in/out of bed safely, in/out of a vehicle, safely use mobility device, walk or wheel around home/community, navigate up and down stairs, show how to get up/down from the ground, ensure home is accessible, demonstrate HEP, complete caregiver training    Physical Therapy Problems (Active)       Problem: Mobility       Dates: Start:  07/18/24         Goal: STG-Within one week, patient will ambulate 50 feet with kneeling scooter SBA       Dates: Start:  07/18/24            Goal: STG-Within one week, patient will ambulate up/down a curb with FWW CGA       Dates: Start:  07/18/24               Problem: Mobility Transfers       Dates: Start:  07/18/24         Goal: STG-Within one week, patient will sit to stand with CGA       Dates: Start:  07/18/24               Problem: PT-Long Term Goals       Dates: Start:  07/18/24         Goal: LTG-By discharge, patient will ambulate with kneeling scooter >150 feet supervised       Dates: Start:  07/18/24            Goal: LTG-By discharge, patient will transfer one surface to another supervised        Dates: Start:  07/18/24            Goal: LTG-By discharge, patient will ambulate up/down 4-6 stairs with single railing supervised       Dates: Start:  07/18/24            Goal: LTG-By discharge, patient will transfer in/out of a car supervised       Dates: Start:  07/18/24              "

## 2024-07-26 NOTE — PROGRESS NOTES
NURSING DAILY NOTE    Name: Pj Freeman   Date of Admission: 7/17/2024   Admitting Diagnosis: Acute CVA (cerebrovascular accident) ([facility])  Attending Physician: Dilia Salazar M.d.  Allergies: Patient has no known allergies.    Safety  Patient Assist  Mod A  Patient Precautions  Fall Risk, Non Weight Bearing Left Lower Extremity  Precaution Comments  left transmetatarsal amputation  Bed Transfer Status  Standby Assist  Toilet Transfer Status   Moderate Assist (mod assist using grab bar  for stand pivot   and mod to min assist using  FWW     performed 3 times   mod cues for technique  ability to NWB   left LE improved with each  attempt)  Assistive Devices  Wheelchair, Rails  Oxygen  None - Room Air  Diet/Therapeutic Dining  Current Diet Order   Procedures    Diet Order Diet: Low Fiber(GI Soft); Second Modifier: (optional): Consistent CHO (Diabetic)     Pill Administration  whole  Agitated Behavioral Scale     ABS Level of Severity       Fall Risk  Has the patient had a fall this admission?   No  Taylor Gallego Fall Risk Scoring  15, HIGH RISK  Fall Risk Safety Measures  bed alarm, chair alarm, poor balance, and low vision/ hearing    Vitals  Temperature: 36.6 °C (97.8 °F)  Temp src: Oral  Pulse: 74  Respiration: 18  Blood Pressure : 136/73  Blood Pressure MAP (Calculated): 94 MM HG  BP Location: Left, Upper Arm  Patient BP Position: Sitting     Oxygen  Pulse Oximetry: 97 %  O2 (LPM): 2  O2 Delivery Device: None - Room Air    Bowel and Bladder  Last Bowel Movement  07/26/24  Stool Type  Type 5: Soft blob with clear cut edges (passed easily)  Bowel Device  Bathroom, Diaper  Continent  Bladder: Continent void   Bowel: Continent movement  Bladder Function  Urine Void (mL): 300 ml  Number of Times Voided: 1  Urine Color: Yellow  Genitourinary Assessment   Bladder Assessment (WDL):  WDL Except  [place] Catheter: Not Applicable  Urine Color: Yellow  Bladder  Device: Urinal, Bathroom  Time Void: Yes  Bladder Scan: Post Void  $ Bladder Scan Results (mL): 155  Bladder Medications: No    Skin  Rocky Score   17  Sensory Interventions   Bed Types: Standard/Trauma Mattress  Skin Preventative Measures: Pillows in Use for Support / Positioning  Moisture Interventions  Moisturizers/Barriers: Barrier Wipes      Pain  Pain Rating Scale  0 - No Pain  Pain Location  Foot  Pain Location Orientation  Left  Pain Interventions   Declines    ADLs    Bathing   Staff, Shower (OT Shower)  Linen Change      Personal Hygiene  Change Samia Pads, Perineal Care, Moist Samia Wipes  Chlorhexidine Bath      Oral Care  Brushed Teeth  Teeth/Dentures     Shave     Nutrition Percentage Eaten  Dinner, Between 25-50% Consumed  Environmental Precautions  Treaded Slipper Socks on Patient, Bed in Low Position  Patient Turns/Positioning  Patient Turns Self from Side to Side  Patient Turns Assistance/Tolerance     Bed Positions  Bed Controls On, Bed Locked  Head of Bed Elevated  Self regulated      Psychosocial/Neurologic Assessment  Psychosocial Assessment  Psychosocial (WDL):  WDL Except  Patient Behaviors: Fatigue  Family Behaviors: No Family Present  Neurologic Assessment  Neuro (WDL): Within Defined Limits  Level of Consciousness: Alert  Orientation Level: Oriented X4  Cognition: Follows commands  Speech: Clear  EENT (WDL):  WDL Except    Cardio/Pulmonary Assessment  Edema   RLE Edema: 2+, Pitting  LLE Edema: 2+, Pitting  Respiratory Breath Sounds     Cardiac Assessment   Cardiac (WDL):  WDL Except (hx htn, pvd, afib, mitral valve stenosis)

## 2024-07-26 NOTE — THERAPY
"Occupational Therapy  Daily Treatment     Patient Name: Pj Freeman  Age:  69 y.o., Sex:  male  Medical Record #: [id number]  Today's Date: 7/26/2024     Precautions  Precautions: (P) Fall Risk, Non Weight Bearing Left Lower Extremity  Comments: (P) left transmetatarsal amputation         Subjective    \" Oh  I  don't  \" when asked  if  at home he uses a  tub/shower or  walk in shower for bathing.     I reminded Pat he said he showers daily.   \" Well I clean up daily.   I was standing at the sink to wash up and wash my hair .\"  \" Kathy has a walk in but I never use that. \"     With further conversation  concluded he has   tub/shower he can use .     Educated  regarding  getting a tub bench  as this would be safest option for bathing. Pat also relayed that  the tub/shower had grab bars        Objective       07/26/24 0701   OT Charge Group   OT Self Care / ADL (Units) 4   OT Total Time Spent   OT Individual Total Time Spent (Mins) 60   Precautions   Precautions Fall Risk;Non Weight Bearing Left Lower Extremity   Comments left transmetatarsal amputation   Functional Level of Assist   Bathing Supervision  (setup UB sponge bathing)   Upper Body Dressing Supervision  (setup)   Lower Body Dressing Minimal Assist  (Dons  pants over feet  setup   min assist sit to stand to pull up with  poor to fair NWB with sit to stand.   CGA  to stand and pull up  dons  right sock and shoes  with setup)   Toileting Moderate Assist  (min assist  sit to stand for clothing mgmt   CGA   stand to sit   required assist with clean up post BM  due to  having had residual dried BM  in buttock fold from BM  he said he had at 0330)   Bed, Chair, Wheelchair Transfer Contact Guard Assist  (lateral scoot to w/c)   Toilet Transfers Moderate Assist  (mod assist for sit to stand with  poor to fair  NWB   left LE)   Toilet Transfer Description Grab bar   Interdisciplinary Plan of Care Collaboration   Patient Position at End of Therapy Seated;Chair Alarm " "On;Self Releasing Lap Belt Applied  (in dining room for breakfast)        Provided education regarding benefits of showering  in tub/shower  using tub bench    spouse will need to provide assist  with transfer and when standing .   \" Oh  she won't do that \" was his response.  Scheduling is reaching out to spouse to come for family training  prior to d/c.    Assessment     Continued difficulty with   NWB left LE  due to decreased strength/endurance   limit independence    Strengths: Able to follow instructions, Alert and oriented, Effective communication skills, Independent prior level of function, Motivated for self care and independence, Pleasant and cooperative, Supportive family, Willingly participates in therapeutic activities  Barriers: Decreased endurance, Generalized weakness, Impaired activity tolerance, Tube feeding, Fatigue    Plan    ADL IADL , related mobility and cognition strength/endurance building standing tolerance and balance activity all incorporating NWB left LE   family training  with spouse prior to d/c        DME  OT DME Recommendations  Bathroom Equipment: 3 in 1 Commode for over toilet      tub bench     Additional Equipment:   Occupational Therapy Goals (Active)       Problem: Bathing       Dates: Start:  07/18/24         Goal: STG-Within one week, patient will bathe with SBA.       Dates: Start:  07/18/24         Goal Note filed on 07/23/24 0956 by Antolin Juarez, C.O.T.A.        Requires min assist     Limited by  decreased strength/endurance and  difficulty maintaining NWB  left LE  during standing and sit <-> stands   Continue goal                  Problem: Dressing       Dates: Start:  07/18/24         Goal: STG-Within one week, patient will dress LB with Min A.       Dates: Start:  07/18/24         Goal Note filed on 07/23/24 0956 by Antolin Juarez, C.O.T.A.       Mod assist   Limited by  decreased strength/endurance and  difficulty maintaining NWB  left LE  during standing and sit " <-> stands   Continue goal                  Problem: Functional Transfers       Dates: Start:  07/18/24         Goal: STG-Within one week, patient will transfer to toilet with Min A.       Dates: Start:  07/18/24         Goal Note filed on 07/23/24 0956 by NICKO Lisa.O.T.A.       Max assist   Limited by  decreased strength/endurance and  difficulty maintaining NWB  left LE  during standing and sit <-> stands   Continue goal               Goal: STG-Within one week, patient will transfer to tub/shower with Min A.       Dates: Start:  07/18/24         Goal Note filed on 07/23/24 0956 by NICKO Lisa.O.T.A.         Min assist max cues Limited by  decreased strength/endurance and  difficulty maintaining NWB  left LE  during standing and sit <-> stands  note   has been performing lateral scoot not stand pivot  to and from shower bench    Continue goal                  Problem: OT Long Term Goals       Dates: Start:  07/18/24         Goal: LTG-By discharge, patient will complete basic self care tasks at Mod Independent level.       Dates: Start:  07/18/24            Goal: LTG-By discharge, patient will perform bathroom transfers at Mod Independent level.       Dates: Start:  07/18/24               Problem: Toileting       Dates: Start:  07/18/24         Goal: STG-Within one week, patient will complete toileting tasks with Min A.       Dates: Start:  07/18/24         Goal Note filed on 07/23/24 0956 by Antolin Juarez, C.O.T.A.       Max assist   Limited by  decreased strength/endurance and  difficulty maintaining NWB  left LE  during standing and sit <-> stands   Continue goal

## 2024-07-26 NOTE — CARE PLAN
"  Problem: Fall Risk - Rehab  Goal: Patient will remain free from falls  Outcome: Progressing  Note: Taylor Gallego Fall risk Assessment Score: 15    High fall risk Interventions   - Bed and strip alarm   - Yellow sign by the door   - Yellow wrist band \"Fall risk\"  - Room near to the nurse station  - Do not leave patient unattended in the bathroom  - Fall risk education provided      Problem: Diabetes Management  Goal: Patient's ability to maintain appropriate glucose levels will be maintained or improve  Outcome: Progressing  Note: F/S at HS- 106, no coverage needed. No s/s of hypo and hyperglycemia noted.Snacks provided.     Problem: Pain - Standard  Goal: Alleviation of pain or a reduction in pain to the patient’s comfort goal  Outcome: Progressing  Note: Assessed for pain  and discomfort , pain under control, needs anticipated and attended.   The patient is Stable - Low risk of patient condition declining or worsening    Shift Goals  Clinical Goals: Safety  Patient Goals: Rest  Family Goals: no family present    Progress made toward(s) clinical / shift goals:  Pt free from fall and injury.    "

## 2024-07-26 NOTE — PROGRESS NOTES
"  Physical Medicine & Rehabilitation Progress Note    Encounter Date: 7/26/2024    Chief Complaint: Decreased mobility, weakness    Interval Events (Subjective):  Patient sitting up in room. He reports he is doing well. Denies pain at rest. Denies NVD.     _____________________________________  Interdisciplinary Team Conference   Most recent IDT on 7/23/2024    Discharge Date/Disposition:  8/1/24  _____________________________________    Objective:  VITAL SIGNS: BP (!) 156/84   Pulse 78   Temp 36.3 °C (97.4 °F) (Oral)   Resp 18   Ht 1.803 m (5' 11\")   Wt 75.2 kg (165 lb 12.8 oz)   SpO2 92%   BMI 23.12 kg/m²   Gen: NAD  Psych: Mood and affect appropriate  CV: RRR, 0 edema  Resp: CTAB, no upper airway sounds  Abd: NTND  Neuro: AOx4, following commands 4/5 RUE    Laboratory Values:  Recent Results (from the past 72 hour(s))   POCT glucose device results    Collection Time: 07/23/24 11:21 AM   Result Value Ref Range    POC Glucose, Blood 201 (H) 65 - 99 mg/dL   POCT glucose device results    Collection Time: 07/23/24  5:27 PM   Result Value Ref Range    POC Glucose, Blood 132 (H) 65 - 99 mg/dL   POCT glucose device results    Collection Time: 07/23/24  8:31 PM   Result Value Ref Range    POC Glucose, Blood 199 (H) 65 - 99 mg/dL   POCT glucose device results    Collection Time: 07/24/24  8:07 AM   Result Value Ref Range    POC Glucose, Blood 174 (H) 65 - 99 mg/dL   POCT glucose device results    Collection Time: 07/24/24 11:34 AM   Result Value Ref Range    POC Glucose, Blood 149 (H) 65 - 99 mg/dL   POCT glucose device results    Collection Time: 07/24/24  5:15 PM   Result Value Ref Range    POC Glucose, Blood 173 (H) 65 - 99 mg/dL   POCT glucose device results    Collection Time: 07/24/24  8:39 PM   Result Value Ref Range    POC Glucose, Blood 152 (H) 65 - 99 mg/dL   POCT glucose device results    Collection Time: 07/25/24  7:27 AM   Result Value Ref Range    POC Glucose, Blood 164 (H) 65 - 99 mg/dL   POCT " glucose device results    Collection Time: 07/25/24 11:34 AM   Result Value Ref Range    POC Glucose, Blood 149 (H) 65 - 99 mg/dL   POCT glucose device results    Collection Time: 07/25/24  5:11 PM   Result Value Ref Range    POC Glucose, Blood 158 (H) 65 - 99 mg/dL   POCT glucose device results    Collection Time: 07/25/24  8:44 PM   Result Value Ref Range    POC Glucose, Blood 106 (H) 65 - 99 mg/dL   OCCULT BLOOD STOOL    Collection Time: 07/26/24  3:30 AM   Result Value Ref Range    Occult Blood Feces Negative Negative   POCT glucose device results    Collection Time: 07/26/24  7:33 AM   Result Value Ref Range    POC Glucose, Blood 151 (H) 65 - 99 mg/dL       Medications:  Scheduled Medications   Medication Dose Frequency    SITagliptin  50 mg DAILY    amiodarone  400 mg TWICE DAILY    insulin lispro  2-12 Units 4X/DAY ACHS    ferrous sulfate  325 mg BID WITH MEALS    potassium chloride SA  10 mEq DAILY    furosemide  20 mg DAILY    vitamin D3  2,000 Units DAILY    senna-docusate  2 Tablet BID    atorvastatin  40 mg Q EVENING    calcium carbonate  1,000 mg TID WITH MEALS    metFORMIN  1,000 mg BID    omeprazole  40 mg BID     PRN medications: [DISCONTINUED] insulin regular **AND** POC blood glucose manual result **AND** NOTIFY MD and PharmD **AND** Administer 20 grams of glucose (approximately 8 ounces of fruit juice) every 15 minutes PRN FSBG less than 70 mg/dL **AND** dextrose bolus, Respiratory Therapy Consult, hydrALAZINE, acetaminophen, senna-docusate **AND** polyethylene glycol/lytes, docusate sodium, magnesium hydroxide, carboxymethylcellulose, mag hydrox-al hydrox-simeth, ondansetron **OR** ondansetron, traZODone, sodium chloride, oxyCODONE immediate-release **OR** oxyCODONE immediate-release, bisacodyl    Diet:  Current Diet Order   Procedures    Diet Order Diet: Low Fiber(GI Soft); Second Modifier: (optional): Consistent CHO (Diabetic)       Medical Decision Making and Plan:  L CVA -Patient with L  parietal CVA in 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 Stroke Ppx has been held due to GI bleed  -PT and OT for mobility and ADLs. Per guidelines, 15 hours per week between PT, OT and/or SLP.  -Follow-up Neurology     HTN/CAD/Mitral stenosis/A fib - Patient on Amiodarone 400 mg BID, Lasix 20 mg daily. Consulted hospitalist. Amiodarone fell off MAR, did not miss dose, restart Amiodarone and discussed with hospitalist. HR stable, SBP elevated, continue Lasix 20 and Amiodarone 400 mg      MT amputation - Recent at OSH. NWB reportedly on LLE     HLD - Patient on Atorvastatin 40 mg daily     GI bleed - Check AM CBC. Continue PPI BID. Hgb 8.9, consult hospitalist     DM2 with hyperglycemia - Patient on Lantus 5 U and SSI. Previously on metformin and Jardiance. Consult hospitalist. Increased Lantus. Started on metformin. Improving sugars, lantus discontinued. Blood sugars 150s and below, continue Metformin 1000 mg BID, Januvia 50 mg (increased) and SSI.      Anemia - Check AM CBC - 8.9 on admission.      Hypokalemia - Check AM CMP - 3.8, will monitor. Repeat 3.1, started on 20 mEq. Repeat 3.8, improved.      Hypocalcemia - Check AM CMP - 7.6 on admission, will monitor     Pain - Patient on PRN Tylenol/Oxycodone     Skin - Patient at risk for skin breakdown due to debility in areas including sacrum, achilles, elbows and head in addition to other sites. Nursing to assess skin daily.      GI Ppx - Patient on Prilosec for GERD prophylaxis. Patient on Senna-docusate for constipation prophylaxis.      DVT Ppx - Patient not cleared for AC on transfer.   ___________________________________    T. Yordy Salazar MD/PhD  [facility]   [facility]   ____________________________________

## 2024-07-26 NOTE — THERAPY
Speech Language Pathology  Daily Treatment     Patient Name: Pj Freeman  Age:  69 y.o., Sex:  male  Medical Record #: [id number]  Today's Date: 7/26/2024     Precautions  Precautions: Fall Risk, Non Weight Bearing Left Lower Extremity  Comments: left transmetatarsal amputation    Subjective    Pt pleasant and cooperative, pt indep completed homework task as provided.      Objective       07/26/24 1303   Treatment Charges   SLP Cognitive Skill Development First 15 Minutes 1   SLP Cognitive Skill Development Additional 15 Minutes 1   SLP Total Time Spent   SLP Individual Total Time Spent (Mins) 30         Assessment    Pt presented with two tasks similar to hw activity, short story with target word OF and AND, pt completed activities with 100% accuracy indep with self implementation of strategies.     Strengths: Able to follow instructions, Alert and oriented, Effective communication skills, Pleasant and cooperative, Motivated for self care and independence, Supportive family, Willingly participates in therapeutic activities  Barriers: Impulsive, Impaired carryover of learning, Impaired functional cognition, Impaired balance (impaired attention.)    Plan    Cont to address complex attention skills    Speech Therapy Problems (Active)       Problem: Speech/Swallowing LTGs       Dates: Start:  07/18/24         Goal: LTG-By discharge, patient will solve complex problems and recall safety training with 90% acc with spv. for safe discharge home.       Dates: Start:  07/18/24

## 2024-07-26 NOTE — THERAPY
"Occupational Therapy  Daily Treatment     Patient Name: Pj Freeman  Age:  69 y.o., Sex:  male  Medical Record #: [id number]  Today's Date: 7/26/2024     Precautions  Precautions: (P) Fall Risk, Non Weight Bearing Left Lower Extremity  Comments: (P) left transmetatarsal amputation         Subjective    \" That feels great.\"  Referring to ther ex performed       Objective       07/26/24 1101   OT Charge Group   OT Therapeutic Exercise (Units) 2   OT Total Time Spent   OT Individual Total Time Spent (Mins) 30   Precautions   Precautions Fall Risk;Non Weight Bearing Left Lower Extremity   Comments left transmetatarsal amputation   Sitting Upper Body Exercises   Lat Pull 3 sets of 10;Bilateral  (25lbs)   Upper Extremity Bike Level 3 Resistance  (water level 3    hydrocycle  x 5 minutes)   Other Exercise rickhsaw   x 10 reps x 3 backed in  with 40lbs   Interdisciplinary Plan of Care Collaboration   Patient Position at End of Therapy Seated;Call Light within Reach;Tray Table within Reach         Assessment     Completed  tx no complaints   reported min to mod fatigue at end of session     Strengths: Able to follow instructions, Alert and oriented, Effective communication skills, Independent prior level of function, Motivated for self care and independence, Pleasant and cooperative, Supportive family, Willingly participates in therapeutic activities  Barriers: Decreased endurance, Generalized weakness, Impaired activity tolerance, Tube feeding, Fatigue    Plan    ADL IADL , related mobility and cognition strength/endurance building standing tolerance and balance activity all incorporating NWB left LE   family training  with spouse prior to d/c        DME  OT DME Recommendations  Bathroom Equipment: 3 in 1 Commode  Additional Equipment: Other (Comments) (slideboard)        Occupational Therapy Goals (Active)       Problem: Bathing       Dates: Start:  07/18/24         Goal: STG-Within one week, patient will bathe with " SBA.       Dates: Start:  07/18/24         Goal Note filed on 07/23/24 0956 by Antolin Juarez C.O.T.A.        Requires min assist     Limited by  decreased strength/endurance and  difficulty maintaining NWB  left LE  during standing and sit <-> stands   Continue goal                  Problem: Dressing       Dates: Start:  07/18/24         Goal: STG-Within one week, patient will dress LB with Min A.       Dates: Start:  07/18/24         Goal Note filed on 07/23/24 0956 by Antolin Juarez C.O.T.A.       Mod assist   Limited by  decreased strength/endurance and  difficulty maintaining NWB  left LE  during standing and sit <-> stands   Continue goal                  Problem: Functional Transfers       Dates: Start:  07/18/24         Goal: STG-Within one week, patient will transfer to toilet with Min A.       Dates: Start:  07/18/24         Goal Note filed on 07/23/24 0956 by Antolin Juarez C.O.T.A.       Max assist   Limited by  decreased strength/endurance and  difficulty maintaining NWB  left LE  during standing and sit <-> stands   Continue goal               Goal: STG-Within one week, patient will transfer to tub/shower with Min A.       Dates: Start:  07/18/24         Goal Note filed on 07/23/24 0956 by Antolin Juarez C.O.T.A.         Min assist max cues Limited by  decreased strength/endurance and  difficulty maintaining NWB  left LE  during standing and sit <-> stands  note   has been performing lateral scoot not stand pivot  to and from shower bench    Continue goal                  Problem: OT Long Term Goals       Dates: Start:  07/18/24         Goal: LTG-By discharge, patient will complete basic self care tasks at Mod Independent level.       Dates: Start:  07/18/24            Goal: LTG-By discharge, patient will perform bathroom transfers at Mod Independent level.       Dates: Start:  07/18/24               Problem: Toileting       Dates: Start:  07/18/24         Goal: STG-Within one week, patient will  complete toileting tasks with Min A.       Dates: Start:  07/18/24         Goal Note filed on 07/23/24 0956 by BASSAM LisaTKYARA       Max assist   Limited by  decreased strength/endurance and  difficulty maintaining NWB  left LE  during standing and sit <-> stands   Continue goal

## 2024-07-27 LAB
ERYTHROCYTE [DISTWIDTH] IN BLOOD BY AUTOMATED COUNT: 50.4 FL (ref 35.9–50)
GLUCOSE BLD STRIP.AUTO-MCNC: 149 MG/DL (ref 65–99)
GLUCOSE BLD STRIP.AUTO-MCNC: 151 MG/DL (ref 65–99)
GLUCOSE BLD STRIP.AUTO-MCNC: 160 MG/DL (ref 65–99)
GLUCOSE BLD STRIP.AUTO-MCNC: 199 MG/DL (ref 65–99)
HCT VFR BLD AUTO: 29.1 % (ref 42–52)
HEMOCCULT STL QL: NEGATIVE
HGB BLD-MCNC: 8.9 G/DL (ref 14–18)
MCH RBC QN AUTO: 26.5 PG (ref 27–33)
MCHC RBC AUTO-ENTMCNC: 30.6 G/DL (ref 32.3–36.5)
MCV RBC AUTO: 86.6 FL (ref 81.4–97.8)
PLATELET # BLD AUTO: 461 K/UL (ref 164–446)
PMV BLD AUTO: 9.6 FL (ref 9–12.9)
POTASSIUM SERPL-SCNC: 4.2 MMOL/L (ref 3.6–5.5)
RBC # BLD AUTO: 3.36 M/UL (ref 4.7–6.1)
WBC # BLD AUTO: 9.7 K/UL (ref 4.8–10.8)

## 2024-07-27 PROCEDURE — A9270 NON-COVERED ITEM OR SERVICE: HCPCS | Performed by: PHYSICAL MEDICINE & REHABILITATION

## 2024-07-27 PROCEDURE — 99231 SBSQ HOSP IP/OBS SF/LOW 25: CPT | Performed by: HOSPITALIST

## 2024-07-27 PROCEDURE — 82272 OCCULT BLD FECES 1-3 TESTS: CPT

## 2024-07-27 PROCEDURE — 770010 HCHG ROOM/CARE - REHAB SEMI PRIVAT*

## 2024-07-27 PROCEDURE — 85027 COMPLETE CBC AUTOMATED: CPT

## 2024-07-27 PROCEDURE — 700102 HCHG RX REV CODE 250 W/ 637 OVERRIDE(OP): Performed by: HOSPITALIST

## 2024-07-27 PROCEDURE — 36415 COLL VENOUS BLD VENIPUNCTURE: CPT

## 2024-07-27 PROCEDURE — 82962 GLUCOSE BLOOD TEST: CPT | Mod: 91

## 2024-07-27 PROCEDURE — 700102 HCHG RX REV CODE 250 W/ 637 OVERRIDE(OP): Performed by: PHYSICAL MEDICINE & REHABILITATION

## 2024-07-27 PROCEDURE — 84132 ASSAY OF SERUM POTASSIUM: CPT

## 2024-07-27 PROCEDURE — A9270 NON-COVERED ITEM OR SERVICE: HCPCS | Performed by: HOSPITALIST

## 2024-07-27 RX ADMIN — FERROUS SULFATE TAB 325 MG (65 MG ELEMENTAL FE) 325 MG: 325 (65 FE) TAB at 09:01

## 2024-07-27 RX ADMIN — INSULIN LISPRO 2 UNITS: 100 INJECTION, SOLUTION INTRAVENOUS; SUBCUTANEOUS at 11:00

## 2024-07-27 RX ADMIN — CALCIUM CARBONATE (ANTACID) CHEW TAB 500 MG 1000 MG: 500 CHEW TAB at 17:14

## 2024-07-27 RX ADMIN — FERROUS SULFATE TAB 325 MG (65 MG ELEMENTAL FE) 325 MG: 325 (65 FE) TAB at 17:14

## 2024-07-27 RX ADMIN — ATORVASTATIN CALCIUM 40 MG: 40 TABLET, FILM COATED ORAL at 21:24

## 2024-07-27 RX ADMIN — SENNOSIDES AND DOCUSATE SODIUM 2 TABLET: 50; 8.6 TABLET ORAL at 21:25

## 2024-07-27 RX ADMIN — OMEPRAZOLE 40 MG: 20 CAPSULE, DELAYED RELEASE ORAL at 21:24

## 2024-07-27 RX ADMIN — CALCIUM CARBONATE (ANTACID) CHEW TAB 500 MG 1000 MG: 500 CHEW TAB at 09:00

## 2024-07-27 RX ADMIN — METFORMIN HYDROCHLORIDE 1000 MG: 500 TABLET ORAL at 17:14

## 2024-07-27 RX ADMIN — METFORMIN HYDROCHLORIDE 1000 MG: 500 TABLET ORAL at 05:57

## 2024-07-27 RX ADMIN — OXYCODONE HYDROCHLORIDE 5 MG: 5 TABLET ORAL at 21:58

## 2024-07-27 RX ADMIN — FUROSEMIDE 20 MG: 20 TABLET ORAL at 09:01

## 2024-07-27 RX ADMIN — SENNOSIDES AND DOCUSATE SODIUM 2 TABLET: 50; 8.6 TABLET ORAL at 09:01

## 2024-07-27 RX ADMIN — INSULIN LISPRO 2 UNITS: 100 INJECTION, SOLUTION INTRAVENOUS; SUBCUTANEOUS at 17:18

## 2024-07-27 RX ADMIN — INSULIN LISPRO 2 UNITS: 100 INJECTION, SOLUTION INTRAVENOUS; SUBCUTANEOUS at 21:28

## 2024-07-27 RX ADMIN — POTASSIUM CHLORIDE 10 MEQ: 1500 TABLET, EXTENDED RELEASE ORAL at 09:01

## 2024-07-27 RX ADMIN — Medication 2000 UNITS: at 09:01

## 2024-07-27 RX ADMIN — OMEPRAZOLE 40 MG: 20 CAPSULE, DELAYED RELEASE ORAL at 09:01

## 2024-07-27 RX ADMIN — CALCIUM CARBONATE (ANTACID) CHEW TAB 500 MG 1000 MG: 500 CHEW TAB at 11:02

## 2024-07-27 RX ADMIN — SITAGLIPTIN 50 MG: 50 TABLET, FILM COATED ORAL at 09:01

## 2024-07-27 RX ADMIN — AMIODARONE HYDROCHLORIDE 400 MG: 200 TABLET ORAL at 05:57

## 2024-07-27 RX ADMIN — AMIODARONE HYDROCHLORIDE 400 MG: 200 TABLET ORAL at 17:14

## 2024-07-27 ASSESSMENT — ENCOUNTER SYMPTOMS
VOMITING: 0
DIZZINESS: 0
BLURRED VISION: 0
NAUSEA: 0
FEVER: 0
HALLUCINATIONS: 0
HEADACHES: 0
PALPITATIONS: 0
SHORTNESS OF BREATH: 0

## 2024-07-27 ASSESSMENT — PAIN DESCRIPTION - PAIN TYPE
TYPE: ACUTE PAIN
TYPE: ACUTE PAIN

## 2024-07-27 NOTE — CARE PLAN
The patient is Stable - Low risk of patient condition declining or worsening  Problem: Discharge Barriers/Planning  Goal: Patient's continuum of care needs are met  Outcome: Progressing  Note: Pt uses call light consistently and appropriately. Waits for assistance does not attempt self transfer this shift. Able to verbalize needs.     Problem: Skin Integrity  Goal: Skin integrity is maintained or improved  Outcome: Progressing  Note: Patient's skin remains intact and free from new or accidental injury this shift.

## 2024-07-27 NOTE — PROGRESS NOTES
Hospital Medicine Daily Progress Note      Chief Complaint  Diabetes Mellitus    Interval Problem Update  No complaints.  Doing ok.    Review of Systems  Review of Systems   Constitutional:  Negative for fever.   Eyes:  Negative for blurred vision.   Respiratory:  Negative for shortness of breath.    Cardiovascular:  Negative for palpitations.   Gastrointestinal:  Negative for nausea and vomiting.   Neurological:  Negative for dizziness and headaches.   Psychiatric/Behavioral:  Negative for hallucinations.         Physical Exam  Temp:  [36.6 °C (97.9 °F)-36.8 °C (98.3 °F)] 36.6 °C (97.9 °F)  Pulse:  [74-76] 76  Resp:  [18] 18  BP: (121-158)/(77-79) 158/77  SpO2:  [93 %-96 %] 93 %    Physical Exam  Vitals and nursing note reviewed.   Constitutional:       General: He is not in acute distress.  HENT:      Mouth/Throat:      Mouth: Mucous membranes are moist.      Pharynx: Oropharynx is clear.   Eyes:      General: No scleral icterus.  Cardiovascular:      Rate and Rhythm: Normal rate and regular rhythm.   Pulmonary:      Effort: Pulmonary effort is normal.      Breath sounds: No wheezing or rales.   Abdominal:      General: Bowel sounds are normal.      Palpations: Abdomen is soft.   Musculoskeletal:      Cervical back: No rigidity.      Right lower leg: Edema present.      Left lower leg: Edema present.   Skin:     General: Skin is warm and dry.   Neurological:      Mental Status: He is alert and oriented to person, place, and time.   Psychiatric:         Mood and Affect: Mood normal.         Behavior: Behavior normal.         Fluids    Intake/Output Summary (Last 24 hours) at 7/27/2024 1048  Last data filed at 7/27/2024 0800  Gross per 24 hour   Intake 700 ml   Output 400 ml   Net 300 ml       Laboratory  Recent Labs     07/27/24  0536   WBC 9.7   RBC 3.36*   HEMOGLOBIN 8.9*   HEMATOCRIT 29.1*   MCV 86.6   MCH 26.5*   MCHC 30.6*   RDW 50.4*   PLATELETCT 461*   MPV 9.6       Recent Labs     07/27/24  0536   POTASSIUM  4.2                     Assessment/Plan  * Acute CVA (cerebrovascular accident) ([facility])- (present on admission)  Assessment & Plan  Was on Plavix and Lipitor  Antiplatelet presently on hold d/t recent GIB    Vitamin D deficiency  Assessment & Plan  Vit D: 17  Cont supplements    A-fib ([facility])  Assessment & Plan  HR ok  Echo EF 60%, RVSP 80 mmHg, severe MS, small pericardial effusion, pleural effusion noted  BNP stable: 5594   CXR: mod right basilar opacity and suspected small pleural effusion  Cont Lasix  K+: 4.2 (7/27)  AC on hold 2nd to recent GIB  Ont to monitor    Type 2 diabetes mellitus ([facility])- (present on admission)  Assessment & Plan  Hba1c 5.4 (7/18)  BS recently: 106-183  Cont Metformin  Cont Januvia --> dose increased recently  Note: home meds include Metformin 1000 mg bid, Januvia 100 mg qd, Jardiance 25 mg qd, and Lantus 10 u qhs  Cont to monitor    Acute osteomyelitis of left foot ([facility])- (present on admission)  Assessment & Plan  S/P recent left TMA on 7/3/24 (at [facility])    NSTEMI (non-ST elevated myocardial infarction) ([facility])- (present on admission)  Assessment & Plan  Was on Plavix and Lipitor  Antiplatelet presently on hold d/t recent GIB    Acute blood loss anemia- (present on admission)  Assessment & Plan  Hb: 8.1 --> 8.9 (7/27)  H&H was wnl prior to adm  Had recent GIB  Fe 20 and ferritin 67  Cont Fe supplements  Cont to monitor    Upper GI bleed- (present on admission)  Assessment & Plan  Pt presented with syncope  EGD showed a DU x 6 -- s/p clip x 1 for active bleeding  S/P PRBC x 4 units  Cont PPI bid

## 2024-07-27 NOTE — PROGRESS NOTES
NURSING DAILY NOTE    Name: Pj Freeman   Date of Admission: 7/17/2024   Admitting Diagnosis: Acute CVA (cerebrovascular accident) ([facility])  Attending Physician: Dilia Salazar M.d.  Allergies: Patient has no known allergies.    Safety  Patient Assist  minm  Patient Precautions  Fall Risk, Non Weight Bearing Left Lower Extremity  Precaution Comments  left transmetatarsal amputation  Bed Transfer Status  Minimal Assist  Toilet Transfer Status   Moderate Assist (mod assist for sit to stand with  poor to fair  NWB   left LE)  Assistive Devices  Wheelchair, Rails  Oxygen  None - Room Air  Diet/Therapeutic Dining  Current Diet Order   Procedures    Diet Order Diet: Low Fiber(GI Soft); Second Modifier: (optional): Consistent CHO (Diabetic)     Pill Administration  whole  Agitated Behavioral Scale     ABS Level of Severity       Fall Risk  Has the patient had a fall this admission?   No  Taylor Gallego Fall Risk Scoring  15, HIGH RISK  Fall Risk Safety Measures  bed alarm, chair alarm, poor balance, and low vision/ hearing    Vitals  Temperature: 36.8 °C (98.3 °F)  Temp src: Oral  Pulse: 74  Respiration: 18  Blood Pressure : 121/79  Blood Pressure MAP (Calculated): 93 MM HG  BP Location: Right, Upper Arm  Patient BP Position: Supine     Oxygen  Pulse Oximetry: 96 %  O2 (LPM): 2  O2 Delivery Device: None - Room Air    Bowel and Bladder  Last Bowel Movement  07/26/24  Stool Type  Type 5: Soft blob with clear cut edges (passed easily)  Bowel Device  Bathroom, Diaper  Continent  Bladder: Continent void   Bowel: Continent movement  Bladder Function  Urine Void (mL): 300 ml  Number of Times Voided: 1  Urine Color: Unable To Evaluate  Genitourinary Assessment   Bladder Assessment (WDL):  WDL Except  [place] Catheter: Not Applicable  Urine Color: Unable To Evaluate  Bladder Device: Urinal, Bathroom  Time Void: Yes  Bladder Scan: Post Void  $ Bladder Scan Results  (mL): 155  Bladder Medications: No    Skin  Rocky Score   17  Sensory Interventions   Bed Types: Standard/Trauma Mattress with Overlay  Skin Preventative Measures: Pillows in Use for Support / Positioning, Seat Cushion in Use on Chair when Out of Bed  Moisture Interventions  Moisturizers/Barriers: Barrier Wipes      Pain  Pain Rating Scale  0 - No Pain  Pain Location  Foot  Pain Location Orientation  Left  Pain Interventions   Declines    ADLs    Bathing   Staff, Shower (OT Shower)  Linen Change      Personal Hygiene  Change Samia Pads, Perineal Care, Moist Samia Wipes  Chlorhexidine Bath      Oral Care  Brushed Teeth  Teeth/Dentures     Shave     Nutrition Percentage Eaten  *  * Meal *  *, Lunch, Between % Consumed (100%)  Environmental Precautions  Treaded Slipper Socks on Patient, Bed in Low Position  Patient Turns/Positioning  Patient Turns Self from Side to Side  Patient Turns Assistance/Tolerance     Bed Positions  Bed Controls On, Bed Locked  Head of Bed Elevated  Self regulated      Psychosocial/Neurologic Assessment  Psychosocial Assessment  Psychosocial (WDL):  WDL Except  Patient Behaviors: Fatigue  Family Behaviors: No Family Present  Neurologic Assessment  Neuro (WDL): Within Defined Limits  Level of Consciousness: Alert  Orientation Level: Oriented X4  Cognition: Follows commands  Speech: Clear  EENT (WDL):  WDL Except    Cardio/Pulmonary Assessment  Edema   RLE Edema: 2+, Generalized  LLE Edema: 2+, Generalized  Respiratory Breath Sounds     Cardiac Assessment   Cardiac (WDL):  WDL Except (hx htn, pvd, afib, mitral valve stenosis)

## 2024-07-27 NOTE — PROGRESS NOTES
NURSING DAILY NOTE    Name: Pj Freeman   Date of Admission: 7/17/2024   Admitting Diagnosis: Acute CVA (cerebrovascular accident) ([facility])  Attending Physician: Dilia Salazar M.d.  Allergies: Patient has no known allergies.    Safety  Patient Assist  minm  Patient Precautions  Fall Risk, Non Weight Bearing Left Lower Extremity  Precaution Comments  left transmetatarsal amputation  Bed Transfer Status  Minimal Assist  Toilet Transfer Status   Moderate Assist (mod assist for sit to stand with  poor to fair  NWB   left LE)  Assistive Devices  Wheelchair, Rails  Oxygen  None - Room Air  Diet/Therapeutic Dining  Current Diet Order   Procedures    Diet Order Diet: Low Fiber(GI Soft); Second Modifier: (optional): Consistent CHO (Diabetic)     Pill Administration  whole  Agitated Behavioral Scale     ABS Level of Severity       Fall Risk  Has the patient had a fall this admission?   No  Taylor Gallego Fall Risk Scoring  15, HIGH RISK  Fall Risk Safety Measures  bed alarm, chair alarm, poor balance, and low vision/ hearing    Vitals  Temperature: 36.6 °C (97.9 °F)  Temp src: Oral  Pulse: 76  Respiration: 18  Blood Pressure : (!) 158/77  Blood Pressure MAP (Calculated): 104 MM HG  BP Location: Right, Upper Arm  Patient BP Position: Sitting     Oxygen  Pulse Oximetry: 93 %  O2 (LPM): 2  O2 Delivery Device: None - Room Air    Bowel and Bladder  Last Bowel Movement  07/27/24  Stool Type  Type 5: Soft blob with clear cut edges (passed easily)  Bowel Device  Bathroom, Diaper  Continent  Bladder: Continent void   Bowel: Continent movement  Bladder Function  Urine Void (mL): 400 ml  Number of Times Voided: 1  Urine Color: Yellow  Genitourinary Assessment   Bladder Assessment (WDL):  WDL Except  [place]ova Catheter: Not Applicable  Urine Color: Yellow  Bladder Device: Urinal, Bathroom  Time Void: Yes  Bladder Scan: Post Void  $ Bladder Scan Results (mL): 155  Bladder  Medications: No    Skin  Rocky Score   17  Sensory Interventions   Bed Types: Standard/Trauma Mattress  Skin Preventative Measures: Heel Protectors in Use   Moisture Interventions  Moisturizers/Barriers: Barrier Wipes      Pain  Pain Rating Scale  0 - No Pain  Pain Location  Leg  Pain Location Orientation  Right  Pain Interventions   Declines    ADLs    Bathing   Staff, Shower (OT Shower)  Linen Change      Personal Hygiene  Change Samia Pads, Perineal Care, Moist Samia Wipes  Chlorhexidine Bath      Oral Care  Brushed Teeth  Teeth/Dentures     Shave     Nutrition Percentage Eaten  *  * Meal *  *, Dinner, Between % Consumed  Environmental Precautions  Treaded Slipper Socks on Patient, Bed in Low Position  Patient Turns/Positioning  Patient Turns Self from Side to Side  Patient Turns Assistance/Tolerance     Bed Positions  Bed Controls On, Bed Locked  Head of Bed Elevated  Self regulated      Psychosocial/Neurologic Assessment  Psychosocial Assessment  Psychosocial (WDL):  WDL Except  Patient Behaviors: Fatigue  Family Behaviors: No Family Present  Neurologic Assessment  Neuro (WDL): Within Defined Limits  Level of Consciousness: Alert  Orientation Level: Oriented X4  Cognition: Follows commands  Speech: Clear  EENT (WDL):  WDL Except    Cardio/Pulmonary Assessment  Edema   RLE Edema: 2+, Generalized  LLE Edema: 2+, Generalized  Respiratory Breath Sounds     Cardiac Assessment   Cardiac (WDL):  WDL Except (hx htn, pvd, afib, mitral valve stenosis)

## 2024-07-27 NOTE — CARE PLAN
"  Problem: Fall Risk - Rehab  Goal: Patient will remain free from falls  Outcome: Progressing  Note: Taylor Gallego Fall risk Assessment Score: 15    High fall risk Interventions   - Bed and strip alarm   - Yellow sign by the door   - Yellow wrist band \"Fall risk\"  - Room near to the nurse station  - Do not leave patient unattended in the bathroom  - Fall risk education provided      Problem: Pain - Standard  Goal: Alleviation of pain or a reduction in pain to the patient’s comfort goal  Outcome: Progressing  Note: Assessed for pain and discomfort , left toes  amputee with sutures , dressing changed , well approximated . Pain under control.   The patient is Stable - Low risk of patient condition declining or worsening    Shift Goals  Clinical Goals: Safety  Patient Goals: Rest  Family Goals: no family present    Progress made toward(s) clinical / shift goals:  Pt free from fall and injury.    "

## 2024-07-28 LAB
GLUCOSE BLD STRIP.AUTO-MCNC: 173 MG/DL (ref 65–99)
GLUCOSE BLD STRIP.AUTO-MCNC: 173 MG/DL (ref 65–99)
GLUCOSE BLD STRIP.AUTO-MCNC: 182 MG/DL (ref 65–99)
GLUCOSE BLD STRIP.AUTO-MCNC: 192 MG/DL (ref 65–99)
HEMOCCULT STL QL: NEGATIVE

## 2024-07-28 PROCEDURE — A9270 NON-COVERED ITEM OR SERVICE: HCPCS | Performed by: HOSPITALIST

## 2024-07-28 PROCEDURE — 99232 SBSQ HOSP IP/OBS MODERATE 35: CPT | Performed by: HOSPITALIST

## 2024-07-28 PROCEDURE — A9270 NON-COVERED ITEM OR SERVICE: HCPCS | Performed by: PHYSICAL MEDICINE & REHABILITATION

## 2024-07-28 PROCEDURE — 700102 HCHG RX REV CODE 250 W/ 637 OVERRIDE(OP): Performed by: HOSPITALIST

## 2024-07-28 PROCEDURE — 82962 GLUCOSE BLOOD TEST: CPT

## 2024-07-28 PROCEDURE — 82272 OCCULT BLD FECES 1-3 TESTS: CPT

## 2024-07-28 PROCEDURE — 770010 HCHG ROOM/CARE - REHAB SEMI PRIVAT*

## 2024-07-28 PROCEDURE — 700111 HCHG RX REV CODE 636 W/ 250 OVERRIDE (IP): Performed by: PHYSICAL MEDICINE & REHABILITATION

## 2024-07-28 PROCEDURE — 700102 HCHG RX REV CODE 250 W/ 637 OVERRIDE(OP): Performed by: PHYSICAL MEDICINE & REHABILITATION

## 2024-07-28 RX ADMIN — FERROUS SULFATE TAB 325 MG (65 MG ELEMENTAL FE) 325 MG: 325 (65 FE) TAB at 08:51

## 2024-07-28 RX ADMIN — OXYCODONE HYDROCHLORIDE 5 MG: 5 TABLET ORAL at 20:33

## 2024-07-28 RX ADMIN — ONDANSETRON 4 MG: 4 TABLET, ORALLY DISINTEGRATING ORAL at 07:20

## 2024-07-28 RX ADMIN — CALCIUM CARBONATE (ANTACID) CHEW TAB 500 MG 1000 MG: 500 CHEW TAB at 08:50

## 2024-07-28 RX ADMIN — SENNOSIDES AND DOCUSATE SODIUM 2 TABLET: 50; 8.6 TABLET ORAL at 20:32

## 2024-07-28 RX ADMIN — CALCIUM CARBONATE (ANTACID) CHEW TAB 500 MG 1000 MG: 500 CHEW TAB at 11:04

## 2024-07-28 RX ADMIN — AMIODARONE HYDROCHLORIDE 400 MG: 200 TABLET ORAL at 17:08

## 2024-07-28 RX ADMIN — Medication 2000 UNITS: at 08:50

## 2024-07-28 RX ADMIN — INSULIN LISPRO 2 UNITS: 100 INJECTION, SOLUTION INTRAVENOUS; SUBCUTANEOUS at 11:06

## 2024-07-28 RX ADMIN — METFORMIN HYDROCHLORIDE 1000 MG: 500 TABLET ORAL at 17:08

## 2024-07-28 RX ADMIN — SENNOSIDES AND DOCUSATE SODIUM 2 TABLET: 50; 8.6 TABLET ORAL at 08:50

## 2024-07-28 RX ADMIN — METFORMIN HYDROCHLORIDE 1000 MG: 500 TABLET ORAL at 05:29

## 2024-07-28 RX ADMIN — OMEPRAZOLE 40 MG: 20 CAPSULE, DELAYED RELEASE ORAL at 08:50

## 2024-07-28 RX ADMIN — INSULIN LISPRO 2 UNITS: 100 INJECTION, SOLUTION INTRAVENOUS; SUBCUTANEOUS at 17:12

## 2024-07-28 RX ADMIN — SITAGLIPTIN 50 MG: 50 TABLET, FILM COATED ORAL at 08:50

## 2024-07-28 RX ADMIN — POTASSIUM CHLORIDE 10 MEQ: 1500 TABLET, EXTENDED RELEASE ORAL at 08:50

## 2024-07-28 RX ADMIN — FERROUS SULFATE TAB 325 MG (65 MG ELEMENTAL FE) 325 MG: 325 (65 FE) TAB at 17:08

## 2024-07-28 RX ADMIN — CALCIUM CARBONATE (ANTACID) CHEW TAB 500 MG 1000 MG: 500 CHEW TAB at 17:07

## 2024-07-28 RX ADMIN — OMEPRAZOLE 40 MG: 20 CAPSULE, DELAYED RELEASE ORAL at 20:32

## 2024-07-28 RX ADMIN — INSULIN LISPRO 2 UNITS: 100 INJECTION, SOLUTION INTRAVENOUS; SUBCUTANEOUS at 07:03

## 2024-07-28 RX ADMIN — INSULIN LISPRO 2 UNITS: 100 INJECTION, SOLUTION INTRAVENOUS; SUBCUTANEOUS at 20:39

## 2024-07-28 RX ADMIN — FUROSEMIDE 20 MG: 20 TABLET ORAL at 08:50

## 2024-07-28 RX ADMIN — AMIODARONE HYDROCHLORIDE 400 MG: 200 TABLET ORAL at 05:29

## 2024-07-28 RX ADMIN — ATORVASTATIN CALCIUM 40 MG: 40 TABLET, FILM COATED ORAL at 20:32

## 2024-07-28 ASSESSMENT — PATIENT HEALTH QUESTIONNAIRE - PHQ9
2. FEELING DOWN, DEPRESSED, IRRITABLE, OR HOPELESS: NOT AT ALL
SUM OF ALL RESPONSES TO PHQ9 QUESTIONS 1 AND 2: 0
1. LITTLE INTEREST OR PLEASURE IN DOING THINGS: NOT AT ALL

## 2024-07-28 ASSESSMENT — ENCOUNTER SYMPTOMS
COUGH: 0
BLURRED VISION: 0
FEVER: 0
DIARRHEA: 0
NERVOUS/ANXIOUS: 0
DIZZINESS: 0

## 2024-07-28 ASSESSMENT — PAIN DESCRIPTION - PAIN TYPE: TYPE: ACUTE PAIN

## 2024-07-28 NOTE — PROGRESS NOTES
NURSING DAILY NOTE    Name: Pj Freeman   Date of Admission: 7/17/2024   Admitting Diagnosis: Acute CVA (cerebrovascular accident) ([facility])  Attending Physician: Dilia Salazar M.d.  Allergies: Patient has no known allergies.    Safety  Patient Assist  minm  Patient Precautions  Fall Risk, Non Weight Bearing Left Lower Extremity  Precaution Comments  left transmetatarsal amputation  Bed Transfer Status  Minimal Assist  Toilet Transfer Status   Moderate Assist (mod assist for sit to stand with  poor to fair  NWB   left LE)  Assistive Devices  Wheelchair, Rails  Oxygen  None - Room Air  Diet/Therapeutic Dining  Current Diet Order   Procedures    Diet Order Diet: Low Fiber(GI Soft); Second Modifier: (optional): Consistent CHO (Diabetic)     Pill Administration  whole  Agitated Behavioral Scale     ABS Level of Severity       Fall Risk  Has the patient had a fall this admission?   No  Taylor Gallego Fall Risk Scoring  15, HIGH RISK  Fall Risk Safety Measures  bed alarm and chair alarm    Vitals  Temperature: 36.6 °C (97.9 °F)  Temp src: Oral  Pulse: 68  Respiration: 14  Blood Pressure : 139/70  Blood Pressure MAP (Calculated): 93 MM HG  BP Location: Right, Upper Arm  Patient BP Position: Supine     Oxygen  Pulse Oximetry: 97 %  O2 (LPM): 2  O2 Delivery Device: None - Room Air    Bowel and Bladder  Last Bowel Movement  07/27/24  Stool Type  Type 5: Soft blob with clear cut edges (passed easily)  Bowel Device  Bathroom, Diaper  Continent  Bladder: Continent void   Bowel: Continent movement  Bladder Function  Urine Void (mL): 400 ml  Number of Times Voided: 1  Urine Color: Yellow  Genitourinary Assessment   Bladder Assessment (WDL):  WDL Except  Cordova Catheter: Not Applicable  Urine Color: Yellow  Bladder Device: Urinal, Bathroom  Time Void: Yes  Bladder Scan: Post Void  $ Bladder Scan Results (mL): 155  Bladder Medications: No    Skin  Rocky Score    17  Sensory Interventions   Bed Types: Standard/Trauma Mattress  Skin Preventative Measures: Pillows in Use to Float Heels  Moisture Interventions  Moisturizers/Barriers: Moisturizer       Pain  Pain Rating Scale  0 - No Pain  Pain Location  Leg  Pain Location Orientation  Right  Pain Interventions   Declines    ADLs    Bathing   Staff, Shower (OT Shower)  Linen Change      Personal Hygiene  Change Samia Pads, Perineal Care, Moist Samia Wipes  Chlorhexidine Bath      Oral Care  Brushed Teeth  Teeth/Dentures     Shave     Nutrition Percentage Eaten  Between % Consumed  Environmental Precautions  Treaded Slipper Socks on Patient, Bed in Low Position  Patient Turns/Positioning  Patient Turns Self from Side to Side  Patient Turns Assistance/Tolerance     Bed Positions  Bed Controls On, Bed Locked  Head of Bed Elevated  Self regulated      Psychosocial/Neurologic Assessment  Psychosocial Assessment  Psychosocial (WDL):  WDL Except  Patient Behaviors: Fatigue  Family Behaviors: No Family Present  Neurologic Assessment  Neuro (WDL): Within Defined Limits  Level of Consciousness: Alert  Orientation Level: Oriented X4  Cognition: Follows commands  Speech: Clear  EENT (WDL):  WDL Except    Cardio/Pulmonary Assessment  Edema   RLE Edema: 2+, Generalized  LLE Edema: 2+, Generalized  Respiratory Breath Sounds     Cardiac Assessment   Cardiac (WDL):  WDL Except (hx htn, pvd, afib, mitral valve stenosis)

## 2024-07-28 NOTE — CARE PLAN
The patient is Stable - Low risk of patient condition declining or worsening    Shift Goals  Clinical Goals: Safety, Pain control  Patient Goals: Safety  Family Goals: Education    Progress made toward(s) clinical / shift goals: VSS, denies pain, skin intact, able to participate in therapy.

## 2024-07-28 NOTE — PROGRESS NOTES
Hospital Medicine Daily Progress Note      Chief Complaint  Diabetes Mellitus    Interval Problem Update  Discussed about his BS being elevate and have increased his Januvia dose.    Review of Systems  Review of Systems   Constitutional:  Negative for fever.   Eyes:  Negative for blurred vision.   Respiratory:  Negative for cough.    Cardiovascular:  Negative for chest pain.   Gastrointestinal:  Negative for diarrhea.   Musculoskeletal:  Negative for joint pain.   Neurological:  Negative for dizziness.   Psychiatric/Behavioral:  The patient is not nervous/anxious.         Physical Exam  Temp:  [36.6 °C (97.9 °F)-37.3 °C (99.1 °F)] 36.9 °C (98.4 °F)  Pulse:  [67-71] 68  Resp:  [12-18] 16  BP: (128-146)/(64-74) 128/64  SpO2:  [91 %-98 %] 91 %    Physical Exam  Vitals and nursing note reviewed.   Constitutional:       Appearance: He is not diaphoretic.   HENT:      Mouth/Throat:      Pharynx: No oropharyngeal exudate or posterior oropharyngeal erythema.   Eyes:      Extraocular Movements: Extraocular movements intact.   Neck:      Vascular: No carotid bruit.   Cardiovascular:      Rate and Rhythm: Normal rate and regular rhythm.   Pulmonary:      Effort: Pulmonary effort is normal.      Breath sounds: No wheezing or rales.   Abdominal:      General: Bowel sounds are normal. There is no distension.      Palpations: Abdomen is soft.      Tenderness: There is no abdominal tenderness.   Musculoskeletal:      Right lower leg: Edema present.      Left lower leg: Edema present.   Skin:     General: Skin is warm and dry.   Neurological:      Mental Status: He is alert and oriented to person, place, and time.   Psychiatric:         Mood and Affect: Mood normal.         Behavior: Behavior normal.         Fluids    Intake/Output Summary (Last 24 hours) at 7/28/2024 1044  Last data filed at 7/28/2024 0828  Gross per 24 hour   Intake 280 ml   Output 650 ml   Net -370 ml       Laboratory  Recent Labs     07/27/24  0536   WBC 9.7    RBC 3.36*   HEMOGLOBIN 8.9*   HEMATOCRIT 29.1*   MCV 86.6   MCH 26.5*   MCHC 30.6*   RDW 50.4*   PLATELETCT 461*   MPV 9.6       Recent Labs     07/27/24  0536   POTASSIUM 4.2                     Assessment/Plan  * Acute CVA (cerebrovascular accident) (HCC)- (present on admission)  Assessment & Plan  Was on Plavix and Lipitor  Antiplatelet presently on hold d/t recent GIB    Vitamin D deficiency  Assessment & Plan  Vit D: 17  Cont supplements    A-fib (HCC)  Assessment & Plan  HR ok  Echo EF 60%, RVSP 80 mmHg, severe MS, small pericardial effusion, pleural effusion noted  BNP stable: 5594   CXR: mod right basilar opacity and suspected small pleural effusion  Cont Lasix  K+: 4.2 (7/27)  AC on hold 2nd to recent GIB  Ont to monitor    Type 2 diabetes mellitus (HCC)- (present on admission)  Assessment & Plan  Hba1c 5.4 (7/18)  BS recently: 149-199  Cont Metformin  Cont Januvia --> will increase dose again  Note: home meds include Metformin 1000 mg bid, Januvia 100 mg qd, Jardiance 25 mg qd, and Lantus 10 u qhs  Cont to monitor    Acute osteomyelitis of left foot ([facility])- (present on admission)  Assessment & Plan  S/P recent left TMA on 7/3/24 (at [facility])    NSTEMI (non-ST elevated myocardial infarction) ([facility])- (present on admission)  Assessment & Plan  Was on Plavix and Lipitor  Antiplatelet presently on hold d/t recent GIB    Acute blood loss anemia- (present on admission)  Assessment & Plan  Hb: 8.1 --> 8.9 (7/27)  H&H was wnl prior to adm  Had recent GIB  FOB (-) x 2  Fe 20 and ferritin 67  Cont Fe supplements  Cont to monitor    Upper GI bleed- (present on admission)  Assessment & Plan  Pt presented with syncope  EGD showed a DU x 6 -- s/p clip x 1 for active bleeding  S/P PRBC x 4 units  Cont PPI bid

## 2024-07-28 NOTE — CARE PLAN
"The patient is Stable - Low risk of patient condition declining or worsening    Shift Goals  Clinical Goals: Safety, Pain control  Patient Goals: Safety  Family Goals: Education    Problem: Skin Integrity  Goal: Skin integrity is maintained or improved  Outcome: Progressing  Note:   Rocky Score: 17    Patient's skin remains intact and free from new or accidental injury this shift; no s/s of infection. RN wound protocol checked. Encouraged hydration and educated about the importance of nutrition to keep skin integrity. Will continue to monitor.      Problem: Fall Risk - Rehab  Goal: Patient will remain free from falls  Outcome: Progressing  Note: Taylor Gallego Fall risk Assessment Score: 15    High fall risk Interventions   - Bed and strip alarm   - Yellow sign by the door   - Yellow wrist band \"Fall risk\"  - Room near to the nurse station  - Do not leave patient unattended in the bathroom  - Fall risk education provided     "

## 2024-07-28 NOTE — PROGRESS NOTES
NURSING DAILY NOTE    Name: Pj Freeman   Date of Admission: 7/17/2024   Admitting Diagnosis: Acute CVA (cerebrovascular accident) ([facility])  Attending Physician: Dilia Salazar M.d.  Allergies: Patient has no known allergies.    Safety  Patient Assist  minm  Patient Precautions  Fall Risk, Non Weight Bearing Left Lower Extremity  Precaution Comments  left transmetatarsal amputation  Bed Transfer Status  Minimal Assist  Toilet Transfer Status   Moderate Assist (mod assist for sit to stand with  poor to fair  NWB   left LE)  Assistive Devices  Wheelchair, Rails  Oxygen  None - Room Air  Diet/Therapeutic Dining  Current Diet Order   Procedures    Diet Order Diet: Low Fiber(GI Soft); Second Modifier: (optional): Consistent CHO (Diabetic)     Pill Administration  whole  Agitated Behavioral Scale     ABS Level of Severity       Fall Risk  Has the patient had a fall this admission?   No  Taylor Gallego Fall Risk Scoring  15, HIGH RISK  Fall Risk Safety Measures  bed alarm and chair alarm    Vitals  Temperature: 36.6 °C (97.9 °F)  Temp src: Oral  Pulse: 68  Respiration: 14  Blood Pressure : 139/70  Blood Pressure MAP (Calculated): 93 MM HG  BP Location: Right, Upper Arm  Patient BP Position: Supine     Oxygen  Pulse Oximetry: 97 %  O2 (LPM): 2  O2 Delivery Device: None - Room Air    Bowel and Bladder  Last Bowel Movement  07/27/24  Stool Type  Type 5: Soft blob with clear cut edges (passed easily)  Bowel Device  Bathroom, Diaper  Continent  Bladder: Continent void   Bowel: Continent movement  Bladder Function  Urine Void (mL): 400 ml  Number of Times Voided: 1  Urine Color: Yellow  Genitourinary Assessment   Bladder Assessment (WDL):  WDL Except  Cordova Catheter: Not Applicable  Urine Color: Yellow  Bladder Device: Urinal, Bathroom  Time Void: Yes  Bladder Scan: Post Void  $ Bladder Scan Results (mL): 155  Bladder Medications: No    Skin  Rocky Score    17  Sensory Interventions   Bed Types: Standard/Trauma Mattress  Skin Preventative Measures: Pillows in Use to Float Heels  Moisture Interventions  Moisturizers/Barriers: Moisturizer       Pain  Pain Rating Scale  0 - No Pain  Pain Location  Leg  Pain Location Orientation  Right  Pain Interventions   Declines    ADLs    Bathing   Staff, Shower (OT Shower)  Linen Change      Personal Hygiene  Change Samia Pads, Perineal Care, Moist Samia Wipes  Chlorhexidine Bath      Oral Care  Brushed Teeth  Teeth/Dentures     Shave     Nutrition Percentage Eaten  Between % Consumed  Environmental Precautions  Treaded Slipper Socks on Patient, Bed in Low Position  Patient Turns/Positioning  Patient Turns Self from Side to Side  Patient Turns Assistance/Tolerance     Bed Positions  Bed Controls On, Bed Locked  Head of Bed Elevated  Self regulated      Psychosocial/Neurologic Assessment  Psychosocial Assessment  Psychosocial (WDL):  WDL Except  Patient Behaviors: Fatigue  Family Behaviors: No Family Present  Neurologic Assessment  Neuro (WDL): Within Defined Limits  Level of Consciousness: Alert  Orientation Level: Oriented X4  Cognition: Follows commands  Speech: Clear  EENT (WDL):  WDL Except    Cardio/Pulmonary Assessment  Edema   RLE Edema: 2+, Generalized  LLE Edema: 2+, Generalized  Respiratory Breath Sounds     Cardiac Assessment   Cardiac (WDL):  WDL Except (hx htn, pvd, afib, mitral valve stenosis)

## 2024-07-28 NOTE — PROGRESS NOTES
NURSING DAILY NOTE    Name: Pj Freeman   Date of Admission: 7/17/2024   Admitting Diagnosis: Acute CVA (cerebrovascular accident) ([facility])  Attending Physician: Dilia Salazar M.d.  Allergies: Patient has no known allergies.    Safety  Patient Assist  minm  Patient Precautions  Fall Risk, Non Weight Bearing Left Lower Extremity  Precaution Comments  left transmetatarsal amputation  Bed Transfer Status  Minimal Assist  Toilet Transfer Status   Moderate Assist (mod assist for sit to stand with  poor to fair  NWB   left LE)  Assistive Devices  Rails, Wheelchair  Oxygen  None - Room Air  Diet/Therapeutic Dining  Current Diet Order   Procedures    Diet Order Diet: Low Fiber(GI Soft); Second Modifier: (optional): Consistent CHO (Diabetic)     Pill Administration  whole  Agitated Behavioral Scale     ABS Level of Severity       Fall Risk  Has the patient had a fall this admission?   No  Taylor Gallego Fall Risk Scoring  15, HIGH RISK  Fall Risk Safety Measures  bed alarm and chair alarm    Vitals  Temperature: 36.7 °C (98 °F)  Temp src: Oral  Pulse: 67  Respiration: 16  Blood Pressure : 132/69  Blood Pressure MAP (Calculated): 90 MM HG  BP Location: Left, Upper Arm  Patient BP Position: Supine     Oxygen  Pulse Oximetry: 94 %  O2 (LPM): 0  O2 Delivery Device: None - Room Air    Bowel and Bladder  Last Bowel Movement  07/27/24  Stool Type  Type 5: Soft blob with clear cut edges (passed easily)  Bowel Device  Bathroom, Diaper  Continent  Bladder: Continent void   Bowel: Continent movement  Bladder Function  Urine Void (mL): 350 ml  Number of Times Voided: 1  Urine Color: Yellow  Genitourinary Assessment   Bladder Assessment (WDL):  WDL Except  Cordova Catheter: Not Applicable  Urine Color: Yellow  Bladder Device: Urinal, Bathroom  Time Void: Yes  Bladder Scan: Post Void  $ Bladder Scan Results (mL): 155  Bladder Medications: No    Skin  Rocky Score    17  Sensory Interventions   Bed Types: Standard/Trauma Mattress  Skin Preventative Measures: Pillows in Use for Support / Positioning  Moisture Interventions  Moisturizers/Barriers: Moisturizer       Pain  Pain Rating Scale  0 - No Pain  Pain Location  Leg  Pain Location Orientation  Right  Pain Interventions   Medication (see MAR)    ADLs    Bathing   Staff, Shower (OT Shower)  Linen Change      Personal Hygiene  Moist Samia Wipes  Chlorhexidine Bath      Oral Care  Brushed Teeth  Teeth/Dentures     Shave     Nutrition Percentage Eaten  *  * Meal *  *, Lunch, Between % Consumed  Environmental Precautions  Treaded Slipper Socks on Patient, Bed in Low Position  Patient Turns/Positioning  Patient Turns Self from Side to Side  Patient Turns Assistance/Tolerance     Bed Positions  Bed Controls On, Bed Locked  Head of Bed Elevated  Self regulated      Psychosocial/Neurologic Assessment  Psychosocial Assessment  Psychosocial (WDL):  WDL Except  Patient Behaviors: Fatigue  Family Behaviors: No Family Present  Neurologic Assessment  Neuro (WDL): Within Defined Limits  Level of Consciousness: Alert  Orientation Level: Oriented X4  Cognition: Follows commands  Speech: Clear  EENT (WDL):  WDL Except    Cardio/Pulmonary Assessment  Edema   RLE Edema: 2+, Generalized  LLE Edema: 2+, Generalized  Respiratory Breath Sounds     Cardiac Assessment   Cardiac (WDL):  WDL Except (hx htn, pvd, afib, mitral valve stenosis)

## 2024-07-28 NOTE — PROGRESS NOTES
NURSING DAILY NOTE    Name: Pj Freeman   Date of Admission: 7/17/2024   Admitting Diagnosis: Acute CVA (cerebrovascular accident) ([facility])  Attending Physician: Dilia Salazar M.d.  Allergies: Patient has no known allergies.    Safety  Patient Assist  minm  Patient Precautions  Fall Risk, Non Weight Bearing Left Lower Extremity  Precaution Comments  left transmetatarsal amputation  Bed Transfer Status  Minimal Assist  Toilet Transfer Status   Moderate Assist (mod assist for sit to stand with  poor to fair  NWB   left LE)  Assistive Devices  Rails, Wheelchair  Oxygen  None - Room Air  Diet/Therapeutic Dining  Current Diet Order   Procedures    Diet Order Diet: Low Fiber(GI Soft); Second Modifier: (optional): Consistent CHO (Diabetic)     Pill Administration  whole  Agitated Behavioral Scale     ABS Level of Severity       Fall Risk  Has the patient had a fall this admission?   No  Taylor Gallego Fall Risk Scoring  15, HIGH RISK  Fall Risk Safety Measures  bed alarm and chair alarm    Vitals  Temperature: 36.7 °C (98 °F)  Temp src: Temporal  Pulse: 69  Respiration: 18  Blood Pressure : 138/70  Blood Pressure MAP (Calculated): 93 MM HG  BP Location: Left, Upper Arm  Patient BP Position: Supine     Oxygen  Pulse Oximetry: 96 %  O2 (LPM): 2  O2 Delivery Device: None - Room Air    Bowel and Bladder  Last Bowel Movement  07/27/24  Stool Type  Type 5: Soft blob with clear cut edges (passed easily)  Bowel Device  Bathroom, Diaper  Continent  Bladder: Continent void   Bowel: Continent movement  Bladder Function  Urine Void (mL): 350 ml  Number of Times Voided: 1  Urine Color: Yellow  Genitourinary Assessment   Bladder Assessment (WDL):  WDL Except  Cordova Catheter: Not Applicable  Urine Color: Yellow  Bladder Device: Urinal, Bathroom  Time Void: Yes  Bladder Scan: Post Void  $ Bladder Scan Results (mL): 155  Bladder Medications: No    Skin  Rocky Score    17  Sensory Interventions   Bed Types: Standard/Trauma Mattress  Skin Preventative Measures: Pillows in Use for Support / Positioning  Moisture Interventions  Moisturizers/Barriers: Moisturizer       Pain  Pain Rating Scale  0 - No Pain  Pain Location  Leg  Pain Location Orientation  Right  Pain Interventions   Medication (see MAR)    ADLs    Bathing   Staff, Shower (OT Shower)  Linen Change      Personal Hygiene  Moist Samia Wipes  Chlorhexidine Bath      Oral Care  Brushed Teeth  Teeth/Dentures     Shave     Nutrition Percentage Eaten  Between % Consumed  Environmental Precautions  Treaded Slipper Socks on Patient, Bed in Low Position  Patient Turns/Positioning  Patient Turns Self from Side to Side  Patient Turns Assistance/Tolerance     Bed Positions  Bed Controls On, Bed Locked  Head of Bed Elevated  Self regulated      Psychosocial/Neurologic Assessment  Psychosocial Assessment  Psychosocial (WDL):  WDL Except  Patient Behaviors: Fatigue  Family Behaviors: No Family Present  Neurologic Assessment  Neuro (WDL): Within Defined Limits  Level of Consciousness: Alert  Orientation Level: Oriented X4  Cognition: Follows commands  Speech: Clear  EENT (WDL):  WDL Except    Cardio/Pulmonary Assessment  Edema   RLE Edema: 2+, Generalized  LLE Edema: 2+, Generalized  Respiratory Breath Sounds     Cardiac Assessment   Cardiac (WDL):  WDL Except (hx htn, pvd, afib, mitral valve stenosis)

## 2024-07-28 NOTE — CARE PLAN
The patient is Stable - Low risk of patient condition declining or worsening    Shift Goals  Clinical Goals: Safety, Pain control  Patient Goals: Safety  Family Goals: Education    Progress made toward(s) clinical / shift goals:   VSS, pain controlled with PRN pain medication, able to participate in therapy, skin intact.

## 2024-07-29 ENCOUNTER — APPOINTMENT (OUTPATIENT)
Dept: PHYSICAL THERAPY | Facility: REHABILITATION | Age: 69
DRG: 056 | End: 2024-07-29
Attending: PHYSICAL MEDICINE & REHABILITATION
Payer: MEDICARE

## 2024-07-29 ENCOUNTER — APPOINTMENT (OUTPATIENT)
Dept: SPEECH THERAPY | Facility: REHABILITATION | Age: 69
DRG: 056 | End: 2024-07-29
Attending: PHYSICAL MEDICINE & REHABILITATION
Payer: MEDICARE

## 2024-07-29 ENCOUNTER — APPOINTMENT (OUTPATIENT)
Dept: OCCUPATIONAL THERAPY | Facility: REHABILITATION | Age: 69
DRG: 056 | End: 2024-07-29
Attending: PHYSICAL MEDICINE & REHABILITATION
Payer: MEDICARE

## 2024-07-29 LAB
ANION GAP SERPL CALC-SCNC: 11 MMOL/L (ref 7–16)
BASOPHILS # BLD AUTO: 0 % (ref 0–1.8)
BASOPHILS # BLD: 0 K/UL (ref 0–0.12)
BUN SERPL-MCNC: 20 MG/DL (ref 8–22)
CALCIUM SERPL-MCNC: 7.7 MG/DL (ref 8.5–10.5)
CHLORIDE SERPL-SCNC: 101 MMOL/L (ref 96–112)
CO2 SERPL-SCNC: 24 MMOL/L (ref 20–33)
CREAT SERPL-MCNC: 0.67 MG/DL (ref 0.5–1.4)
EOSINOPHIL # BLD AUTO: 0 K/UL (ref 0–0.51)
EOSINOPHIL NFR BLD: 0 % (ref 0–6.9)
ERYTHROCYTE [DISTWIDTH] IN BLOOD BY AUTOMATED COUNT: 51.2 FL (ref 35.9–50)
GFR SERPLBLD CREATININE-BSD FMLA CKD-EPI: 100 ML/MIN/1.73 M 2
GLUCOSE BLD STRIP.AUTO-MCNC: 131 MG/DL (ref 65–99)
GLUCOSE BLD STRIP.AUTO-MCNC: 169 MG/DL (ref 65–99)
GLUCOSE BLD STRIP.AUTO-MCNC: 170 MG/DL (ref 65–99)
GLUCOSE BLD STRIP.AUTO-MCNC: 201 MG/DL (ref 65–99)
GLUCOSE SERPL-MCNC: 250 MG/DL (ref 65–99)
HCT VFR BLD AUTO: 28 % (ref 42–52)
HGB BLD-MCNC: 8.3 G/DL (ref 14–18)
HYPOCHROMIA BLD QL SMEAR: ABNORMAL
LYMPHOCYTES # BLD AUTO: 1.25 K/UL (ref 1–4.8)
LYMPHOCYTES NFR BLD: 14.5 % (ref 22–41)
MAGNESIUM SERPL-MCNC: 1.2 MG/DL (ref 1.5–2.5)
MANUAL DIFF BLD: NORMAL
MCH RBC QN AUTO: 25.8 PG (ref 27–33)
MCHC RBC AUTO-ENTMCNC: 29.6 G/DL (ref 32.3–36.5)
MCV RBC AUTO: 87 FL (ref 81.4–97.8)
MICROCYTES BLD QL SMEAR: ABNORMAL
MONOCYTES # BLD AUTO: 0.44 K/UL (ref 0–0.85)
MONOCYTES NFR BLD AUTO: 5.1 % (ref 0–13.4)
MORPHOLOGY BLD-IMP: NORMAL
NEUTROPHILS # BLD AUTO: 6.91 K/UL (ref 1.82–7.42)
NEUTROPHILS NFR BLD: 80.4 % (ref 44–72)
NRBC # BLD AUTO: 0 K/UL
NRBC BLD-RTO: 0 /100 WBC (ref 0–0.2)
PLATELET # BLD AUTO: 397 K/UL (ref 164–446)
PLATELET BLD QL SMEAR: NORMAL
PMV BLD AUTO: 9.8 FL (ref 9–12.9)
POTASSIUM SERPL-SCNC: 3.9 MMOL/L (ref 3.6–5.5)
RBC # BLD AUTO: 3.22 M/UL (ref 4.7–6.1)
RBC BLD AUTO: PRESENT
SODIUM SERPL-SCNC: 136 MMOL/L (ref 135–145)
WBC # BLD AUTO: 8.6 K/UL (ref 4.8–10.8)

## 2024-07-29 PROCEDURE — 97535 SELF CARE MNGMENT TRAINING: CPT | Mod: CO

## 2024-07-29 PROCEDURE — 36415 COLL VENOUS BLD VENIPUNCTURE: CPT

## 2024-07-29 PROCEDURE — 700102 HCHG RX REV CODE 250 W/ 637 OVERRIDE(OP): Performed by: HOSPITALIST

## 2024-07-29 PROCEDURE — 80048 BASIC METABOLIC PNL TOTAL CA: CPT

## 2024-07-29 PROCEDURE — 97602 WOUND(S) CARE NON-SELECTIVE: CPT

## 2024-07-29 PROCEDURE — 97116 GAIT TRAINING THERAPY: CPT | Mod: CQ

## 2024-07-29 PROCEDURE — 97110 THERAPEUTIC EXERCISES: CPT | Mod: CQ

## 2024-07-29 PROCEDURE — A9270 NON-COVERED ITEM OR SERVICE: HCPCS | Performed by: HOSPITALIST

## 2024-07-29 PROCEDURE — 97112 NEUROMUSCULAR REEDUCATION: CPT | Mod: CQ

## 2024-07-29 PROCEDURE — 85007 BL SMEAR W/DIFF WBC COUNT: CPT

## 2024-07-29 PROCEDURE — 83735 ASSAY OF MAGNESIUM: CPT

## 2024-07-29 PROCEDURE — A9270 NON-COVERED ITEM OR SERVICE: HCPCS | Performed by: PHYSICAL MEDICINE & REHABILITATION

## 2024-07-29 PROCEDURE — 99232 SBSQ HOSP IP/OBS MODERATE 35: CPT | Performed by: HOSPITALIST

## 2024-07-29 PROCEDURE — 82962 GLUCOSE BLOOD TEST: CPT | Mod: 91

## 2024-07-29 PROCEDURE — 770010 HCHG ROOM/CARE - REHAB SEMI PRIVAT*

## 2024-07-29 PROCEDURE — 700102 HCHG RX REV CODE 250 W/ 637 OVERRIDE(OP): Performed by: PHYSICAL MEDICINE & REHABILITATION

## 2024-07-29 PROCEDURE — 85027 COMPLETE CBC AUTOMATED: CPT

## 2024-07-29 PROCEDURE — 97130 THER IVNTJ EA ADDL 15 MIN: CPT

## 2024-07-29 PROCEDURE — 97129 THER IVNTJ 1ST 15 MIN: CPT

## 2024-07-29 PROCEDURE — 97530 THERAPEUTIC ACTIVITIES: CPT | Mod: CQ

## 2024-07-29 RX ORDER — LANOLIN ALCOHOL/MO/W.PET/CERES
400 CREAM (GRAM) TOPICAL 2 TIMES DAILY
Status: DISCONTINUED | OUTPATIENT
Start: 2024-07-29 | End: 2024-08-01 | Stop reason: HOSPADM

## 2024-07-29 RX ADMIN — CALCIUM CARBONATE (ANTACID) CHEW TAB 500 MG 1000 MG: 500 CHEW TAB at 16:42

## 2024-07-29 RX ADMIN — FERROUS SULFATE TAB 325 MG (65 MG ELEMENTAL FE) 325 MG: 325 (65 FE) TAB at 08:25

## 2024-07-29 RX ADMIN — AMIODARONE HYDROCHLORIDE 400 MG: 200 TABLET ORAL at 05:25

## 2024-07-29 RX ADMIN — SENNOSIDES AND DOCUSATE SODIUM 2 TABLET: 50; 8.6 TABLET ORAL at 08:25

## 2024-07-29 RX ADMIN — FERROUS SULFATE TAB 325 MG (65 MG ELEMENTAL FE) 325 MG: 325 (65 FE) TAB at 16:41

## 2024-07-29 RX ADMIN — CALCIUM CARBONATE (ANTACID) CHEW TAB 500 MG 1000 MG: 500 CHEW TAB at 11:45

## 2024-07-29 RX ADMIN — METFORMIN HYDROCHLORIDE 1000 MG: 500 TABLET ORAL at 17:16

## 2024-07-29 RX ADMIN — POTASSIUM CHLORIDE 10 MEQ: 1500 TABLET, EXTENDED RELEASE ORAL at 08:25

## 2024-07-29 RX ADMIN — Medication 2000 UNITS: at 08:25

## 2024-07-29 RX ADMIN — INSULIN LISPRO 2 UNITS: 100 INJECTION, SOLUTION INTRAVENOUS; SUBCUTANEOUS at 20:26

## 2024-07-29 RX ADMIN — CALCIUM CARBONATE (ANTACID) CHEW TAB 500 MG 1000 MG: 500 CHEW TAB at 08:24

## 2024-07-29 RX ADMIN — OXYCODONE HYDROCHLORIDE 10 MG: 10 TABLET ORAL at 21:04

## 2024-07-29 RX ADMIN — INSULIN LISPRO 4 UNITS: 100 INJECTION, SOLUTION INTRAVENOUS; SUBCUTANEOUS at 08:28

## 2024-07-29 RX ADMIN — SITAGLIPTIN 75 MG: 50 TABLET, FILM COATED ORAL at 08:24

## 2024-07-29 RX ADMIN — INSULIN LISPRO 2 UNITS: 100 INJECTION, SOLUTION INTRAVENOUS; SUBCUTANEOUS at 11:41

## 2024-07-29 RX ADMIN — Medication 400 MG: at 20:24

## 2024-07-29 RX ADMIN — METFORMIN HYDROCHLORIDE 1000 MG: 500 TABLET ORAL at 05:25

## 2024-07-29 RX ADMIN — AMIODARONE HYDROCHLORIDE 400 MG: 200 TABLET ORAL at 16:42

## 2024-07-29 RX ADMIN — OMEPRAZOLE 40 MG: 20 CAPSULE, DELAYED RELEASE ORAL at 20:24

## 2024-07-29 RX ADMIN — Medication 400 MG: at 11:54

## 2024-07-29 RX ADMIN — FUROSEMIDE 20 MG: 20 TABLET ORAL at 08:25

## 2024-07-29 RX ADMIN — OMEPRAZOLE 40 MG: 20 CAPSULE, DELAYED RELEASE ORAL at 08:26

## 2024-07-29 RX ADMIN — ATORVASTATIN CALCIUM 40 MG: 40 TABLET, FILM COATED ORAL at 20:24

## 2024-07-29 ASSESSMENT — GAIT ASSESSMENTS
GAIT LEVEL OF ASSIST: CONTACT GUARD ASSIST
DISTANCE (FEET): 50
ASSISTIVE DEVICE: FRONT WHEEL WALKER
DISTANCE (FEET): 30
GAIT LEVEL OF ASSIST: CONTACT GUARD ASSIST
ASSISTIVE DEVICE: FRONT WHEEL WALKER
DEVIATION: STEP TO

## 2024-07-29 ASSESSMENT — ENCOUNTER SYMPTOMS
VOMITING: 0
ABDOMINAL PAIN: 0
DIARRHEA: 0
FEVER: 0
NERVOUS/ANXIOUS: 0
NAUSEA: 0
CHILLS: 0
SHORTNESS OF BREATH: 0

## 2024-07-29 ASSESSMENT — ACTIVITIES OF DAILY LIVING (ADL): BED_CHAIR_WHEELCHAIR_TRANSFER_DESCRIPTION: INITIAL PREPARATION FOR TASK

## 2024-07-29 NOTE — PROGRESS NOTES
"  Physical Medicine & Rehabilitation Progress Note    Encounter Date: 7/29/2024    Chief Complaint: Decreased mobility, weakness    Interval Events (Subjective):  Patient sitting up in room. He reports he is doing well. He reports his wife would like him to go to another facility but he would like to go home. Discussed he should continue discussion with wife and also have her come in to see how well he is doing. Discussed that PT discussed about weight bearing status with surgeon and will not be advanced on WB yet.     _____________________________________  Interdisciplinary Team Conference   Most recent IDT on 7/23/2024    Discharge Date/Disposition:  8/1/24  _____________________________________    Objective:  VITAL SIGNS: /67   Pulse 73   Temp 36.7 °C (98.1 °F) (Oral)   Resp 18   Ht 1.803 m (5' 11\")   Wt 75.2 kg (165 lb 12.8 oz)   SpO2 96%   BMI 23.12 kg/m²   Gen: NAD  Psych: Mood and affect appropriate  CV: RRR, 0 edema  Resp: CTAB, no upper airway sounds  Abd: NTND  Neuro: AOx4, following commands    Laboratory Values:  Recent Results (from the past 72 hour(s))   POCT glucose device results    Collection Time: 07/26/24  4:55 PM   Result Value Ref Range    POC Glucose, Blood 183 (H) 65 - 99 mg/dL   POCT glucose device results    Collection Time: 07/26/24 10:04 PM   Result Value Ref Range    POC Glucose, Blood 181 (H) 65 - 99 mg/dL   CBC WITHOUT DIFFERENTIAL    Collection Time: 07/27/24  5:36 AM   Result Value Ref Range    WBC 9.7 4.8 - 10.8 K/uL    RBC 3.36 (L) 4.70 - 6.10 M/uL    Hemoglobin 8.9 (L) 14.0 - 18.0 g/dL    Hematocrit 29.1 (L) 42.0 - 52.0 %    MCV 86.6 81.4 - 97.8 fL    MCH 26.5 (L) 27.0 - 33.0 pg    MCHC 30.6 (L) 32.3 - 36.5 g/dL    RDW 50.4 (H) 35.9 - 50.0 fL    Platelet Count 461 (H) 164 - 446 K/uL    MPV 9.6 9.0 - 12.9 fL   POTASSIUM SERUM (K)    Collection Time: 07/27/24  5:36 AM   Result Value Ref Range    Potassium 4.2 3.6 - 5.5 mmol/L   OCCULT BLOOD STOOL    Collection Time: " 07/27/24  6:00 AM   Result Value Ref Range    Occult Blood Feces Negative Negative   POCT glucose device results    Collection Time: 07/27/24  7:09 AM   Result Value Ref Range    POC Glucose, Blood 149 (H) 65 - 99 mg/dL   POCT glucose device results    Collection Time: 07/27/24 10:56 AM   Result Value Ref Range    POC Glucose, Blood 199 (H) 65 - 99 mg/dL   POCT glucose device results    Collection Time: 07/27/24  5:07 PM   Result Value Ref Range    POC Glucose, Blood 151 (H) 65 - 99 mg/dL   POCT glucose device results    Collection Time: 07/27/24  9:27 PM   Result Value Ref Range    POC Glucose, Blood 160 (H) 65 - 99 mg/dL   POCT glucose device results    Collection Time: 07/28/24  6:53 AM   Result Value Ref Range    POC Glucose, Blood 173 (H) 65 - 99 mg/dL   POCT glucose device results    Collection Time: 07/28/24 11:03 AM   Result Value Ref Range    POC Glucose, Blood 192 (H) 65 - 99 mg/dL   OCCULT BLOOD STOOL    Collection Time: 07/28/24  5:05 PM   Result Value Ref Range    Occult Blood Feces Negative Negative   POCT glucose device results    Collection Time: 07/28/24  5:11 PM   Result Value Ref Range    POC Glucose, Blood 173 (H) 65 - 99 mg/dL   POCT glucose device results    Collection Time: 07/28/24  8:38 PM   Result Value Ref Range    POC Glucose, Blood 182 (H) 65 - 99 mg/dL   MAGNESIUM    Collection Time: 07/29/24  6:33 AM   Result Value Ref Range    Magnesium 1.2 (L) 1.5 - 2.5 mg/dL   CBC WITH DIFFERENTIAL    Collection Time: 07/29/24  6:33 AM   Result Value Ref Range    WBC 8.6 4.8 - 10.8 K/uL    RBC 3.22 (L) 4.70 - 6.10 M/uL    Hemoglobin 8.3 (L) 14.0 - 18.0 g/dL    Hematocrit 28.0 (L) 42.0 - 52.0 %    MCV 87.0 81.4 - 97.8 fL    MCH 25.8 (L) 27.0 - 33.0 pg    MCHC 29.6 (L) 32.3 - 36.5 g/dL    RDW 51.2 (H) 35.9 - 50.0 fL    Platelet Count 397 164 - 446 K/uL    MPV 9.8 9.0 - 12.9 fL    Neutrophils-Polys 80.40 (H) 44.00 - 72.00 %    Lymphocytes 14.50 (L) 22.00 - 41.00 %    Monocytes 5.10 0.00 - 13.40 %     Eosinophils 0.00 0.00 - 6.90 %    Basophils 0.00 0.00 - 1.80 %    Nucleated RBC 0.00 0.00 - 0.20 /100 WBC    Neutrophils (Absolute) 6.91 1.82 - 7.42 K/uL    Lymphs (Absolute) 1.25 1.00 - 4.80 K/uL    Monos (Absolute) 0.44 0.00 - 0.85 K/uL    Eos (Absolute) 0.00 0.00 - 0.51 K/uL    Baso (Absolute) 0.00 0.00 - 0.12 K/uL    NRBC (Absolute) 0.00 K/uL    Hypochromia 2+ (A)     Microcytosis 2+ (A)    Basic Metabolic Panel    Collection Time: 07/29/24  6:33 AM   Result Value Ref Range    Sodium 136 135 - 145 mmol/L    Potassium 3.9 3.6 - 5.5 mmol/L    Chloride 101 96 - 112 mmol/L    Co2 24 20 - 33 mmol/L    Glucose 250 (H) 65 - 99 mg/dL    Bun 20 8 - 22 mg/dL    Creatinine 0.67 0.50 - 1.40 mg/dL    Calcium 7.7 (L) 8.5 - 10.5 mg/dL    Anion Gap 11.0 7.0 - 16.0   ESTIMATED GFR    Collection Time: 07/29/24  6:33 AM   Result Value Ref Range    GFR (CKD-EPI) 100 >60 mL/min/1.73 m 2   DIFFERENTIAL MANUAL    Collection Time: 07/29/24  6:33 AM   Result Value Ref Range    Manual Diff Status PERFORMED    PERIPHERAL SMEAR REVIEW    Collection Time: 07/29/24  6:33 AM   Result Value Ref Range    Peripheral Smear Review see below    PLATELET ESTIMATE    Collection Time: 07/29/24  6:33 AM   Result Value Ref Range    Plt Estimation Normal    MORPHOLOGY    Collection Time: 07/29/24  6:33 AM   Result Value Ref Range    RBC Morphology Present    POCT glucose device results    Collection Time: 07/29/24  8:03 AM   Result Value Ref Range    POC Glucose, Blood 201 (H) 65 - 99 mg/dL   POCT glucose device results    Collection Time: 07/29/24 11:38 AM   Result Value Ref Range    POC Glucose, Blood 170 (H) 65 - 99 mg/dL       Medications:  Scheduled Medications   Medication Dose Frequency    magnesium oxide  400 mg BID    SITagliptin  75 mg DAILY    amiodarone  400 mg TWICE DAILY    insulin lispro  2-12 Units 4X/DAY ACHS    ferrous sulfate  325 mg BID WITH MEALS    potassium chloride SA  10 mEq DAILY    furosemide  20 mg DAILY    vitamin D3  2,000  Units DAILY    senna-docusate  2 Tablet BID    atorvastatin  40 mg Q EVENING    calcium carbonate  1,000 mg TID WITH MEALS    metFORMIN  1,000 mg BID    omeprazole  40 mg BID     PRN medications: [DISCONTINUED] insulin regular **AND** POC blood glucose manual result **AND** NOTIFY MD and PharmD **AND** Administer 20 grams of glucose (approximately 8 ounces of fruit juice) every 15 minutes PRN FSBG less than 70 mg/dL **AND** dextrose bolus, Respiratory Therapy Consult, hydrALAZINE, acetaminophen, senna-docusate **AND** polyethylene glycol/lytes, docusate sodium, magnesium hydroxide, carboxymethylcellulose, mag hydrox-al hydrox-simeth, ondansetron **OR** ondansetron, traZODone, sodium chloride, oxyCODONE immediate-release **OR** oxyCODONE immediate-release, bisacodyl    Diet:  Current Diet Order   Procedures    Diet Order Diet: Low Fiber(GI Soft); Second Modifier: (optional): Consistent CHO (Diabetic)       Medical Decision Making and Plan:  L CVA -Patient with L parietal CVA in 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 Stroke Ppx has been held due to GI bleed  -PT and OT for mobility and ADLs. Per guidelines, 15 hours per week between PT, OT and/or SLP.  -Follow-up Neurology     HTN/CAD/Mitral stenosis/A fib - Patient on Amiodarone 400 mg BID, Lasix 20 mg daily. Consulted hospitalist. Amiodarone fell off MAR, did not miss dose, restart Amiodarone and discussed with hospitalist. HR into 70s, continue Amiodarone 400 mg and Lasix     MT amputation - Recent at OSH. NWB reportedly on LLE     HLD - Patient on Atorvastatin 40 mg daily     GI bleed - Check AM CBC. Continue PPI BID. Hgb 8.9, consult hospitalist. Repeat 8.3, will monitor     DM2 with hyperglycemia - Patient on Lantus 5 U and SSI. Previously on metformin and Jardiance. Consult hospitalist. Increased Lantus. Started on metformin. Improving sugars, lantus discontinued. Blood sugars 150s and below, continue Metformin 1000 mg BID, Januvia 50 mg (increased) and SSI.  Elevated  blood sugars over weekend, Januvia increased to 75 on 7/29    Anemia - Check AM CBC - 8.9 on admission.      Hypokalemia - Check AM CMP - 3.8, will monitor. Repeat 3.1, started on 20 mEq. Repeat 3.8, improved. Repeat 3.9 on 7/2/9     Hypocalcemia - Check AM CMP - 7.6 on admission, will monitor. Repeat low, started on Ca supplement.     Hypomagnesia - Low on 7/29, started on 400 mg      Pain - Patient on PRN Tylenol/Oxycodone     Skin - Patient at risk for skin breakdown due to debility in areas including sacrum, achilles, elbows and head in addition to other sites. Nursing to assess skin daily.      GI Ppx - Patient on Prilosec for GERD prophylaxis. Patient on Senna-docusate for constipation prophylaxis.      DVT Ppx - Patient not cleared for AC on transfer.     Dispo - Ongoing discussion about safe discharge. Patient would like to go home.   ___________________________________    T. Yordy Salazar MD/PhD  [facility] - Physical Medicine & Rehabilitation   [facility] - Brain Injury Medicine   ____________________________________

## 2024-07-29 NOTE — THERAPY
"Recreational Therapy  Daily Treatment     Patient Name: Pj Freeman  AGE:  69 y.o., SEX:  male  Medical Record #: [id number]  Today's Date: 7/29/2024       Subjective    Shared WBP with this writer at start of session. He reported that \"standing felt good\"     Objective       07/29/24 1101   Procedural Tracking   Procedural Tracking Gross Motor Functional Leisure Skills   Treatment Time   Total Time Spent (mins) 30   Functional Ability Status - Cognitive   Attention Span Remains on Task   Comprehension Follows Three Step Commands   Judgment Able to Make Independent Decisions   Functional Ability Status - Emotional    Affect Appropriate;Bright   Mood Appropriate   Behavior Appropriate   Skilled Intervention    Skilled Intervention Gross Motor Leisure;Relaxation / Coping Skills   Skilled Intervention Comments Standing Checkers   Interdisciplinary Plan of Care Collaboration   Patient Position at End of Therapy Seated;Other (Comments)  (In lunch room)   Strengths & Barriers   Strengths Able to follow instructions;Alert and oriented;Good insight into deficits/needs;Motivated for self care and independence;Pleasant and cooperative;Supportive family;Willingly participates in therapeutic activities   Barriers   (WBP)         Assessment    NWB LLE. Followed these precautions. Stood for 15 minutes x1 with the use of a gait belt and CGA to SBA used. No LOB. Good safety awareness and when sitting took him time and reached back to the WC.     Strengths: (P) Able to follow instructions, Alert and oriented, Good insight into deficits/needs, Motivated for self care and independence, Pleasant and cooperative, Supportive family, Willingly participates in therapeutic activities  Barriers: (P)  (WBP)    Plan    DC    Passport items to be completed:  Verbalize two positive leisure activities, discuss returning to work, hobbies, community groups or volunteer activities, explore community resources   "

## 2024-07-29 NOTE — CARE PLAN
"The patient is Stable - Low risk of patient condition declining or worsening    Shift Goals  Clinical Goals: safety  Patient Goals: safety, pain contro, sleep  Family Goals: Education    Problem: Fall Risk - Rehab  Goal: Patient will remain free from falls  Outcome: Progressing  Note: Taylor Gallego Fall risk Assessment Score: 15    High fall risk Interventions   - Alarming seatbelt  - Bed and strip alarm   - Yellow sign by the door   - Yellow wrist band \"Fall risk\"  - Room near to the nurse station  - Do not leave patient unattended in the bathroom  - Fall risk education provided     Problem: Diabetes Management  Goal: Patient's ability to maintain appropriate glucose levels will be maintained or improve  Outcome: Progressing     Problem: Infection  Goal: Patient will remain free from infection  Outcome: Progressing     Problem: Skin Integrity  Goal: Skin integrity is maintained or improved  Outcome: Progressing     Problem: Pain - Standard  Goal: Alleviation of pain or a reduction in pain to the patient’s comfort goal  Outcome: Progressing     Problem: Mobility  Goal: Patient's capacity to carry out activities will improve  Outcome: Progressing     "

## 2024-07-29 NOTE — THERAPY
"Occupational Therapy  Daily Treatment     Patient Name: Pj Freeman  Age:  69 y.o., Sex:  male  Medical Record #: [id number]  Today's Date: 7/29/2024     Precautions  Precautions: Fall Risk, Non Weight Bearing Left Lower Extremity  Comments: left transmetatarsal amputation         Subjective    \" She doesn't want me to come home.  She's worried I will fall.  She says she can't help me if I fall.  I don't want to  go to the [facility] home.\"  Referring to spouse   \" I think If See them ( the surgeon) on Monday.  They will let me put weight on my foot and put me in a boot.\"       Objective       07/29/24 0702   OT Charge Group   OT Self Care / ADL (Units) 4   OT Total Time Spent   OT Individual Total Time Spent (Mins) 60   Precautions   Precautions Fall Risk;Non Weight Bearing Left Lower Extremity   Comments left transmetatarsal amputation   Functional Level of Assist   Grooming Independent  (seated at sink)   Bathing Supervision  (setup   therapist waterproofed  left foot.   he performed  side to side lean to wash  rinse  buttocks)   Upper Body Dressing Independent  (note  no need for clothing retrieval)   Lower Body Dressing Standby Assist  (clothing retrieval at wheelchair level cues to remove foot rests to get closer to closet .   dons LB clothing with out assist  but does required   SBA and cues for NWB left LE  sit to stand  to Grab bar for FWW  when standing to pull up pants.)   Toileting Standby Assist  (standing for clothing mgmt)   Bed, Chair, Wheelchair Transfer Supervised  (lateral scoot  occasional reminders for w/c setup)   Toilet Transfers Standby Assist  (good adherence to NWB left foot  with transfer to toilet      Fair  transferring back to w/c)   Tub / Shower Transfers Supervised  (scooting transfer  w/c <->  shower bench)   IADL Treatments   Home Management Laundry activity    SBA    cues for NWB  left LE  adherence    fair.   Interdisciplinary Plan of Care Collaboration   Patient Position at End " of Therapy Seated;Chair Alarm On;Self Releasing Lap Belt Applied  (in dining room for  breakfast)   Shower/Bathe Self   Assistance Needed Set-up / clean-up;Supervision;Verbal cues   Physical Assistance Level No physical assistance   CARE Score - Shower/Bathe Self 4   Upper Body Dressing   Assistance Needed Independent   CARE Score - Upper Body Dressing 6   Lower Body Dressing   Assistance Needed Supervision   Physical Assistance Level No physical assistance   CARE Score - Lower Body Dressing 4   Putting On/Taking Off Footwear   Assistance Needed Supervision   Physical Assistance Level No physical assistance   CARE Score - Putting On/Taking Off Footwear 4   Toileting Hygiene   Assistance Needed Independent   Physical Assistance Level No physical assistance   CARE Score - Toileting Hygiene 6   Toilet Transfer   Assistance Needed Supervision   Physical Assistance Level No physical assistance   CARE Score - Toilet Transfer 4         Assessment     Making  gains but continues with difficulty with NWB  left LE   during transitional movements    Particularly  sit to stand.  Cues to put foot out in front of him and off of the floor.  If he  flexes at the knee incision site  touches the floor   and at times  heel weight bears  with is to stands.     Strengths: Able to follow instructions, Alert and oriented, Effective communication skills, Independent prior level of function, Motivated for self care and independence, Pleasant and cooperative, Supportive family, Willingly participates in therapeutic activities  Barriers: Decreased endurance, Generalized weakness, Impaired activity tolerance, Tube feeding, Fatigue    Plan  ADL IADL , related mobility and cognition strength/endurance building standing tolerance and balance activity all incorporating NWB left LE   family training  with spouse prior to d/c          DME  OT DME Recommendations  Bathroom Equipment: 3 in 1 Commode  Additional Equipment: Other (Comments)  (slideboard)      Occupational Therapy Goals (Active)       Problem: Bathing       Dates: Start:  07/18/24         Goal: STG-Within one week, patient will bathe with SBA.       Dates: Start:  07/18/24    Expected End:  08/01/24         Goal Note filed on 07/23/24 0956 by NICKO Lisa.O.T.A.        Requires min assist     Limited by  decreased strength/endurance and  difficulty maintaining NWB  left LE  during standing and sit <-> stands   Continue goal                  Problem: Dressing       Dates: Start:  07/18/24         Goal: STG-Within one week, patient will dress LB with Min A.       Dates: Start:  07/18/24    Expected End:  08/01/24         Goal Note filed on 07/23/24 0956 by NICKO Lisa.O.T.A.       Mod assist   Limited by  decreased strength/endurance and  difficulty maintaining NWB  left LE  during standing and sit <-> stands   Continue goal                  Problem: Functional Transfers       Dates: Start:  07/18/24         Goal: STG-Within one week, patient will transfer to toilet with Min A.       Dates: Start:  07/18/24    Expected End:  08/01/24         Goal Note filed on 07/23/24 0956 by Antolin Juarez, C.O.T.A.       Max assist   Limited by  decreased strength/endurance and  difficulty maintaining NWB  left LE  during standing and sit <-> stands   Continue goal               Goal: STG-Within one week, patient will transfer to tub/shower with Min A.       Dates: Start:  07/18/24    Expected End:  08/01/24         Goal Note filed on 07/23/24 0956 by Antolin Juarez C.O.T.A.         Min assist max cues Limited by  decreased strength/endurance and  difficulty maintaining NWB  left LE  during standing and sit <-> stands  note   has been performing lateral scoot not stand pivot  to and from shower bench    Continue goal                  Problem: OT Long Term Goals       Dates: Start:  07/18/24         Goal: LTG-By discharge, patient will complete basic self care tasks at Mod Independent level.        Dates: Start:  07/18/24    Expected End:  08/01/24            Goal: LTG-By discharge, patient will perform bathroom transfers at Mod Independent level.       Dates: Start:  07/18/24    Expected End:  08/01/24               Problem: Toileting       Dates: Start:  07/18/24         Goal: STG-Within one week, patient will complete toileting tasks with Min A.       Dates: Start:  07/18/24    Expected End:  08/01/24         Goal Note filed on 07/23/24 0956 by Antolin Juarez C.O.T.A.       Max assist   Limited by  decreased strength/endurance and  difficulty maintaining NWB  left LE  during standing and sit <-> stands   Continue goal

## 2024-07-29 NOTE — THERAPY
Speech Language Pathology  Daily Treatment     Patient Name: Pj Freeman  Age:  69 y.o., Sex:  male  Medical Record #: [id number]  Today's Date: 7/29/2024     Precautions  Precautions: Fall Risk, Non Weight Bearing Left Upper Extremity  Comments: L TMA    Subjective    Pt pleasant and cooperative.      Objective       07/29/24 0933   Treatment Charges   SLP Cognitive Skill Development First 15 Minutes 1   SLP Cognitive Skill Development Additional 15 Minutes 1   SLP Total Time Spent   SLP Individual Total Time Spent (Mins) 30         Assessment    Attention task presented with use of car buying activity, pt indep able to answer what would meet most of the requirements however made errors with correct selections of met/not met. Rec review and completion of questions that follow.    Strengths: Able to follow instructions, Alert and oriented, Effective communication skills, Pleasant and cooperative, Motivated for self care and independence, Supportive family, Willingly participates in therapeutic activities  Barriers: Impulsive, Impaired carryover of learning, Impaired functional cognition, Impaired balance (impaired attention.)    Plan    Cont car buying task.    Speech Therapy Problems (Active)       Problem: Speech/Swallowing LTGs       Dates: Start:  07/18/24         Goal: LTG-By discharge, patient will solve complex problems and recall safety training with 90% acc with spv. for safe discharge home.       Dates: Start:  07/18/24    Expected End:  08/01/24

## 2024-07-29 NOTE — THERAPY
Physical Therapy   Daily Treatment     Patient Name: Pj Freeman  Age:  69 y.o., Sex:  male  Medical Record #: [id number]  Today's Date: 7/29/2024     Precautions  Precautions: (P) Fall Risk, Non Weight Bearing Left Upper Extremity  Comments: (P) L TMA    Subjective    Pt greeted in his wc and agreeable to participate in therapy      Objective       07/29/24 1031   PT Charge Group   PT Gait Training (Units) 1   PT Therapeutic Activities (Units) 1   PT Total Time Spent   PT Individual Total Time Spent (Mins) 30   Precautions   Precautions Fall Risk;Non Weight Bearing Left Upper Extremity   Comments L TMA   Gait Functional Level of Assist    Gait Level Of Assist Contact Guard Assist   Assistive Device Front Wheel Walker   Distance (Feet) 30   # of Times Distance was Traveled 2   Deviation   (Single leg hopping)   Wheelchair Functional Level of Assist   Wheelchair Assist Supervised   Distance Wheelchair (Feet or Distance) 250   Wheelchair Description Extra time;Leg rest management;Supervision for safety;Verbal cueing   Transfer Functional Level of Assist   Bed, Chair, Wheelchair Transfer Standby Assist  (pull to  // bars)   Bed Chair Wheelchair Transfer Description Initial preparation for task  (Adheres to NWB LLE)   Bed Mobility    Sit to Stand Standby Assist   Scooting Supervised   Interdisciplinary Plan of Care Collaboration   IDT Collaboration with  Physician   Patient Position at End of Therapy Seated;Call Light within Reach;Tray Table within Reach;Phone within Reach   Collaboration Comments Physician discussed DC planning with pt     STS from W/C x 5   - Verbal cues for anterior lean and hand placement       Assessment    Pt able to adhere to NWB LLE during sit to stand and transfers. Able to perform single leg hopping on RLE using FWW CGA for 30', focusing on increasing hop distance and efficiency. Noted concerns from pt regarding wife's attitude towards d/c home.     Strengths: Able to follow  "instructions, Alert and oriented, Independent prior level of function, Pleasant and cooperative, Willingly participates in therapeutic activities, Motivated for self care and independence  Barriers: Fatigue, Difficulty following instructions, Decreased endurance, Impaired activity tolerance, Limited mobility (limited ability to maintain weightbearing precaution and tends to lose focus/becomes distracted)    Plan    Continue with emphasis on slideboard/lateral scoot vs stand pivot transfers, right LE strengthening, car transfers     DME  PT DME Recommendations  Wheelchair: 16\" Width  Cushion: Standard  Additional Equipment: Other (Comments) (slideboard)    Passport items to be completed:  Get in/out of bed safely, in/out of a vehicle, safely use mobility device, walk or wheel around home/community, navigate up and down stairs, show how to get up/down from the ground, ensure home is accessible, demonstrate HEP, complete caregiver training    Physical Therapy Problems (Active)       Problem: Mobility       Dates: Start:  07/18/24         Goal: STG-Within one week, patient will ambulate 50 feet with kneeling scooter SBA       Dates: Start:  07/18/24    Expected End:  08/01/24            Goal: STG-Within one week, patient will ambulate up/down a curb with FWW CGA       Dates: Start:  07/18/24    Expected End:  08/01/24               Problem: Mobility Transfers       Dates: Start:  07/18/24         Goal: STG-Within one week, patient will sit to stand with CGA       Dates: Start:  07/18/24    Expected End:  08/01/24               Problem: PT-Long Term Goals       Dates: Start:  07/18/24         Goal: LTG-By discharge, patient will ambulate with kneeling scooter >150 feet supervised       Dates: Start:  07/18/24    Expected End:  08/01/24            Goal: LTG-By discharge, patient will transfer one surface to another supervised        Dates: Start:  07/18/24    Expected End:  08/01/24            Goal: LTG-By discharge, " patient will ambulate up/down 4-6 stairs with single railing supervised       Dates: Start:  07/18/24    Expected End:  08/01/24            Goal: LTG-By discharge, patient will transfer in/out of a car supervised       Dates: Start:  07/18/24    Expected End:  08/01/24

## 2024-07-29 NOTE — CARE PLAN
Problem: Diabetes Management  Goal: Patient's ability to maintain appropriate glucose levels will be maintained or improve  Outcome: Progressing     Problem: Fall Risk - Rehab  Goal: Patient will remain free from falls  Outcome: Progressing     The patient is Stable - Low risk of patient condition declining or worsening    Shift Goals  Clinical Goals: safety; DM management  Patient Goals: safety; pain control  Family Goals: Not present

## 2024-07-29 NOTE — WOUND TEAM
[facility]  Inpatient Services  Wound and Skin Care Brief Evaluation    Admission Date: 7/17/2024     Last order of IP CONSULT TO WOUND CARE was found on 7/28/2024 from Hospital Encounter on 7/17/2024     HPI, PMH, SH: Reviewed    No chief complaint on file.    Diagnosis: Left sided cerebral hemisphere cerebrovascular accident (CVA) (HCC) [I63.9]    Unit where seen by Wound Team: RH24/02     Wound consult placed regarding Left Foot. Chart and images reviewed. This discussed with bedside RN Estela. This clinician in to assess patient. Patient pleasant and agreeable. Small amount of serous fluid witnessed at first metatarsel area. Where wood end of cotton tip applicator is pointing in photo is to show a suture that is further (deep) into extremity and not tunneling at this time. Non-selectively debrided with Wound cleanser and Gauze. Orders for wound care placed.     No pressure injuries or advanced wound care needs identified. Wound consult completed. No further follow up unless indicated and consulted.     Wound 07/10/24 Incision Foot Left TMA from [facility] (Active)   Date First Assessed/Time First Assessed: 07/10/24 1546   Present on Original Admission: Yes  Primary Wound Type: Incision  Location: Foot  Laterality: Left  Wound Description (Comments): TMA from [facility]      Assessments 7/29/2024 11:00 AM   Wound Image      Site Assessment Intact   Periwound Assessment Maceration   Margins Attached edges   Closure Sutures   Drainage Amount Small   Drainage Description Serous   Treatments Cleansed;Site care;Offloading   Offloading/DME Offloading Boot   Wound Cleansing Approved Wound Cleanser   Dressing Status Old drainage   Dressing Changed Changed   Dressing Options Dry Roll Gauze;Petrolatum Gauze (yellow);Tape;Telfa   Dressing Change/Treatment Frequency Daily, and As Needed   NEXT Dressing Change/Treatment Date 07/30/24   NEXT Weekly Photo (Inpatient Only) 08/04/24   Wound Team Following  Not following       PREVENTATIVE INTERVENTIONS:    Q shift Rocky - performed per nursing policy  Q shift pressure point assessments - performed per nursing policy    Surface/Positioning  Waffle overlay  - Currently in Place    Offloading/Redistribution  Heels floated with waffle overlay - Currently in Place  Moon Boots - Currently in Place      Mobilization      Working with therapies

## 2024-07-29 NOTE — PROGRESS NOTES
Hospital Medicine Daily Progress Note      Chief Complaint  Diabetes Mellitus    Interval Problem Update  Discussed about his Januvia dose increased today and will continue to follow BS.    Review of Systems  Review of Systems   Constitutional:  Negative for chills and fever.   Respiratory:  Negative for shortness of breath.    Cardiovascular:  Negative for chest pain.   Gastrointestinal:  Negative for abdominal pain, diarrhea, nausea and vomiting.   Psychiatric/Behavioral:  The patient is not nervous/anxious.         Physical Exam  Temp:  [36.7 °C (98 °F)-37.2 °C (98.9 °F)] 36.7 °C (98 °F)  Pulse:  [66-86] 86  Resp:  [16-18] 18  BP: (128-134)/(62-69) 134/68  SpO2:  [94 %-95 %] 94 %    Physical Exam  Vitals and nursing note reviewed.   Constitutional:       Appearance: Normal appearance.   HENT:      Head: Atraumatic.   Eyes:      Conjunctiva/sclera: Conjunctivae normal.      Pupils: Pupils are equal, round, and reactive to light.   Cardiovascular:      Rate and Rhythm: Normal rate and regular rhythm.      Heart sounds: No murmur heard.  Pulmonary:      Effort: Pulmonary effort is normal.      Breath sounds: No stridor. No wheezing or rales.   Abdominal:      General: There is no distension.      Palpations: Abdomen is soft.      Tenderness: There is no abdominal tenderness.   Musculoskeletal:      Cervical back: Normal range of motion and neck supple.      Right lower leg: Edema present.      Left lower leg: Edema present.   Skin:     General: Skin is warm and dry.      Findings: No rash.   Neurological:      Mental Status: He is alert and oriented to person, place, and time.   Psychiatric:         Mood and Affect: Mood normal.         Behavior: Behavior normal.         Fluids    Intake/Output Summary (Last 24 hours) at 7/29/2024 1054  Last data filed at 7/29/2024 0800  Gross per 24 hour   Intake 1380 ml   Output 700 ml   Net 680 ml       Laboratory  Recent Labs     07/27/24  0536 07/29/24  0633   WBC 9.7 8.6   RBC  3.36* 3.22*   HEMOGLOBIN 8.9* 8.3*   HEMATOCRIT 29.1* 28.0*   MCV 86.6 87.0   MCH 26.5* 25.8*   MCHC 30.6* 29.6*   RDW 50.4* 51.2*   PLATELETCT 461* 397   MPV 9.6 9.8       Recent Labs     24  0536 24  0633   SODIUM  --  136   POTASSIUM 4.2 3.9   CHLORIDE  --  101   CO2  --  24   GLUCOSE  --  250*   BUN  --  20   CREATININE  --  0.67   CALCIUM  --  7.7*                     Assessment/Plan  * Acute CVA (cerebrovascular accident) (HCC)- (present on admission)  Assessment & Plan  Was on Plavix and Lipitor  Antiplatelet presently on hold d/t recent GIB    Vitamin D deficiency  Assessment & Plan  Vit D: 17  Cont supplements    A-fib (HCC)  Assessment & Plan  HR ok  Echo EF 60%, RVSP 80 mmHg, severe MS, small pericardial effusion, pleural effusion noted  BNP stable: 5594   CXR: mod right basilar opacity and suspected small pleural effusion  Cont Lasix  K+: 3.9 ()  AC on hold 2nd to recent GIB  Cont to monitor    Hypomagnesemia- (present on admission)  Assessment & Plan  M.2   Will start supplements  Monitor    Type 2 diabetes mellitus (HCC)- (present on admission)  Assessment & Plan  Hba1c 5.4 ()  Cont Metformin  Cont Januvia --> dose increased ()  Note: home meds include Metformin 1000 mg bid, Januvia 100 mg qd, Jardiance 25 mg qd, and Lantus 10 u qhs  Cont to monitor    Acute osteomyelitis of left foot ([facility])- (present on admission)  Assessment & Plan  S/P recent left TMA on 7/3/24 (at [facility])    NSTEMI (non-ST elevated myocardial infarction) ([facility])- (present on admission)  Assessment & Plan  Was on Plavix and Lipitor  Antiplatelet presently on hold d/t recent GIB    Acute blood loss anemia- (present on admission)  Assessment & Plan  Hb: 8.1 --> 8.9 --> 8.3 ()  H&H was wnl prior to adm  Had recent GIB  FOB (-) x 2  Fe 20 and ferritin 67  Cont Fe supplements  Cont to monitor    Upper GI bleed- (present on admission)  Assessment & Plan  Pt presented with syncope  EGD showed a DU x 6 -- s/p clip  x 1 for active bleeding  S/P PRBC x 4 units  Cont PPI bid

## 2024-07-29 NOTE — PROGRESS NOTES
NURSING DAILY NOTE    Name: Pj Freeman   Date of Admission: 7/17/2024   Admitting Diagnosis: Acute CVA (cerebrovascular accident) ([facility])  Attending Physician: Dilia Salazar M.d.  Allergies: Patient has no known allergies.    Safety  Patient Assist  minm  Patient Precautions  Fall Risk, Non Weight Bearing Left Lower Extremity  Precaution Comments  left transmetatarsal amputation  Bed Transfer Status  Minimal Assist  Toilet Transfer Status   Moderate Assist (mod assist for sit to stand with  poor to fair  NWB   left LE)  Assistive Devices  Rails, Wheelchair  Oxygen  None - Room Air  Diet/Therapeutic Dining  Current Diet Order   Procedures    Diet Order Diet: Low Fiber(GI Soft); Second Modifier: (optional): Consistent CHO (Diabetic)     Pill Administration  whole  Agitated Behavioral Scale     ABS Level of Severity       Fall Risk  Has the patient had a fall this admission?   No  Taylor Gallego Fall Risk Scoring  15, HIGH RISK  Fall Risk Safety Measures  bed alarm and chair alarm    Vitals  Temperature: 36.7 °C (98 °F)  Temp src: Oral  Pulse: 86  Respiration: 18  Blood Pressure : 134/68  Blood Pressure MAP (Calculated): 90 MM HG  BP Location: Left, Upper Arm  Patient BP Position: Supine     Oxygen  Pulse Oximetry: 94 %  O2 (LPM): 0  O2 Delivery Device: None - Room Air    Bowel and Bladder  Last Bowel Movement  07/28/24  Stool Type  Type 6: Fluffy pieces with ragged edges, a mushy stool  Bowel Device  Bathroom, Diaper  Continent  Bladder: Continent void   Bowel: Continent movement  Bladder Function  Urine Void (mL): 200 ml  Number of Times Voided: 1  Urine Color: Yellow  Genitourinary Assessment   Bladder Assessment (WDL):  WDL Except  [place] Catheter: Not Applicable  Urine Color: Yellow  Bladder Device: Urinal, Bathroom  Time Void: Yes  Bladder Scan: Post Void  $ Bladder Scan Results (mL): 180  Bladder Medications: No    Skin  Rocky Score    17  Sensory Interventions   Bed Types: Standard/Trauma Mattress  Skin Preventative Measures: Pillows in Use for Support / Positioning  Moisture Interventions  Moisturizers/Barriers: Barrier Wipes      Pain  Pain Rating Scale  6 - Hard to ignore, avoid usual activities  Pain Location  Leg  Pain Location Orientation  Right  Pain Interventions   Medication (see MAR)    ADLs    Bathing   Staff, Shower (OT Shower)  Linen Change      Personal Hygiene  Samia Bottle, Moist Samia Wipes  Chlorhexidine Bath      Oral Care  Brushed Teeth  Teeth/Dentures     Shave     Nutrition Percentage Eaten  *  * Meal *  *, Dinner, Between % Consumed  Environmental Precautions  Treaded Slipper Socks on Patient, Bed in Low Position  Patient Turns/Positioning  Patient Turns Self from Side to Side  Patient Turns Assistance/Tolerance     Bed Positions  Bed Controls On, Bed Locked  Head of Bed Elevated  Self regulated      Psychosocial/Neurologic Assessment  Psychosocial Assessment  Psychosocial (WDL):  Within Defined Limits  Patient Behaviors: Fatigue  Family Behaviors: No Family Present  Neurologic Assessment  Neuro (WDL): Within Defined Limits  Level of Consciousness: Alert  Orientation Level: Oriented X4  Cognition: Follows commands  Speech: Clear  EENT (WDL):  WDL Except    Cardio/Pulmonary Assessment  Edema   RLE Edema: 2+, Generalized  LLE Edema: 2+, Generalized  Respiratory Breath Sounds     Cardiac Assessment   Cardiac (WDL):  WDL Except (hx htn, pvd, afib, mitral valve stenosis)

## 2024-07-29 NOTE — THERAPY
"Physical Therapy   Daily Treatment     Patient Name: Pj Freeman  Age:  69 y.o., Sex:  male  Medical Record #: [id number]  Today's Date: 7/29/2024     Precautions  Precautions: Fall Risk, Non Weight Bearing Left Lower Extremity  Comments: L TMA    Subjective    Pt agreeable to PT    \"My wife doesn't think she can help me if I fall at home\"     Objective       07/29/24 1301   PT Charge Group   PT Gait Training (Units) 1   PT Therapeutic Exercise (Units) 1   PT Neuromuscular Re-Education / Balance (Units) 1   PT Therapeutic Activities (Units) 1   Supervising Physical Therapist Bibiana Bhatti   PT Total Time Spent   PT Individual Total Time Spent (Mins) 60   Precautions   Precautions Fall Risk;Non Weight Bearing Left Lower Extremity   Comments L TMA   Gait Functional Level of Assist    Gait Level Of Assist Contact Guard Assist   Assistive Device Front Wheel Walker   Distance (Feet) 50  (standing rest break every 15')   # of Times Distance was Traveled 2   Deviation Step To  (NWB L LE)   Wheelchair Functional Level of Assist   Wheelchair Assist Supervised   Distance Wheelchair (Feet or Distance) 150   Wheelchair Description Extra time;Leg rest management;Supervision for safety   Standing Lower Body Exercises   Standing Lower Body Exercises Yes   Hamstring Curl 2 sets of 10;Left   Hip Flexion 2 sets of 10;Left   Hip Abduction 2 sets of 10;Left   Heel Rise 2 sets of 10;Right   Comments in //   Bed Mobility    Sit to Stand Minimal Assist  (from wc <> FWW)   Neuro-Muscular Treatments   Neuro-Muscular Treatments Anterior weight shift;Postural Facilitation   Comments s/d standing in // pt participated in various activities including bean bag toss, corn hole and ladderball using R UE on // and L UE to toss/throw   Interdisciplinary Plan of Care Collaboration   IDT Collaboration with  Certified O.T. Assistant  (DUDLEY)   Patient Position at End of Therapy Seated;Chair Alarm On;Call Light within Reach   Collaboration Comments " "CLOF       Edu on reducing fall risk, working on progression of floor transfer techniques, primary wc level to dec risk for falls.     Assessment    Pt tolerated tx session well focusing on progression of standing tolerance, balance with single UE support, and amb with FWW. Pt standing tolerance up to 5 minutes over 4 separate bouts during activities in // Needed assist to come to stand from wc <> FWW vc for sequencing/technique/hand placement. Remains 90% compliant with wbing status with STS transitions and 100% compliant with amb  Strengths: Able to follow instructions, Alert and oriented, Independent prior level of function, Pleasant and cooperative, Willingly participates in therapeutic activities, Motivated for self care and independence  Barriers: Fatigue, Difficulty following instructions, Decreased endurance, Impaired activity tolerance, Limited mobility (limited ability to maintain weightbearing precaution and tends to lose focus/becomes distracted)    Plan    Continue with emphasis on slideboard/lateral scoot vs stand pivot transfers, right LE strengthening, car transfers     DME  PT DME Recommendations  Wheelchair: 16\" Width  Cushion: Standard  Additional Equipment: Other (Comments) (slideboard)    Passport items to be completed:  Get in/out of bed safely, in/out of a vehicle, safely use mobility device, walk or wheel around home/community, navigate up and down stairs, show how to get up/down from the ground, ensure home is accessible, demonstrate HEP, complete caregiver training    Physical Therapy Problems (Active)       Problem: Mobility       Dates: Start:  07/18/24         Goal: STG-Within one week, patient will ambulate 50 feet with kneeling scooter SBA       Dates: Start:  07/18/24    Expected End:  08/01/24            Goal: STG-Within one week, patient will ambulate up/down a curb with FWW CGA       Dates: Start:  07/18/24    Expected End:  08/01/24               Problem: Mobility Transfers       " Dates: Start:  07/18/24         Goal: STG-Within one week, patient will sit to stand with CGA       Dates: Start:  07/18/24    Expected End:  08/01/24               Problem: PT-Long Term Goals       Dates: Start:  07/18/24         Goal: LTG-By discharge, patient will ambulate with kneeling scooter >150 feet supervised       Dates: Start:  07/18/24    Expected End:  08/01/24            Goal: LTG-By discharge, patient will transfer one surface to another supervised        Dates: Start:  07/18/24    Expected End:  08/01/24            Goal: LTG-By discharge, patient will ambulate up/down 4-6 stairs with single railing supervised       Dates: Start:  07/18/24    Expected End:  08/01/24            Goal: LTG-By discharge, patient will transfer in/out of a car supervised       Dates: Start:  07/18/24    Expected End:  08/01/24

## 2024-07-29 NOTE — DISCHARGE PLANNING
[address] from pt's wife, Kathy; she reports her  is not in agreement w/ SNF. I informed her we can discuss SNF but we cannot force pt to go to a SNF.  Informed her that w/ pt's Medicare, he has 30 days to utilize his Medicare benefit to  be admitted to a SNF.    Also, I called Dr Hagan Podiatrist in [place] @ [phone] re scheduling follow up appt re weight bearing status. Not available; left message.     Discussed w/ Dr Salazar also.     ADDENDUM:  TC  back for Khalida @ Dr Hagan's office; she discussed w APRN at office; pt needs to remain NON WEIGHT BEARING STATUS on left lower extremity; she is requesting another photo be sent to their office end of week for APRN to review.

## 2024-07-30 ENCOUNTER — APPOINTMENT (OUTPATIENT)
Dept: OCCUPATIONAL THERAPY | Facility: REHABILITATION | Age: 69
DRG: 056 | End: 2024-07-30
Attending: PHYSICAL MEDICINE & REHABILITATION
Payer: MEDICARE

## 2024-07-30 ENCOUNTER — APPOINTMENT (OUTPATIENT)
Dept: SPEECH THERAPY | Facility: REHABILITATION | Age: 69
DRG: 056 | End: 2024-07-30
Attending: PHYSICAL MEDICINE & REHABILITATION
Payer: MEDICARE

## 2024-07-30 ENCOUNTER — APPOINTMENT (OUTPATIENT)
Dept: PHYSICAL THERAPY | Facility: REHABILITATION | Age: 69
DRG: 056 | End: 2024-07-30
Attending: PHYSICAL MEDICINE & REHABILITATION
Payer: MEDICARE

## 2024-07-30 LAB
GLUCOSE BLD STRIP.AUTO-MCNC: 153 MG/DL (ref 65–99)
GLUCOSE BLD STRIP.AUTO-MCNC: 196 MG/DL (ref 65–99)
GLUCOSE BLD STRIP.AUTO-MCNC: 240 MG/DL (ref 65–99)

## 2024-07-30 PROCEDURE — 99232 SBSQ HOSP IP/OBS MODERATE 35: CPT | Performed by: HOSPITALIST

## 2024-07-30 PROCEDURE — 97530 THERAPEUTIC ACTIVITIES: CPT | Mod: CO

## 2024-07-30 PROCEDURE — 97129 THER IVNTJ 1ST 15 MIN: CPT

## 2024-07-30 PROCEDURE — 770010 HCHG ROOM/CARE - REHAB SEMI PRIVAT*

## 2024-07-30 PROCEDURE — 700102 HCHG RX REV CODE 250 W/ 637 OVERRIDE(OP): Performed by: HOSPITALIST

## 2024-07-30 PROCEDURE — 97110 THERAPEUTIC EXERCISES: CPT | Mod: CO

## 2024-07-30 PROCEDURE — A9270 NON-COVERED ITEM OR SERVICE: HCPCS | Performed by: PHYSICAL MEDICINE & REHABILITATION

## 2024-07-30 PROCEDURE — A9270 NON-COVERED ITEM OR SERVICE: HCPCS | Performed by: HOSPITALIST

## 2024-07-30 PROCEDURE — 97130 THER IVNTJ EA ADDL 15 MIN: CPT

## 2024-07-30 PROCEDURE — 97110 THERAPEUTIC EXERCISES: CPT | Mod: CQ

## 2024-07-30 PROCEDURE — 700102 HCHG RX REV CODE 250 W/ 637 OVERRIDE(OP): Performed by: PHYSICAL MEDICINE & REHABILITATION

## 2024-07-30 PROCEDURE — 97535 SELF CARE MNGMENT TRAINING: CPT | Mod: CO

## 2024-07-30 PROCEDURE — 82962 GLUCOSE BLOOD TEST: CPT | Mod: 91

## 2024-07-30 PROCEDURE — 97530 THERAPEUTIC ACTIVITIES: CPT

## 2024-07-30 RX ADMIN — OMEPRAZOLE 40 MG: 20 CAPSULE, DELAYED RELEASE ORAL at 08:07

## 2024-07-30 RX ADMIN — INSULIN LISPRO 4 UNITS: 100 INJECTION, SOLUTION INTRAVENOUS; SUBCUTANEOUS at 11:33

## 2024-07-30 RX ADMIN — SITAGLIPTIN 75 MG: 50 TABLET, FILM COATED ORAL at 08:06

## 2024-07-30 RX ADMIN — AMIODARONE HYDROCHLORIDE 400 MG: 200 TABLET ORAL at 16:56

## 2024-07-30 RX ADMIN — FERROUS SULFATE TAB 325 MG (65 MG ELEMENTAL FE) 325 MG: 325 (65 FE) TAB at 08:07

## 2024-07-30 RX ADMIN — OMEPRAZOLE 40 MG: 20 CAPSULE, DELAYED RELEASE ORAL at 21:02

## 2024-07-30 RX ADMIN — CALCIUM CARBONATE (ANTACID) CHEW TAB 500 MG 1000 MG: 500 CHEW TAB at 08:07

## 2024-07-30 RX ADMIN — AMIODARONE HYDROCHLORIDE 400 MG: 200 TABLET ORAL at 05:22

## 2024-07-30 RX ADMIN — INSULIN LISPRO 2 UNITS: 100 INJECTION, SOLUTION INTRAVENOUS; SUBCUTANEOUS at 17:00

## 2024-07-30 RX ADMIN — Medication 2000 UNITS: at 08:07

## 2024-07-30 RX ADMIN — Medication 400 MG: at 08:07

## 2024-07-30 RX ADMIN — INSULIN LISPRO 4 UNITS: 100 INJECTION, SOLUTION INTRAVENOUS; SUBCUTANEOUS at 21:03

## 2024-07-30 RX ADMIN — METFORMIN HYDROCHLORIDE 1000 MG: 500 TABLET ORAL at 16:56

## 2024-07-30 RX ADMIN — Medication 400 MG: at 21:02

## 2024-07-30 RX ADMIN — METFORMIN HYDROCHLORIDE 1000 MG: 500 TABLET ORAL at 08:07

## 2024-07-30 RX ADMIN — FUROSEMIDE 20 MG: 20 TABLET ORAL at 08:07

## 2024-07-30 RX ADMIN — CALCIUM CARBONATE (ANTACID) CHEW TAB 500 MG 1000 MG: 500 CHEW TAB at 11:38

## 2024-07-30 RX ADMIN — ATORVASTATIN CALCIUM 40 MG: 40 TABLET, FILM COATED ORAL at 21:02

## 2024-07-30 RX ADMIN — FERROUS SULFATE TAB 325 MG (65 MG ELEMENTAL FE) 325 MG: 325 (65 FE) TAB at 16:56

## 2024-07-30 RX ADMIN — SENNOSIDES AND DOCUSATE SODIUM 2 TABLET: 50; 8.6 TABLET ORAL at 08:07

## 2024-07-30 RX ADMIN — INSULIN LISPRO 2 UNITS: 100 INJECTION, SOLUTION INTRAVENOUS; SUBCUTANEOUS at 08:12

## 2024-07-30 RX ADMIN — POTASSIUM CHLORIDE 10 MEQ: 1500 TABLET, EXTENDED RELEASE ORAL at 08:07

## 2024-07-30 RX ADMIN — SENNOSIDES AND DOCUSATE SODIUM 2 TABLET: 50; 8.6 TABLET ORAL at 21:02

## 2024-07-30 RX ADMIN — CALCIUM CARBONATE (ANTACID) CHEW TAB 500 MG 1000 MG: 500 CHEW TAB at 16:56

## 2024-07-30 ASSESSMENT — PATIENT HEALTH QUESTIONNAIRE - PHQ9
2. FEELING DOWN, DEPRESSED, IRRITABLE, OR HOPELESS: NOT AT ALL
1. LITTLE INTEREST OR PLEASURE IN DOING THINGS: NOT AT ALL
SUM OF ALL RESPONSES TO PHQ9 QUESTIONS 1 AND 2: 0

## 2024-07-30 ASSESSMENT — ENCOUNTER SYMPTOMS
POLYDIPSIA: 0
COUGH: 0
PALPITATIONS: 0
SHORTNESS OF BREATH: 0
EYES NEGATIVE: 1
BRUISES/BLEEDS EASILY: 0
FEVER: 0
NAUSEA: 0
VOMITING: 0
CHILLS: 0

## 2024-07-30 ASSESSMENT — GAIT ASSESSMENTS
DISTANCE (FEET): 10
DEVIATION: STEP TO
GAIT LEVEL OF ASSIST: CONTACT GUARD ASSIST
ASSISTIVE DEVICE: FRONT WHEEL WALKER

## 2024-07-30 ASSESSMENT — ACTIVITIES OF DAILY LIVING (ADL): BED_CHAIR_WHEELCHAIR_TRANSFER_DESCRIPTION: SUPERVISION FOR SAFETY;VERBAL CUEING

## 2024-07-30 NOTE — CARE PLAN
Problem: Bathing  Goal: STG-Within one week, patient will bathe with SBA.  Outcome: Met     Problem: Dressing  Goal: STG-Within one week, patient will dress LB with Min A.  Outcome: Met     Problem: Toileting  Goal: STG-Within one week, patient will complete toileting tasks with Min A.  Outcome: Met     Problem: Functional Transfers  Goal: STG-Within one week, patient will transfer to toilet with Min A.  Outcome: Met

## 2024-07-30 NOTE — CARE PLAN
Problem: Wound/ / Incision Healing  Goal: Patient's wound/surgical incision will decrease in size and heals properly  Outcome: Progressing     Problem: Pain - Standard  Goal: Alleviation of pain or a reduction in pain to the patient’s comfort goal  Outcome: Progressing     Problem: Diabetes Management  Goal: Patient's ability to maintain appropriate glucose levels will be maintained or improve  Outcome: Progressing     The patient is Stable - Low risk of patient condition declining or worsening    Shift Goals  Clinical Goals: safety; Diabetes management  Patient Goals: safety  Family Goals: Not present

## 2024-07-30 NOTE — CARE PLAN
Problem: Speech/Swallowing LTGs  Goal: LTG-By discharge, patient will solve complex problems and recall safety training with 90% acc with spv. for safe discharge home.  Outcome: Not Met

## 2024-07-30 NOTE — PROGRESS NOTES
..                                                         NURSING DAILY NOTE    Name: Pj Freeman   Date of Admission: 7/17/2024   Admitting Diagnosis: Acute CVA (cerebrovascular accident) ([facility])  Attending Physician: Dilia Salazar M.d.  Allergies: Patient has no known allergies.    Safety  Patient Assist  minm  Patient Precautions  Fall Risk, Non Weight Bearing Left Lower Extremity  Precaution Comments  L TMA  Bed Transfer Status  Standby Assist (pull to  // bars)  Toilet Transfer Status   Standby Assist (good adherence to NWB left foot  with transfer to toilet      Fair  transferring back to w/c)  Assistive Devices  Rails, Wheelchair  Oxygen  None - Room Air  Diet/Therapeutic Dining  Current Diet Order   Procedures    Diet Order Diet: Low Fiber(GI Soft); Second Modifier: (optional): Consistent CHO (Diabetic)     Pill Administration  whole  Agitated Behavioral Scale     ABS Level of Severity       Fall Risk  Has the patient had a fall this admission?   No  Taylor Gallego Fall Risk Scoring  16, HIGH RISK  Fall Risk Safety Measures  bed alarm, chair alarm, and poor balance    Vitals  Temperature: 36.8 °C (98.2 °F)  Temp src: Temporal  Pulse: 72  Respiration: 18  Blood Pressure : (!) 147/70  Blood Pressure MAP (Calculated): 96 MM HG  BP Location: Left, Upper Arm  Patient BP Position: Supine     Oxygen  Pulse Oximetry: 93 %  O2 (LPM): 0  O2 Delivery Device: None - Room Air    Bowel and Bladder  Last Bowel Movement  07/29/24 (per patient)  Stool Type  Type 6: Fluffy pieces with ragged edges, a mushy stool  Bowel Device  Bathroom, Diaper  Continent  Bladder: Continent void   Bowel: Continent movement  Bladder Function  Urine Void (mL): 250 ml  Number of Times Voided: 1  Urine Color: Lynne  Genitourinary Assessment   Bladder Assessment (WDL):  Within Defined Limits  [place] Catheter: Not Applicable  Urine Color: Lynne  Bladder Device: Urinal  Time Void: Yes  Bladder Scan: Post Void  $ Bladder Scan  Results (mL): 180  Bladder Medications: No    Skin  Rocky Score   18  Sensory Interventions   Bed Types: Standard/Trauma Mattress  Skin Preventative Measures: Pillows in Use for Support / Positioning  Moisture Interventions  Moisturizers/Barriers: Barrier Wipes      Pain  Pain Rating Scale  7 - Focus of attention, prevents doing daily activities  Pain Location  Foot  Pain Location Orientation  Right  Pain Interventions   Medication (see MAR)    ADLs    Bathing    (OT Shower)  Linen Change      Personal Hygiene  Samia Bottle, Moist Samia Wipes  Chlorhexidine Bath      Oral Care  Brushed Teeth  Teeth/Dentures     Shave     Nutrition Percentage Eaten  Dinner, Between % Consumed  Environmental Precautions  Treaded Slipper Socks on Patient  Patient Turns/Positioning  Patient Turns Self from Side to Side  Patient Turns Assistance/Tolerance     Bed Positions  Bed Controls On, Bed Locked  Head of Bed Elevated  Self regulated      Psychosocial/Neurologic Assessment  Psychosocial Assessment  Psychosocial (WDL):  Within Defined Limits  Patient Behaviors: Fatigue  Family Behaviors: No Family Present  Neurologic Assessment  Neuro (WDL): Within Defined Limits  Level of Consciousness: Alert  Orientation Level: Oriented X4  Cognition: Follows commands  Speech: Clear  EENT (WDL):  WDL Except    Cardio/Pulmonary Assessment  Edema   RLE Edema: 1+, Pitting  LLE Edema: 2+, Pitting  Respiratory Breath Sounds     Cardiac Assessment   Cardiac (WDL):  WDL Except (hx htn, pvd, afib, mitral valve stenosis)

## 2024-07-30 NOTE — PROGRESS NOTES
NURSING DAILY NOTE    Name: Pj Freeman   Date of Admission: 7/17/2024   Admitting Diagnosis: Acute CVA (cerebrovascular accident) ([facility])  Attending Physician: Dilia Salazar M.d.  Allergies: Patient has no known allergies.    Safety  Patient Assist  minm  Patient Precautions  Fall Risk, Non Weight Bearing Left Lower Extremity  Precaution Comments  L TMA  Bed Transfer Status  Standby Assist (pull to  // bars)  Toilet Transfer Status   Standby Assist (good adherence to NWB left foot  with transfer to toilet      Fair  transferring back to w/c)  Assistive Devices  Rails, Wheelchair  Oxygen  None - Room Air  Diet/Therapeutic Dining  Current Diet Order   Procedures    Diet Order Diet: Low Fiber(GI Soft); Second Modifier: (optional): Consistent CHO (Diabetic)     Pill Administration  whole  Agitated Behavioral Scale     ABS Level of Severity       Fall Risk  Has the patient had a fall this admission?   No  Taylor Gallego Fall Risk Scoring  15, HIGH RISK  Fall Risk Safety Measures  bed alarm, chair alarm, and poor balance    Vitals  Temperature: 36.9 °C (98.4 °F)  Temp src: Oral  Pulse: 72  Respiration: 18  Blood Pressure : 136/65  Blood Pressure MAP (Calculated): 89 MM HG  BP Location: Left, Upper Arm  Patient BP Position: Supine     Oxygen  Pulse Oximetry: 96 %  O2 (LPM): 0  O2 Delivery Device: None - Room Air    Bowel and Bladder  Last Bowel Movement  07/28/24  Stool Type  Type 6: Fluffy pieces with ragged edges, a mushy stool  Bowel Device  Bathroom, Diaper  Continent  Bladder: Continent void   Bowel: Continent movement  Bladder Function  Urine Void (mL): 375 ml  Number of Times Voided: 1  Urine Color: Lynne  Genitourinary Assessment   Bladder Assessment (WDL):  WDL Except  [place] Catheter: Not Applicable  Urine Color: Lynne  Bladder Device: Urinal  Time Void: Yes  Bladder Scan: Post Void  $ Bladder Scan Results (mL): 180  Bladder  Medications: No    Skin  Rocky Score   17  Sensory Interventions   Bed Types: Standard/Trauma Mattress  Skin Preventative Measures: Pillows in Use for Support / Positioning  Moisture Interventions  Moisturizers/Barriers: Barrier Wipes      Pain  Pain Rating Scale  0 - No Pain  Pain Location  Leg  Pain Location Orientation  Right  Pain Interventions   Declines    ADLs    Bathing    (OT Shower)  Linen Change      Personal Hygiene  Saima Bottle, Moist Samia Wipes  Chlorhexidine Bath      Oral Care  Brushed Teeth  Teeth/Dentures     Shave     Nutrition Percentage Eaten  Dinner, Between % Consumed  Environmental Precautions  Treaded Slipper Socks on Patient  Patient Turns/Positioning  Patient Turns Self from Side to Side  Patient Turns Assistance/Tolerance     Bed Positions  Bed Controls On, Bed Locked  Head of Bed Elevated  Self regulated      Psychosocial/Neurologic Assessment  Psychosocial Assessment  Psychosocial (WDL):  Within Defined Limits  Patient Behaviors: Fatigue  Family Behaviors: No Family Present  Neurologic Assessment  Neuro (WDL): Within Defined Limits  Level of Consciousness: Alert  Orientation Level: Oriented X4  Cognition: Follows commands  Speech: Clear  EENT (WDL):  WDL Except    Cardio/Pulmonary Assessment  Edema   RLE Edema: 2+, Generalized  LLE Edema: 2+, Generalized  Respiratory Breath Sounds     Cardiac Assessment   Cardiac (WDL):  WDL Except (hx htn, pvd, afib, mitral valve stenosis)

## 2024-07-30 NOTE — THERAPY
Occupational Therapy  Daily Treatment     Patient Name: Pj Freeman  Age:  69 y.o., Sex:  male  Medical Record #: [id number]  Today's Date: 7/30/2024     Precautions  Precautions: (P) Fall Risk, Non Weight Bearing Left Lower Extremity  Comments: L TMA  Documentation reflects 2 tx sessions          Subjective     Continues to  express concerns regarding spouse not wanting him to return home           Objective/Assessment       07/30/24 0833 07/30/24 1001   OT Charge Group   OT Self Care / ADL (Units) 1  --    OT Therapy Activity (Units)  --  2   OT Therapeutic Exercise (Units) 1  --    OT Total Time Spent   OT Individual Total Time Spent (Mins) 30 30   Precautions   Precautions Fall Risk;Non Weight Bearing Left Lower Extremity Fall Risk;Non Weight Bearing Left Lower Extremity   Functional Level of Assist   Grooming Independent  (seated at sink)  --    Toileting Standby Assist  --    Toilet Transfers Standby Assist  --    Sitting Upper Body Exercises   Chest Press  --  3 sets of 10;Bilateral  (weighted pulley 10lbs)   Interdisciplinary Plan of Care Collaboration   Patient Position at End of Therapy Seated;Call Light within Reach;Tray Table within Reach;Self Releasing Lap Belt Applied Seated;Call Light within Reach;Tray Table within Reach;Self Releasing Lap Belt Applied     Session 2   blocked practice for  sit to stands from w/c   up to FWW while  maintaining  NWB   left LE     Initially requiring  min assist progressing to CGA   with max cues  for technique   one hand on w/c  arm rest one on the walker.        and therapist  holding   patients left LE up off floor with his own LE.   Performed  5 times.      W/c push ups  x 5 reps   2 reps  therapist propping left foot up   3 reps  required no input from therapist to  keep foot off floor  and up in front of him.         Sit to stand  again  x 5 reps   this time pushing off the w/c with both hands     reminders to keep feet apart and not  bend his  left knee       progressed from CGA to SBA    no input  or need for therapist to  prop left LE up with his own LE    Strengths: Able to follow instructions, Alert and oriented, Effective communication skills, Independent prior level of function, Motivated for self care and independence, Pleasant and cooperative, Supportive family, Willingly participates in therapeutic activities  Barriers: Decreased endurance, Generalized weakness, Impaired activity tolerance, Tube feeding, Fatigue    Plan     ADL routine in AM  family training   prior to d/c if spouse is agreeable     DME  OT DME Recommendations  Bathroom Equipment: 3 in 1 Commode  Additional Equipment: Other (Comments) (slideboard)        Occupational Therapy Goals (Active)       Problem: Functional Transfers       Dates: Start:  07/18/24         Goal: STG-Within one week, patient will transfer to tub/shower with Min A.       Dates: Start:  07/18/24    Expected End:  08/01/24         Goal Note filed on 07/23/24 0956 by Antolin Juarez, C.O.T.A.         Min assist max cues Limited by  decreased strength/endurance and  difficulty maintaining NWB  left LE  during standing and sit <-> stands  note   has been performing lateral scoot not stand pivot  to and from shower bench    Continue goal                  Problem: OT Long Term Goals       Dates: Start:  07/18/24         Goal: LTG-By discharge, patient will complete basic self care tasks at Mod Independent level.       Dates: Start:  07/18/24    Expected End:  08/01/24            Goal: LTG-By discharge, patient will perform bathroom transfers at Mod Independent level.       Dates: Start:  07/18/24    Expected End:  08/01/24

## 2024-07-30 NOTE — THERAPY
"Physical Therapy   Daily Treatment     Patient Name: Pj Freeman  Age:  69 y.o., Sex:  male  Medical Record #: [id number]  Today's Date: 7/30/2024     Precautions  Precautions: (P) Fall Risk, Non Weight Bearing Left Lower Extremity  Comments: (P) left transmetatarsal amputation    Subjective    Patient reports that his wife still has reservations about DC home next week      Objective       07/30/24 1301   PT Charge Group   PT Therapeutic Activities (Units) 2   PT Total Time Spent   PT Individual Total Time Spent (Mins) 30   Precautions   Precautions Fall Risk;Non Weight Bearing Left Lower Extremity   Comments left transmetatarsal amputation   Gait Functional Level of Assist    Gait Level Of Assist Contact Guard Assist   Assistive Device Front Wheel Walker   Distance (Feet) 10   # of Times Distance was Traveled 3   Deviation Step To   Wheelchair Functional Level of Assist   Wheelchair Assist Modified Independent   Distance Wheelchair (Feet or Distance) 150   Stairs Functional Level of Assist   Level of Assist with Stairs Contact Guard Assist   # of Stairs Climbed 1   Stairs Description Hand rails  (4 inch step in bars 6 reps)   Transfer Functional Level of Assist   Bed, Chair, Wheelchair Transfer Contact Guard Assist   Bed Mobility    Supine to Sit Independent   Sit to Supine Independent   Sit to Stand Minimal Assist  (from standard height chair)   Scooting Independent   Rolling Independent   Interdisciplinary Plan of Care Collaboration   IDT Collaboration with  Occupational Therapist   Patient Position at End of Therapy Seated;Chair Alarm On   Collaboration Comments DC plan   PT DME Recommendations   Wheelchair 16\" Width   Cushion Standard   Assistive Device Front Wheeled Walker     Spent majority of session practicing lateral scoot transfer, stand pivot with FWW and sit to stand - all with emphasis on maintenance of left LE NWB  Further discussion regarding equipment needs at DC and need for ramp installation " "ASAP. Otherwise - 2nd person will need to pull wheelchair up stairs     Assessment    Patient is doing a much better job at maintaining NWB on left LE, still requires a variable minimal assist if fatigued or from lower surfaces     Strengths: Able to follow instructions, Alert and oriented, Independent prior level of function, Pleasant and cooperative, Willingly participates in therapeutic activities, Motivated for self care and independence  Barriers: Fatigue, Difficulty following instructions, Decreased endurance, Impaired activity tolerance, Limited mobility (limited ability to maintain weightbearing precaution and tends to lose focus/becomes distracted)    Plan    Please practice NWB lateral scoot and stand pivot with FWW transfers, attempt using kneeling scooter again for mobility, practice car transfers     DME  PT DME Recommendations  Wheelchair: (P) 16\" Width  Cushion: (P) Standard  Assistive Device: (P) Front Wheeled Walker  Additional Equipment: Other (Comments) (slideboard)    Passport items to be completed:  Get in/out of bed safely, in/out of a vehicle, safely use mobility device, walk or wheel around home/community, navigate up and down stairs, show how to get up/down from the ground, ensure home is accessible, demonstrate HEP, complete caregiver training    Physical Therapy Problems (Active)       Problem: Mobility       Dates: Start:  07/18/24         Goal: STG-Within one week, patient will ambulate 50 feet with kneeling scooter SBA       Dates: Start:  07/18/24    Expected End:  08/01/24            Goal: STG-Within one week, patient will ambulate up/down a curb with FWW CGA       Dates: Start:  07/18/24    Expected End:  08/01/24               Problem: Mobility Transfers       Dates: Start:  07/18/24         Goal: STG-Within one week, patient will sit to stand with CGA       Dates: Start:  07/18/24    Expected End:  08/01/24               Problem: PT-Long Term Goals       Dates: Start:  07/18/24    "      Goal: LTG-By discharge, patient will ambulate with kneeling scooter >150 feet supervised       Dates: Start:  07/18/24    Expected End:  08/01/24            Goal: LTG-By discharge, patient will transfer one surface to another supervised        Dates: Start:  07/18/24    Expected End:  08/01/24            Goal: LTG-By discharge, patient will ambulate up/down 4-6 stairs with single railing supervised       Dates: Start:  07/18/24    Expected End:  08/01/24            Goal: LTG-By discharge, patient will transfer in/out of a car supervised       Dates: Start:  07/18/24    Expected End:  08/01/24

## 2024-07-30 NOTE — PROGRESS NOTES
Physical Medicine & Rehabilitation Progress Note    Encounter Date: 7/30/2024    Chief Complaint: Decreased mobility, weakness    Interval Events (Subjective):  Patient sitting up in room. He reports he is doing OK. Denies pain at rest. He reports he thinks he can still go home. Discussed would have IDT later today to discuss. He now has a follow-up with surgeon for his amputation..     _____________________________________  Interdisciplinary Team Conference   Most recent IDT on 7/30/2024    IDilia M.D./Ph.D., was present and led the interdisciplinary team conference on 7/30/2024.  I led the IDT conference and agree with the IDT conference documentation and plan of care as noted below.     Nursing:  Diet Current Diet Order   Procedures    Diet Order Diet: Low Fiber(GI Soft); Second Modifier: (optional): Consistent CHO (Diabetic)       Eating ADL Independent      % of Last Meal  Oral Nutrition: Lunch, Between % Consumed   Sleep    Bowel Last BM: 07/29/24 (per patient)    Bladder Continent   Barriers to Discharge Home:  Mild pain  Daily incision changes    Physical Therapy:  Bed Mobility ind   Transfers Contact Guard Assist (CGA to SBA lateral scoot)  Supervision for safety, Verbal cueing   Mobility Contact Guard Assist   Stairs    Barriers to Discharge Home:  Fatigues   Needs level surfaces    WC, walker    Occupational Therapy:  Grooming Independent (seated at sink)   Bathing Supervision (setup   therapist waterproofed  left foot.   he performed  side to side lean to wash  rinse  buttocks)   UB Dressing Independent (note  no need for clothing retrieval)   LB Dressing Standby Assist (clothing retrieval at wheelchair level cues to remove foot rests to get closer to closet .   dons LB clothing with out assist  but does required   SBA and cues for NWB left LE  sit to stand  to Grab bar for FWW  when standing to pull up pants.)   Toileting Standby Assist (standing for clothing mgmt)   Shower  "& Transfer supervs   Barriers to Discharge Home:  Difficulty with maintaining TTWB    Tub bench    Speech-Language Pathology:  Comprehension:  Modified Independent  Comprehension Description:  Glasses  Expression:  Independent  Expression Description:     Social Interaction:  Independent  Social Interaction Description:  Verbal cues  Problem Solving:  Modified Independent  Problem Solving Description:  Increased time, Bed/chair alarm, Seat belt, Supervision, Therapy schedule, Verbal cueing  Memory:  Supervision  Memory Description:  Increased time, Bed/chair alarm, Seat belt, Supervision, Therapy schedule, Verbal cueing  Barriers to Discharge Home:  100% for functional but limited attention - tangential off task    Rec Therapy:  Met all of his goals    Case Management:  Continues to work on disposition and DME needs.      Discharge Date/Disposition:  8/1/24  _____________________________________    Objective:  VITAL SIGNS: /60   Pulse 72   Temp 36.7 °C (98 °F) (Oral)   Resp 18   Ht 1.803 m (5' 11\")   Wt 75.2 kg (165 lb 12.8 oz)   SpO2 98%   BMI 23.12 kg/m²   Gen: NAD  Psych: Mood and affect appropriate  CV: RRR, 0 edema  Resp: CTAB, no upper airway sounds  Abd: NTND  Neuro: AOx4, following commands  Unchanged from 7/29/24    Laboratory Values:  Recent Results (from the past 72 hour(s))   POCT glucose device results    Collection Time: 07/27/24  5:07 PM   Result Value Ref Range    POC Glucose, Blood 151 (H) 65 - 99 mg/dL   POCT glucose device results    Collection Time: 07/27/24  9:27 PM   Result Value Ref Range    POC Glucose, Blood 160 (H) 65 - 99 mg/dL   POCT glucose device results    Collection Time: 07/28/24  6:53 AM   Result Value Ref Range    POC Glucose, Blood 173 (H) 65 - 99 mg/dL   POCT glucose device results    Collection Time: 07/28/24 11:03 AM   Result Value Ref Range    POC Glucose, Blood 192 (H) 65 - 99 mg/dL   OCCULT BLOOD STOOL    Collection Time: 07/28/24  5:05 PM   Result Value Ref " Range    Occult Blood Feces Negative Negative   POCT glucose device results    Collection Time: 07/28/24  5:11 PM   Result Value Ref Range    POC Glucose, Blood 173 (H) 65 - 99 mg/dL   POCT glucose device results    Collection Time: 07/28/24  8:38 PM   Result Value Ref Range    POC Glucose, Blood 182 (H) 65 - 99 mg/dL   MAGNESIUM    Collection Time: 07/29/24  6:33 AM   Result Value Ref Range    Magnesium 1.2 (L) 1.5 - 2.5 mg/dL   CBC WITH DIFFERENTIAL    Collection Time: 07/29/24  6:33 AM   Result Value Ref Range    WBC 8.6 4.8 - 10.8 K/uL    RBC 3.22 (L) 4.70 - 6.10 M/uL    Hemoglobin 8.3 (L) 14.0 - 18.0 g/dL    Hematocrit 28.0 (L) 42.0 - 52.0 %    MCV 87.0 81.4 - 97.8 fL    MCH 25.8 (L) 27.0 - 33.0 pg    MCHC 29.6 (L) 32.3 - 36.5 g/dL    RDW 51.2 (H) 35.9 - 50.0 fL    Platelet Count 397 164 - 446 K/uL    MPV 9.8 9.0 - 12.9 fL    Neutrophils-Polys 80.40 (H) 44.00 - 72.00 %    Lymphocytes 14.50 (L) 22.00 - 41.00 %    Monocytes 5.10 0.00 - 13.40 %    Eosinophils 0.00 0.00 - 6.90 %    Basophils 0.00 0.00 - 1.80 %    Nucleated RBC 0.00 0.00 - 0.20 /100 WBC    Neutrophils (Absolute) 6.91 1.82 - 7.42 K/uL    Lymphs (Absolute) 1.25 1.00 - 4.80 K/uL    Monos (Absolute) 0.44 0.00 - 0.85 K/uL    Eos (Absolute) 0.00 0.00 - 0.51 K/uL    Baso (Absolute) 0.00 0.00 - 0.12 K/uL    NRBC (Absolute) 0.00 K/uL    Hypochromia 2+ (A)     Microcytosis 2+ (A)    Basic Metabolic Panel    Collection Time: 07/29/24  6:33 AM   Result Value Ref Range    Sodium 136 135 - 145 mmol/L    Potassium 3.9 3.6 - 5.5 mmol/L    Chloride 101 96 - 112 mmol/L    Co2 24 20 - 33 mmol/L    Glucose 250 (H) 65 - 99 mg/dL    Bun 20 8 - 22 mg/dL    Creatinine 0.67 0.50 - 1.40 mg/dL    Calcium 7.7 (L) 8.5 - 10.5 mg/dL    Anion Gap 11.0 7.0 - 16.0   ESTIMATED GFR    Collection Time: 07/29/24  6:33 AM   Result Value Ref Range    GFR (CKD-EPI) 100 >60 mL/min/1.73 m 2   DIFFERENTIAL MANUAL    Collection Time: 07/29/24  6:33 AM   Result Value Ref Range    Manual Diff  Status PERFORMED    PERIPHERAL SMEAR REVIEW    Collection Time: 07/29/24  6:33 AM   Result Value Ref Range    Peripheral Smear Review see below    PLATELET ESTIMATE    Collection Time: 07/29/24  6:33 AM   Result Value Ref Range    Plt Estimation Normal    MORPHOLOGY    Collection Time: 07/29/24  6:33 AM   Result Value Ref Range    RBC Morphology Present    POCT glucose device results    Collection Time: 07/29/24  8:03 AM   Result Value Ref Range    POC Glucose, Blood 201 (H) 65 - 99 mg/dL   POCT glucose device results    Collection Time: 07/29/24 11:38 AM   Result Value Ref Range    POC Glucose, Blood 170 (H) 65 - 99 mg/dL   POCT glucose device results    Collection Time: 07/29/24  4:47 PM   Result Value Ref Range    POC Glucose, Blood 131 (H) 65 - 99 mg/dL   POCT glucose device results    Collection Time: 07/29/24  8:23 PM   Result Value Ref Range    POC Glucose, Blood 169 (H) 65 - 99 mg/dL       Medications:  Scheduled Medications   Medication Dose Frequency    magnesium oxide  400 mg BID    metFORMIN  1,000 mg BID WITH MEALS    SITagliptin  75 mg DAILY    amiodarone  400 mg TWICE DAILY    insulin lispro  2-12 Units 4X/DAY ACHS    ferrous sulfate  325 mg BID WITH MEALS    potassium chloride SA  10 mEq DAILY    furosemide  20 mg DAILY    vitamin D3  2,000 Units DAILY    senna-docusate  2 Tablet BID    atorvastatin  40 mg Q EVENING    calcium carbonate  1,000 mg TID WITH MEALS    omeprazole  40 mg BID     PRN medications: [DISCONTINUED] insulin regular **AND** POC blood glucose manual result **AND** NOTIFY MD and PharmD **AND** Administer 20 grams of glucose (approximately 8 ounces of fruit juice) every 15 minutes PRN FSBG less than 70 mg/dL **AND** dextrose bolus, Respiratory Therapy Consult, hydrALAZINE, acetaminophen, senna-docusate **AND** polyethylene glycol/lytes, docusate sodium, magnesium hydroxide, carboxymethylcellulose, mag hydrox-al hydrox-simeth, ondansetron **OR** ondansetron, traZODone, sodium  chloride, oxyCODONE immediate-release **OR** oxyCODONE immediate-release, bisacodyl    Diet:  Current Diet Order   Procedures    Diet Order Diet: Low Fiber(GI Soft); Second Modifier: (optional): Consistent CHO (Diabetic)       Medical Decision Making and Plan:  L CVA -Patient with L parietal CVA in 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 Stroke Ppx has been held due to GI bleed  -PT and OT for mobility and ADLs. Per guidelines, 15 hours per week between PT, OT and/or SLP.  -Follow-up Neurology     HTN/CAD/Mitral stenosis/A fib - Patient on Amiodarone 400 mg BID, Lasix 20 mg daily. Consulted hospitalist. Amiodarone fell off MAR, did not miss dose, restart Amiodarone and discussed with hospitalist. HR into 70s, continue Amiodarone 400 mg and Lasix     MT amputation - Recent at OSH. NWB reportedly on LLE     HLD - Patient on Atorvastatin 40 mg daily     GI bleed - Check AM CBC. Continue PPI BID. Hgb 8.9, consult hospitalist. Repeat 8.3, will monitor     DM2 with hyperglycemia - Patient on Lantus 5 U and SSI. Previously on metformin and Jardiance. Consult hospitalist. Increased Lantus. Started on metformin. Improving sugars, lantus discontinued. Blood sugars 150s and below, continue Metformin 1000 mg BID, Januvia 50 mg (increased) and SSI.  Elevated blood sugars over weekend, Januvia increased to 75 on 7/29. Blood sugars into 200s, continue Metformin 1000 mg BID and Januvia 75 mg daily as well as SSI    Anemia - Check AM CBC - 8.9 on admission.      Hypokalemia - Check AM CMP - 3.8, will monitor. Repeat 3.1, started on 20 mEq. Repeat 3.8, improved. Repeat 3.9 on 7/29 - continue K supplement at 10 mEq     Hypocalcemia - Check AM CMP - 7.6 on admission, will monitor. Repeat low, started on Ca supplement.     Hypomagnesia - Low on 7/29, started on 400 mg      Pain - Patient on PRN Tylenol/Oxycodone     Skin - Patient at risk for skin breakdown due to debility in areas including sacrum, achilles, elbows and head in addition to other  sites. Nursing to assess skin daily.      GI Ppx - Patient on Prilosec for GERD prophylaxis. Patient on Senna-docusate for constipation prophylaxis.      DVT Ppx - Patient not cleared for AC on transfer.     Dispo - Ongoing discussion about safe discharge. Patient would like to go home.   ___________________________________    T. Yordy Salazar MD/PhD  [facility] - Physical Medicine & Rehabilitation   [facility] - Brain Injury Medicine   ____________________________________

## 2024-07-30 NOTE — THERAPY
Speech Language Pathology  Daily Treatment     Patient Name: Pj Freeman  Age:  69 y.o., Sex:  male  Medical Record #: [id number]  Today's Date: 7/30/2024     Precautions  Precautions: Fall Risk, Non Weight Bearing Left Lower Extremity  Comments: L TMA    Subjective    Pt pleasant and cooperative.     Objective       07/30/24 0933   Treatment Charges   SLP Cognitive Skill Development First 15 Minutes 1   SLP Cognitive Skill Development Additional 15 Minutes 1   SLP Total Time Spent   SLP Individual Total Time Spent (Mins) 30         Assessment    Pt cont to present with self distracting behaviors with random off topic initiations of comments and conversations while working on task. Pt cont car buying task, pt answered questions to follow with 50% accuracy indep. Errors due to not reading question for what was asked therefore answer not being appropriate. Cont to address moderate attention skills.    Strengths: Able to follow instructions, Alert and oriented, Effective communication skills, Pleasant and cooperative, Motivated for self care and independence, Supportive family, Willingly participates in therapeutic activities  Barriers: Impulsive, Impaired carryover of learning, Impaired functional cognition, Impaired balance (impaired attention.)    Plan    Moderate attention skills    Speech Therapy Problems (Active)       Problem: Speech/Swallowing LTGs       Dates: Start:  07/18/24         Goal: LTG-By discharge, patient will solve complex problems and recall safety training with 90% acc with spv. for safe discharge home.       Dates: Start:  07/18/24    Expected End:  08/01/24

## 2024-07-30 NOTE — DISCHARGE PLANNING
"CM//Discharge :    The following has been ordered:   Home health:    [facility]                  Disciplines ordered: Skilled, PT, OT  Status:     DME:           [facility]                Following equipment has been ordered:   Status: 16\" wheelchair & Standard cushion      "

## 2024-07-30 NOTE — PROGRESS NOTES
Hospital Medicine Daily Progress Note      Chief Complaint  Diabetes Mellitus    Interval Problem Update  No overnight problems reported.    Review of Systems  Review of Systems   Constitutional:  Negative for chills and fever.   HENT: Negative.     Eyes: Negative.    Respiratory:  Negative for cough and shortness of breath.    Cardiovascular:  Negative for chest pain and palpitations.   Gastrointestinal:  Negative for nausea and vomiting.   Musculoskeletal:         Wound pain   Skin:  Negative for itching and rash.   Endo/Heme/Allergies:  Negative for polydipsia. Does not bruise/bleed easily.        Physical Exam  Temp:  [36.8 °C (98.2 °F)-36.9 °C (98.4 °F)] 36.8 °C (98.2 °F)  Pulse:  [72] 72  Resp:  [18] 18  BP: (136-147)/(65-70) 147/70  SpO2:  [93 %-96 %] 93 %    Physical Exam  Vitals reviewed.   Constitutional:       General: He is not in acute distress.     Appearance: Normal appearance. He is not ill-appearing.   HENT:      Head: Normocephalic and atraumatic.      Right Ear: External ear normal.      Left Ear: External ear normal.      Nose: Nose normal.      Mouth/Throat:      Pharynx: Oropharynx is clear.   Eyes:      General:         Right eye: No discharge.         Left eye: No discharge.      Extraocular Movements: Extraocular movements intact.      Conjunctiva/sclera: Conjunctivae normal.   Cardiovascular:      Rate and Rhythm: Normal rate and regular rhythm.   Pulmonary:      Effort: No respiratory distress.      Breath sounds: No wheezing.      Comments: Decreased BS  Abdominal:      General: Bowel sounds are normal. There is no distension.      Palpations: Abdomen is soft.      Tenderness: There is no abdominal tenderness.   Musculoskeletal:      Cervical back: Normal range of motion and neck supple.      Right lower leg: No edema.      Left lower leg: No edema.      Comments: L TMA incision C/D/I per 7/29/24 Wound Photo   Skin:     General: Skin is warm and dry.   Neurological:      Mental Status: He  is alert and oriented to person, place, and time.         Fluids    Intake/Output Summary (Last 24 hours) at 7/30/2024 1122  Last data filed at 7/30/2024 0827  Gross per 24 hour   Intake 1380 ml   Output 625 ml   Net 755 ml       Laboratory  Recent Labs     07/29/24  0633   WBC 8.6   RBC 3.22*   HEMOGLOBIN 8.3*   HEMATOCRIT 28.0*   MCV 87.0   MCH 25.8*   MCHC 29.6*   RDW 51.2*   PLATELETCT 397   MPV 9.8     Recent Labs     07/29/24  0633   SODIUM 136   POTASSIUM 3.9   CHLORIDE 101   CO2 24   GLUCOSE 250*   BUN 20   CREATININE 0.67   CALCIUM 7.7*                   Assessment/Plan  * Acute CVA (cerebrovascular accident) ([facility])- (present on admission)  Assessment & Plan  Was on Plavix and Lipitor  Antiplatelet presently on hold d/t recent GIB    Vitamin D deficiency  Assessment & Plan  Vit D level 17  Continue supplementation    A-fib ([facility])  Assessment & Plan  Echo EF 60%, RVSP 80 mmHg, severe MS, small pericardial effusion, pleural effusion noted  BNP elevated but stable  CXR mod R basilar opacity and suspected small pleural effusion  On Amiodarone and Lasix  AC on hold d/t recent GIB  Continue IS  Needs Cardiology F/U    Hypomagnesemia- (present on admission)  Assessment & Plan  Continue MgOx    Type 2 diabetes mellitus ([facility])- (present on admission)  Assessment & Plan  HbA1c 5.4  Observe serum glucose trends on Metformin and increased Januvia  Lantus discontinued  Continue SSI while inpt  Outpt meds include Metformin 1000 mg bid, Januvia 100 mg qd, Jardiance 25 mg qd, and Lantus 10 u qhs    Acute osteomyelitis of left foot ([facility])- (present on admission)  Assessment & Plan  S/P recent L TMA on 7/3/24 at [facility]  Wound care and pain control per Physiatry    NSTEMI (non-ST elevated myocardial infarction) ([facility])- (present on admission)  Assessment & Plan  Was on Plavix and Lipitor  Antiplatelet presently on hold d/t recent GIB    Acute blood loss anemia- (present on admission)  Assessment & Plan  2/2 GIB  Has normocytic  indices  Fe 20 and Ferritin 67  Now on Fe supplements  Recent FOB negative x 3  Follow H/H    Upper GI bleed- (present on admission)  Assessment & Plan  Pt presented w/ syncope  EGD DU x 6 S/P clip x 1 for active bleeding  S/P PRBC x 4 u  Continue high dose PPI bid  Recent FOB negative x 3    Full Code

## 2024-07-30 NOTE — THERAPY
"Physical Therapy   Daily Treatment     Patient Name: Pj Freeman  Age:  69 y.o., Sex:  male  Medical Record #: [id number]  Today's Date: 7/30/2024     Precautions  Precautions: Fall Risk, Non Weight Bearing Left Lower Extremity  Comments: L TMA    Subjective    Pt resting in bed, agreeable to PT    Pt confirms 3 CHRISTINE no ramp at his home     Objective       07/30/24 0701   PT Charge Group   PT Therapeutic Exercise (Units) 2   PT Therapeutic Activities (Units) 2   Supervising Physical Therapist Jake Smith   PT Total Time Spent   PT Individual Total Time Spent (Mins) 60   Wheelchair Functional Level of Assist   Wheelchair Assist Modified Independent   Distance Wheelchair (Feet or Distance) 150   Wheelchair Description Extra time;Leg rest management;Supervision for safety   Transfer Functional Level of Assist   Bed, Chair, Wheelchair Transfer Contact Guard Assist  (CGA to SBA lateral scoot)   Bed Chair Wheelchair Transfer Description Supervision for safety;Verbal cueing   Sitting Lower Body Exercises   Tricep Press 3 sets of 15;Weight (See Comments for lbs)  (4 x 15 #30 fwrd facing, 3 x 15 #20 retro facing)   Sit to Stand 1 set of 10  (from 23\" mat height)   Other Exercises rebounder with 2.2lb weighted ball 3 x 15   Bed Mobility    Supine to Sit Supervised   Sit to Supine Supervised   Sit to Stand Minimal Assist  (variable CGA/Coreen mat height 23\")   Neuro-Muscular Treatments   Neuro-Muscular Treatments Anterior weight shift;Sequencing;Verbal Cuing   Comments STS transfers   Interdisciplinary Plan of Care Collaboration   IDT Collaboration with  Physical Therapist   Patient Position at End of Therapy Seated;Self Releasing Lap Belt Applied  (dining room)   Collaboration Comments DC plan, CLOF, home entry         Assessment    Pt demonstrates improved STS mechanics and sequencing following blocked practice, still req min vc for kicking L foot out ahead for adherence to NWB. Pt more successful with STS using R hand to push " "up from mat and L hand to stabilize walker. Improved from Coreen to light CGA however requires increased assist for STS from wc. Lateral scoots x 3 within session CGA  Strengths: Able to follow instructions, Alert and oriented, Independent prior level of function, Pleasant and cooperative, Willingly participates in therapeutic activities, Motivated for self care and independence  Barriers: Fatigue, Difficulty following instructions, Decreased endurance, Impaired activity tolerance, Limited mobility (limited ability to maintain weightbearing precaution and tends to lose focus/becomes distracted)    Plan    Continue with emphasis on slideboard/lateral scoot vs stand pivot transfers, right LE strengthening, car transfers     DME  PT DME Recommendations  Wheelchair: 16\" Width  Cushion: Standard  Additional Equipment: Other (Comments) (slideboard)    Passport items to be completed:  Get in/out of bed safely, in/out of a vehicle, safely use mobility device, walk or wheel around home/community, navigate up and down stairs, show how to get up/down from the ground, ensure home is accessible, demonstrate HEP, complete caregiver training    Physical Therapy Problems (Active)       Problem: Mobility       Dates: Start:  07/18/24         Goal: STG-Within one week, patient will ambulate 50 feet with kneeling scooter SBA       Dates: Start:  07/18/24    Expected End:  08/01/24            Goal: STG-Within one week, patient will ambulate up/down a curb with FWW CGA       Dates: Start:  07/18/24    Expected End:  08/01/24               Problem: Mobility Transfers       Dates: Start:  07/18/24         Goal: STG-Within one week, patient will sit to stand with CGA       Dates: Start:  07/18/24    Expected End:  08/01/24               Problem: PT-Long Term Goals       Dates: Start:  07/18/24         Goal: LTG-By discharge, patient will ambulate with kneeling scooter >150 feet supervised       Dates: Start:  07/18/24    Expected End:  " 08/01/24            Goal: LTG-By discharge, patient will transfer one surface to another supervised        Dates: Start:  07/18/24    Expected End:  08/01/24            Goal: LTG-By discharge, patient will ambulate up/down 4-6 stairs with single railing supervised       Dates: Start:  07/18/24    Expected End:  08/01/24            Goal: LTG-By discharge, patient will transfer in/out of a car supervised       Dates: Start:  07/18/24    Expected End:  08/01/24

## 2024-07-31 ENCOUNTER — APPOINTMENT (OUTPATIENT)
Dept: PHYSICAL THERAPY | Facility: REHABILITATION | Age: 69
DRG: 056 | End: 2024-07-31
Attending: PHYSICAL MEDICINE & REHABILITATION
Payer: MEDICARE

## 2024-07-31 ENCOUNTER — APPOINTMENT (OUTPATIENT)
Dept: OCCUPATIONAL THERAPY | Facility: REHABILITATION | Age: 69
DRG: 056 | End: 2024-07-31
Attending: PHYSICAL MEDICINE & REHABILITATION
Payer: MEDICARE

## 2024-07-31 VITALS
WEIGHT: 165.8 LBS | HEIGHT: 71 IN | BODY MASS INDEX: 23.21 KG/M2 | TEMPERATURE: 98.4 F | DIASTOLIC BLOOD PRESSURE: 68 MMHG | RESPIRATION RATE: 18 BRPM | OXYGEN SATURATION: 98 % | HEART RATE: 72 BPM | SYSTOLIC BLOOD PRESSURE: 133 MMHG

## 2024-07-31 PROBLEM — I27.20 PULMONARY HYPERTENSION (HCC): Status: ACTIVE | Noted: 2024-07-31

## 2024-07-31 LAB
GLUCOSE BLD STRIP.AUTO-MCNC: 131 MG/DL (ref 65–99)
GLUCOSE BLD STRIP.AUTO-MCNC: 155 MG/DL (ref 65–99)
GLUCOSE BLD STRIP.AUTO-MCNC: 184 MG/DL (ref 65–99)
GLUCOSE BLD STRIP.AUTO-MCNC: 209 MG/DL (ref 65–99)
GLUCOSE BLD STRIP.AUTO-MCNC: 216 MG/DL (ref 65–99)
HEMOCCULT STL QL: POSITIVE

## 2024-07-31 PROCEDURE — 97535 SELF CARE MNGMENT TRAINING: CPT

## 2024-07-31 PROCEDURE — 82272 OCCULT BLD FECES 1-3 TESTS: CPT

## 2024-07-31 PROCEDURE — A9270 NON-COVERED ITEM OR SERVICE: HCPCS | Performed by: PHYSICAL MEDICINE & REHABILITATION

## 2024-07-31 PROCEDURE — 82962 GLUCOSE BLOOD TEST: CPT | Mod: 91

## 2024-07-31 PROCEDURE — 97112 NEUROMUSCULAR REEDUCATION: CPT

## 2024-07-31 PROCEDURE — 770010 HCHG ROOM/CARE - REHAB SEMI PRIVAT*

## 2024-07-31 PROCEDURE — 99232 SBSQ HOSP IP/OBS MODERATE 35: CPT | Performed by: HOSPITALIST

## 2024-07-31 PROCEDURE — A9270 NON-COVERED ITEM OR SERVICE: HCPCS | Performed by: HOSPITALIST

## 2024-07-31 PROCEDURE — 700102 HCHG RX REV CODE 250 W/ 637 OVERRIDE(OP): Performed by: HOSPITALIST

## 2024-07-31 PROCEDURE — 97530 THERAPEUTIC ACTIVITIES: CPT

## 2024-07-31 PROCEDURE — 700102 HCHG RX REV CODE 250 W/ 637 OVERRIDE(OP): Performed by: PHYSICAL MEDICINE & REHABILITATION

## 2024-07-31 RX ORDER — POTASSIUM CHLORIDE 750 MG/1
10 TABLET, EXTENDED RELEASE ORAL DAILY
Qty: 90 TABLET | Refills: 2 | Status: SHIPPED | OUTPATIENT
Start: 2024-08-01

## 2024-07-31 RX ORDER — OXYCODONE HYDROCHLORIDE 5 MG/1
5 TABLET ORAL EVERY 8 HOURS PRN
Qty: 21 TABLET | Refills: 0 | Status: SHIPPED | OUTPATIENT
Start: 2024-07-31 | End: 2024-08-07

## 2024-07-31 RX ORDER — EMPAGLIFLOZIN 25 MG/1
25 TABLET, FILM COATED ORAL DAILY
Qty: 30 TABLET | Refills: 2 | Status: SHIPPED | OUTPATIENT
Start: 2024-07-31

## 2024-07-31 RX ORDER — OMEPRAZOLE 40 MG/1
40 CAPSULE, DELAYED RELEASE ORAL 2 TIMES DAILY
Qty: 60 CAPSULE | Refills: 2 | Status: SHIPPED | OUTPATIENT
Start: 2024-07-31

## 2024-07-31 RX ORDER — AMIODARONE HYDROCHLORIDE 200 MG/1
TABLET ORAL
Qty: 116 TABLET | Refills: 0 | Status: SHIPPED | OUTPATIENT
Start: 2024-07-31 | End: 2024-08-01

## 2024-07-31 RX ORDER — OXYCODONE HYDROCHLORIDE 5 MG/1
5 TABLET ORAL EVERY 8 HOURS PRN
Qty: 28 TABLET | Refills: 0 | Status: SHIPPED | OUTPATIENT
Start: 2024-07-31 | End: 2024-07-31

## 2024-07-31 RX ORDER — ATORVASTATIN CALCIUM 40 MG/1
40 TABLET, FILM COATED ORAL EVERY EVENING
Qty: 90 TABLET | Refills: 2 | Status: SHIPPED | OUTPATIENT
Start: 2024-07-31

## 2024-07-31 RX ORDER — FUROSEMIDE 20 MG
20 TABLET ORAL 2 TIMES DAILY
Qty: 60 TABLET | Refills: 2 | Status: SHIPPED | OUTPATIENT
Start: 2024-07-31

## 2024-07-31 RX ORDER — FERROUS SULFATE 325(65) MG
325 TABLET ORAL 2 TIMES DAILY WITH MEALS
Qty: 30 TABLET | Refills: 2 | Status: SHIPPED | OUTPATIENT
Start: 2024-07-31

## 2024-07-31 RX ORDER — LANOLIN ALCOHOL/MO/W.PET/CERES
400 CREAM (GRAM) TOPICAL 2 TIMES DAILY
Qty: 60 TABLET | Refills: 0 | Status: SHIPPED | OUTPATIENT
Start: 2024-07-31

## 2024-07-31 RX ADMIN — FUROSEMIDE 20 MG: 20 TABLET ORAL at 08:21

## 2024-07-31 RX ADMIN — METFORMIN HYDROCHLORIDE 1000 MG: 500 TABLET ORAL at 17:32

## 2024-07-31 RX ADMIN — INSULIN LISPRO 2 UNITS: 100 INJECTION, SOLUTION INTRAVENOUS; SUBCUTANEOUS at 17:38

## 2024-07-31 RX ADMIN — OMEPRAZOLE 40 MG: 20 CAPSULE, DELAYED RELEASE ORAL at 08:20

## 2024-07-31 RX ADMIN — SENNOSIDES AND DOCUSATE SODIUM 2 TABLET: 50; 8.6 TABLET ORAL at 08:20

## 2024-07-31 RX ADMIN — ATORVASTATIN CALCIUM 40 MG: 40 TABLET, FILM COATED ORAL at 19:43

## 2024-07-31 RX ADMIN — SITAGLIPTIN 75 MG: 50 TABLET, FILM COATED ORAL at 08:20

## 2024-07-31 RX ADMIN — Medication 400 MG: at 08:20

## 2024-07-31 RX ADMIN — OMEPRAZOLE 40 MG: 20 CAPSULE, DELAYED RELEASE ORAL at 19:43

## 2024-07-31 RX ADMIN — INSULIN LISPRO 4 UNITS: 100 INJECTION, SOLUTION INTRAVENOUS; SUBCUTANEOUS at 20:39

## 2024-07-31 RX ADMIN — FERROUS SULFATE TAB 325 MG (65 MG ELEMENTAL FE) 325 MG: 325 (65 FE) TAB at 08:20

## 2024-07-31 RX ADMIN — METFORMIN HYDROCHLORIDE 1000 MG: 500 TABLET ORAL at 08:20

## 2024-07-31 RX ADMIN — Medication 2000 UNITS: at 08:21

## 2024-07-31 RX ADMIN — CALCIUM CARBONATE (ANTACID) CHEW TAB 500 MG 1000 MG: 500 CHEW TAB at 08:21

## 2024-07-31 RX ADMIN — AMIODARONE HYDROCHLORIDE 400 MG: 200 TABLET ORAL at 17:31

## 2024-07-31 RX ADMIN — INSULIN LISPRO 2 UNITS: 100 INJECTION, SOLUTION INTRAVENOUS; SUBCUTANEOUS at 08:13

## 2024-07-31 RX ADMIN — Medication 400 MG: at 19:43

## 2024-07-31 RX ADMIN — POTASSIUM CHLORIDE 10 MEQ: 1500 TABLET, EXTENDED RELEASE ORAL at 08:21

## 2024-07-31 RX ADMIN — AMIODARONE HYDROCHLORIDE 400 MG: 200 TABLET ORAL at 05:35

## 2024-07-31 RX ADMIN — CALCIUM CARBONATE (ANTACID) CHEW TAB 500 MG 1000 MG: 500 CHEW TAB at 17:31

## 2024-07-31 RX ADMIN — FERROUS SULFATE TAB 325 MG (65 MG ELEMENTAL FE) 325 MG: 325 (65 FE) TAB at 17:31

## 2024-07-31 RX ADMIN — CALCIUM CARBONATE (ANTACID) CHEW TAB 500 MG 1000 MG: 500 CHEW TAB at 11:41

## 2024-07-31 ASSESSMENT — GAIT ASSESSMENTS
GAIT LEVEL OF ASSIST: CONTACT GUARD ASSIST
GAIT LEVEL OF ASSIST: CONTACT GUARD ASSIST
ASSISTIVE DEVICE: FRONT WHEEL WALKER
DISTANCE (FEET): 50
DISTANCE (FEET): 15
ASSISTIVE DEVICE: FRONT WHEEL WALKER

## 2024-07-31 ASSESSMENT — ACTIVITIES OF DAILY LIVING (ADL)
BED_CHAIR_WHEELCHAIR_TRANSFER_DESCRIPTION: SQUAT PIVOT TRANSFER TO WHEELCHAIR;SUPERVISION FOR SAFETY;VERBAL CUEING
TUB_SHOWER_TRANSFER_DESCRIPTION: ADAPTIVE EQUIPMENT;INCREASED TIME;SET-UP OF EQUIPMENT
SHOWER_TRANSFER_LEVEL_OF_ASSIST: REQUIRES SUPERVISION WITH SHOWER TRANSFER
TOILETING_LEVEL_OF_ASSIST: REQUIRES SUPERVISION WITH TOILETING
TOILET_TRANSFER_LEVEL_OF_ASSIST: REQUIRES SUPERVISION WITH TOILET TRANSFER
BED_CHAIR_WHEELCHAIR_TRANSFER_DESCRIPTION: SQUAT PIVOT TRANSFER TO WHEELCHAIR;VERBAL CUEING;SUPERVISION FOR SAFETY

## 2024-07-31 ASSESSMENT — ENCOUNTER SYMPTOMS
FEVER: 0
CHILLS: 0
NAUSEA: 0
PALPITATIONS: 0
BRUISES/BLEEDS EASILY: 0
SHORTNESS OF BREATH: 0
EYES NEGATIVE: 1
POLYDIPSIA: 0
VOMITING: 0
COUGH: 0

## 2024-07-31 ASSESSMENT — BRIEF INTERVIEW FOR MENTAL STATUS (BIMS)
WHAT DAY OF THE WEEK IS IT: CORRECT
ASKED TO RECALL SOCK: YES, NO CUE REQUIRED
WHAT YEAR IS IT: CORRECT
BIMS SUMMARY SCORE: 15
ASKED TO RECALL BLUE: YES, NO CUE REQUIRED
INITIAL REPETITION OF BED BLUE SOCK - FIRST ATTEMPT: 3
ASKED TO RECALL BED: YES, NO CUE REQUIRED
WHAT MONTH IS IT: ACCURATE WITHIN 5 DAYS

## 2024-07-31 NOTE — CARE PLAN
Problem: OT Long Term Goals  Goal: LTG-By discharge, patient will complete basic self care tasks at Mod Independent level.  Outcome: Not Met  Note: Requires  supervision and cues for NWB  left LE  during mobility related to  basic ADL     sit to stand for pants   toileting  grooming at sink in standing     transfers  in / out of the shower  to and from bed etc     Goal: LTG-By discharge, patient will perform bathroom transfers at Mod Independent level.  Outcome: Not Met  Note: Supervision   cues for NWB  left LE  during mobility related to  basic ADL     sit to stand for pants   toileting  grooming at sink in standing     transfers  in / out of the shower  to and from bed etc

## 2024-07-31 NOTE — THERAPY
Physical Therapy   Discharge Summary     Patient Name: Pj Freeman  Age:  69 y.o., Sex:  male  Medical Record #: [id number]  Today's Date: 7/31/2024     Precautions  Precautions: Fall Risk, Non Weight Bearing Left Lower Extremity  Comments: left transmetatarsal amputation    Subjective    Pt in chair and agreeable to participate in therapy     Objective       07/31/24 1331   PT Charge Group   PT Neuromuscular Re-Education / Balance (Units) 1   PT Therapeutic Activities (Units) 3   PT Total Time Spent   PT Individual Total Time Spent (Mins) 60   Gait Functional Level of Assist    Gait Level Of Assist Contact Guard Assist   Assistive Device Front Wheel Walker   Distance (Feet) 50  (+ 10 x 2)   # of Times Distance was Traveled 1   Deviation   (hop to LLE NWB)   Wheelchair Functional Level of Assist   Wheelchair Assist Modified Independent   Distance Wheelchair (Feet or Distance) 300   Wheelchair Description Extra time;Leg rest management;Supervision for safety   Transfer Functional Level of Assist   Bed, Chair, Wheelchair Transfer Standby Assist   Bed Chair Wheelchair Transfer Description Squat pivot transfer to wheelchair;Verbal cueing;Supervision for safety  (SBA sqt pvt to wc. Coreen for STS and single leg hop)   Bed Mobility    Supine to Sit Independent   Sit to Supine Independent   Sit to Stand Minimal Assist   Scooting Independent   Rolling Independent   Neuro-Muscular Treatments   Neuro-Muscular Treatments Anterior weight shift;Sequencing;Verbal Cuing   Comments STS tx from WC <> FWW   Interdisciplinary Plan of Care Collaboration   Patient Position at End of Therapy Seated;Chair Alarm On;Call Light within Reach;Tray Table within Reach;Phone within Reach   Toilet Transfer   Assistance Needed Supervision   CARE Score - Toilet Transfer 4   Car Transfer   Assistance Needed Incidental touching   CARE Score - Car Transfer 4   Walk 50 Feet with Two Turns   Assistance Needed Incidental touching   CARE Score - Walk 50  Feet with Two Turns 4   Walk 150 Feet   Reason if not Attempted Safety concerns   CARE Score - Walk 150 Feet 88   Walking 10 Feet on Uneven Surfaces   Reason if not Attempted Safety concerns   CARE Score - Walking 10 Feet on Uneven Surfaces 88   4 Steps   Reason if not Attempted Safety concerns   CARE Score - 4 Steps 88   12 Steps   Reason if not Attempted Safety concerns   CARE Score - 12 Steps 88   Picking Up Object   Reason if not Attempted Safety concerns   CARE Score - Picking Up Object 88   Wheel 50 Feet with Two Turns   Assistance Needed Independent   CARE Score - Wheel 50 Feet with Two Turns 6   Wheel 150 Feet   Assistance Needed Independent   CARE Score - Wheel 150 Feet 6   P.T. Discharge Summary   Discharge Location Home   Patient Discharging with Assist of Family   Level of Supervision Required Upon Discharge Intermittent Supervision   Recommended Equipment for Discharge Front-Wheeled Walker;Manual Wheelchair;Ramp   Recommeded Services Upon Discharge Home Health Physical Therapy   Long Term Goals Met 3   Long Term Goals Not Met 1   Reason(s) for Goals Not Met Stairs not completed for safety reasons   Criteria for Termination of Services Maximum Function Achieved for Inpatient Rehabilitation   Discharge Instructions to Patient   Weight Bearing Status - Patient Should Bear No Weight Left Leg   Level of Assist Required for Ambulation Assist for Balance on Curbs;Assist for Balance on Stairs;Assist for Balance on Flat Surfaces   Distance Patient May Ambulate 10   Device Recommended for Ambulation Front-Wheeled Walker   Level of Assist Required to Propel Wheelchair Requires No Assist   Level of Assist Required for Transfers Supervision   Device Recommended for Transfers Front-Wheeled Walker   Home Exercise Program Refer to Home Exercise Program Handout for Details   Prosthesis / Orthosis Recommendation / Location No Prosthesis  or Orthosis Recommended     Pt's own W/C arrived:  - fitted leg rests to pt's  desired height   - fitted cushion and removed excess packaging materials   - educated on swing away arms and leg rest management.     Assessment    Pt still requires Coreen for STS transfers and vcs for foot placement and anterior weight shift. Pt will have FT with wife tomorrow and d/c tomorrow. Performed car tx in [facility] with no difficulties. Educated on safety awareness of LLE when swinging legs into car. Provided basic LE HEP handout to pt.     Strengths: Able to follow instructions, Alert and oriented, Independent prior level of function, Pleasant and cooperative, Willingly participates in therapeutic activities, Motivated for self care and independence  Barriers: Fatigue, Difficulty following instructions, Decreased endurance, Impaired activity tolerance, Limited mobility (limited ability to maintain weightbearing precaution and tends to lose focus/becomes distracted)    Plan    FT tomorrow  D/c tomorrow 8/1  Passport items     Passport items to be completed:  Get in/out of bed safely, in/out of a vehicle, safely use mobility device, walk or wheel around home/community, navigate up and down stairs, show how to get up/down from the ground, ensure home is accessible, demonstrate HEP, complete caregiver training    Physical Therapy Problems (Active)       Problem: Mobility       Dates: Start:  07/18/24         Goal: STG-Within one week, patient will ambulate 50 feet with kneeling scooter SBA       Dates: Start:  07/18/24    Expected End:  08/01/24            Goal: STG-Within one week, patient will ambulate up/down a curb with FWW CGA       Dates: Start:  07/18/24    Expected End:  08/01/24               Problem: Mobility Transfers       Dates: Start:  07/18/24         Goal: STG-Within one week, patient will sit to stand with CGA       Dates: Start:  07/18/24    Expected End:  08/01/24               Problem: PT-Long Term Goals       Dates: Start:  07/18/24         Goal: LTG-By discharge, patient will  ambulate with kneeling scooter >150 feet supervised       Dates: Start:  07/18/24    Expected End:  08/01/24            Goal: LTG-By discharge, patient will transfer one surface to another supervised        Dates: Start:  07/18/24    Expected End:  08/01/24            Goal: LTG-By discharge, patient will ambulate up/down 4-6 stairs with single railing supervised       Dates: Start:  07/18/24    Expected End:  08/01/24            Goal: LTG-By discharge, patient will transfer in/out of a car supervised       Dates: Start:  07/18/24    Expected End:  08/01/24

## 2024-07-31 NOTE — DISCHARGE PLANNING
"CM//Discharge :    The following has been ordered:   Home health:      [facility]                 Disciplines ordered: RN, PT, OT  Status: Accepted    DME:    [facility]                       Following equipment has been ordered: 16\" wc and standard cushion  Status: Accepted    "

## 2024-07-31 NOTE — PROGRESS NOTES
Hospital Medicine Daily Progress Note      Chief Complaint  Diabetes Mellitus    Interval Problem Update  Pt doing well and denies new complaints.  Has multiple disposition questions--defer to Physiatry.    Review of Systems  Review of Systems   Constitutional:  Negative for chills and fever.   HENT: Negative.     Eyes: Negative.    Respiratory:  Negative for cough and shortness of breath.    Cardiovascular:  Negative for chest pain and palpitations.   Gastrointestinal:  Negative for nausea and vomiting.   Musculoskeletal:         Wound pain   Skin:  Negative for itching and rash.   Endo/Heme/Allergies:  Negative for polydipsia. Does not bruise/bleed easily.        Physical Exam  Temp:  [36.6 °C (97.8 °F)] 36.6 °C (97.8 °F)  Pulse:  [78-80] 80  Resp:  [18] 18  BP: (121-142)/(66-76) 142/76  SpO2:  [93 %-95 %] 93 %    Physical Exam  Vitals reviewed.   Constitutional:       General: He is not in acute distress.     Appearance: Normal appearance. He is not ill-appearing.   HENT:      Head: Normocephalic and atraumatic.      Right Ear: External ear normal.      Left Ear: External ear normal.      Nose: Nose normal.      Mouth/Throat:      Pharynx: Oropharynx is clear.   Eyes:      General:         Right eye: No discharge.         Left eye: No discharge.      Extraocular Movements: Extraocular movements intact.      Conjunctiva/sclera: Conjunctivae normal.   Cardiovascular:      Rate and Rhythm: Normal rate and regular rhythm.   Pulmonary:      Effort: No respiratory distress.      Breath sounds: No wheezing.      Comments: Decreased BS  Abdominal:      General: Bowel sounds are normal. There is no distension.      Palpations: Abdomen is soft.      Tenderness: There is no abdominal tenderness.   Musculoskeletal:      Cervical back: Normal range of motion and neck supple.      Right lower leg: No edema.      Left lower leg: No edema.      Comments: L TMA incision C/D/I per 7/29/24 Wound Photo   Skin:     General: Skin is  warm and dry.   Neurological:      Mental Status: He is alert and oriented to person, place, and time.         Fluids    Intake/Output Summary (Last 24 hours) at 7/31/2024 1113  Last data filed at 7/31/2024 1000  Gross per 24 hour   Intake 1560 ml   Output 400 ml   Net 1160 ml       Laboratory  Recent Labs     07/29/24  0633   WBC 8.6   RBC 3.22*   HEMOGLOBIN 8.3*   HEMATOCRIT 28.0*   MCV 87.0   MCH 25.8*   MCHC 29.6*   RDW 51.2*   PLATELETCT 397   MPV 9.8     Recent Labs     07/29/24  0633   SODIUM 136   POTASSIUM 3.9   CHLORIDE 101   CO2 24   GLUCOSE 250*   BUN 20   CREATININE 0.67   CALCIUM 7.7*                   Assessment/Plan  * Acute CVA (cerebrovascular accident) ([facility])- (present on admission)  Assessment & Plan  Was on Plavix and Lipitor  Antiplatelet presently on hold d/t recent GIB    Pulmonary hypertension ([facility])  Assessment & Plan  Echo EF 60%, RVSP 80 mmHg, severe MS, small pericardial effusion, pleural effusion noted  BNP elevated but stable  CXR mod R basilar opacity and suspected small pleural effusion  Continue Lasix and IS  Needs Cardiology F/U    Vitamin D deficiency  Assessment & Plan  Vit D level 17  Continue supplementation    A-fib ([facility])  Assessment & Plan  On Amiodarone  AC on hold d/t recent GIB  Needs Cardiology F/U    Hypomagnesemia- (present on admission)  Assessment & Plan  Continue MgOx    Type 2 diabetes mellitus ([facility])- (present on admission)  Assessment & Plan  HbA1c 5.4  Will increase Januvia back to home dose  Also on Metformin  Continue SSI while inpt  Lantus discontinued  Outpt meds include Metformin 1000 mg bid, Januvia 100 mg qd, Jardiance 25 mg qd, and Lantus 10 u qhs    Acute osteomyelitis of left foot ([facility])- (present on admission)  Assessment & Plan  S/P recent L TMA on 7/3/24 at [facility]  Wound care and pain control per Physiatry    NSTEMI (non-ST elevated myocardial infarction) ([facility])- (present on admission)  Assessment & Plan  Was on Plavix and Lipitor  Antiplatelet presently on  hold d/t recent GIB    Acute blood loss anemia- (present on admission)  Assessment & Plan  2/2 GIB  Has normocytic indices  Fe 20 and Ferritin 67  Now on Fe supplements  Recent FOB negative x 3  Follow H/H    Upper GI bleed- (present on admission)  Assessment & Plan  Pt presented w/ syncope  EGD DU x 6 S/P clip x 1 for active bleeding  S/P PRBC x 4 u  Continue high dose PPI bid  Recent FOB negative x 3    Full Code

## 2024-07-31 NOTE — THERAPY
"Occupational Therapy  Daily Treatment     Patient Name: Pj Freeman  Age:  69 y.o., Sex:  male  Medical Record #: [id number]  Today's Date: 7/31/2024     Precautions  Precautions: Fall Risk, Non Weight Bearing Left Lower Extremity  Comments: left transmetatarsal amputation         Subjective  \" I need to use the bathroom first.\"     Objective       07/31/24 0701   OT Charge Group   OT Self Care / ADL (Units) 4   OT Total Time Spent   OT Individual Total Time Spent (Mins) 60   Precautions   Precautions Fall Risk;Non Weight Bearing Left Lower Extremity   Comments left transmetatarsal amputation   Interdisciplinary Plan of Care Collaboration   Patient Position at End of Therapy Seated;Chair Alarm On;Self Releasing Lap Belt Applied  (in dining room for lunch)   Eating   Assistance Needed Independent   Physical Assistance Level No physical assistance   CARE Score - Eating 6   Oral Hygiene   Assistance Needed Independent   Physical Assistance Level No physical assistance   CARE Score - Oral Hygiene 6   Toileting Hygiene   Assistance Needed Independent   Physical Assistance Level No physical assistance   CARE Score - Toileting Hygiene 6   Shower/Bathe Self   Assistance Needed Set-up / clean-up   Physical Assistance Level No physical assistance   CARE Score - Shower/Bathe Self 5   Upper Body Dressing   Assistance Needed Independent   Physical Assistance Level No physical assistance   CARE Score - Upper Body Dressing 6   Lower Body Dressing   Assistance Needed Supervision   Physical Assistance Level No physical assistance   CARE Score - Lower Body Dressing 4   Putting On/Taking Off Footwear   Assistance Needed Independent   Physical Assistance Level No physical assistance   CARE Score - Putting On/Taking Off Footwear 6   Toilet Transfer   Assistance Needed Supervision   Physical Assistance Level No physical assistance   CARE Score - Toilet Transfer 4   Cognitive Pattern Assessment   Cognitive Pattern Assessment Used BIMS " "  Brief Interview for Mental Status (BIMS)   Repetition of Three Words (First Attempt) 3   Temporal Orientation: Year Correct   Temporal Orientation: Month Accurate within 5 days   Temporal Orientation: Day Correct   Recall: \"Sock\" Yes, no cue required   Recall: \"Blue\" Yes, no cue required   Recall: \"Bed\" Yes, no cue required   BIMS Summary Score 15   Confusion Assessment Method (CAM)   Is there evidence of an acute change in mental status from the patient's baseline? No   Inattention Behavior present, fluctuates (comes and goes, changes in severity)   Disorganized thinking Behavior not present   Altered level of consciousness Behavior not present   Discharge Summary    Discharge Location  Home   Patient Discharging with Assist of Spouse / Significant Other   Level of Supervision Required Intermittent Supervision  (bathing mobility and transfers at FWW level)   Recommended Services Upon Discharge Home Health Occupational Therapy   Long Term Goals Not Met mod I basic self care and  bathroom transfers   Reason(s) for Goals Not Met cues for NWB  left LE  during mobility related to  basic ADL     sit to stand for pants   toileting  grooming at sink in standing     transfers  in / out of the shower  to and from bed etc   Criteria for Termination of Services Maximum Function Achieved for Inpatient Rehabilitation   Discharge Instructions to Patient   Level of Assist Required for Eating Able to Complete Eating without Assist   Level of Assist Required for Grooming Requires Supervision with Grooming  (standing at sink reminders to not put weight on left foot.   wear a shoe  on the right this will help you keep the weight off of the left)   Level of Assist Required for Dressing Requires Physical Assist with Dressing  (reminders to put leg out in front ( not behind)  push up from  wheelchair with both hands   reach up to the walker   keep one hand on the walker and pull pants up with the other  then switch .)   Level of Assist " Required for Toileting Requires Supervision with Toileting  (reminders of putting foot out infront ( not behind)  when with  sit to stand from toilet to pull up pants)   Level of Assist Required for Toilet Transfer Requires Supervision with Toilet Transfer  (using Walker to get to and from the toilet)   Level of Assist Required for Bathing Able to Complete Bathing without Assist  (remember to waterproof your bandage with a bag and tape.   half the tape over the bag the other half against you skin  fold a corner  so you have a  tab to pull on when taking the bag off your foot.   no physical assist needed)   Equipment for Bathing Tub Transfer Bench;Grab Bars in Tub / Shower;Hand Held Shower Head  (you will need to purchas a  tub bench from [employer]  and also a hand held shower   have the doors removed from the  tub shower and put up a curtain   fold the curtain  onto the bench .)   Level of Assist Required for Shower Transfer Requires Supervision with Shower Transfer  (front wheel walker  in / out of the bathroom and to and from the bench   again put your left foot out in front of you with sit to stand  and stand to sit   one hand on the bench one on the walker    don't forget to lean forward)   Driving Please Contact Physician Prior to Driving         Assessment     NWB  left foot remains  a barrier  to independence will require supervision and cues upon d/c          Strengths: Able to follow instructions, Alert and oriented, Effective communication skills, Independent prior level of function, Motivated for self care and independence, Pleasant and cooperative, Supportive family, Willingly participates in therapeutic activities  Barriers: Decreased endurance, Generalized weakness, Impaired activity tolerance, Tube feeding, Fatigue    Plan    Family training and  d/c 8-1-24   Continue  functional transfers and sit to stands  standing endurance    NWB left LE  kick foot out in front with  sit <-> stands   both hands on   w/c    and with  tub bench one on bench one on walker       DME  OT DME Recommendations  Bathroom Equipment: 3 in 1 Commode  Additional Equipment: Other (Comments) (slideboard)        Occupational Therapy Goals (Active)       Problem: Functional Transfers       Dates: Start:  07/18/24         Goal: STG-Within one week, patient will transfer to tub/shower with Min A.       Dates: Start:  07/18/24    Expected End:  08/01/24         Goal Note filed on 07/23/24 0956 by Antolin Juarez, C.O.T.A.         Min assist max cues Limited by  decreased strength/endurance and  difficulty maintaining NWB  left LE  during standing and sit <-> stands  note   has been performing lateral scoot not stand pivot  to and from shower bench    Continue goal                  Problem: OT Long Term Goals       Dates: Start:  07/18/24         Goal: LTG-By discharge, patient will complete basic self care tasks at Mod Independent level.       Dates: Start:  07/18/24    Expected End:  08/01/24         Goal Note filed on 07/31/24 0816 by Antolin Juarez, C.O.T.A.       Requires  supervision and cues for NWB  left LE  during mobility related to  basic ADL     sit to stand for pants   toileting  grooming at sink in standing     transfers  in / out of the shower  to and from bed etc                 Goal: LTG-By discharge, patient will perform bathroom transfers at Mod Independent level.       Dates: Start:  07/18/24    Expected End:  08/01/24         Goal Note filed on 07/31/24 0816 by Antolin Juarez, C.O.T.A.       Supervision   cues for NWB  left LE  during mobility related to  basic ADL     sit to stand for pants   toileting  grooming at sink in standing     transfers  in / out of the shower  to and from bed etc

## 2024-07-31 NOTE — THERAPY
Physical Therapy   Daily Treatment     Patient Name: Pj Freeman  Age:  69 y.o., Sex:  male  Medical Record #: [id number]  Today's Date: 7/31/2024     Precautions  Precautions: Fall Risk, Non Weight Bearing Left Lower Extremity  Comments: left transmetatarsal amputation    Subjective    Pt agreeable to tx. Reports that he will primarily use WC with FWW for bathroom access at home. Aware that it is not recommended for him to use mobility scooter at this time     Objective       07/31/24 1200   PT Charge Group   PT Therapeutic Activities (Units) 2   PT Total Time Spent   PT Individual Total Time Spent (Mins) 30   Cognition    Level of Consciousness Alert   ABS (Agitated Behavior Scale)   Agitated Behavior Scale Performed No   Sleep/Wake Cycle   Sleep & Rest Awake;Out of bed   Gait Functional Level of Assist    Gait Level Of Assist Contact Guard Assist   Assistive Device Front Wheel Walker   Distance (Feet) 15   # of Times Distance was Traveled 1   Deviation   (hop to L LE NWB)   Transfer Functional Level of Assist   Bed, Chair, Wheelchair Transfer Standby Assist   Bed Chair Wheelchair Transfer Description Squat pivot transfer to wheelchair;Supervision for safety;Verbal cueing  (SBA squat pivot WC<>bed, Coreen stand hop FWW L LE NWB)   Bed Mobility    Sit to Stand Minimal Assist  (verbal cues for anterior weight shift and hand placement)   Interdisciplinary Plan of Care Collaboration   IDT Collaboration with  Physical Therapist   Patient Position at End of Therapy Seated;Chair Alarm On;Self Releasing Lap Belt Applied;Call Light within Reach;Tray Table within Reach;Phone within Reach   Collaboration Comments POC   Roll Left and Right   Assistance Needed Independent   CARE Score - Roll Left and Right 6   Sit to Lying   Assistance Needed Independent   CARE Score - Sit to Lying 6   Lying to Sitting on Side of Bed   Assistance Needed Independent   CARE Score - Lying to Sitting on Side of Bed 6   Sit to Stand   Assistance  "Needed Physical assistance   Physical Assistance Level 25% or less   CARE Score - Sit to Stand 3   Chair/Bed-to-Chair Transfer   Assistance Needed Supervision;Verbal cues   CARE Score - Chair/Bed-to-Chair Transfer 4   Walk 10 Feet   Assistance Needed Incidental touching   CARE Score - Walk 10 Feet 4   Wheel 50 Feet with Two Turns   Assistance Needed Independent   CARE Score - Wheel 50 Feet with Two Turns 6   Type of Wheelchair/Scooter Manual   Wheel 150 Feet   Assistance Needed Independent   CARE Score - Wheel 150 Feet 6   Type of Wheelchair/Scooter Manual         Assessment    Pt fatigues quickly with short distance mobility with FWW however has good adherence to left LE nonweightbearing precautions. He reports that his spouse will be present for family training tomorrow prior to DC.  Strengths: Able to follow instructions, Alert and oriented, Independent prior level of function, Pleasant and cooperative, Willingly participates in therapeutic activities, Motivated for self care and independence  Barriers: Fatigue, Difficulty following instructions, Decreased endurance, Impaired activity tolerance, Limited mobility (limited ability to maintain weightbearing precaution and tends to lose focus/becomes distracted)    Plan    DC [facility], FT with spouse    DME  PT DME Recommendations  Wheelchair: 16\" Width  Cushion: Standard  Assistive Device: Front Wheeled Walker  Additional Equipment: Other (Comments) (slideboard)    Passport items to be completed:  Get in/out of bed safely, in/out of a vehicle, safely use mobility device, walk or wheel around home/community, navigate up and down stairs, show how to get up/down from the ground, ensure home is accessible, demonstrate HEP, complete caregiver training    Physical Therapy Problems (Active)       Problem: Mobility       Dates: Start:  07/18/24         Goal: STG-Within one week, patient will ambulate 50 feet with kneeling scooter SBA       Dates: Start:  07/18/24    " Expected End:  08/01/24            Goal: STG-Within one week, patient will ambulate up/down a curb with FWW CGA       Dates: Start:  07/18/24    Expected End:  08/01/24               Problem: Mobility Transfers       Dates: Start:  07/18/24         Goal: STG-Within one week, patient will sit to stand with CGA       Dates: Start:  07/18/24    Expected End:  08/01/24               Problem: PT-Long Term Goals       Dates: Start:  07/18/24         Goal: LTG-By discharge, patient will ambulate with kneeling scooter >150 feet supervised       Dates: Start:  07/18/24    Expected End:  08/01/24            Goal: LTG-By discharge, patient will transfer one surface to another supervised        Dates: Start:  07/18/24    Expected End:  08/01/24            Goal: LTG-By discharge, patient will ambulate up/down 4-6 stairs with single railing supervised       Dates: Start:  07/18/24    Expected End:  08/01/24            Goal: LTG-By discharge, patient will transfer in/out of a car supervised       Dates: Start:  07/18/24    Expected End:  08/01/24

## 2024-07-31 NOTE — DISCHARGE PLANNING
Case management  Reviewed signed copy of IMM and answered all questions.  Spouse will provide transportation back to [place] on 8/1     Dc date /disposition: 8/1  Follow-up Information:     Hang Gallego  [address]  [facility] [id number] [phone]  Call to [facility] follow up appointment w/ PCP     [facility]  [address]  [facility] [address]  [phone]  Referal placed for CARDIOLOGY - they will call you to schedule follow up     [facility]  [address]  [facility] [place] [facility] [address]  [phone]  Referral sent to [facility] Neur[facility] for follow up; they will call you to [facility] follow up appointment.     [facility]  [address]  [facility] [place] [facility] [address]  [phone]  Referral sent to Gastroenterolgy for GI Consulth; they will call you to [facility] follow up appointment.     [facility]- [address]  [address]  [facility] [address]  [phone]  Referral     Mateus Hagan, [email or web address].[email or web address]  [address]  [address]  [facility] [id number] [phone]   Follow up on 8/7/2024 Wednesday @ 3:00pm with Dr Hagan/Podiatry.     [facility]  [address]  [facility] [address]  [phone]  Follow up  A wheelchair / cushion has been ordered.

## 2024-07-31 NOTE — PROGRESS NOTES
NURSING DAILY NOTE    Name: Pj Freeman   Date of Admission: 7/17/2024   Admitting Diagnosis: Acute CVA (cerebrovascular accident) ([facility])  Attending Physician: Dilia Salazar M.d.  Allergies: Patient has no known allergies.    Safety  Patient Assist  minm  Patient Precautions  Fall Risk, Non Weight Bearing Left Lower Extremity  Precaution Comments  left transmetatarsal amputation  Bed Transfer Status  Contact Guard Assist  Toilet Transfer Status   Standby Assist  Assistive Devices  Rails, Wheelchair  Oxygen  None - Room Air  Diet/Therapeutic Dining  Current Diet Order   Procedures    Diet Order Diet: Low Fiber(GI Soft); Second Modifier: (optional): Consistent CHO (Diabetic)     Pill Administration  whole  Agitated Behavioral Scale     ABS Level of Severity       Fall Risk  Has the patient had a fall this admission?   No  Taylor Gallego Fall Risk Scoring  16, HIGH RISK  Fall Risk Safety Measures  bed alarm, chair alarm, and poor balance    Vitals  Temperature: 36.6 °C (97.8 °F)  Temp src: Oral  Pulse: 78  Respiration: 18  Blood Pressure : 121/66  Blood Pressure MAP (Calculated): 84 MM HG  BP Location: Left, Upper Arm  Patient BP Position: Supine     Oxygen  Pulse Oximetry: 95 %  O2 (LPM): 0  O2 Delivery Device: None - Room Air    Bowel and Bladder  Last Bowel Movement  07/30/24  Stool Type  Type 5: Soft blob with clear cut edges (passed easily)  Bowel Device  Bathroom, Diaper  Continent  Bladder: Continent void   Bowel: Continent movement  Bladder Function  Urine Void (mL): 250 ml  Number of Times Voided: 1  Urine Color: Yellow  Genitourinary Assessment   Bladder Assessment (WDL):  WDL Except  Cordova Catheter: Not Applicable  Urine Color: Yellow  Bladder Device: Bathroom  Time Void: Yes  Bladder Scan: Post Void  $ Bladder Scan Results (mL): 180  Bladder Medications: No    Skin  Rocky Score   18  Sensory Interventions   Bed Types: Standard/Trauma  Mattress  Skin Preventative Measures: Pillows in Use for Support / Positioning  Moisture Interventions  Moisturizers/Barriers: Barrier Wipes      Pain  Pain Rating Scale  0 - No Pain  Pain Location  Foot  Pain Location Orientation  Right  Pain Interventions   Declines    ADLs    Bathing    (OT Shower)  Linen Change      Personal Hygiene  Samia Bottle, Moist Samia Wipes  Chlorhexidine Bath      Oral Care  Brushed Teeth  Teeth/Dentures     Shave     Nutrition Percentage Eaten  *  * Meal *  *, Between % Consumed  Environmental Precautions  Treaded Slipper Socks on Patient  Patient Turns/Positioning  Patient Turns Self from Side to Side  Patient Turns Assistance/Tolerance     Bed Positions  Bed Controls On, Bed Locked  Head of Bed Elevated  Self regulated      Psychosocial/Neurologic Assessment  Psychosocial Assessment  Psychosocial (WDL):  Within Defined Limits  Patient Behaviors: Fatigue  Family Behaviors: No Family Present  Neurologic Assessment  Neuro (WDL): Within Defined Limits  Level of Consciousness: Alert  Orientation Level: Oriented X4  Cognition: Follows commands  Speech: Clear  EENT (WDL):  WDL Except    Cardio/Pulmonary Assessment  Edema   RLE Edema: 2+, Generalized  LLE Edema: 2+, Generalized  Respiratory Breath Sounds     Cardiac Assessment   Cardiac (WDL):  WDL Except (hx htn, pvd, afib, mitral valve stenosis)

## 2024-07-31 NOTE — THERAPY
Occupational Therapy  Daily Treatment     Patient Name: Pj Freeman  Age:  69 y.o., Sex:  male  Medical Record #: [id number]  Today's Date: 7/31/2024     Precautions  Precautions: Fall Risk, Non Weight Bearing Left Lower Extremity  Comments: left transmetatarsal amputation         Subjective  Pt seated in w/c upon arrival, agreeable to OT session.      Objective     07/31/24 1101   OT Charge Group   OT Self Care / ADL (Units) 2   OT Total Time Spent   OT Individual Total Time Spent (Mins) 30   Vitals   O2 Delivery Device None - Room Air   Functional Level of Assist   Tub / Shower Transfers Standby Assist  (dry shower txfr, lateral scoot w/c <> TTB)   Tub Shower Transfer Description Adaptive equipment;Increased time;Set-up of equipment   Interdisciplinary Plan of Care Collaboration   IDT Collaboration with  Certified O.T. Assistant  (DUDLEY)   Patient Position at End of Therapy Seated;Chair Alarm On;Self Releasing Lap Belt Applied  (in dining room for lunch)   Collaboration Comments POC   OT DME Recommendations   Bathroom Equipment Tub Transfer Bench (Medicaid Only)     Pt attempted STS in shower to wash bottom (dry run). Discussed lateral lean to complete this task. Poor adherence to WB precautions with initial trial. Raised leg height on TTB and trialed again with improved performance and adherence.     Assessment  Pt plans to order tub transfer bench and HH shower head to support shower safety and independence.   Strengths: Able to follow instructions, Alert and oriented, Effective communication skills, Independent prior level of function, Motivated for self care and independence, Pleasant and cooperative, Supportive family, Willingly participates in therapeutic activities  Barriers: Decreased endurance, Generalized weakness, Impaired activity tolerance, Tube feeding, Fatigue    Plan  Anticipate d/c tomorrow    DME  OT DME Recommendations  Bathroom Equipment: (P) Tub Transfer Bench (Medicaid Only)  Additional  Equipment: Other (Comments) (slideboard)        Occupational Therapy Goals (Active)       Problem: Functional Transfers       Dates: Start:  07/18/24         Goal: STG-Within one week, patient will transfer to tub/shower with Min A.       Dates: Start:  07/18/24    Expected End:  08/01/24         Goal Note filed on 07/23/24 0956 by Antolin Juarez, C.O.T.A.         Min assist max cues Limited by  decreased strength/endurance and  difficulty maintaining NWB  left LE  during standing and sit <-> stands  note   has been performing lateral scoot not stand pivot  to and from shower bench    Continue goal                  Problem: OT Long Term Goals       Dates: Start:  07/18/24         Goal: LTG-By discharge, patient will complete basic self care tasks at Mod Independent level.       Dates: Start:  07/18/24    Expected End:  08/01/24         Goal Note filed on 07/31/24 0816 by Antolin Juarez, C.O.T.A.       Requires  supervision and cues for NWB  left LE  during mobility related to  basic ADL     sit to stand for pants   toileting  grooming at sink in standing     transfers  in / out of the shower  to and from bed etc                 Goal: LTG-By discharge, patient will perform bathroom transfers at Mod Independent level.       Dates: Start:  07/18/24    Expected End:  08/01/24         Goal Note filed on 07/31/24 0816 by Antolin Juarez, C.O.T.A.       Supervision   cues for NWB  left LE  during mobility related to  basic ADL     sit to stand for pants   toileting  grooming at sink in standing     transfers  in / out of the shower  to and from bed etc

## 2024-07-31 NOTE — DISCHARGE SUMMARY
Physical Medicine & Rehabilitation Discharge Summary    Admission Date: 7/17/2024    Discharge Date: 8/***1/2024    Attending Provider: Dilia Salazar MD/PhD    Admission Diagnosis:   Active Hospital Problems    Diagnosis     *Acute CVA (cerebrovascular accident) (HCC)     Pulmonary hypertension (HCC)     A-fib (HCC)     Vitamin D deficiency     Acute coronary syndrome (HCC)     Hypomagnesemia     Acute blood loss anemia     NSTEMI (non-ST elevated myocardial infarction) (HCC)     Type 2 diabetes mellitus (HCC)     Upper GI bleed     Acute osteomyelitis of left foot (HCC)        Discharge Diagnosis:  Active Hospital Problems    Diagnosis     *Acute CVA (cerebrovascular accident) (HCC)     Pulmonary hypertension (HCC)     A-fib (HCC)     Vitamin D deficiency     Acute coronary syndrome (HCC)     Hypomagnesemia     Acute blood loss anemia     NSTEMI (non-ST elevated myocardial infarction) (HCC)     Type 2 diabetes mellitus (HCC)     Upper GI bleed     Acute osteomyelitis of left foot (HCC)        HPI per Admission History & Physical:  Patient is a 69 y.o. male with a PMH of DM2, HTN, PVD, and recent toe amputation (NWB LLE) who was admitted at [facility] on 7/6/24 after having syncope due to blood loss. He reportedly was transfused 4 U at [facility]. He was also found to have a L CVA on MRI as well as an NSTEMI. His stay was complicated by new A fib, anemia and melena requiring higher level of care and was transferred to [facility] on 7/8/24 for ongoing GI bleed. EGD was performed which showed multiple duodenal ulcers 1 of which was oozing requiring clipping. He was evaluated by Cardiology for severe mitral stenosis. Per discharge can consider restarting AC in 1-2 weeks.     Patient was admitted to [facility] on 7/17/2024.     Hospital Course by Problem List:  L CVA -Patient with L parietal CVA in 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 Stroke Ppx has been held due to GI bleed  -PT and OT for mobility and ADLs. Per  guidelines, 15 hours per week between PT, OT and/or SLP.  -Follow-up Neurology     HTN/CAD/Mitral stenosis/A fib - Patient on Amiodarone 400 mg BID, Lasix 20 mg daily. Consulted hospitalist. Amiodarone fell off MAR, did not miss dose, restart Amiodarone and discussed with hospitalist. 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 NWB reportedly on LLE     HLD - Patient on Atorvastatin 40 mg daily     GI bleed - Check AM CBC. Continue PPI BID. Hgb 8.9, consult hospitalist. Repeat 8.3, will monitor     DM2 with hyperglycemia - Patient on Lantus 5 U and SSI. Previously on metformin and Jardiance. Consult hospitalist. Increased Lantus. Started on metformin. Improving sugars, lantus discontinued. Blood sugars 150s and below, continue Metformin 1000 mg BID, Januvia 50 mg (increased) and SSI.  Elevated blood sugars over weekend, Januvia increased to 75 on 7/29. Blood sugars into 200s, continue Metformin 1000 mg BID and Januvia 75 mg daily as well as SSI. Januvia increased to 100, will not need SSI at home.      Anemia - Check AM CBC - 8.9 on admission.      Hypokalemia - Check AM CMP - 3.8, will monitor. Repeat 3.1, started on 20 mEq. Repeat 3.8, improved. Repeat 3.9 on 7/29. On Lasix, continue 10 mEq at discharge     Hypocalcemia - Check AM CMP - 7.6 on admission, will monitor. Repeat low, started on Ca supplement.      Hypomagnesia - Low on 7/29, started on 400 mg      Pain - Patient on PRN Tylenol/Oxycodone     Skin - Patient at risk for skin breakdown due to debility in areas including sacrum, achilles, elbows and head in addition to other sites. Nursing to assess skin daily.      GI Ppx - Patient on Prilosec for GERD prophylaxis. Patient on Senna-docusate for constipation prophylaxis.      DVT Ppx - Patient not cleared for AC on transfer.      Dispo - Ongoing discussion about safe discharge. Patient would like to go home.     Functional Status at  Discharge  Eating:  Independent  Eating Description:     Grooming:  Independent (seated at sink)  Grooming Description:  Seated in wheelchair at sink, Increased time, Initial preparation for task, Set-up of equipment  Bathing:  Supervision (setup   therapist waterproofed  left foot.   he performed  side to side lean to wash  rinse  buttocks)  Bathing Description:  Grab bar, Hand held shower, Assit with perineal, Increased time, Initial preparation for task, Set-up of equipment, Set up for shower sleeve, Set up for wound protection, Supervision for safety  Upper Body Dressing:  Independent (note  no need for clothing retrieval)  Upper Body Dressing Description:  Set-up of equipment, Increased time, Initial preparation for task  Lower Body Dressing:  Standby Assist (clothing retrieval at wheelchair level cues to remove foot rests to get closer to closet .   dons LB clothing with out assist  but does required   SBA and cues for NWB left LE  sit to stand  to Grab bar for FWW  when standing to pull up pants.)  Lower Body Dressing Description:  Set-up of equipment, Initial preparation for task, Increased time, Supervision for safety, Other (comment) (Assist with pulling up underwear and shorts/pants over legs and hips.)  Discharge Location : Home  Patient Discharging with Assist of: Spouse / Significant Other  Level of Supervision Required: Intermittent Supervision (bathing mobility and transfers at FWW level)  Recommended Services Upon Discharge: Home Health Occupational Therapy  Long Term Goals Not Met: mod I basic self care and  bathroom transfers  Reason(s) for Goals Not Met: cues for NWB  left LE  during mobility related to  basic ADL     sit to stand for pants   toileting  grooming at sink in standing     transfers  in / out of the shower  to and from bed etc  Criteria for Termination of Services: Maximum Function Achieved for Inpatient Rehabilitation  Walk:  Contact Guard Assist  Distance Walked:  15  Number of  Times Distance Was Traveled:  1  Assistive Device:  Front Wheel Walker  Gait Deviation:   (hop to L LE NWB)  Wheelchair:  Modified Independent  Distance Propelled:  150   Wheelchair Description:  Extra time, Leg rest management, Supervision for safety  Stairs Contact Guard Assist  Stairs Description Hand rails (4 inch step in bars 6 reps)     Comprehension:  Modified Independent  Comprehension Description:  Glasses  Expression:  Independent  Expression Description:     Social Interaction:  Independent  Social Interaction Description:  Verbal cues  Problem Solving:  Supervision  Problem Solving Description:  Increased time, Bed/chair alarm, Seat belt, Supervision, Therapy schedule, Verbal cueing  Memory:  Supervision  Memory Description:  Increased time, Bed/chair alarm, Seat belt, Supervision, Therapy schedule, Verbal cueing       Dilia DORSEY M.D., personally performed a complete drug regimen review and no potential clinically significant medication issues were identified.   Discharge Medication:     Medication List        START taking these medications        Instructions   ferrous sulfate 325 (65 Fe) MG tablet   Take 1 Tablet by mouth 2 times a day with meals.  Dose: 325 mg     magnesium oxide 400 (240 Mg) MG Tabs   Take 1 Tablet by mouth 2 times a day.  Dose: 400 mg     oxyCODONE immediate-release 5 MG Tabs  Commonly known as: Roxicodone   Take 1 Tablet by mouth every 8 hours as needed for Severe Pain for up to 7 days.  Dose: 5 mg     potassium chloride SA 10 MEQ Tbcr  Start taking on: August 1, 2024  Commonly known as: K-Dur   Take 1 Tablet by mouth every day.  Dose: 10 mEq     SITagliptin 100 MG Tabs  Start taking on: August 1, 2024  Commonly known as: Januvia   Take 1 Tablet by mouth every day.  Dose: 100 mg            CHANGE how you take these medications        Instructions   amiodarone 200 MG Tabs  Start taking on: July 31, 2024  What changed: See the new instructions.  Commonly known as:  Cordarone   Take 2 Tablets by mouth 2 times a day for 7 days, THEN 2 Tablets 2 times a day for 7 days, THEN 1 Tablet 2 times a day for 30 days.            CONTINUE taking these medications        Instructions   acetaminophen 325 MG Tabs  Commonly known as: Tylenol   Take 2 Tablets by mouth every 6 hours as needed for Mild Pain or Moderate Pain.  Dose: 650 mg     atorvastatin 40 MG Tabs  Commonly known as: Lipitor   Take 1 Tablet by mouth every evening.  Dose: 40 mg     Calcium Carbonate Antacid 1000 MG Chew   Chew 1 Tablet 3 times a day with meals.  Dose: 1,000 mg     furosemide 20 MG Tabs  Commonly known as: Lasix   Take 1 Tablet by mouth 2 times a day.  Dose: 20 mg     Jardiance 25 MG Tabs  Generic drug: Empagliflozin   Take 25 mg by mouth every day.  Dose: 25 mg     metFORMIN 500 MG Tabs  Commonly known as: Glucophage   Take 2 Tablets by mouth 2 times a day.  Dose: 1,000 mg     omeprazole 40 MG delayed-release capsule  Commonly known as: PriLOSEC   Take 1 Capsule by mouth 2 times a day.  Dose: 40 mg     vitamin D3 400 UNIT Tabs  Commonly known as: Cholecalciferol   Take 1,000 Units by mouth every day.  Dose: 1,000 Units            STOP taking these medications      insulin GLARGINE 100 UNIT/ML Soln  Commonly known as: Lantus,Semglee     insulin lispro 100 UNIT/ML Sopn injection PEN  Commonly known as: HumaLOG/AdmeLOG     polyethylene glycol/lytes Pack  Commonly known as: Miralax     senna-docusate 8.6-50 MG Tabs  Commonly known as: Pericolace Or Senokot S              Discharge Diet:  Current Diet Order   Procedures    Diet Order Diet: Low Fiber(GI Soft); Second Modifier: (optional): Consistent CHO (Diabetic)       Discharge Activity:  NWB LLE     Disposition:  Patient to discharge home with family support and community resources.    Equipment:  FWW, WC    Follow-up & Discharge Instructions:  Follow up with your primary care provider (PCP) within 7-10 days of discharge to review your medications and take over  your care.     If you develop chest pain, fever, chills, change in neurologic function (weakness, sensation changes, vision changes), or other concerning sxs, seek immediate medical attention or call 911.      Future Appointments   Date Time Provider Department Center   7/31/2024  2:00 PM Hugh Serrato PT [facility] None       Condition on Discharge:  Good    More than 32 minutes was spent on discharging this patient, including face-to-face time, prescription management, and the dictation of this note.    Dilia Saalzar M.D.    Date of Service: 8/1/2024

## 2024-07-31 NOTE — ASSESSMENT & PLAN NOTE
Echo EF 60%, RVSP 80 mmHg, severe MS, small pericardial effusion, pleural effusion noted  BNP elevated but stable  CXR mod R basilar opacity and suspected small pleural effusion  Continue Lasix and IS  Needs Cardiology F/U

## 2024-07-31 NOTE — PROGRESS NOTES
..                                                         NURSING DAILY NOTE    Name: Pj Freeman   Date of Admission: 7/17/2024   Admitting Diagnosis: Acute CVA (cerebrovascular accident) ([facility])  Attending Physician: Dilia Salazar M.d.  Allergies: Patient has no known allergies.    Safety  Patient Assist  minimum assist  Patient Precautions  Fall Risk, Non Weight Bearing Left Lower Extremity  Precaution Comments  left transmetatarsal amputation  Bed Transfer Status  Contact Guard Assist  Toilet Transfer Status   Standby Assist  Assistive Devices  Rails, Wheelchair  Oxygen  None - Room Air  Diet/Therapeutic Dining  Current Diet Order   Procedures    Diet Order Diet: Low Fiber(GI Soft); Second Modifier: (optional): Consistent CHO (Diabetic)     Pill Administration  whole  Agitated Behavioral Scale     ABS Level of Severity       Fall Risk  Has the patient had a fall this admission?   No  Taylor Gallego Fall Risk Scoring  16, HIGH RISK  Fall Risk Safety Measures  bed alarm, chair alarm, poor balance, and ok to leave pt in bathroom    Vitals  Temperature: 36.6 °C (97.8 °F)  Temp src: Oral  Pulse: 78  Respiration: 18  Blood Pressure : 121/66  Blood Pressure MAP (Calculated): 84 MM HG  BP Location: Left, Upper Arm  Patient BP Position: Supine     Oxygen  Pulse Oximetry: 95 %  O2 (LPM): 0  O2 Delivery Device: None - Room Air    Bowel and Bladder  Last Bowel Movement  07/31/24  Stool Type  Type 4: Like a sausage or snake, smooth and soft  Bowel Device  Bathroom  Continent  Bladder: Continent void   Bowel: Continent movement  Bladder Function  Urine Void (mL): 150 ml  Number of Times Voided: 1  Urine Color: Lynne  Genitourinary Assessment   Bladder Assessment (WDL):  Within Defined Limits  Cordova Catheter: Not Applicable  Urine Color: Lynne  Bladder Device: Bathroom  Time Void: Yes  Bladder Scan: Post Void  $ Bladder Scan Results (mL): 180  Bladder Medications: No    Skin  Rocky Score   18  Sensory  Interventions   Bed Types: Standard/Trauma Mattress  Skin Preventative Measures: Pillows in Use for Support / Positioning  Moisture Interventions  Moisturizers/Barriers: Barrier Wipes      Pain  Pain Rating Scale  0 - No Pain  Pain Location  Foot  Pain Location Orientation  Right  Pain Interventions   Declines    ADLs    Bathing    (OT Shower)  Linen Change      Personal Hygiene  Perineal Care, Moist Samia Wipes  Chlorhexidine Bath      Oral Care  Brushed Teeth  Teeth/Dentures     Shave     Nutrition Percentage Eaten  *  * Meal *  *, Between % Consumed  Environmental Precautions  Treaded Slipper Socks on Patient  Patient Turns/Positioning  Patient Turns Self from Side to Side  Patient Turns Assistance/Tolerance     Bed Positions  Bed Controls On, Bed Locked  Head of Bed Elevated  Self regulated      Psychosocial/Neurologic Assessment  Psychosocial Assessment  Psychosocial (WDL):  Within Defined Limits  Patient Behaviors: Fatigue  Family Behaviors: No Family Present  Neurologic Assessment  Neuro (WDL): Within Defined Limits  Level of Consciousness: Alert  Orientation Level: Oriented X4  Cognition: Follows commands  Speech: Clear  EENT (WDL):  WDL Except    Cardio/Pulmonary Assessment  Edema   RLE Edema: 1+, Pitting  LLE Edema: 2+, Pitting  Respiratory Breath Sounds     Cardiac Assessment   Cardiac (WDL):  WDL Except (hx htn, pvd, afib, mitral valve stenosis)

## 2024-07-31 NOTE — PROGRESS NOTES
"  Physical Medicine & Rehabilitation Progress Note    Encounter Date: 7/31/2024    Chief Complaint: Decreased mobility, weakness    Interval Events (Subjective):  Patient sitting up in room. He reports he is doing well. Discussed about discharge tomorrow. He reports his wife will come pick him up. He reports he knows about his follow-up next week with surgeon.     _____________________________________  Interdisciplinary Team Conference   Most recent IDT on 7/30/2024    Discharge Date/Disposition:  8/1/24  _____________________________________    Objective:  VITAL SIGNS: /62   Pulse 75   Temp 36.9 °C (98.4 °F) (Oral)   Resp 18   Ht 1.803 m (5' 11\")   Wt 75.2 kg (165 lb 12.8 oz)   SpO2 96%   BMI 23.12 kg/m²   Gen: NAD  Psych: Mood and affect appropriate  CV: RRR, 0 edema  Resp: CTAB, no upper airway sounds  Abd: NTND  Neuro: AOx4, following commands    Laboratory Values:  Recent Results (from the past 72 hour(s))   OCCULT BLOOD STOOL    Collection Time: 07/28/24  5:05 PM   Result Value Ref Range    Occult Blood Feces Negative Negative   POCT glucose device results    Collection Time: 07/28/24  5:11 PM   Result Value Ref Range    POC Glucose, Blood 173 (H) 65 - 99 mg/dL   POCT glucose device results    Collection Time: 07/28/24  8:38 PM   Result Value Ref Range    POC Glucose, Blood 182 (H) 65 - 99 mg/dL   MAGNESIUM    Collection Time: 07/29/24  6:33 AM   Result Value Ref Range    Magnesium 1.2 (L) 1.5 - 2.5 mg/dL   CBC WITH DIFFERENTIAL    Collection Time: 07/29/24  6:33 AM   Result Value Ref Range    WBC 8.6 4.8 - 10.8 K/uL    RBC 3.22 (L) 4.70 - 6.10 M/uL    Hemoglobin 8.3 (L) 14.0 - 18.0 g/dL    Hematocrit 28.0 (L) 42.0 - 52.0 %    MCV 87.0 81.4 - 97.8 fL    MCH 25.8 (L) 27.0 - 33.0 pg    MCHC 29.6 (L) 32.3 - 36.5 g/dL    RDW 51.2 (H) 35.9 - 50.0 fL    Platelet Count 397 164 - 446 K/uL    MPV 9.8 9.0 - 12.9 fL    Neutrophils-Polys 80.40 (H) 44.00 - 72.00 %    Lymphocytes 14.50 (L) 22.00 - 41.00 %    " Monocytes 5.10 0.00 - 13.40 %    Eosinophils 0.00 0.00 - 6.90 %    Basophils 0.00 0.00 - 1.80 %    Nucleated RBC 0.00 0.00 - 0.20 /100 WBC    Neutrophils (Absolute) 6.91 1.82 - 7.42 K/uL    Lymphs (Absolute) 1.25 1.00 - 4.80 K/uL    Monos (Absolute) 0.44 0.00 - 0.85 K/uL    Eos (Absolute) 0.00 0.00 - 0.51 K/uL    Baso (Absolute) 0.00 0.00 - 0.12 K/uL    NRBC (Absolute) 0.00 K/uL    Hypochromia 2+ (A)     Microcytosis 2+ (A)    Basic Metabolic Panel    Collection Time: 07/29/24  6:33 AM   Result Value Ref Range    Sodium 136 135 - 145 mmol/L    Potassium 3.9 3.6 - 5.5 mmol/L    Chloride 101 96 - 112 mmol/L    Co2 24 20 - 33 mmol/L    Glucose 250 (H) 65 - 99 mg/dL    Bun 20 8 - 22 mg/dL    Creatinine 0.67 0.50 - 1.40 mg/dL    Calcium 7.7 (L) 8.5 - 10.5 mg/dL    Anion Gap 11.0 7.0 - 16.0   ESTIMATED GFR    Collection Time: 07/29/24  6:33 AM   Result Value Ref Range    GFR (CKD-EPI) 100 >60 mL/min/1.73 m 2   DIFFERENTIAL MANUAL    Collection Time: 07/29/24  6:33 AM   Result Value Ref Range    Manual Diff Status PERFORMED    PERIPHERAL SMEAR REVIEW    Collection Time: 07/29/24  6:33 AM   Result Value Ref Range    Peripheral Smear Review see below    PLATELET ESTIMATE    Collection Time: 07/29/24  6:33 AM   Result Value Ref Range    Plt Estimation Normal    MORPHOLOGY    Collection Time: 07/29/24  6:33 AM   Result Value Ref Range    RBC Morphology Present    POCT glucose device results    Collection Time: 07/29/24  8:03 AM   Result Value Ref Range    POC Glucose, Blood 201 (H) 65 - 99 mg/dL   POCT glucose device results    Collection Time: 07/29/24 11:38 AM   Result Value Ref Range    POC Glucose, Blood 170 (H) 65 - 99 mg/dL   POCT glucose device results    Collection Time: 07/29/24  4:47 PM   Result Value Ref Range    POC Glucose, Blood 131 (H) 65 - 99 mg/dL   POCT glucose device results    Collection Time: 07/29/24  8:23 PM   Result Value Ref Range    POC Glucose, Blood 169 (H) 65 - 99 mg/dL   POCT glucose device  results    Collection Time: 07/30/24  8:05 AM   Result Value Ref Range    POC Glucose, Blood 196 (H) 65 - 99 mg/dL   POCT glucose device results    Collection Time: 07/30/24 11:24 AM   Result Value Ref Range    POC Glucose, Blood 240 (H) 65 - 99 mg/dL   POCT glucose device results    Collection Time: 07/30/24  4:59 PM   Result Value Ref Range    POC Glucose, Blood 153 (H) 65 - 99 mg/dL   OCCULT BLOOD STOOL    Collection Time: 07/31/24  2:30 AM   Result Value Ref Range    Occult Blood Feces Positive (A) Negative   POCT glucose device results    Collection Time: 07/31/24  8:10 AM   Result Value Ref Range    POC Glucose, Blood 184 (H) 65 - 99 mg/dL       Medications:  Scheduled Medications   Medication Dose Frequency    [START ON 8/1/2024] SITagliptin  100 mg DAILY    magnesium oxide  400 mg BID    metFORMIN  1,000 mg BID WITH MEALS    amiodarone  400 mg TWICE DAILY    insulin lispro  2-12 Units 4X/DAY ACHS    ferrous sulfate  325 mg BID WITH MEALS    potassium chloride SA  10 mEq DAILY    furosemide  20 mg DAILY    vitamin D3  2,000 Units DAILY    senna-docusate  2 Tablet BID    atorvastatin  40 mg Q EVENING    calcium carbonate  1,000 mg TID WITH MEALS    omeprazole  40 mg BID     PRN medications: [DISCONTINUED] insulin regular **AND** POC blood glucose manual result **AND** NOTIFY MD and PharmD **AND** Administer 20 grams of glucose (approximately 8 ounces of fruit juice) every 15 minutes PRN FSBG less than 70 mg/dL **AND** dextrose bolus, Respiratory Therapy Consult, hydrALAZINE, acetaminophen, senna-docusate **AND** polyethylene glycol/lytes, docusate sodium, magnesium hydroxide, carboxymethylcellulose, mag hydrox-al hydrox-simeth, ondansetron **OR** ondansetron, traZODone, sodium chloride, oxyCODONE immediate-release **OR** oxyCODONE immediate-release, bisacodyl    Diet:  Current Diet Order   Procedures    Diet Order Diet: Low Fiber(GI Soft); Second Modifier: (optional): Consistent CHO (Diabetic)       Medical  Decision Making and Plan:  L CVA -Patient with L parietal CVA in 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 Stroke Ppx has been held due to GI bleed  -PT and OT for mobility and ADLs. Per guidelines, 15 hours per week between PT, OT and/or SLP.  -Follow-up Neurology     HTN/CAD/Mitral stenosis/A fib - Patient on Amiodarone 400 mg BID, Lasix 20 mg daily. Consulted hospitalist. Amiodarone fell off MAR, did not miss dose, restart Amiodarone and discussed with hospitalist. 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 NWB reportedly on LLE     HLD - Patient on Atorvastatin 40 mg daily     GI bleed - Check AM CBC. Continue PPI BID. Hgb 8.9, consult hospitalist. Repeat 8.3, will monitor     DM2 with hyperglycemia - Patient on Lantus 5 U and SSI. Previously on metformin and Jardiance. Consult hospitalist. Increased Lantus. Started on metformin. Improving sugars, lantus discontinued. Blood sugars 150s and below, continue Metformin 1000 mg BID, Januvia 50 mg (increased) and SSI.  Elevated blood sugars over weekend, Januvia increased to 75 on 7/29. Blood sugars into 200s, continue Metformin 1000 mg BID and Januvia 75 mg daily as well as SSI. Januvia increased to 100, will not need SSI at home.     Anemia - Check AM CBC - 8.9 on admission.      Hypokalemia - Check AM CMP - 3.8, will monitor. Repeat 3.1, started on 20 mEq. Repeat 3.8, improved. Repeat 3.9 on 7/29. On Lasix, continue 10 mEq at discharge     Hypocalcemia - Check AM CMP - 7.6 on admission, will monitor. Repeat low, started on Ca supplement.     Hypomagnesia - Low on 7/29, started on 400 mg      Pain - Patient on PRN Tylenol/Oxycodone     Skin - Patient at risk for skin breakdown due to debility in areas including sacrum, achilles, elbows and head in addition to other sites. Nursing to assess skin daily.      GI Ppx - Patient on Prilosec for GERD prophylaxis. Patient on Senna-docusate for constipation prophylaxis.      DVT Ppx  - Patient not cleared for AC on transfer.     Dispo - Ongoing discussion about safe discharge. Patient would like to go home.   ___________________________________    T. Yordy Salazar MD/PhD  [facility] - Physical Medicine & Rehabilitation   [facility] - Brain Injury Medicine   ____________________________________

## 2024-08-01 ENCOUNTER — APPOINTMENT (OUTPATIENT)
Dept: OCCUPATIONAL THERAPY | Facility: REHABILITATION | Age: 69
End: 2024-08-01
Attending: PHYSICAL MEDICINE & REHABILITATION
Payer: MEDICARE

## 2024-08-01 ENCOUNTER — APPOINTMENT (OUTPATIENT)
Dept: PHYSICAL THERAPY | Facility: REHABILITATION | Age: 69
End: 2024-08-01
Attending: PHYSICAL MEDICINE & REHABILITATION
Payer: MEDICARE

## 2024-08-01 VITALS
HEART RATE: 74 BPM | SYSTOLIC BLOOD PRESSURE: 128 MMHG | TEMPERATURE: 98.1 F | RESPIRATION RATE: 18 BRPM | WEIGHT: 165.8 LBS | HEIGHT: 71 IN | OXYGEN SATURATION: 95 % | DIASTOLIC BLOOD PRESSURE: 66 MMHG | BODY MASS INDEX: 23.21 KG/M2

## 2024-08-01 LAB
GLUCOSE BLD STRIP.AUTO-MCNC: 160 MG/DL (ref 65–99)
GLUCOSE BLD STRIP.AUTO-MCNC: 173 MG/DL (ref 65–99)

## 2024-08-01 PROCEDURE — 700102 HCHG RX REV CODE 250 W/ 637 OVERRIDE(OP): Performed by: PHYSICAL MEDICINE & REHABILITATION

## 2024-08-01 PROCEDURE — A9270 NON-COVERED ITEM OR SERVICE: HCPCS | Performed by: HOSPITALIST

## 2024-08-01 PROCEDURE — 97535 SELF CARE MNGMENT TRAINING: CPT

## 2024-08-01 PROCEDURE — A9270 NON-COVERED ITEM OR SERVICE: HCPCS | Performed by: PHYSICAL MEDICINE & REHABILITATION

## 2024-08-01 PROCEDURE — 99232 SBSQ HOSP IP/OBS MODERATE 35: CPT | Performed by: HOSPITALIST

## 2024-08-01 PROCEDURE — 82962 GLUCOSE BLOOD TEST: CPT | Mod: 91

## 2024-08-01 PROCEDURE — 700102 HCHG RX REV CODE 250 W/ 637 OVERRIDE(OP): Performed by: HOSPITALIST

## 2024-08-01 PROCEDURE — 97530 THERAPEUTIC ACTIVITIES: CPT

## 2024-08-01 RX ORDER — AMIODARONE HYDROCHLORIDE 200 MG/1
200 TABLET ORAL DAILY
Qty: 30 TABLET | Refills: 2 | Status: SHIPPED | OUTPATIENT
Start: 2024-08-01

## 2024-08-01 RX ADMIN — METFORMIN HYDROCHLORIDE 1000 MG: 500 TABLET ORAL at 09:02

## 2024-08-01 RX ADMIN — INSULIN LISPRO 2 UNITS: 100 INJECTION, SOLUTION INTRAVENOUS; SUBCUTANEOUS at 07:26

## 2024-08-01 RX ADMIN — AMIODARONE HYDROCHLORIDE 400 MG: 200 TABLET ORAL at 05:50

## 2024-08-01 RX ADMIN — SITAGLIPTIN 100 MG: 50 TABLET, FILM COATED ORAL at 09:01

## 2024-08-01 RX ADMIN — OMEPRAZOLE 40 MG: 20 CAPSULE, DELAYED RELEASE ORAL at 09:02

## 2024-08-01 RX ADMIN — FERROUS SULFATE TAB 325 MG (65 MG ELEMENTAL FE) 325 MG: 325 (65 FE) TAB at 09:02

## 2024-08-01 RX ADMIN — CALCIUM CARBONATE (ANTACID) CHEW TAB 500 MG 1000 MG: 500 CHEW TAB at 09:01

## 2024-08-01 RX ADMIN — POTASSIUM CHLORIDE 10 MEQ: 1500 TABLET, EXTENDED RELEASE ORAL at 09:02

## 2024-08-01 RX ADMIN — INSULIN LISPRO 2 UNITS: 100 INJECTION, SOLUTION INTRAVENOUS; SUBCUTANEOUS at 11:14

## 2024-08-01 RX ADMIN — Medication 2000 UNITS: at 09:02

## 2024-08-01 RX ADMIN — CALCIUM CARBONATE (ANTACID) CHEW TAB 500 MG 1000 MG: 500 CHEW TAB at 11:14

## 2024-08-01 RX ADMIN — Medication 400 MG: at 09:02

## 2024-08-01 ASSESSMENT — PATIENT HEALTH QUESTIONNAIRE - PHQ9
1. LITTLE INTEREST OR PLEASURE IN DOING THINGS: NOT AT ALL
2. FEELING DOWN, DEPRESSED, IRRITABLE, OR HOPELESS: NOT AT ALL
2. FEELING DOWN, DEPRESSED, IRRITABLE, OR HOPELESS: NOT AT ALL
SUM OF ALL RESPONSES TO PHQ9 QUESTIONS 1 AND 2: 0
SUM OF ALL RESPONSES TO PHQ9 QUESTIONS 1 AND 2: 0
1. LITTLE INTEREST OR PLEASURE IN DOING THINGS: NOT AT ALL

## 2024-08-01 ASSESSMENT — ENCOUNTER SYMPTOMS
BRUISES/BLEEDS EASILY: 0
VOMITING: 0
FEVER: 0
PALPITATIONS: 0
EYES NEGATIVE: 1
NAUSEA: 0
SHORTNESS OF BREATH: 0
POLYDIPSIA: 0
COUGH: 0
CHILLS: 0

## 2024-08-01 ASSESSMENT — PAIN DESCRIPTION - PAIN TYPE: TYPE: ACUTE PAIN;SURGICAL PAIN

## 2024-08-01 NOTE — DISCHARGE PLANNING
Case management  Reviewed signed copy of IMM and answered all questions.  Spouse will provide transportation back to [place] on 8/1      Dc date /disposition: 8/1

## 2024-08-01 NOTE — PROGRESS NOTES
Patient discharged to home per order.  Discharge instructions reviewed with patient and wife; they verbalized understanding and signed copies placed in chart.  Patient has all belongings; signed copy of form in chart.  Patient left facility at 1150 via wheelchair accompanied by rehab staff and wife.  Have enjoyed working with this pleasant patient.

## 2024-08-01 NOTE — PROGRESS NOTES
..                                                         NURSING DAILY NOTE    Name: Pj Freeman   Date of Admission: 7/17/2024   Admitting Diagnosis: Acute CVA (cerebrovascular accident) ([facility])  Attending Physician: Dilia Salazar M.d.  Allergies: Patient has no known allergies.    Safety  Patient Assist  minimum assist  Patient Precautions  Fall Risk, Non Weight Bearing Left Lower Extremity  Precaution Comments  left transmetatarsal amputation  Bed Transfer Status  Standby Assist  Toilet Transfer Status   Standby Assist  Assistive Devices  Rails, Wheelchair  Oxygen  None - Room Air  Diet/Therapeutic Dining  Current Diet Order   Procedures    Diet Order Diet: Low Fiber(GI Soft); Second Modifier: (optional): Consistent CHO (Diabetic)     Pill Administration  whole  Agitated Behavioral Scale     ABS Level of Severity       Fall Risk  Has the patient had a fall this admission?   No  Taylor Gallego Fall Risk Scoring  16, HIGH RISK  Fall Risk Safety Measures  bed alarm, chair alarm, poor balance, and ok to leave pt in bathroom    Vitals  Temperature: 36.7 °C (98.1 °F)  Temp src: Oral  Pulse: 74  Respiration: 18  Blood Pressure : 128/66  Blood Pressure MAP (Calculated): 87 MM HG  BP Location: Left, Upper Arm  Patient BP Position: Supine     Oxygen  Pulse Oximetry: 95 %  O2 (LPM): 0  O2 Delivery Device: None - Room Air    Bowel and Bladder  Last Bowel Movement  07/31/24  Stool Type  Type 4: Like a sausage or snake, smooth and soft  Bowel Device  Bathroom  Continent  Bladder: Continent void   Bowel: Continent movement  Bladder Function  Urine Void (mL): 300 ml (urinal)  Number of Times Voided: 1  Urine Color: Yellow  Genitourinary Assessment   Bladder Assessment (WDL):  Within Defined Limits  [place] Catheter: Not Applicable  Urine Color: Yellow  Bladder Device: Urinal  Time Void: Yes  Bladder Scan: Post Void  $ Bladder Scan Results (mL): 180  Bladder Medications: No    Skin  Rocky Score   18  Sensory  Interventions   Bed Types: Standard/Trauma Mattress  Skin Preventative Measures: Pillows in Use for Support / Positioning  Moisture Interventions  Moisturizers/Barriers: Barrier Wipes      Pain  Pain Rating Scale  3 - Sometimes distracts me  Pain Location  Foot  Pain Location Orientation  Right  Pain Interventions   Heat Applied    ADLs    Bathing    (OT Shower)  Linen Change      Personal Hygiene  Perineal Care, Moist Samia Wipes  Chlorhexidine Bath      Oral Care  Brushed Teeth  Teeth/Dentures     Shave     Nutrition Percentage Eaten  Lunch, Between % Consumed  Environmental Precautions  Treaded Slipper Socks on Patient  Patient Turns/Positioning  Patient Turns Self from Side to Side  Patient Turns Assistance/Tolerance     Bed Positions  Bed Controls On, Bed Locked  Head of Bed Elevated  Self regulated      Psychosocial/Neurologic Assessment  Psychosocial Assessment  Psychosocial (WDL):  Within Defined Limits  Patient Behaviors: Fatigue  Family Behaviors: No Family Present  Neurologic Assessment  Neuro (WDL): Within Defined Limits  Level of Consciousness: Alert  Orientation Level: Oriented X4  Cognition: Follows commands  Speech: Clear  EENT (WDL):  WDL Except    Cardio/Pulmonary Assessment  Edema   RLE Edema: 1+, Pitting  LLE Edema: 2+, Pitting  Respiratory Breath Sounds     Cardiac Assessment   Cardiac (WDL):  WDL Except (hx htn, pvd, afib, mitral valve stenosis)

## 2024-08-01 NOTE — CARE PLAN
Problem: Discharge Barriers/Planning  Goal: Patient's continuum of care needs are met  Outcome: Progressing     Problem: Wound/ / Incision Healing  Goal: Patient's wound/surgical incision will decrease in size and heals properly  Outcome: Progressing     Problem: Diabetes Management  Goal: Patient's ability to maintain appropriate glucose levels will be maintained or improve  Outcome: Progressing     The patient is Stable - Low risk of patient condition declining or worsening    Shift Goals  Clinical Goals: Safety ; DM management  Patient Goals: safety; participate w/ therapy  Family Goals: Not present

## 2024-08-01 NOTE — CARE PLAN
Problem: Knowledge Deficit - Standard  Goal: Patient and family/care givers will demonstrate understanding of plan of care, disease process/condition, diagnostic tests and medications  Outcome: Met     Problem: Psychosocial  Goal: Patient's level of anxiety will decrease  Outcome: Met  Goal: Patient's ability to verbalize feelings about condition will improve  Outcome: Met  Goal: Patient's ability to re-evaluate and adapt role responsibilities will improve  Outcome: Met  Goal: Patient and family will demonstrate ability to cope with life altering diagnosis and/or procedure  Outcome: Met  Goal: Spiritual and cultural needs incorporated into hospitalization  Outcome: Met     Problem: Communication  Goal: The ability to communicate needs accurately and effectively will improve  Outcome: Met     Problem: Discharge Barriers/Planning  Goal: Patient's continuum of care needs are met  Outcome: Met     Problem: Mobility  Goal: Patient's capacity to carry out activities will improve  Outcome: Met     Problem: Self Care  Goal: Patient will have the ability to perform ADLs independently or with assistance (bathe, groom, dress, toilet and feed)  Outcome: Met     Problem: Infection - Standard  Goal: Patient will remain free from infection  Outcome: Met     Problem: Wound/ / Incision Healing  Goal: Patient's wound/surgical incision will decrease in size and heals properly  Outcome: Met     Problem: Pain - Standard  Goal: Alleviation of pain or a reduction in pain to the patient’s comfort goal  Outcome: Met     Problem: Skin Integrity  Goal: Skin integrity is maintained or improved  Outcome: Met     Problem: Infection  Goal: Patient will remain free from infection  Outcome: Met     Problem: Diabetes Management  Goal: Patient's ability to maintain appropriate glucose levels will be maintained or improve  Outcome: Met     Problem: Fall Risk - Rehab  Goal: Patient will remain free from falls  Outcome: Met     Problem:  Hemodynamics  Goal: Patient's hemodynamics, fluid balance and neurologic status will be stable or improve  Outcome: Met     Problem: Respiratory  Goal: Patient will understand use and administration of respiratory medications to improve respiratory function  Outcome: Met     Problem: Risk for Aspiration  Goal: Patient's risk for aspiration will be absent or decrease  Outcome: Met     Problem: Bladder / Voiding  Goal: Patient will establish and maintain regular urinary output  Outcome: Met  Goal: Patient will establish and maintain bladder regimen  Outcome: Met     Problem: Neurogenic Bladder  Goal: Patient will demonstrate ability to take care of indwelling catheter  Outcome: Met  Goal: Patient will demonstrate self-cath technique using clean technique and care of the catheter  Outcome: Met     Problem: Bowel Elimination  Goal: Patient will participate in bowel management program  Outcome: Met     Problem: Neurogenic Bowel  Goal: Patient will perform adequate hygiene with incontinent episodes  Outcome: Met  Goal: Patient will verbalize signs and symptoms of constipation and how to prevent/alleviate  Outcome: Met  Goal: Patient will demonstrates ability to complete digital stimulation technique  Outcome: Met     Problem: Skin Integrity  Goal: Patient's skin integrity will be maintained or improve  Outcome: Met     Problem: Nutrition  Goal: Patient's nutritional and fluid intake will be adequate or improve  Outcome: Met  Goal: Patient will display progressive weight gain toward goal have adequate food and fluid intake  Outcome: Met  Goal: Patient will demonstrate ability to administer respiratory medications  Outcome: Met     Problem: Self Care  Goal: Patient will have the ability to perform ADLs independently or with assistance  Outcome: Met     Problem: Mobility  Goal: Patient's capacity to carry out activities will improve  Outcome: Met     Problem: VTE Prevention  Goal: Patient will remain free from venous  thromboembolism (VTE)  Outcome: Met     Problem: IP BOWEL ELIMINATION  Goal: STG - patient will be accident free/content  Outcome: Met   The patient is Stable - Low risk of patient condition declining or worsening    Shift Goals  Clinical Goals: Safety, pain control  Patient Goals: safety  Family Goals: Not present

## 2024-08-01 NOTE — PROGRESS NOTES
NURSING DAILY NOTE    Name: Pj Freeman   Date of Admission: 7/17/2024   Admitting Diagnosis: Acute CVA (cerebrovascular accident) ([facility])  Attending Physician: Dilia Salazar M.d.  Allergies: Patient has no known allergies.    Safety  Patient Assist  minimum assist  Patient Precautions  Fall Risk, Non Weight Bearing Left Lower Extremity  Precaution Comments  left transmetatarsal amputation  Bed Transfer Status  Standby Assist  Toilet Transfer Status   Standby Assist  Assistive Devices  Rails, Wheelchair  Oxygen  None - Room Air  Diet/Therapeutic Dining  Current Diet Order   Procedures    Diet Order Diet: Low Fiber(GI Soft); Second Modifier: (optional): Consistent CHO (Diabetic)     Pill Administration  whole  Agitated Behavioral Scale     ABS Level of Severity       Fall Risk  Has the patient had a fall this admission?   No  Taylor Gallego Fall Risk Scoring  16, HIGH RISK  Fall Risk Safety Measures  bed alarm, chair alarm, and poor balance    Vitals  Temperature: 36.9 °C (98.4 °F)  Temp src: Oral  Pulse: 72  Respiration: 18  Blood Pressure : 133/68  Blood Pressure MAP (Calculated): 90 MM HG  BP Location: Left, Upper Arm  Patient BP Position: Sitting     Oxygen  Pulse Oximetry: 98 %  O2 (LPM): 0  O2 Delivery Device: None - Room Air    Bowel and Bladder  Last Bowel Movement  07/31/24  Stool Type  Type 4: Like a sausage or snake, smooth and soft  Bowel Device  Bathroom  Continent  Bladder: Continent void   Bowel: Continent movement  Bladder Function  Urine Void (mL): 400 ml  Number of Times Voided: 1  Urine Color: Yellow  Genitourinary Assessment   Bladder Assessment (WDL):  WDL Except  [place] Catheter: Not Applicable  Urine Color: Yellow  Bladder Device: Urinal  Time Void: Yes  Bladder Scan: Post Void  $ Bladder Scan Results (mL): 180  Bladder Medications: No    Skin  Rocky Score   18  Sensory Interventions   Bed Types: Standard/Trauma  Mattress  Skin Preventative Measures: Pillows in Use for Support / Positioning  Moisture Interventions  Moisturizers/Barriers: Barrier Wipes      Pain  Pain Rating Scale  1 - Hardly Notice Pain  Pain Location  Foot  Pain Location Orientation  Right  Pain Interventions   Declines    ADLs    Bathing    (OT Shower)  Linen Change      Personal Hygiene  Perineal Care, Moist Samia Wipes  Chlorhexidine Bath      Oral Care  Brushed Teeth  Teeth/Dentures     Shave     Nutrition Percentage Eaten  Lunch, Between % Consumed  Environmental Precautions  Treaded Slipper Socks on Patient  Patient Turns/Positioning  Patient Turns Self from Side to Side  Patient Turns Assistance/Tolerance     Bed Positions  Bed Controls On, Bed Locked  Head of Bed Elevated  Self regulated      Psychosocial/Neurologic Assessment  Psychosocial Assessment  Psychosocial (WDL):  Within Defined Limits  Patient Behaviors: Fatigue  Family Behaviors: No Family Present  Neurologic Assessment  Neuro (WDL): Within Defined Limits  Level of Consciousness: Alert  Orientation Level: Oriented X4  Cognition: Follows commands  Speech: Clear  EENT (WDL):  WDL Except    Cardio/Pulmonary Assessment  Edema   RLE Edema: 2+, Generalized  LLE Edema: 2+, Generalized  Respiratory Breath Sounds     Cardiac Assessment   Cardiac (WDL):  WDL Except (hx htn, pvd, afib, mitral valve stenosis)

## 2024-08-01 NOTE — THERAPY
"Occupational Therapy  Daily Treatment     Patient Name: Pj Freeman  Age:  69 y.o., Sex:  male  Medical Record #: [id number]  Today's Date: 8/1/2024     Precautions  Precautions: Fall Risk, Non Weight Bearing Left Lower Extremity  Comments: left transmetatarsal amputation         Subjective  Pt wife reported TTB will not fit in  tub. This OTR intervened and discussed that glass shower doors could be removed for TTB as this is a safe txfr option to promote independence versus WIS with ~6\" threshold.   Pt continues to endorse that he can perform sinkside sponge baths. He is anticipating full LLE WB after his appointment next Wednesday.      Objective     08/01/24 1001   OT Charge Group   OT Self Care / ADL (Units) 2   OT Total Time Spent   OT Individual Total Time Spent (Mins) 30   Vitals   O2 Delivery Device None - Room Air   Functional Level of Assist   Tub / Shower Transfers Minimal Assist  (WIS txfr with FWW, min A via wife and OTR)   Tub Shower Transfer Description Adaptive equipment;Shower bench;Increased time;Set-up of equipment   Interdisciplinary Plan of Care Collaboration   IDT Collaboration with  Physical Therapist   Patient Position at End of Therapy Seated   Collaboration Comments txfr of care to PT, son/wife engage din hands on family training     Despite that wife and son report w/c will not fit in bathroom, pt believes that it will fit. Regardless, pt had good adherence to WB precautions when using FWW to ambulate to WIS and onto shower bench. Pt wife declined handout for shower chair, OTR reinforced that standard shower chair without armrests and with legs at tallest height will be best option.     Pt reported that he plans to use countertop in bathroom to support STS from toilet, educated that he can purchase raised toilet seat if this becomes a challenge    Assessment  Pt's wife reported concerns for d/c due to pt remaining weakness. Despite this, she assisted pt to WIS bench with min A " well and was receptive to use of gait belt to support safety with txfrs.   Strengths: Able to follow instructions, Alert and oriented, Effective communication skills, Independent prior level of function, Motivated for self care and independence, Pleasant and cooperative, Supportive family, Willingly participates in therapeutic activities  Barriers: Decreased endurance, Generalized weakness, Impaired activity tolerance, Tube feeding, Fatigue    Plan  Anticipate d/c to home today    DME  OT DME Recommendations  Bathroom Equipment: Tub Transfer Bench (Medicaid Only)  Additional Equipment: Other (Comments) (slideboard)        Occupational Therapy Goals (Active)       Problem: Functional Transfers       Dates: Start:  07/18/24         Goal: STG-Within one week, patient will transfer to tub/shower with Min A.       Dates: Start:  07/18/24    Expected End:  08/01/24         Goal Note filed on 07/23/24 0956 by Antolin Juarez, C.O.T.A.         Min assist max cues Limited by  decreased strength/endurance and  difficulty maintaining NWB  left LE  during standing and sit <-> stands  note   has been performing lateral scoot not stand pivot  to and from shower bench    Continue goal                  Problem: OT Long Term Goals       Dates: Start:  07/18/24         Goal: LTG-By discharge, patient will complete basic self care tasks at Mod Independent level.       Dates: Start:  07/18/24    Expected End:  08/01/24         Goal Note filed on 07/31/24 0816 by Antolin Juarez, C.O.T.A.       Requires  supervision and cues for NWB  left LE  during mobility related to  basic ADL     sit to stand for pants   toileting  grooming at sink in standing     transfers  in / out of the shower  to and from bed etc                 Goal: LTG-By discharge, patient will perform bathroom transfers at Mod Independent level.       Dates: Start:  07/18/24    Expected End:  08/01/24         Goal Note filed on 07/31/24 0816 by Antolin Juarez, C.O.T.A.        Supervision   cues for NWB  left LE  during mobility related to  basic ADL     sit to stand for pants   toileting  grooming at sink in standing     transfers  in / out of the shower  to and from bed etc

## 2024-08-01 NOTE — DISCHARGE INSTRUCTIONS
[facility] NURSING DISCHARGE INSTRUCTIONS    Blood Pressure : 128/66  Weight: 75.2 kg (165 lb 12.8 oz)  Nursing recommendations for Pj Freeman at time of discharge are as follows:  Client and Family Member verbalized understanding of all discharge instructions and prescriptions.     Review all your home medications and newly ordered medications with your doctor and/or pharmacist. Follow medication instructions as directed by your doctor and/or pharmacist.    Pain Management:   Discharge Pain Medication Instructions:  Comfort Goal: Sleep Comfortably  Notify your primary care provider if pain is unrelieved with these measures, if the pain is new, or increased in intensity.    Discharge Skin Characteristics: Warm, Dry  Discharge Skin Exam: Other (Comments) (see wound documentation)  Skin / Wound Care Instructions: Please contact your primary care physician for any change in skin integrity.     If You Have Surgical Incisions / Wounds:  Monitor surgical site(s) for signs of increased swelling, redness or symptoms of drainage from the site or fever as this could indicate signs and symptoms of infection. If these symptoms are noted, notifiy your primary care provider.      Discharge Safety Instructions:       Discharge Safety Concerns: Balance Problems (Dizziness, Light Headedness), Unsteady Gait, Weakness  The interdisciplinary team has made recommendation that you do not require supervision in the house due to demonstration of safety with ADL's and IADLS and problem solving skills  Anti-embolic stockings are not required to increase circulation to the lower extremities.    Discharge Diet: Diabetic Low Fiber     Discharge Liquids: Thin  Discharge Bowel Function: Continent  Please contact your primary care physician for any changes in bowel habits.  Discharge Bowel Program:    Discharge Bladder Function: Continent  Discharge Urinary Devices: None      Nursing Discharge Plan:        Case  "Management Discharge Instructions:   Discharge Location:    Agency Name/Address/Phone:    Home Health:    Outpatient Services:    DME Provider/Phone:    Medical Equipment Ordered:    Prescription Faxed to:        Discharge Medication Instructions:  Below are the medications your physician expects you to take upon discharge:        Prevent Falls in Your Home    \"Falling once doubles your chance of falling again\"        -Center for Disease Control and Prevention    Falls in the home can lead to serious injury (fractures, brain injuries), hospitalizations, increased medical costs, and could even be fatal.  The good news is, there are many precautions you can take to avoid falls in your home and help keep you safe:     If prescribed an assistive device (walker, crutches), use as instructed by the healthcare provider\"   Remove any tripping hazards from your home, including loose cords, throw rugs and clutter  Keep a nightlight on in dark (hallways, bathrooms, etc)   Get up slowly, to make sure you feel okay before getting up  Be aware of any side effects of your medications: some medications may make you dizzy  Place a non-skid rubber mat in your shower or tub-consider a shower bench or chair if unsteady on your feet  Wear supportive shoes or non-skid socks when moving around  Start an exercise program once approved by your provider.  If you are feeling weak following a hospital stay, talk to your doctor about home health or outpatient therapy programs designed to help rebuild your strength and endurance    Stroke Prevention  Some medical conditions and lifestyle choices can lead to a higher risk for a stroke. You can help to prevent a stroke by eating healthy foods and exercising. It also helps to not smoke and to manage any health problems you may have.  How can this condition affect me?  A stroke is an emergency. It should be treated right away. A stroke can lead to brain damage or threaten your life. There is a " better chance of surviving and getting better after a stroke if you get medical help right away.  What can increase my risk?  The following medical conditions may increase your risk of a stroke:  Diseases of the heart and blood vessels (cardiovascular disease).  High blood pressure (hypertension).  Diabetes.  High cholesterol.  Sickle cell disease.  Problems with blood clotting.  Being very overweight.  Sleeping problems (obstructivesleep apnea).  Other risk factors include:  Being older than age 60.  A history of blood clots, stroke, or mini-stroke (TIA).  Race, ethnic background, or a family history of stroke.  Smoking or using tobacco products.  Taking birth control pills, especially if you smoke.  Heavy alcohol and drug use.  Not being active.  What actions can I take to prevent this?  Manage your health conditions  High cholesterol.  Eat a healthy diet. If this is not enough to manage your cholesterol, you may need to take medicines.  Take medicines as told by your doctor.  High blood pressure.  Try to keep your blood pressure below 130/80.  If your blood pressure cannot be managed through a healthy diet and regular exercise, you may need to take medicines.  Take medicines as told by your doctor.  Ask your doctor if you should check your blood pressure at home.  Have your blood pressure checked every year.  Diabetes.  Eat a healthy diet and get regular exercise. If your blood sugar (glucose) cannot be managed through diet and exercise, you may need to take medicines.  Take medicines as told by your doctor.  Talk to your doctor about getting checked for sleeping problems. Signs of a problem can include:  Snoring a lot.  Feeling very tired.  Make sure that you manage any other conditions you have.  Nutrition    Follow instructions from your doctor about what to eat or drink. You may be told to:  Eat and drink fewer calories each day.  Limit how much salt (sodium) you use to 1,500 milligrams (mg) each day.  Use  "only healthy fats for cooking, such as olive oil, canola oil, and sunflower oil.  Eat healthy foods. To do this:  Choose foods that are high in fiber. These include whole grains, and fresh fruits and vegetables.  Eat at least 5 servings of fruits and vegetables a day. Try to fill one-half of your plate with fruits and vegetables at each meal.  Choose low-fat (lean) proteins. These include low-fat cuts of meat, chicken without skin, fish, tofu, beans, and nuts.  Eat low-fat dairy products.  Avoid foods that:  Are high in salt.  Have saturated fat.  Have trans fat.  Have cholesterol.  Are processed or pre-made.  Count how many carbohydrates you eat and drink each day.  Lifestyle  If you drink alcohol:  Limit how much you have to:  0-1 drink a day for women who are not pregnant.  0-2 drinks a day for men.  Know how much alcohol is in your drink. In the [place] one drink equals one 12 oz bottle of beer (355mL), one 5 oz glass of wine (148mL), or one 1½ oz glass of hard liquor (44mL).  Do not smoke or use any products that have nicotine or tobacco. If you need help quitting, ask your doctor.  Avoid secondhand smoke.  Do not use drugs.  Activity    Try to stay at a healthy weight.  Get at least 30 minutes of exercise on most days, such as:  Fast walking.  Biking.  Swimming.  Medicines  Take over-the-counter and prescription medicines only as told by your doctor.  Avoid taking birth control pills. Talk to your doctor about the risks of taking birth control pills if:  You are over 35 years old.  You smoke.  You get very bad headaches.  You have had a blood clot.  Where to find more information  American Stroke Association: www.strokeassociation.org  Get help right away if:  You or a loved one has any signs of a stroke. \"BE FAST\" is an easy way to remember the warning signs:  B - Balance. Dizziness, sudden trouble walking, or loss of balance.  E - Eyes. Trouble seeing or a change in how you see.  F - Face. Sudden weakness or " loss of feeling of the face. The face or eyelid may droop on one side.  A - Arms. Weakness or loss of feeling in an arm. This happens all of a sudden and most often on one side of the body.  S - Speech. Sudden trouble speaking, slurred speech, or trouble understanding what people say.  T - Time. Time to call emergency services. Write down what time symptoms started.  You or a loved one has other signs of a stroke, such as:  A sudden, very bad headache with no known cause.  Feeling like you may vomit (nausea).  Vomiting.  A seizure.  These symptoms may be an emergency. Get help right away. Call your local emergency services (911 in the [place]  Do not wait to see if the symptoms will go away.  Do not drive yourself to the hospital.  Summary  You can help to prevent a stroke by eating healthy, exercising, and not smoking. It also helps to manage any health problems you have.  Do not smoke or use any products that contain nicotine or tobacco.  Get help right away if you or a loved one has any signs of a stroke.  This information is not intended to replace advice given to you by your health care provider. Make sure you discuss any questions you have with your health care provider.  Document Revised: 07/19/2021 Document Reviewed: 07/19/2021  [employer] Patient Education © 2023 [employer]    Toe Amputation, Care After    The following information offers guidance on how to care for yourself after your procedure. Your health care provider may also give you more specific instructions. If you have problems or questions, contact your health care provider.  What can I expect after the procedure?  After the procedure, it is common to have:  Soreness or pain. Pain usually improves within a week.  Slight redness or swelling at the incision site.  Follow these instructions at home:  Medicines  Take over-the-counter and prescription medicines only as told by your health care provider.  If you were prescribed an antibiotic medicine,  take it as told by your health care provider. Do not stop taking the antibiotic even if you start to feel better.  Ask your health care provider if the medicine prescribed to you:  Requires you to avoid driving or using heavy machinery.  Can cause constipation. You may need to take actions to prevent or treat constipation, such as:  Drink enough fluid to keep your urine pale yellow.  Take over-the-counter or prescription medicines.  Eat foods that are high in fiber, such as beans, whole grains, and fresh fruits and vegetables.  Limit foods that are high in fat and processed sugars, such as fried or sweet foods.  Managing pain, stiffness, and swelling    If directed, put ice on the affected area. To do this:  Put ice in a plastic bag.  Place a towel between your skin and the bag.  Leave the ice on for 20 minutes, 2-3 times a day.  Remove the ice if your skin turns bright red. This is very important. If you cannot feel pain, heat, or cold, you have a greater risk of damage to the area.  Move your other toes often to reduce stiffness and swelling.  Raise (elevate) the injured area above the level of your heart while you are sitting or lying down.  Incision care         Follow instructions from your health care provider about how to take care of your incision. Make sure you:  Wash your hands with soap and water for at least 20 seconds before and after you change your bandage (dressing). If soap and water are not available, use hand .  Change your dressing as told by your health care provider.  Leave stitches (sutures), skin glue, or adhesive strips in place. These skin closures may need to stay in place for 2 weeks or longer. If adhesive strip edges start to loosen and curl up, you may trim the loose edges. Do not remove adhesive strips completely unless your health care provider tells you to do that.  Check your incision area every day for signs of infection. Check for:  More redness, swelling, or pain.  Fluid  or blood.  Warmth.  Pus or a bad smell.  Bathing  Do not take baths, swim, or use a hot tub until your health care provider approves. Ask your health care provider if you may take showers. You may only be allowed to take sponge baths.  Do not soak your foot until your health care provider approves.  If your dressing has been removed and is no longer needed, you may wash your skin with warm water and soap. Do not scrub in the area where you had your surgery (surgical site).  Activity  Rest as told by your health care provider.  Avoid sitting for a long time without moving. Get up to take short walks every 1-2 hours. This is important to improve blood flow and breathing. Ask for help if you feel weak or unsteady.  Do not use the injured limb to support your body weight until your health care provider says that you can. Use crutches as told by your health care provider.  Do exercises as told by your health care provider.  Return to your normal activities as told by your health care provider. Ask your health care provider what activities are safe for you.  General instructions  If you were given a sedative during the procedure, it can affect you for several hours. Do not drive or operate machinery until your health care provider says that it is safe.  Ask your health care provider when it is safe to drive if you have a dressing on your foot.  Do not use any products that contain nicotine or tobacco. These products include cigarettes, chewing tobacco, and vaping devices, such as e-cigarettes. If you need help quitting, ask your health care provider.  Ask your health care provider about wearing supportive shoes or using inserts to help with your balance when walking.  If you have diabetes, keep your blood sugar under good control and check your feet daily for sores or open areas.  Keep all follow-up visits. This is important.  Contact a health care provider if:  You have signs of infection at your incision, such  as:  Redness, swelling, or pain.  Fluid or blood.  Warmth.  Pus or a bad smell.  You have a fever or chills.  Your dressing is soaked with blood.  Your sutures tear or they separate.  Your pain does not improve after you take your medicine.  Get help right away if:  You have red streaks on your skin near your toes, foot, or leg.  You have pain in your calf or behind your knee.  You feel extreme pain, numbness, weakness, loss of a pulse, or your foot feels cold to touch. This can be a sign of a medical emergency called compartment syndrome.  You have chest pain or shortness of breath.  These symptoms may be an emergency. Get help right away. Call 911.  Do not wait to see if the symptoms will go away.  Do not drive yourself to the hospital.  Summary  After the procedure, it is common to have some pain. Pain usually improves within a week.  If you were prescribed an antibiotic medicine, take it as told by your health care provider. Do not stop taking the antibiotic even if you start to feel better.  Change your dressing as told by your health care provider.  Get help right away if you have red streaks on your skin or develop signs of compartment syndrome.  Keep all follow-up visits. This is important.  This information is not intended to replace advice given to you by your health care provider. Make sure you discuss any questions you have with your health care provider.  Document Revised: 08/01/2022 Document Reviewed: 08/01/2022  [employer] Patient Education © 2023 [employer]

## 2024-08-01 NOTE — PROGRESS NOTES
Hospital Medicine Daily Progress Note      Chief Complaint  Diabetes Mellitus    Interval Problem Update  Pt counseled to resume home diabetic medication regimen except for insulin.    Review of Systems  Review of Systems   Constitutional:  Negative for chills and fever.   HENT: Negative.     Eyes: Negative.    Respiratory:  Negative for cough and shortness of breath.    Cardiovascular:  Negative for chest pain and palpitations.   Gastrointestinal:  Negative for nausea and vomiting.   Musculoskeletal:         Wound pain   Skin:  Negative for itching and rash.   Endo/Heme/Allergies:  Negative for polydipsia. Does not bruise/bleed easily.        Physical Exam  Temp:  [36.7 °C (98.1 °F)-36.9 °C (98.4 °F)] 36.7 °C (98.1 °F)  Pulse:  [72-75] 74  Resp:  [18] 18  BP: (125-133)/(62-68) 128/66  SpO2:  [95 %-98 %] 95 %    Physical Exam  Vitals reviewed.   Constitutional:       General: He is not in acute distress.     Appearance: Normal appearance. He is not ill-appearing.   HENT:      Head: Normocephalic and atraumatic.      Right Ear: External ear normal.      Left Ear: External ear normal.      Nose: Nose normal.      Mouth/Throat:      Pharynx: Oropharynx is clear.   Eyes:      General:         Right eye: No discharge.         Left eye: No discharge.      Extraocular Movements: Extraocular movements intact.      Conjunctiva/sclera: Conjunctivae normal.   Cardiovascular:      Rate and Rhythm: Normal rate and regular rhythm.   Pulmonary:      Effort: No respiratory distress.      Breath sounds: No wheezing.      Comments: Decreased BS  Abdominal:      General: Bowel sounds are normal. There is no distension.      Palpations: Abdomen is soft.      Tenderness: There is no abdominal tenderness.   Musculoskeletal:      Cervical back: Normal range of motion and neck supple.      Right lower leg: No edema.      Left lower leg: No edema.      Comments: L TMA incision C/D/I per 7/29/24 Wound Photo   Skin:     General: Skin is warm  and dry.   Neurological:      Mental Status: He is alert and oriented to person, place, and time.         Fluids    Intake/Output Summary (Last 24 hours) at 8/1/2024 0934  Last data filed at 8/1/2024 0824  Gross per 24 hour   Intake 1560 ml   Output 1350 ml   Net 210 ml       Laboratory                            Assessment/Plan  * Acute CVA (cerebrovascular accident) ([facility])- (present on admission)  Assessment & Plan  Was on Plavix and Lipitor  Antiplatelet presently on hold d/t recent GIB    Pulmonary hypertension ([facility])  Assessment & Plan  Echo EF 60%, RVSP 80 mmHg, severe MS, small pericardial effusion, pleural effusion noted  BNP elevated but stable  CXR mod R basilar opacity and suspected small pleural effusion  Continue Lasix and IS  Needs Cardiology F/U    Vitamin D deficiency  Assessment & Plan  Vit D level 17  Continue supplementation    A-fib ([facility])  Assessment & Plan  On Amiodarone  AC on hold d/t recent GIB  Needs Cardiology F/U    Hypomagnesemia- (present on admission)  Assessment & Plan  Continue MgOx  PCP F/U    Type 2 diabetes mellitus ([facility])- (present on admission)  Assessment & Plan  HbA1c 5.4  Continue Metformin and Januvia  Lantus discontinued  May also stop SSI upon discharge and resume Jardiance  Outpt meds include Metformin 1000 mg bid, Januvia 100 mg qd, Jardiance 25 mg qd, Lantus 10 u qhs, and Regular Insulin 10 u qac  Would not resume insulin until PCP F/U  Pt instructed to record home FSBS readings    Acute osteomyelitis of left foot ([facility])- (present on admission)  Assessment & Plan  S/P recent L TMA on 7/3/24 at [facility]  Wound care and pain control per Physiatry    NSTEMI (non-ST elevated myocardial infarction) ([facility])- (present on admission)  Assessment & Plan  Was on Plavix and Lipitor  Antiplatelet presently on hold d/t recent GIB    Acute blood loss anemia- (present on admission)  Assessment & Plan  2/2 GIB  Has normocytic indices  Fe 20 and Ferritin 67  Now on Fe supplements  Follow  H/H    Upper GI bleed- (present on admission)  Assessment & Plan  Pt presented w/ syncope  EGD DU x 6 S/P clip x 1 for active bleeding  S/P PRBC x 4 u  Continue high dose PPI bid  Needs GI F/U    Full Code

## 2024-08-01 NOTE — THERAPY
"Physical Therapy   Daily Treatment     Patient Name: Pj Freeman  Age:  69 y.o., Sex:  male  Medical Record #: [id number]  Today's Date: 8/1/2024     Precautions  Precautions: (P) Fall Risk, Non Weight Bearing Left Lower Extremity  Comments: left transmetatarsal amputation    Subjective    Wife and Son present for training     Objective       08/01/24 1031   PT Charge Group   PT Therapeutic Activities (Units) 2   PT Total Time Spent   PT Individual Total Time Spent (Mins) 30   Precautions   Precautions Fall Risk;Non Weight Bearing Left Lower Extremity   Interdisciplinary Plan of Care Collaboration   IDT Collaboration with  Family / Caregiver   Patient Position at End of Therapy Seated;Family / Friend in Room   Collaboration Comments training per note below     Performed family training for bed mobility, lateral scoot transfer, stand pivot with FWW transfer, hopping with FWW, car transfer, wheelchair mobility/management and \"bumping\" chair up stairs if no ramp is available  Spouse has reservations about DC home but demonstrates understanding of spouses present level of function and assist required     Assessment    Patient is independent with all aspects of mobility with exception of sit to stand from lower surfaces which requires minimal assist to maintain left LE NWB. Will limit gait to less than 50 feet due to potential UE fatigue. Will need assist with wheelchair in/out of car and with getting up stairs     Strengths: Able to follow instructions, Alert and oriented, Independent prior level of function, Pleasant and cooperative, Willingly participates in therapeutic activities, Motivated for self care and independence  Barriers: Fatigue, Difficulty following instructions, Decreased endurance, Impaired activity tolerance, Limited mobility (limited ability to maintain weightbearing precaution and tends to lose focus/becomes distracted)    Plan    DC home    DME  PT DME Recommendations  Wheelchair: 16\" " Width  Cushion: Standard  Assistive Device: Front Wheeled Walker  Additional Equipment: Other (Comments) (slideboard)      Physical Therapy Problems (Active)       There are no active problems.

## 2024-08-01 NOTE — PROGRESS NOTES
NURSING DAILY NOTE    Name: Pj Freeman   Date of Admission: 7/17/2024   Admitting Diagnosis: Acute CVA (cerebrovascular accident) ([facility])  Attending Physician: Dilia Salazar M.d.  Allergies: Patient has no known allergies.    Safety  Patient Assist  minimum assist  Patient Precautions  Fall Risk, Non Weight Bearing Left Lower Extremity  Precaution Comments  left transmetatarsal amputation  Bed Transfer Status  Standby Assist  Toilet Transfer Status   Standby Assist  Assistive Devices  Rails, Wheelchair  Oxygen  None - Room Air  Diet/Therapeutic Dining  Current Diet Order   Procedures    Diet Order Diet: Low Fiber(GI Soft); Second Modifier: (optional): Consistent CHO (Diabetic)     Pill Administration  whole  Agitated Behavioral Scale     ABS Level of Severity       Fall Risk  Has the patient had a fall this admission?   No  Taylor Gallego Fall Risk Scoring  16, HIGH RISK  Fall Risk Safety Measures  bed alarm, chair alarm, poor balance, and low vision/ hearing    Vitals  Temperature: 36.7 °C (98.1 °F)  Temp src: Oral  Pulse: 74  Respiration: 18  Blood Pressure : 128/66  Blood Pressure MAP (Calculated): 87 MM HG  BP Location: Left, Upper Arm  Patient BP Position: Supine     Oxygen  Pulse Oximetry: 95 %  O2 (LPM): 0  O2 Delivery Device: None - Room Air    Bowel and Bladder  Last Bowel Movement  07/31/24  Stool Type  Type 4: Like a sausage or snake, smooth and soft  Bowel Device  Bathroom  Continent  Bladder: Continent void   Bowel: Continent movement  Bladder Function  Urine Void (mL): 500 ml  Number of Times Voided: 1  Urine Color: Lynne  Genitourinary Assessment   Bladder Assessment (WDL):  Within Defined Limits  [place] Catheter: Not Applicable  Urine Color: Lynne  Bladder Device: Urinal, Bathroom  Time Void: Yes  Bladder Scan: Post Void  $ Bladder Scan Results (mL): 180  Bladder Medications: No    Skin  Rocky Score   18  Sensory Interventions    Bed Types: Standard/Trauma Mattress  Skin Preventative Measures: Pillows in Use for Support / Positioning  Moisture Interventions  Moisturizers/Barriers: Barrier Wipes      Pain  Pain Rating Scale  0 - No Pain  Pain Location  Foot  Pain Location Orientation  Right, Left  Pain Interventions   Declines    ADLs    Bathing    (OT Shower)  Linen Change      Personal Hygiene  Perineal Care, Moist Samia Wipes  Chlorhexidine Bath      Oral Care  Brushed Teeth  Teeth/Dentures     Shave     Nutrition Percentage Eaten  Breakfast, Between % Consumed  Environmental Precautions  Personal Belongings, Wastebasket, Call Bell etc. in Easy Reach, Bed in Low Position, Treaded Slipper Socks on Patient  Patient Turns/Positioning  Patient Turns Self from Side to Side  Patient Turns Assistance/Tolerance     Bed Positions  Bed Controls On, Bed Locked  Head of Bed Elevated  Self regulated      Psychosocial/Neurologic Assessment  Psychosocial Assessment  Psychosocial (WDL):  Within Defined Limits  Patient Behaviors: Fatigue  Family Behaviors: No Family Present  Neurologic Assessment  Neuro (WDL): Within Defined Limits  Level of Consciousness: Alert  Orientation Level: Oriented X4  Cognition: Follows commands  Speech: Clear  EENT (WDL):  WDL Except    Cardio/Pulmonary Assessment  Edema   RLE Edema: 2+, Generalized  LLE Edema: 2+, Generalized  Respiratory Breath Sounds     Cardiac Assessment   Cardiac (WDL):  WDL Except (hx htn, pvd, afib, mitral valve stenosis)

## 2024-08-08 ENCOUNTER — TELEPHONE (OUTPATIENT)
Dept: CARDIOLOGY | Facility: MEDICAL CENTER | Age: 69
End: 2024-08-08
Payer: MEDICARE

## 2024-08-08 NOTE — TELEPHONE ENCOUNTER
Referral from: Dr. Indiana Toscano for sev MS    Patient called on 8/8/2024. He states he's currently at the [facility] in [place]. He requests I call him in 2 weeks (8/22/2024) to discuss scheduling a visit.     First attempt

## 2024-08-26 NOTE — TELEPHONE ENCOUNTER
Called and spoke to patient. He states he is still in the hospital waiting for his wound to heal. He requests a call back to discuss scheduling in several weeks.

## 2024-09-04 ENCOUNTER — TELEPHONE (OUTPATIENT)
Dept: HEALTH INFORMATION MANAGEMENT | Facility: OTHER | Age: 69
End: 2024-09-04
Payer: MEDICARE

## (undated) DEVICE — ELECTRODE 850 FOAM ADHESIVE - HYDROGEL RADIOTRNSPRNT (50/PK)

## (undated) DEVICE — TUBING CLEARLINK DUO-VENT - C-FLO (48EA/CA)

## (undated) DEVICE — FORCEP RADIAL JAW 4 STANDARD CAPACITY W/NEEDLE 240CM (40EA/BX)

## (undated) DEVICE — BUTTON ENDOSCOPY DISPOSABLE

## (undated) DEVICE — FILM CASSETTE ENDO

## (undated) DEVICE — TUBE CONNECTING SUCTION - CLEAR PLASTIC STERILE 72 IN (50EA/CA)

## (undated) DEVICE — BLOCK BITE MAXI DENTAL RETENTION RIM (100EA/BX)

## (undated) DEVICE — PORT AUXILLARY WATER (50EA/BX)

## (undated) DEVICE — LACTATED RINGERS INJ 1000 ML - (14EA/CA 60CA/PF)

## (undated) DEVICE — DEVICE HEMOSTATIC CLIPPING RESOLUTION 360 DEGREES (20EA/BX)

## (undated) DEVICE — KIT  I.V. START (100EA/CA)

## (undated) DEVICE — NEPTUNE 4 PORT MANIFOLD - (20/PK)

## (undated) DEVICE — SODIUM CHL IRRIGATION 0.9% 1000ML (12EA/CA)

## (undated) DEVICE — CANISTER SUCTION RIGID RED 1500CC (40EA/CA)

## (undated) DEVICE — WATER IRRIGATION STERILE 1000ML (12EA/CA)

## (undated) DEVICE — KIT CUSTOM PROCEDURE SINGLE FOR ENDO (15/CA)

## (undated) DEVICE — CONTAINER, SPECIMEN, STERILE

## (undated) DEVICE — CAP ENDOSCOPE REVEAL DISTAL ATTACHMENT (10EA/BX)

## (undated) DEVICE — TOWEL STOP TIMEOUT SAFETY FLAG (40EA/CA)

## (undated) DEVICE — SENSOR OXIMETER ADULT SPO2 RD SET (20EA/BX)

## (undated) DEVICE — MASK PANORAMIC OXYGEN PRO2 (30EA/CA)